# Patient Record
Sex: FEMALE | Race: WHITE | NOT HISPANIC OR LATINO | Employment: FULL TIME | URBAN - METROPOLITAN AREA
[De-identification: names, ages, dates, MRNs, and addresses within clinical notes are randomized per-mention and may not be internally consistent; named-entity substitution may affect disease eponyms.]

---

## 2017-01-02 ENCOUNTER — OFFICE VISIT (OUTPATIENT)
Dept: ORTHOPEDICS | Facility: CLINIC | Age: 57
End: 2017-01-02
Payer: OTHER MISCELLANEOUS

## 2017-01-02 VITALS — TEMPERATURE: 96.9 F | HEIGHT: 57 IN | BODY MASS INDEX: 33.22 KG/M2 | WEIGHT: 154 LBS

## 2017-01-02 DIAGNOSIS — G56.03 BILATERAL CARPAL TUNNEL SYNDROME: ICD-10-CM

## 2017-01-02 DIAGNOSIS — G56.21 ULNAR NERVE ENTRAPMENT AT THE WRIST, RIGHT: Primary | ICD-10-CM

## 2017-01-02 PROCEDURE — 99024 POSTOP FOLLOW-UP VISIT: CPT | Performed by: ORTHOPAEDIC SURGERY

## 2017-01-02 ASSESSMENT — PAIN SCALES - GENERAL: PAINLEVEL: NO PAIN (0)

## 2017-01-02 NOTE — NURSING NOTE
"Chief Complaint   Patient presents with     RECHECK     Release of Guyon's canal right wrist.  DOS: 11/23/16 (6 weeks postop)     Surgical Followup     PREOPERATIVE DIAGNOSIS:  Compression of the ulnar nerve at Guyon's canal, right wrist.  POSTOPERATIVE DIAGNOSIS:  Compression of the ulnar nerve at Guyon's canal, right wrist.  PROCEDURE:  Release of Guyon's canal, right wrist.       Initial Temp(Src) 96.9  F (36.1  C) (Temporal)  Ht 1.454 m (4' 9.25\")  Wt 69.854 kg (154 lb)  BMI 33.04 kg/m2 Estimated body mass index is 33.04 kg/(m^2) as calculated from the following:    Height as of this encounter: 1.454 m (4' 9.25\").    Weight as of this encounter: 69.854 kg (154 lb).  BP completed using cuff size: NA (Not Taken)  KASI Maldonado  "

## 2017-01-02 NOTE — Clinical Note
[address]   [address]  Tel. [phone] / Fax [phone]        2017   Regarding: Fern Gonzalez  : 1960          To Whom it May Concern:    Fern is under my care.  Fern may return to work on 1/3/17 with the following restrictions: 5 hour work days for 1 month.  On 17 she may resume normal work days.  If you have any further questions please contact me at [phone].        Sincerely,       Leif Fine M.D./bettina laguna

## 2017-01-02 NOTE — PROGRESS NOTES
"Office Visit-Follow up  HISTORY OF PRESENT ILLNESS:    Fern Gonzalez is a 56 year old female who is seen in follow up for   Chief Complaint   Patient presents with     RECHECK     Release of Guyon's canal right wrist.  DOS: 11/23/16 (6 weeks postop)     Surgical Followup     PREOPERATIVE DIAGNOSIS:  Compression of the ulnar nerve at Guyon's canal, right wrist.  POSTOPERATIVE DIAGNOSIS:  Compression of the ulnar nerve at Guyon's canal, right wrist.  PROCEDURE:  Release of Guyon's canal, right wrist.       Patient is motivated to get back to work this point in time  Present symptoms: Her only residual symptom is that of palmar sensitivity.  Treatments tried to this point: She has had bilateral carpal tunnel releases. She had concurrent left wrist Guyon canal release and then subsequently was taken back to the OR for a right Guyon's canal release.    REVIEW OF SYSTEMS  General: negative for, night sweats, dizziness, fatigue  Resp: No shortness of breath and no cough  CV: negative for chest pain, syncope or near-syncope  GI: negative for nausea, vomiting and diarrhea  : negative for dysuria and hematuria  Musculoskeletal: as above  Neurologic: negative for syncope   Hematologic: negative for bleeding disorder    Physical Exam:  Vitals: Temp(Src) 96.9  F (36.1  C) (Temporal)  Ht 1.454 m (4' 9.25\")  Wt 69.854 kg (154 lb)  BMI 33.04 kg/m2  BMI= Body mass index is 33.04 kg/(m^2).  Constitutional: healthy, alert and no acute distress   Psychiatric: mentation appears normal and affect normal/bright  NEURO: no focal deficits  SKIN: no excoriation or erythema. No signs of infection.    GAIT: non-antalgic    JOINT/EXTREMITIES:     Inspection of her risks all well-healed.  She has local soft tissue induration. This is appropriate.  She has full range of motion of the digits and wrist.  Sensation is intact.    IMAGING INTERPRETATION: No x-rays obtained.       Impression:      ICD-10-CM    1. 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 Bilateral carpal tunnel syndrome G56.03        Patient is recovering very nicely from her wrist entrapment neuropathies.    Plan:   The above was reviewed with Fern and she agrees.     We would like to return to work. She unfortunately uses vibrating tools for 10 hour days.  We will release her to 5 hour work days. This will continue for 1 month. She will then resume her normal duties.    She was not accompanied by the [facility] today.    My plan would be to complete final paperwork in 2 months. This was explained to her. She was encouraged to return if she has any recurring issues. Otherwise, the paperwork be completed.  She was very comfortable with this approach.      Return to clinic 2, months, or PRN    Leif Fine MD

## 2017-01-02 NOTE — MR AVS SNAPSHOT
After Visit Summary   1/2/2017    Fern Gonzalez    MRN: [id number]           Patient Information     Date Of Birth          1960        Visit Information        Provider Department      1/2/2017 4:20 PM Leif Fine MD [facility]         Follow-ups after your visit        Your next 10 appointments already scheduled     Jan 06, 2017  9:30 AM   Return Sleep Patient with Deon Singh PA-C   [facility] ([facility])    [address] [place] [address]  [place] [address]   [phone]            Jan 06, 2017 11:15 AM   Treatment 45 with Joanie Moreland, OT   [facility] ([facility])    [address] [place] [address] [place] [address]   [phone]            Jan 13, 2017 11:15 AM   Treatment 45 with Joanie Moreland OT   [facility] ([facility])    [address] [place] [address] [place] [address]   [phone]            Jan 20, 2017 11:15 AM   Treatment 45 with Joanie Moreland OT   [facility] ([facility])    [address] [place] [address] [place] [address]   [phone]            Jan 27, 2017 11:15 AM   Treatment 45 with Joanie Moreland OT   [facility] ([facility])    [address] [place] [address] [place] [address]   [phone]            Jan 27, 2017 12:00 PM   Consult HOD with Lenora Norris RD   [facility] ([facility])    [address] [place] [address]   [phone]            Feb 01, 2017 10:45 AM   Treatment 45 with Joanie Moreland OT   [facility] ([facility])    [place] [address]   [phone]            Feb 10, 2017 10:30 AM   Treatment 45 with Joanie Moreland, OT   [facility]  "([facility])    [address] [place] [address]   [phone]            2017 10:30 AM   Treatment 45 with Joanie Moreland, OT   [facility] ([facility])    [address] [place] [address]   [phone]            2017 10:30 AM   Treatment 45 with Joanie Moreland, OT   [facility] ([facility])    [address] [place] [address]   [phone]              Who to contact     If you have questions or need follow up information about today's clinic visit or your schedule please contact [facility] directly at [phone].  Normal or non-critical lab and imaging results will be communicated to you by MyChart, letter or phone within 4 business days after the clinic has received the results. If you do not hear from us within 7 days, please contact the clinic through MyChart or phone. If you have a critical or abnormal lab result, we will notify you by phone as soon as possible.  Submit refill requests through [email or web address] or call your pharmacy and they will forward the refill request to us. Please allow 3 business days for your refill to be completed.          Additional Information About Your Visit        [employer] Information     [email or web address] lets you send messages to your doctor, view your test results, renew your prescriptions, schedule appointments and more. To sign up, go to [email or web address] . Click on \"Log in\" on the left side of the screen, which will take you to the Welcome page. Then click on \"Sign up Now\" on the right side of the page.     You will be asked to enter the access code listed below, as well as some personal information. Please follow the directions to create your username and password.     Your access code is: [email or web address]  Expires: 2017  3:52 PM     Your access code will  in 90 days. If you need help or a new " "code, please call your [facility] or [phone].        Your Vitals Were     Temperature Height BMI (Body Mass Index)             96.9  F (36.1  C) (Temporal) 1.454 m (4' 9.25\") 33.04 kg/m2          Blood Pressure from Last 3 Encounters:   12/13/16 113/62   11/23/16 106/77   11/18/16 112/66    Weight from Last 3 Encounters:   01/02/17 69.854 kg (154 lb)   12/28/16 70.58 kg (155 lb 9.6 oz)   12/13/16 69.854 kg (154 lb)              Today, you had the following     No orders found for display       Primary Care Provider Office Phone # Fax #    Mirta Fan [phone]       [facility] [place] [address] [place] [address]        Thank you!     Thank you for choosing [facility]  for your care. Our goal is always to provide you with excellent care. Hearing back from our patients is one way we can continue to improve our services. Please take a few minutes to complete the written survey that you may receive in the mail after your visit with us. Thank you!             Your Updated Medication List - Protect others around you: Learn how to safely use, store and throw away your medicines at www.disposemymeds.org.          This list is accurate as of: 1/2/17  4:23 PM.  Always use your most recent med list.                   Brand Name Dispense Instructions for use    fexofenadine 180 MG tablet    ALLEGRA    30 tablet    Take 1 tablet (180 mg) by mouth daily       ibuprofen 200 MG tablet    ADVIL/MOTRIN    120 tablet    Take 3 tablets (600 mg) by mouth every 6 hours as needed       ipratropium - albuterol 0.5 mg/2.5 mg/3 mL 0.5-2.5 (3) MG/3ML neb solution    DUONEB    30 vial    Take 1 vial (3 mLs) by nebulization every 6 hours as needed for shortness of breath / dyspnea or wheezing       order for DME     1 each    Equipment being ordered: Other: nebulizer machine Treatment Diagnosis: acute bronchospasm, respiratory distress  Use as directed with albuterol       " Vitamin D3 3000 UNITS Tabs          ZOFRAN PO      Take 4 mg by mouth every 6 hours as needed for nausea or vomiting       zolpidem 10 MG tablet    AMBIEN    1 tablet    Take tablet by mouth 15 minutes prior to sleep, for Sleep Study

## 2017-01-13 ENCOUNTER — HOSPITAL ENCOUNTER (OUTPATIENT)
Dept: OCCUPATIONAL THERAPY | Facility: CLINIC | Age: 57
Setting detail: THERAPIES SERIES
End: 2017-01-13
Attending: PHYSICIAN ASSISTANT
Payer: OTHER MISCELLANEOUS

## 2017-01-13 ENCOUNTER — OFFICE VISIT (OUTPATIENT)
Dept: SLEEP MEDICINE | Facility: CLINIC | Age: 57
End: 2017-01-13
Payer: COMMERCIAL

## 2017-01-13 VITALS
HEIGHT: 57 IN | HEART RATE: 82 BPM | DIASTOLIC BLOOD PRESSURE: 73 MMHG | OXYGEN SATURATION: 95 % | BODY MASS INDEX: 33.44 KG/M2 | WEIGHT: 155 LBS | SYSTOLIC BLOOD PRESSURE: 107 MMHG

## 2017-01-13 DIAGNOSIS — G47.33 OSA (OBSTRUCTIVE SLEEP APNEA): Primary | ICD-10-CM

## 2017-01-13 PROCEDURE — 99213 OFFICE O/P EST LOW 20 MIN: CPT | Performed by: PHYSICIAN ASSISTANT

## 2017-01-13 PROCEDURE — 97110 THERAPEUTIC EXERCISES: CPT | Mod: GO | Performed by: OCCUPATIONAL THERAPIST

## 2017-01-13 PROCEDURE — 97035 APP MDLTY 1+ULTRASOUND EA 15: CPT | Mod: GO | Performed by: OCCUPATIONAL THERAPIST

## 2017-01-13 PROCEDURE — 97140 MANUAL THERAPY 1/> REGIONS: CPT | Mod: GO | Performed by: OCCUPATIONAL THERAPIST

## 2017-01-13 PROCEDURE — 40000839 ZZH STATISTIC HAND THERAPY VISIT: Performed by: OCCUPATIONAL THERAPIST

## 2017-01-13 NOTE — MR AVS SNAPSHOT
After Visit Summary   1/13/2017    Fern Gonzalez    MRN: [id number]           Patient Information     Date Of Birth          1960        Visit Information        Provider Department      1/13/2017 9:00 AM Deon Singh PA-C [facility]        Care Instructions      Your BMI is There is no weight on file to calculate BMI.  Weight management is a personal decision.  If you are interested in exploring weight loss strategies, the following discussion covers the approaches that may be successful. Body mass index (BMI) is one way to tell whether you are at a healthy weight, overweight, or obese. It measures your weight in relation to your height.  A BMI of 18.5 to 24.9 is in the healthy range. A person with a BMI of 25 to 29.9 is considered overweight, and someone with a BMI of 30 or greater is considered obese. More than two-thirds of American adults are considered overweight or obese.  Being overweight or obese increases the risk for further weight gain. Excess weight may lead to heart disease and diabetes.  Creating and following plans for healthy eating and physical activity may help you improve your health.  Weight control is part of healthy lifestyle and includes exercise, emotional health, and healthy eating habits. Careful eating habits lifelong are the mainstay of weight control. Though there are significant health benefits from weight loss, long-term weight loss with diet alone may be very difficult to achieve- studies show long-term success with dietary management in less than 10% of people. Attaining a healthy weight may be especially difficult to achieve in those with severe obesity. In some cases, medications, devices and surgical management might be considered.  What can you do?  If you are overweight or obese and are interested in methods for weight loss, you should discuss this with your provider.     Consider reducing daily calorie intake by 500  calories.     Keep a food journal.     Avoiding skipping meals, consider cutting portions instead.    Diet combined with exercise helps maintain muscle while optimizing fat loss. Strength training is particularly important for building and maintaining muscle mass. Exercise helps reduce stress, increase energy, and improves fitness. Increasing exercise without diet control, however, may not burn enough calories to loose weight.       Start walking three days a week 10-20 minutes at a time    Work towards walking thirty minutes five days a week     Eventually, increase the speed of your walking for 1-2 minutes at time    In addition, we recommend that you review healthy lifestyles and methods for weight loss available through the National Institutes of Health patient information sites:  http://win.niddk.nih.gov/publications/index.htm    And look into health and wellness programs that may be available through your health insurance provider, employer, local community center, or ghislaine club.    Weight management plan: Patient was referred to their PCP to discuss a diet and exercise plan.    The problem of recurrent insomnia is discussed. Avoidance of caffeine sources is strongly encouraged. Sleep hygiene issues are reviewed. The use of sedative hypnotics for temporary relief is appropriate; we discussed the addictive nature of these drugs, and a one-time only prescription for prn use of a hypnotic is given, to use no more than 3 times per week for 2-3 weeks.    Sometimes insomnia can be made worse by our own habits or situation.  You should consider making some of the following changes to help improve your sleep:     If there is excessive noise in your house / neighborhood use a fan or similar device to make a quiet background noise that can drown out other noises    If your room is too bright early in the morning, hang a blanket or extra curtain over the windows to keep the light out or use a sleeping mask to cover your  eyes    If your room is too warm during the night, set the temperature in the house down a few degrees for the night    Spend a little time before bedtime trying to relax.  Don t work right up until bedtime.  Take 30-60 minutes to relax and unwind before bedtime with a book or watching TV    Do not drink anything with alcohol or caffeine in them for at least 4-5 hours before bedtime    Do not smoke right before bedtime or during the night    No strenuous exercise for 2-3 hours before bedtime    The problem of recurrent insomnia is discussed. Avoidance of caffeine sources is strongly encouraged. Sleep hygiene issues are reviewed. The use of sedative hypnotics for temporary relief is appropriate; we discussed the addictive nature of these drugs, and a one-time only prescription for prn use of a hypnotic is given, to use no more than 3 times per week for 2-3 weeks.  Stress, tension, and anxiety can be big factors contributing to insomnia.  If you can t relax your mind and body, then you won t be able to sleep.  You would likely benefit from trying some of the relaxation exercises that we ve assembled below.  These are all internet based links.  If you don t have or use a computer, you may benefit from one of the stress management books listed below that you can get at your public library or at a local bookstore for a relatively low price.      Copy and paste into web browser address window   https://www.[employer].com/watch?v=Eown9dgAyZg    Progressive Muscle Relaxation (PMR):     http://www.[employer]Hawk/progressive-muscle-relaxation-exercise.html     http://studentsupport.[facility]/counseling/resources/self-help/relaxation-and-stress-management/   Deep Breathing Exercises:    http://www.[employer]Hawk/breathing-awareness.html     Meditation:     www.CityVoter    www.theOcera Therapeuticsguided-meditation-site.com You may have to pay for some of these resources.    Guided  Imagery:    http://www.[place].MODIZY.COM/guided-imagery-scripts.html     http://S[employer]aCamp/library/guqohyoqwy-kgakip-monrrym/    [place] site under construction : http://www.[place].[facility]/Services/SleepMedicine/Audio/index.htm        Follow-ups after your visit        Your next 10 appointments already scheduled     Jan 13, 2017 11:15 AM   Treatment 45 with Joanie Moreland OT   [facility] ([facility])    [address] [place] [address]   [phone]            Jan 20, 2017 11:15 AM   Treatment 45 with Joanie Moreland OT   [facility] ([facility])    [address] [place] [address]   [phone]            Jan 27, 2017 11:15 AM   Treatment 45 with Joanie Moreland OT   [facility] ([facility])    [address] [place] [address]   [phone]            Jan 27, 2017 12:00 PM   Consult HOD with Lenora Norris RD   [facility] ([facility])    [address] [place] [address] [place] [address]   [phone]            Feb 01, 2017 10:45 AM   Treatment 45 with Joanie Moreland OT   [facility] ([facility])    [address] [place] [address]   [phone]            Feb 10, 2017 10:30 AM   Treatment 45 with Joanie Moreland OT   [facility] ([facility])    [address] [place] [address]   [phone]            Feb 17, 2017 10:30 AM   Treatment 45 with Joanie Moreland OT   [facility] ([facility])    [address] [place] [address]   [phone]            Feb 24, 2017 10:30 AM   Treatment 45 with Joanie Moreland OT   [facility] ([facility])    [address] [place] [address] [phone]              Who to contact     If you have questions or need follow up information about today's clinic visit or your schedule please contact [facility] directly at [phone].  Normal or non-critical lab and imaging results will be communicated to you by MyChart, letter or phone within 4 business days after the clinic has received the results. If you do not hear from us within 7 days, please contact the clinic through MyChart or phone. If you have a critical or abnormal lab result, we will notify you by phone as soon as possible.  Submit refill requests through [employer] or call your pharmacy and they will forward the refill request to us. Please allow 3 business days for your refill to be completed.          Additional Information About Your Visit        MyChart Information     [employer]ulation lets you send messages to your doctor, view your test results, renew your prescriptions, schedule appointments and more. To sign up, go to [email or web address] . Click on \"Log in\" on the left side of the screen, which will take you to the Welcome page. Then click on \"Sign up Now\" on the right side of the page.     You will be asked to enter the access code listed below, as well as some personal information. Please follow the directions to create your username and password.     Your access code is: [email or web address]  Expires: 2017  3:52 PM     Your access code will  in 90 days. If you need help or a new code, please call your [facility] clinic or [phone].        Care EveryWhere ID     This is your Care EveryWhere ID. This could be used by other organizations to access your [facility] medical records  [id number]        Your Vitals Were     Pulse Height BMI (Body Mass Index) Pulse Oximetry          82 1.448 m (4' 9\") 33.53 kg/m2 95%         Blood Pressure from Last 3 Encounters:   17 107/73   16 113/62   16 106/77    Weight from " Last 3 Encounters:   01/13/17 70.308 kg (155 lb)   01/02/17 69.854 kg (154 lb)   12/28/16 70.58 kg (155 lb 9.6 oz)              Today, you had the following     No orders found for display         Today's Medication Changes          These changes are accurate as of: 1/13/17  9:31 AM.  If you have any questions, ask your nurse or doctor.               Stop taking these medicines if you haven't already. Please contact your care team if you have questions.     Vitamin D3 3000 UNITS Tabs   Stopped by:  Deon Singh PA-C           ZOFRAN PO   Stopped by:  Deon Singh PA-C           zolpidem 10 MG tablet   Commonly known as:  AMBIEN   Stopped by:  Deon Singh PA-C                    Primary Care Provider Office Phone # Fax #    Mirta Edilson Fan [phone]       [facility] [place] [address] [place] [address]        Thank you!     Thank you for choosing [facility]  for your care. Our goal is always to provide you with excellent care. Hearing back from our patients is one way we can continue to improve our services. Please take a few minutes to complete the written survey that you may receive in the mail after your visit with us. Thank you!             Your Updated Medication List - Protect others around you: Learn how to safely use, store and throw away your medicines at www.disposemymeds.org.          This list is accurate as of: 1/13/17  9:31 AM.  Always use your most recent med list.                   Brand Name Dispense Instructions for use    fexofenadine 180 MG tablet    ALLEGRA    30 tablet    Take 1 tablet (180 mg) by mouth daily       ibuprofen 200 MG tablet    ADVIL/MOTRIN    120 tablet    Take 3 tablets (600 mg) by mouth every 6 hours as needed       ipratropium - albuterol 0.5 mg/2.5 mg/3 mL 0.5-2.5 (3) MG/3ML neb solution    DUONEB    30 vial    Take 1 vial (3 mLs) by nebulization every 6 hours as needed for shortness of  breath / dyspnea or wheezing       order for DME     1 each    Equipment being ordered: Other: nebulizer machine Treatment Diagnosis: acute bronchospasm, respiratory distress  Use as directed with albuterol

## 2017-01-13 NOTE — NURSING NOTE
"Chief Complaint   Patient presents with     Sleep Problem     go over sleep study       Initial /73 mmHg  Pulse 82  Ht 1.448 m (4' 9\")  Wt 70.308 kg (155 lb)  BMI 33.53 kg/m2  SpO2 95% Estimated body mass index is 33.53 kg/(m^2) as calculated from the following:    Height as of this encounter: 1.448 m (4' 9\").    Weight as of this encounter: 70.308 kg (155 lb).  BP completed using cuff size: large  "

## 2017-01-13 NOTE — PATIENT INSTRUCTIONS
Your BMI is There is no weight on file to calculate BMI.  Weight management is a personal decision.  If you are interested in exploring weight loss strategies, the following discussion covers the approaches that may be successful. Body mass index (BMI) is one way to tell whether you are at a healthy weight, overweight, or obese. It measures your weight in relation to your height.  A BMI of 18.5 to 24.9 is in the healthy range. A person with a BMI of 25 to 29.9 is considered overweight, and someone with a BMI of 30 or greater is considered obese. More than two-thirds of American adults are considered overweight or obese.  Being overweight or obese increases the risk for further weight gain. Excess weight may lead to heart disease and diabetes.  Creating and following plans for healthy eating and physical activity may help you improve your health.  Weight control is part of healthy lifestyle and includes exercise, emotional health, and healthy eating habits. Careful eating habits lifelong are the mainstay of weight control. Though there are significant health benefits from weight loss, long-term weight loss with diet alone may be very difficult to achieve- studies show long-term success with dietary management in less than 10% of people. Attaining a healthy weight may be especially difficult to achieve in those with severe obesity. In some cases, medications, devices and surgical management might be considered.  What can you do?  If you are overweight or obese and are interested in methods for weight loss, you should discuss this with your provider.     Consider reducing daily calorie intake by 500 calories.     Keep a food journal.     Avoiding skipping meals, consider cutting portions instead.    Diet combined with exercise helps maintain muscle while optimizing fat loss. Strength training is particularly important for building and maintaining muscle mass. Exercise helps reduce stress, increase energy, and  improves fitness. Increasing exercise without diet control, however, may not burn enough calories to loose weight.       Start walking three days a week 10-20 minutes at a time    Work towards walking thirty minutes five days a week     Eventually, increase the speed of your walking for 1-2 minutes at time    In addition, we recommend that you review healthy lifestyles and methods for weight loss available through the National Institutes of Health patient information sites:  http://win.niddk.nih.gov/publications/index.htm    And look into health and wellness programs that may be available through your health insurance provider, employer, local community center, or ghislaine club.    Weight management plan: Patient was referred to their PCP to discuss a diet and exercise plan.    The problem of recurrent insomnia is discussed. Avoidance of caffeine sources is strongly encouraged. Sleep hygiene issues are reviewed. The use of sedative hypnotics for temporary relief is appropriate; we discussed the addictive nature of these drugs, and a one-time only prescription for prn use of a hypnotic is given, to use no more than 3 times per week for 2-3 weeks.    Sometimes insomnia can be made worse by our own habits or situation.  You should consider making some of the following changes to help improve your sleep:     If there is excessive noise in your house / neighborhood use a fan or similar device to make a quiet background noise that can drown out other noises    If your room is too bright early in the morning, hang a blanket or extra curtain over the windows to keep the light out or use a sleeping mask to cover your eyes    If your room is too warm during the night, set the temperature in the house down a few degrees for the night    Spend a little time before bedtime trying to relax.  Don t work right up until bedtime.  Take 30-60 minutes to relax and unwind before bedtime with a book or watching TV    Do not drink anything  with alcohol or caffeine in them for at least 4-5 hours before bedtime    Do not smoke right before bedtime or during the night    No strenuous exercise for 2-3 hours before bedtime    The problem of recurrent insomnia is discussed. Avoidance of caffeine sources is strongly encouraged. Sleep hygiene issues are reviewed. The use of sedative hypnotics for temporary relief is appropriate; we discussed the addictive nature of these drugs, and a one-time only prescription for prn use of a hypnotic is given, to use no more than 3 times per week for 2-3 weeks.  Stress, tension, and anxiety can be big factors contributing to insomnia.  If you can t relax your mind and body, then you won t be able to sleep.  You would likely benefit from trying some of the relaxation exercises that we ve assembled below.  These are all internet based links.  If you don t have or use a computer, you may benefit from one of the stress management books listed below that you can get at your public library or at a local bookstore for a relatively low price.      Copy and paste into web browser address window   https://www.[employer].com/watch?v=Yzab0eeRmLj    Progressive Muscle Relaxation (PMR):     http://www.[employer]/progressive-muscle-relaxation-exercise.html     http://studentsupport.[facility]/counseling/resources/self-help/relaxation-and-stress-management/   Deep Breathing Exercises:    http://www.[employer]/breathing-awareness.html     Meditation:     www.[employer]    www.[employer] IndustriesguidedExacastermeditation-site.com You may have to pay for some of these resources.    Guided Imagery:    http://www.[employer]/guided-imagery-scripts.html     http://[employer]anz[employer]/library/qgwvarscmb-rpumgv-gsdxfbz/    [employer]tis site under construction : http://www.fairview.org/Services/SleepMedicine/Audio/index.htm

## 2017-01-13 NOTE — PROGRESS NOTES
Sleep Study Follow-Up Visit:    Date on this visit: 1/13/2017    Fern Gonzalez comes in today for follow-up of her sleep study done on 12/15/2016 at the [facility] for excessive daytime sleepiness and possible sleep apnea.    Sleep latency 10.5 minutes with Ambien.  REM achieved.   REM latency 150 minutes.  Sleep efficiency 77.4%. Total sleep time 396.5 minutes.    Sleep architecture:  Stage 1, 8.4% (5%), stage 2, 72.4% (45-55%), stage 3, 2.3% (15-20%), stage REM, 16.9% (20-25%).  AHI was 1.8, without desaturations. RDI 4.5.  REM RDI 3.6, consistent with no REM NICOLE.  Supine RDI 3.1, consistent with no SUPINE NICOLE.  Periodic Limb Movement Index 8.9/hour.       These findings were reviewed with patient.     Past medical/surgical history, family history, social history, medications and allergies were reviewed.      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, right 11/08/2016     Priority: Medium     Lateral epicondylitis of right elbow 06/02/2016     Priority: Medium     Bilateral carpal tunnel syndrome 06/02/2016     Priority: Medium     Respiratory distress 09/01/2015     Priority: Medium     Wheezing-associated respiratory infection 09/01/2015     Priority: Medium     Tobacco use disorder 04/08/2015     Priority: Medium     Anxiety 08/15/2014     Priority: Medium     Major depressive disorder, recurrent episode, moderate (H) 05/14/2015     CARDIOVASCULAR SCREENING; LDL GOAL LESS THAN 160 10/07/2011     Meningitis due to viruses not elsewhere classified 10/03/2011     Herpes zoster with meningitis 10/03/2011     Chronic cholecystitis 01/22/2007        Impression/Plan:  No nicole, patient is given information regarding insomnia. Will follow up if she wishes to pursue further treatment.   She will follow up with me as needed.     Fifteen minutes spent with patient, all of which were spent face-to-face counseling, consulting,  coordinating plan of care.          CC: Mirta Fan

## 2017-02-03 ENCOUNTER — TELEPHONE (OUTPATIENT)
Dept: ORTHOPEDICS | Facility: CLINIC | Age: 57
End: 2017-02-03

## 2017-02-03 NOTE — TELEPHONE ENCOUNTER
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 please call to clarify.     Phone Number Patient can be reached at: Other phone number:  [employer] - [phone]      Best Time: today if possible     Can we leave a detailed message on this number? Not Applicable    Call taken on 2/3/2017 at 8:59 AM by Debora Perez

## 2017-02-03 NOTE — TELEPHONE ENCOUNTER
Left message for Lynda to return call.  Per last office notes:      Plan:   The above was reviewed with Fern and she agrees.      We would like to return to work. She unfortunately uses vibrating tools for 10 hour days.  We will release her to 5 hour work days. This will continue for 1 month. She will then resume her normal duties.    She was not accompanied by the [facility] today.    My plan would be to complete final paperwork in 2 months. This was explained to her. She was encouraged to return if she has any recurring issues. Otherwise, the paperwork be completed.  She was very comfortable with this approach.

## 2017-02-19 ENCOUNTER — HOSPITAL ENCOUNTER (EMERGENCY)
Facility: CLINIC | Age: 57
Discharge: HOME | End: 2017-02-19
Attending: PHYSICIAN ASSISTANT | Admitting: PHYSICIAN ASSISTANT
Payer: COMMERCIAL

## 2017-02-19 VITALS
BODY MASS INDEX: 31.28 KG/M2 | OXYGEN SATURATION: 94 % | HEART RATE: 78 BPM | RESPIRATION RATE: 16 BRPM | SYSTOLIC BLOOD PRESSURE: 114 MMHG | DIASTOLIC BLOOD PRESSURE: 86 MMHG | HEIGHT: 57 IN | WEIGHT: 145 LBS | TEMPERATURE: 99.3 F

## 2017-02-19 DIAGNOSIS — J10.1 INFLUENZA A: ICD-10-CM

## 2017-02-19 LAB
FLUAV+FLUBV AG SPEC QL: ABNORMAL
FLUAV+FLUBV AG SPEC QL: POSITIVE
SPECIMEN SOURCE: ABNORMAL

## 2017-02-19 PROCEDURE — 87804 INFLUENZA ASSAY W/OPTIC: CPT | Performed by: FAMILY MEDICINE

## 2017-02-19 PROCEDURE — 25000125 ZZHC RX 250: Performed by: PHYSICIAN ASSISTANT

## 2017-02-19 PROCEDURE — 25000128 H RX IP 250 OP 636: Performed by: PHYSICIAN ASSISTANT

## 2017-02-19 PROCEDURE — 99284 EMERGENCY DEPT VISIT MOD MDM: CPT | Mod: 25

## 2017-02-19 PROCEDURE — 94640 AIRWAY INHALATION TREATMENT: CPT

## 2017-02-19 PROCEDURE — 99284 EMERGENCY DEPT VISIT MOD MDM: CPT | Performed by: PHYSICIAN ASSISTANT

## 2017-02-19 PROCEDURE — 96372 THER/PROPH/DIAG INJ SC/IM: CPT

## 2017-02-19 RX ORDER — KETOROLAC TROMETHAMINE 30 MG/ML
30 INJECTION, SOLUTION INTRAMUSCULAR; INTRAVENOUS ONCE
Status: COMPLETED | OUTPATIENT
Start: 2017-02-19 | End: 2017-02-19

## 2017-02-19 RX ORDER — IPRATROPIUM BROMIDE AND ALBUTEROL SULFATE 2.5; .5 MG/3ML; MG/3ML
3 SOLUTION RESPIRATORY (INHALATION) ONCE
Status: COMPLETED | OUTPATIENT
Start: 2017-02-19 | End: 2017-02-19

## 2017-02-19 RX ADMIN — KETOROLAC TROMETHAMINE 30 MG: 30 INJECTION, SOLUTION INTRAMUSCULAR at 14:49

## 2017-02-19 RX ADMIN — IPRATROPIUM BROMIDE AND ALBUTEROL SULFATE 3 ML: .5; 3 SOLUTION RESPIRATORY (INHALATION) at 14:51

## 2017-02-19 ASSESSMENT — ENCOUNTER SYMPTOMS
CHILLS: 1
CHEST TIGHTNESS: 1
FEVER: 0
SHORTNESS OF BREATH: 0
SORE THROAT: 0
VOMITING: 0
NAUSEA: 0
SINUS PRESSURE: 1
DIARRHEA: 0
COUGH: 1
HEADACHES: 1
ABDOMINAL PAIN: 0

## 2017-02-19 NOTE — ED AVS SNAPSHOT
[facility] Emergency Department    [address] [place] [address]    Phone:  [phone]    Fax:  [phone]                                       Fern Gonzalez   MRN: [id number]    Department:  [facility] Emergency Department   Date of Visit:  2/19/2017           After Visit Summary Signature Page     I have received my discharge instructions, and my questions have been answered. I have discussed any challenges I see with this plan with the nurse or doctor.    ..........................................................................................................................................  Patient/Patient Representative Signature      ..........................................................................................................................................  Patient Representative Print Name and Relationship to Patient    ..................................................               ................................................  Date                                            Time    ..........................................................................................................................................  Reviewed by Signature/Title    ...................................................              ..............................................  Date                                                            Time

## 2017-02-19 NOTE — DISCHARGE INSTRUCTIONS
Continue using ibuprofen or Tylenol for fever/headache/bodyaches.  You can also continue using Mucinex, NyQuil, and other over-the-counter cold/flu remedies for symptoms. Use your duo-neb treatments as needed for wheezing/worsening cough.  Be sure to get plenty of rest and drink lots of fluids to stay hydrated. You should start feeling better in 5-8 days.  Do not go to work until you are feeling better as this is very contagious. Return to the emergency department as discussed if your symptoms worsen or you develop difficulty breathing.  Thank you for choosing [facility] Emergency Department. It was a pleasure taking care of you today. If you have any questions, please call [phone].    Kareen Perez PA-C      Influenza (Adult)    Influenza is also called the flu. It is a viral illness that affects the air passages of your lungs. It is different from the common cold. The flu can easily be passed from one to person to another. It may be spread through the air by coughing and sneezing. Or it can be spread by touching the sick person and then touching your own eyes, nose, or mouth.  The flu starts 1 to 3 days after you are exposed to the flu virus. It may last for 1 to 2 weeks. You usually don t need to take antibiotics unless you have a complication. This might be an ear or sinus infection or pneumonia.  Symptoms of the flu may be mild or severe. They can include extreme tiredness (wanting to stay in bed all day), chills, fevers, muscle aches, soreness with eye movement, headache, and a dry, hacking cough.  Home care  Follow these guidelines when caring for yourself at home:    Avoid being around cigarette smoke, whether yours or other people s.    Acetaminophen or ibuprofen will help ease your fever, muscle aches, and headache. Don t give aspirin to anyone younger than 18 who has the flu. Aspirin can harm the liver.    Nausea and loss of appetite are common with the flu. Eat light meals. Drink 6  to 8 glasses of liquids every day. Good choices are water, sport drinks, soft drinks without caffeine, juices, tea, and soup. Extra fluids will also help loosen secretions in your nose and lungs.    Over-the-counter cold medicines will not make the flu go away faster. But the medicines may help with coughing, sore throat, and congestion in your nose and sinuses. Don t use a decongestant if you have high blood pressure.    Stay home until your fever has been gone for at least 24 hours without using medicine to reduce fever.  Follow-up care  Follow up with your health care provider, or as advised, if you are not getting better over the next week.  If you are 65 or older, talk with your provider about getting a pneumococcal vaccine every 5 years. You should also get this vaccine if you have chronic asthma or COPD. All adults should get a flu vaccine every fall. Ask your provider about this.  When to seek medical advice  Call your health care provider right away if any of these occur:    Cough with lots of colored sputum (mucus) or blood in your sputum    Chest pain, shortness of breath, wheezing, or difficulty breathing    Severe headache, or face, neck, or ear pain    New rash with fever    Fever of 101 F (38 C) oral or higher that doesn t get better with fever medicine    Confusion, behavior change, or seizure    Severe weakness or dizziness    You get a fever or cough after getting better for a few days

## 2017-02-19 NOTE — ED AVS SNAPSHOT
[facility] Emergency Department    [address] [place] [address]    Phone:  [phone]    Fax:  [phone]                                       Fern Gonzalez   MRN: [id number]    Department:  [facility] Emergency Department   Date of Visit:  2/19/2017           Patient Information     Date Of Birth          1960        Your diagnoses for this visit were:     Influenza A        You were seen by Kareen Perez PA-C.      Follow-up Information     Follow up with Mirta Fan NP In 1 week.    Specialty:  Nurse Practitioner - Family    Why:  As needed for persistent symptoms    Contact information:    [facility]  [address] [place] [address] [phone]          Follow up with [facility].    Specialty:  EMERGENCY MEDICINE    Why:  If symptoms worsen    Contact information:    [address] [place] [address] [address] [place] [address] [phone]    Additional information:    From [address]: Exit at [address] on south side of [place]. Turn right on [address]. Turn left at stoplight on [address]. [facility] will be in view two blocks ahead        Discharge Instructions           Continue using ibuprofen or Tylenol for fever/headache/bodyaches.  You can also continue using Mucinex, NyQuil, and other over-the-counter cold/flu remedies for symptoms.  Be sure to get plenty of rest and drink lots of fluids to stay hydrated. You should start feeling better in 5-8 days.  Do not go to work until you are feeling better as this is very contagious.  Thank you for choosing [facility] Emergency Department. It was a pleasure taking care of you today. If you have any questions, please call [phone].    Kareen Perez PA-C      Influenza (Adult)    Influenza is also called the flu. It is a viral illness that affects the air passages of your lungs. It is different from the common cold. The flu  can easily be passed from one to person to another. It may be spread through the air by coughing and sneezing. Or it can be spread by touching the sick person and then touching your own eyes, nose, or mouth.  The flu starts 1 to 3 days after you are exposed to the flu virus. It may last for 1 to 2 weeks. You usually don t need to take antibiotics unless you have a complication. This might be an ear or sinus infection or pneumonia.  Symptoms of the flu may be mild or severe. They can include extreme tiredness (wanting to stay in bed all day), chills, fevers, muscle aches, soreness with eye movement, headache, and a dry, hacking cough.  Home care  Follow these guidelines when caring for yourself at home:    Avoid being around cigarette smoke, whether yours or other people s.    Acetaminophen or ibuprofen will help ease your fever, muscle aches, and headache. Don t give aspirin to anyone younger than 18 who has the flu. Aspirin can harm the liver.    Nausea and loss of appetite are common with the flu. Eat light meals. Drink 6 to 8 glasses of liquids every day. Good choices are water, sport drinks, soft drinks without caffeine, juices, tea, and soup. Extra fluids will also help loosen secretions in your nose and lungs.    Over-the-counter cold medicines will not make the flu go away faster. But the medicines may help with coughing, sore throat, and congestion in your nose and sinuses. Don t use a decongestant if you have high blood pressure.    Stay home until your fever has been gone for at least 24 hours without using medicine to reduce fever.  Follow-up care  Follow up with your health care provider, or as advised, if you are not getting better over the next week.  If you are 65 or older, talk with your provider about getting a pneumococcal vaccine every 5 years. You should also get this vaccine if you have chronic asthma or COPD. All adults should get a flu vaccine every fall. Ask your provider about this.  When to  seek medical advice  Call your health care provider right away if any of these occur:    Cough with lots of colored sputum (mucus) or blood in your sputum    Chest pain, shortness of breath, wheezing, or difficulty breathing    Severe headache, or face, neck, or ear pain    New rash with fever    Fever of 101 F (38 C) oral or higher that doesn t get better with fever medicine    Confusion, behavior change, or seizure    Severe weakness or dizziness    You get a fever or cough after getting better for a few days           Future Appointments        Provider Department Dept Phone Center    2/24/2017 10:30 AM Joanie Moreland OT [facility] Occupational Therapy [phone] [facility]      24 Hour Appointment Hotline       To make an appointment at any [facility], call [phone] ([phone]). If you don't have a family doctor or clinic, we will help you find one. [facility] are conveniently located to serve the needs of you and your family.             Review of your medicines      Our records show that you are taking the medicines listed below. If these are incorrect, please call your family doctor or clinic.        Dose / Directions Last dose taken    fexofenadine 180 MG tablet   Commonly known as:  ALLEGRA   Dose:  180 mg   Quantity:  30 tablet        Take 1 tablet (180 mg) by mouth daily   Refills:  0        ibuprofen 200 MG tablet   Commonly known as:  ADVIL/MOTRIN   Dose:  600 mg   Quantity:  120 tablet        Take 3 tablets (600 mg) by mouth every 6 hours as needed   Refills:  0        ipratropium - albuterol 0.5 mg/2.5 mg/3 mL 0.5-2.5 (3) MG/3ML neb solution   Commonly known as:  DUONEB   Dose:  1 vial   Quantity:  30 vial        Take 1 vial (3 mLs) by nebulization every 6 hours as needed for shortness of breath / dyspnea or wheezing   Refills:  1        order for DME   Quantity:  1 each        Equipment being ordered: Other: nebulizer machine Treatment Diagnosis: acute  "bronchospasm, respiratory distress  Use as directed with albuterol   Refills:  0                Procedures and tests performed during your visit     Influenza A/B antigen      Orders Needing Specimen Collection     None      Pending Results     No orders found from 2017 to 2017.            Pending Culture Results     No orders found from 2017 to 2017.            Thank you for choosing [facility]       Thank you for choosing [facility] for your care. Our goal is always to provide you with excellent care. Hearing back from our patients is one way we can continue to improve our services. Please take a few minutes to complete the written survey that you may receive in the mail after you visit with us. Thank you!        [employer] Information     [employer] lets you send messages to your doctor, view your test results, renew your prescriptions, schedule appointments and more. To sign up, go to [email or web address] . Click on \"Log in\" on the left side of the screen, which will take you to the Welcome page. Then click on \"Sign up Now\" on the right side of the page.     You will be asked to enter the access code listed below, as well as some personal information. Please follow the directions to create your username and password.     Your access code is: [email or web address]  Expires: 2017  3:52 PM     Your access code will  in 90 days. If you need help or a new code, please call your [facility] clinic or [phone].        Care EveryWhere ID     This is your Care EveryWhere ID. This could be used by other organizations to access your [facility] medical records  [id number]        After Visit Summary       This is your record. Keep this with you and show to your community pharmacist(s) and doctor(s) at your next visit.                  "

## 2017-02-19 NOTE — LETTER
[facility] EMERGENCY DEPARTMENT  [address] [place] [address]  [phone]  Dept: [phone]      2/19/2017    Re: Fern Gonzalez      TO WHOM IT MAY CONCERN:    Fern Gonzalez was seen on 2/19/17.  Please excuse her until 2/27/17 due to illness.    Cordially      Kareen Perez PA-C   [facility] EMERGENCY DEPARTMENT  
Patient

## 2017-02-19 NOTE — ED PROVIDER NOTES
"  History     Chief Complaint   Patient presents with     Flu Symptoms     Otalgia     Cough     HPI  Fern Gonzalez is a 56 year old female who presents to the emergency department with flu symptoms.  2 days ago she developed sinus pressure/congestion with runny nose, right ear pain, dry cough, chest tightness, body aches, chills, and sinus headache.  She has tried Mucinex and NyQuil without significant relief.  Right ear feels like there is a lot of pressure behind it.  Headache also described as a pressure behind her right eye.  She denies significant shortness of breath but reports breathing feeling \"tight\" and wheezy.  No chest pain, no vomiting or diarrhea.  She did not get her flu shot this year.  Many people at her work have had similar symptoms.  She is a smoker.     I have reviewed the Medications, Allergies, Past Medical and Surgical History, and Social History in the Epic system.    Review of Systems   Constitutional: Positive for chills. Negative for fever.   HENT: Positive for congestion, ear pain (right) and sinus pressure. Negative for sore throat.    Eyes: Negative for visual disturbance.   Respiratory: Positive for cough and chest tightness. Negative for shortness of breath.    Cardiovascular: Negative for chest pain.   Gastrointestinal: Negative for abdominal pain, diarrhea, nausea and vomiting.   Neurological: Positive for headaches.   All other systems reviewed and are negative.      Physical Exam   BP: 114/86  Pulse: 115  Temp: 99.3  F (37.4  C)  Resp: 16  Height: 144.8 cm (4' 9\")  Weight: 65.8 kg (145 lb)  SpO2: 94 %  Physical Exam   Constitutional: She is oriented to person, place, and time. She appears well-developed and well-nourished. She appears ill. No distress.   HENT:   Head: Normocephalic and atraumatic.   Right Ear: External ear normal. A middle ear effusion is present.   Left Ear: Tympanic membrane and external ear normal.   Nose: Right sinus exhibits maxillary sinus tenderness. "   Mouth/Throat: Uvula is midline, oropharynx is clear and moist and mucous membranes are normal. No oropharyngeal exudate.   Cardiovascular: Normal rate, regular rhythm and normal heart sounds.  Exam reveals no gallop and no friction rub.    No murmur heard.  Pulmonary/Chest: Effort normal. No respiratory distress. She has wheezes (faint, expiratory at bases). She has no rales.   Abdominal: Soft. There is no tenderness. There is no rebound.   Neurological: She is alert and oriented to person, place, and time.   Skin: Skin is warm and dry. She is not diaphoretic.   Psychiatric: She has a normal mood and affect.   Nursing note and vitals reviewed.      ED Course     ED Course     Procedures  None     Labs Ordered and Resulted from Time of ED Arrival Up to the Time of Departure from the ED   INFLUENZA A/B ANTIGEN - Abnormal; Notable for the following:        Result Value    Influenza A Positive (*)     All other components within normal limits       Assessments & Plan (with Medical Decision Making)  Fern Gonzalez is a 56 year old female who presented to the emergency department with flulike symptoms.  This began 2 days ago and seems to be worsening.  She has been taking Mucinex and NyQuil without much relief. On arrival she had a temp of 99.3F and was tachycardic at 115. She had O2 saturations at 94% on room air. Exam revealed right ear effusion with maxillary sinus tenderness and scant expiratory wheezing at lung bases. Patient was given IM toradol for headache and a duo-neb treatment which helped open airway and improved wheezing. Patient reported mild relief of sinus pressure with toradol. She was swabbed for influenza and this came back positive for influenza A.  She is over 48 hours into the illness and has no underlying pulmonary disease so I do not think Tamiflu is indicated.  Patient was advised to use Tylenol or ibuprofen for Continued headache/bodyaches/fever.  She can also continue using over-the-counter  cold/flu therapies.  I recommended plenty of rest and oral hydration. She has a neb machine with duo-nebs at home that I recommended she use as needed for wheezing/chest tightness or worsening cough. She was given a work note to excuse her for the rest of the week. I discussed warning signs and symptoms of when she needs to return to the emergency department. She can otherwise follow up as needed in the clinic. Patient expressed understanding and was agreeable to this plan and to discharge.     I have reviewed the nursing notes.    I have reviewed the findings, diagnosis, plan and need for follow up with the patient.    New Prescriptions    No medications on file       Final diagnoses:   Influenza A       2/19/2017   [facility] EMERGENCY DEPARTMENT     Kareen Perez PA-C  02/19/17 [id number]

## 2017-03-01 ENCOUNTER — TELEPHONE (OUTPATIENT)
Dept: ORTHOPEDICS | Facility: CLINIC | Age: 57
End: 2017-03-01

## 2017-03-30 ENCOUNTER — OFFICE VISIT (OUTPATIENT)
Dept: ORTHOPEDICS | Facility: CLINIC | Age: 57
End: 2017-03-30
Payer: OTHER MISCELLANEOUS

## 2017-03-30 ENCOUNTER — TELEPHONE (OUTPATIENT)
Dept: ORTHOPEDICS | Facility: CLINIC | Age: 57
End: 2017-03-30

## 2017-03-30 VITALS — HEIGHT: 57 IN | TEMPERATURE: 98.8 F

## 2017-03-30 DIAGNOSIS — G56.21 ULNAR NERVE ENTRAPMENT AT THE WRIST, RIGHT: ICD-10-CM

## 2017-03-30 DIAGNOSIS — G56.03 BILATERAL CARPAL TUNNEL SYNDROME: Primary | ICD-10-CM

## 2017-03-30 PROCEDURE — 99213 OFFICE O/P EST LOW 20 MIN: CPT | Performed by: ORTHOPAEDIC SURGERY

## 2017-03-30 ASSESSMENT — PAIN SCALES - GENERAL: PAINLEVEL: SEVERE PAIN (7)

## 2017-03-30 NOTE — PROGRESS NOTES
"Office Visit-Follow up  HISTORY OF PRESENT ILLNESS:    Fern Gonzalez is a 56 year old female who is seen in follow up for   Chief Complaint   Patient presents with     RECHECK     Release of Guyon's canal right wrist.  DOS: 11/23/16 (4 months postop)  New pain in right wrist     Surgical Followup     PREOPERATIVE DIAGNOSIS:  Compression of the ulnar nerve at Guyon's canal, right wrist.  POSTOPERATIVE DIAGNOSIS:  Compression of the ulnar nerve at Guyon's canal, right wrist.  PROCEDURE:  Release of Guyon's canal, right wrist.     RECHECK     Left wrist pain.       Patient returns today 3 months after she was last seen.  In February she had called us requesting return to her normal duties. This includes 12 are work days where she will be using vibrating tools.  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 This was my plan at the time of her January follow-up appointment.  Present symptoms: She states that since returning to the full-time duty she has had discomfort in her hands with some neurologic symptoms.  Treatments tried to this point: There has been no treatment of her recurrent symptoms.    REVIEW OF SYSTEMS  General: negative for, night sweats, dizziness, fatigue  Resp: No shortness of breath and no cough  CV: negative for chest pain, syncope or near-syncope  GI: negative for nausea, vomiting and diarrhea  : negative for dysuria and hematuria  Musculoskeletal: as above  Neurologic: negative for syncope   Hematologic: negative for bleeding disorder    Physical Exam:  Vitals: Temp 98.8  F (37.1  C) (Temporal)  Ht 1.448 m (4' 9\")  BMI= There is no height or weight on file to calculate BMI.  Constitutional: healthy, alert and no acute distress   Psychiatric: mentation appears normal and affect normal/bright  NEURO: no focal deficits  SKIN: no excoriation or erythema. No signs of infection.    GAIT: non-antalgic    JOINT/EXTREMITIES:     Surgical wounds are well-healed. She still has a reasonably " well-maintained range of motion of the digits and wrist.  There is some sensitivity over the palms.    IMAGING INTERPRETATION: No x-rays were taken.       Impression:      ICD-10-CM    1. Bilateral carpal tunnel syndrome G56.03    2. 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  She now describes recurrent problems since returning to her normal duties.    Plan:   The above was reviewed with Fern.     We will take her off work with the hopes that complete rest will get her symptoms to reverse. We will ask her to take a regular dose of ibuprofen for 2 weeks.        Return to clinic 2, weeks, even if her symptoms have improved in the 2 weeks time we will have to seriously consider whether or not returning her to her previous level of employment is going to be appropriate for the long run.    Leif Fine MD

## 2017-03-30 NOTE — NURSING NOTE
"Chief Complaint   Patient presents with     RECHECK     Release of Guyon's canal right wrist.  DOS: 11/23/16 (4 months postop)  New pain in right wrist     Surgical Followup     PREOPERATIVE DIAGNOSIS:  Compression of the ulnar nerve at Guyon's canal, right wrist.  POSTOPERATIVE DIAGNOSIS:  Compression of the ulnar nerve at Guyon's canal, right wrist.  PROCEDURE:  Release of Guyon's canal, right wrist.     RECHECK     Left wrist pain.       Initial Temp 98.8  F (37.1  C) (Temporal)  Ht 1.448 m (4' 9\") Estimated body mass index is 31.38 kg/(m^2) as calculated from the following:    Height as of 2/19/17: 1.448 m (4' 9\").    Weight as of 2/19/17: 65.8 kg (145 lb).  Medication Reconciliation: complete   KASI Maldonado  "

## 2017-03-30 NOTE — MR AVS SNAPSHOT
"              After Visit Summary   3/30/2017    Fern Gonzalez    MRN: [id number]           Patient Information     Date Of Birth          1960        Visit Information        Provider Department      3/30/2017 10:20 AM Leif Fine MD [facility]        Today's Diagnoses     Bilateral carpal tunnel syndrome    -  1    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, right           Follow-ups after your visit        Your next 10 appointments already scheduled     Apr 13, 2017 10:20 AM CDT   Return Visit with Leif Fine MD   [facility] ([facility]    [address] [place] [id number] [phone]              Who to contact     If you have questions or need follow up information about today's clinic visit or your schedule please contact [facility] directly at [phone].  Normal or non-critical lab and imaging results will be communicated to you by iFrat Warshart, letter or phone within 4 business days after the clinic has received the results. If you do not hear from us within 7 days, please contact the clinic through iFrat Warshart or phone. If you have a critical or abnormal lab result, we will notify you by phone as soon as possible.  Submit refill requests through Vendigi or call your pharmacy and they will forward the refill request to us. Please allow 3 business days for your refill to be completed.          Additional Information About Your Visit        MyChart Information     Vendigi lets you send messages to your doctor, view your test results, renew your prescriptions, schedule appointments and more. To sign up, go to [email or web address] . Click on \"Log in\" on the left side of the screen, which will take you to the Welcome page. Then click on \"Sign up Now\" on the right side of the page.     You will be asked to enter the access code listed below, as well as some personal information. Please follow the directions to create " "your username and password.     Your access code is: [email or web address]  Expires: 2017 10:22 AM     Your access code will  in 90 days. If you need help or a new code, please call your [facility] or [phone].        Care EveryWhere ID     This is your Care EveryWhere ID. This could be used by other organizations to access your [facility] medical records  [id number]        Your Vitals Were     Temperature Height                98.8  F (37.1  C) (Temporal) 1.448 m (4' 9\")           Blood Pressure from Last 3 Encounters:   17 114/86   17 107/73   16 113/62    Weight from Last 3 Encounters:   17 65.8 kg (145 lb)   17 70.3 kg (155 lb)   17 69.9 kg (154 lb)              Today, you had the following     No orders found for display       Primary Care Provider Office Phone # Fax #    Mirta Fan [phone]       [facility] [address] [place] [address]        Thank you!     Thank you for choosing [facility]  for your care. Our goal is always to provide you with excellent care. Hearing back from our patients is one way we can continue to improve our services. Please take a few minutes to complete the written survey that you may receive in the mail after your visit with us. Thank you!             Your Updated Medication List - Protect others around you: Learn how to safely use, store and throw away your medicines at www.disposemymeds.org.          This list is accurate as of: 3/30/17 11:59 PM.  Always use your most recent med list.                   Brand Name Dispense Instructions for use    ibuprofen 200 MG tablet    ADVIL/MOTRIN    120 tablet    Take 3 tablets (600 mg) by mouth every 6 hours as needed       order for DME     1 each    Equipment being ordered: Other: nebulizer machine Treatment Diagnosis: acute bronchospasm, respiratory distress  Use as directed with albuterol         "

## 2017-03-30 NOTE — LETTER
[address] [address]  [phone]          March 30, 2017    RE:  Fern Gonzalez                                                                                                                                                       [address]  [facility] [address]            To whom it may concern:    Fern Gonzalez is under my professional care for bilateral wrist pain s/p carpal tunnel and guyons canal release.  Patient had resurgence of pain.  We will need to take her off work over the next 2 weeks.  She will follow up in 2 weeks for evaluation of symptoms.      Sincerely,        Leif Fine MD

## 2017-03-30 NOTE — TELEPHONE ENCOUNTER
Pt need paperwork for todays visit sent to [employer]/ [employer] Claim # [id number] Fax# [phone]/ Notes and Aftersummery visit also when next apt is scheduled.

## 2017-03-30 NOTE — LETTER
"  3/30/2017       RE: Fern Gonzalez  [address] [facility] [address]           Dear Colleague,    Thank you for referring your patient, Fern Gonzalez, to the [facility]. Please see a copy of my visit note below.    Office Visit-Follow up  HISTORY OF PRESENT ILLNESS:    Fern Gonzalez is a 56 year old female who is seen in follow up for   Chief Complaint   Patient presents with     RECHECK     Release of Guyon's canal right wrist.  DOS: 11/23/16 (4 months postop)  New pain in right wrist     Surgical Followup     PREOPERATIVE DIAGNOSIS:  Compression of the ulnar nerve at Guyon's canal, right wrist.  POSTOPERATIVE DIAGNOSIS:  Compression of the ulnar nerve at Guyon's canal, right wrist.  PROCEDURE:  Release of Guyon's canal, right wrist.     RECHECK     Left wrist pain.       Patient returns today 3 months after she was last seen.  In February she had called us requesting return to her normal duties. This includes 12 are work days where she will be using vibrating tools.  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 This was my plan at the time of her January follow-up appointment.  Present symptoms: She states that since returning to the full-time duty she has had discomfort in her hands with some neurologic symptoms.  Treatments tried to this point: There has been no treatment of her recurrent symptoms.    REVIEW OF SYSTEMS  General: negative for, night sweats, dizziness, fatigue  Resp: No shortness of breath and no cough  CV: negative for chest pain, syncope or near-syncope  GI: negative for nausea, vomiting and diarrhea  : negative for dysuria and hematuria  Musculoskeletal: as above  Neurologic: negative for syncope   Hematologic: negative for bleeding disorder    Physical Exam:  Vitals: Temp 98.8  F (37.1  C) (Temporal)  Ht 1.448 m (4' 9\")  BMI= There is no height or weight on file to calculate BMI.  Constitutional: healthy, alert and no acute distress   Psychiatric: mentation " appears normal and affect normal/bright  NEURO: no focal deficits  SKIN: no excoriation or erythema. No signs of infection.    GAIT: non-antalgic    JOINT/EXTREMITIES:     Surgical wounds are well-healed. She still has a reasonably well-maintained range of motion of the digits and wrist.  There is some sensitivity over the palms.    IMAGING INTERPRETATION: No x-rays were taken.       Impression:      ICD-10-CM    1. Bilateral carpal tunnel syndrome G56.03    2. 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  She now describes recurrent problems since returning to her normal duties.    Plan:   The above was reviewed with Fern.     We will take her off work with the hopes that complete rest will get her symptoms to reverse. We will ask her to take a regular dose of ibuprofen for 2 weeks.        Return to clinic 2, weeks, even if her symptoms have improved in the 2 weeks time we will have to seriously consider whether or not returning her to her previous level of employment is going to be appropriate for the long run.    Leif Fine MD    Again, thank you for allowing me to participate in the care of your patient.        Sincerely,              Leif Fine MD

## 2017-04-13 ENCOUNTER — OFFICE VISIT (OUTPATIENT)
Dept: ORTHOPEDICS | Facility: CLINIC | Age: 57
End: 2017-04-13
Payer: OTHER MISCELLANEOUS

## 2017-04-13 VITALS — WEIGHT: 153 LBS | HEIGHT: 57 IN | TEMPERATURE: 99.4 F | BODY MASS INDEX: 33.01 KG/M2

## 2017-04-13 DIAGNOSIS — R20.0 NUMBNESS AND TINGLING OF BOTH UPPER EXTREMITIES WHILE SLEEPING: ICD-10-CM

## 2017-04-13 DIAGNOSIS — R20.2 NUMBNESS AND TINGLING OF BOTH UPPER EXTREMITIES WHILE SLEEPING: ICD-10-CM

## 2017-04-13 DIAGNOSIS — M54.12 CERVICAL RADICULOPATHY: Primary | ICD-10-CM

## 2017-04-13 PROCEDURE — 99214 OFFICE O/P EST MOD 30 MIN: CPT | Performed by: PHYSICIAN ASSISTANT

## 2017-04-13 ASSESSMENT — PAIN SCALES - GENERAL: PAINLEVEL: SEVERE PAIN (7)

## 2017-04-13 NOTE — LETTER
[address] [address]  [phone]          April 13, 2017    RE:  Fern MATT Lisa                                                                                                                                                       [address]  [facility] [address]            To whom it may concern:    Fern VERNELL Gonzalez is under my professional care for continued bilateral wrist/hand numbness and tingling s/p bilateral carpal tunnel release.  Please keep her off work at this time.      Sincerely,        Leif Fine MD

## 2017-04-13 NOTE — NURSING NOTE
"Chief Complaint   Patient presents with     RECHECK     Release of Guyon's canal right wrist.  DOS: 11/23/16 (5 months postop)      Surgical Followup     PREOPERATIVE DIAGNOSIS:  Compression of the ulnar nerve at Guyon's canal, right wrist.  POSTOPERATIVE DIAGNOSIS:  Compression of the ulnar nerve at Guyon's canal, right wrist.  PROCEDURE:  Release of Guyon's canal, right wrist.     RECHECK     Left wrist pain.     Work Comp       Initial Temp 99.4  F (37.4  C) (Temporal)  Ht 1.448 m (4' 9\")  Wt 69.4 kg (153 lb)  BMI 33.11 kg/m2 Estimated body mass index is 33.11 kg/(m^2) as calculated from the following:    Height as of this encounter: 1.448 m (4' 9\").    Weight as of this encounter: 69.4 kg (153 lb).  BP completed using cuff size: NA (Not Taken)  Medication Reconciliation: complete    Pilar Bolaños CMA, April 13, 2017  "

## 2017-04-13 NOTE — MR AVS SNAPSHOT
After Visit Summary   4/13/2017    Fern Gonzalez    MRN: [id number]           Patient Information     Date Of Birth          1960        Visit Information        Provider Department      4/13/2017 10:20 AM Leif Fine MD [facility]        Today's Diagnoses     Cervical radiculopathy    -  1    Numbness and tingling of both upper extremities while sleeping           Follow-ups after your visit        Additional Services     CHIROPRACTIC REFERRAL       Your provider has referred you to: [facility] [phone]   [email or web address]    Please be aware that coverage of these services is subject to the terms and limitations of your health insurance plan.  Call member services at your health plan with any benefit or coverage questions.      Please bring the following to your appointment:    >>   Any x-rays, CTs or MRIs which have been performed.  Contact the facility where they were done to arrange for  prior to your scheduled appointment.    >>   List of current medications   >>   This referral request   >>   Any documents/labs given to you for this referral                  Future tests that were ordered for you today     Open Future Orders        Priority Expected Expires Ordered    MR Cervical Spine w/o Contrast Routine  4/13/2018 4/13/2017            Who to contact     If you have questions or need follow up information about today's clinic visit or your schedule please contact [facility] directly at [phone].  Normal or non-critical lab and imaging results will be communicated to you by MyChart, letter or phone within 4 business days after the clinic has received the results. If you do not hear from us within 7 days, please contact the clinic through MyChart or phone. If you have a critical or abnormal lab result, we will notify you by phone as soon as possible.  Submit refill requests through  "MyChart or call your pharmacy and they will forward the refill request to us. Please allow 3 business days for your refill to be completed.          Additional Information About Your Visit        MyChart Information     Modern Armoryhart lets you send messages to your doctor, view your test results, renew your prescriptions, schedule appointments and more. To sign up, go to [email or web address] . Click on \"Log in\" on the left side of the screen, which will take you to the Welcome page. Then click on \"Sign up Now\" on the right side of the page.     You will be asked to enter the access code listed below, as well as some personal information. Please follow the directions to create your username and password.     Your access code is: [email or web address]  Expires: 2017 10:22 AM     Your access code will  in 90 days. If you need help or a new code, please call your [place] clinic or [phone].        Care EveryWhere ID     This is your Care EveryWhere ID. This could be used by other organizations to access your [place] medical records  [id number]        Your Vitals Were     Temperature Height BMI (Body Mass Index)             99.4  F (37.4  C) (Temporal) 1.448 m (4' 9\") 33.11 kg/m2          Blood Pressure from Last 3 Encounters:   17 114/86   17 107/73   16 113/62    Weight from Last 3 Encounters:   17 69.4 kg (153 lb)   17 65.8 kg (145 lb)   17 70.3 kg (155 lb)              We Performed the Following     CHIROPRACTIC REFERRAL        Primary Care Provider Office Phone # Fax #    Mirta Fan [phone]       [facility] [address] [place] [address]        Thank you!     Thank you for choosing [facility]  for your care. Our goal is always to provide you with excellent care. Hearing back from our patients is one way we can continue to improve our services. Please take a few minutes to complete the written survey that you " may receive in the mail after your visit with us. Thank you!             Your Updated Medication List - Protect others around you: Learn how to safely use, store and throw away your medicines at www.disposemymeds.org.          This list is accurate as of: 4/13/17 12:11 PM.  Always use your most recent med list.                   Brand Name Dispense Instructions for use    ibuprofen 200 MG tablet    ADVIL/MOTRIN    120 tablet    Take 3 tablets (600 mg) by mouth every 6 hours as needed       order for DME     1 each    Equipment being ordered: Other: nebulizer machine Treatment Diagnosis: acute bronchospasm, respiratory distress  Use as directed with albuterol

## 2017-04-13 NOTE — LETTER
"  4/13/2017       RE: Fern Gonzalez  [address] [facility] [address]           Dear Colleague,    Thank you for referring your patient, Fern Gonzalez, to the [facility]. Please see a copy of my visit note below.    HISTORY OF PRESENT ILLNESS:    Fern Gonzalez is a 56 year old female who is seen in follow up for   Chief Complaint   Patient presents with     RECHECK     Release of Guyon's canal right wrist.  DOS: 11/23/16 (5 months postop)      Surgical Followup     PREOPERATIVE DIAGNOSIS:  Compression of the ulnar nerve at Guyon's canal, right wrist.  POSTOPERATIVE DIAGNOSIS:  Compression of the ulnar nerve at Guyon's canal, right wrist.  PROCEDURE:  Release of Guyon's canal, right wrist.     RECHECK     Left wrist pain.     Work Comp   Interval: Patient reports she was doing well in both wrists postsurgically until she began working 12 hours per day. This work status irritated initially her right wrist and now she is experiencing pain in her left wrist. Patient just had 2 weeks off of work to try to determine if rest and a consistent anti-inflammatory could relieve her symptoms. She reports no change in symptoms. She describes her pain 6 out of 10. The pain distribution in her left hand which is greater than her right goes from the pinky across the volar portion of the wrist up to the thumb. The pain is greatest at night. Patient has been utilizing NyQuil at night just to allow for some sleep. She is using Advil 3 tablets 3 times daily. This is initially a work comp claim.  Patient evaluation done with Dr. Fine    Physical Exam:  Vitals: Temp 99.4  F (37.4  C) (Temporal)  Ht 1.448 m (4' 9\")  Wt 69.4 kg (153 lb)  BMI 33.11 kg/m2  BMI= Body mass index is 33.11 kg/(m^2).  Constitutional: healthy, alert and no acute distress   Psychiatric: mentation appears normal and affect normal/bright  NEURO: no focal deficits  Location of surgery:  Bilateral wrists  Tinel's at the wrist does not " repeat any pain.  Patient has good motion in both hands. There is no visible swelling or redness.    Cervical maneuvers are tested:  Chin to chest, looking over both shoulders, and tear to shoulder maneuvers do not cause repeatable symptoms. Patient looking up to the sealing reports some neck discomfort as well as bilateral hand tingling into all the tips of the fingers.     ASSESSMENT:    Chief Complaint   Patient presents with     RECHECK     Release of Guyon's canal right wrist.  DOS: 11/23/16 (5 months postop)      Surgical Followup     PREOPERATIVE DIAGNOSIS:  Compression of the ulnar nerve at Guyon's canal, right wrist.  POSTOPERATIVE DIAGNOSIS:  Compression of the ulnar nerve at Guyon's canal, right wrist.  PROCEDURE:  Release of Guyon's canal, right wrist.     RECHECK     Left wrist pain.     Work Comp       ICD-10-CM    1. Cervical radiculopathy M54.12 MR Cervical Spine w/o Contrast     CHIROPRACTIC REFERRAL   2. Numbness and tingling of both upper extremities while sleeping R20.0     R20.2      Patient is still with continued numbness and tingling of bilateral hands status post bilateral carpal tunnel release and release at Guyon's canal.  Cervical neck testing didn't cause some repeatable/familiar symptoms. Patient's pain symptoms began again after patient was working longer shifts in usual.    Plan:   Return to work: Patient would be unable to work with current symptomatology. Note was faxed to work comp claim person as well as a copy given to the patient.  We will obtain cervical spine MRI to further investigate bilateral numbness.  Chiropractic referral is obtained to assist patient with management of symptoms  Return to clinic after cervical spine MRI is obtained.    Ximena Cabral PA-C   4/13/2017  11:01 AM      I attest I have seen and evaluated the patient.  I agree with above impression and plan.    Leif Fine MD    Again, thank you for allowing me to participate in the care of your patient.         Sincerely,              Leif Fine MD

## 2017-04-13 NOTE — PROGRESS NOTES
"HISTORY OF PRESENT ILLNESS:    Fern Gonzalez is a 56 year old female who is seen in follow up for   Chief Complaint   Patient presents with     RECHECK     Release of Guyon's canal right wrist.  DOS: 11/23/16 (5 months postop)      Surgical Followup     PREOPERATIVE DIAGNOSIS:  Compression of the ulnar nerve at Guyon's canal, right wrist.  POSTOPERATIVE DIAGNOSIS:  Compression of the ulnar nerve at Guyon's canal, right wrist.  PROCEDURE:  Release of Guyon's canal, right wrist.     RECHECK     Left wrist pain.     Work Comp   Interval: Patient reports she was doing well in both wrists postsurgically until she began working 12 hours per day. This work status irritated initially her right wrist and now she is experiencing pain in her left wrist. Patient just had 2 weeks off of work to try to determine if rest and a consistent anti-inflammatory could relieve her symptoms. She reports no change in symptoms. She describes her pain 6 out of 10. The pain distribution in her left hand which is greater than her right goes from the pinky across the volar portion of the wrist up to the thumb. The pain is greatest at night. Patient has been utilizing NyQuil at night just to allow for some sleep. She is using Advil 3 tablets 3 times daily. This is initially a work comp claim.  Patient evaluation done with Dr. Fine    Physical Exam:  Vitals: Temp 99.4  F (37.4  C) (Temporal)  Ht 1.448 m (4' 9\")  Wt 69.4 kg (153 lb)  BMI 33.11 kg/m2  BMI= Body mass index is 33.11 kg/(m^2).  Constitutional: healthy, alert and no acute distress   Psychiatric: mentation appears normal and affect normal/bright  NEURO: no focal deficits  Location of surgery:  Bilateral wrists  Tinel's at the wrist does not repeat any pain.  Patient has good motion in both hands. There is no visible swelling or redness.    Cervical maneuvers are tested:  Chin to chest, looking over both shoulders, and chin to shoulder maneuvers do not cause repeatable symptoms. " Patient looking up to the ceiling reports some neck discomfort as well as bilateral hand tingling into all the tips of the fingers.     ASSESSMENT:    Chief Complaint   Patient presents with     RECHECK     Release of Guyon's canal right wrist.  DOS: 11/23/16 (5 months postop)      Surgical Followup     PREOPERATIVE DIAGNOSIS:  Compression of the ulnar nerve at Guyon's canal, right wrist.  POSTOPERATIVE DIAGNOSIS:  Compression of the ulnar nerve at Guyon's canal, right wrist.  PROCEDURE:  Release of Guyon's canal, right wrist.     RECHECK     Left wrist pain.     Work Comp       ICD-10-CM    1. Cervical radiculopathy M54.12 MR Cervical Spine w/o Contrast     CHIROPRACTIC REFERRAL   2. Numbness and tingling of both upper extremities while sleeping R20.0     R20.2      Patient is still with continued numbness and tingling of bilateral hands status post bilateral carpal tunnel release and release at Guyon's canal.  Cervical neck testing looking up did cause some repeatable/familiar symptoms. Patient's pain symptoms began again after patient was working longer shifts than usual.    Plan:   Return to work: Patient would be unable to work with current symptomatology. Note was faxed to work comp claim person as well as a copy given to the patient.  We will obtain cervical spine MRI to further investigate bilateral numbness.  Chiropractic referral is obtained to assist patient with management of symptoms  Return to clinic after cervical spine MRI is obtained.    Ximena Cabral PA-C   4/13/2017  11:01 AM      I attest I have seen and evaluated the patient.  I agree with above impression and plan.    Leif Fine MD

## 2017-04-28 ENCOUNTER — HOSPITAL ENCOUNTER (OUTPATIENT)
Dept: MRI IMAGING | Facility: CLINIC | Age: 57
Discharge: HOME | End: 2017-04-28
Attending: ORTHOPAEDIC SURGERY | Admitting: ORTHOPAEDIC SURGERY
Payer: OTHER MISCELLANEOUS

## 2017-04-28 DIAGNOSIS — M54.12 CERVICAL RADICULOPATHY: ICD-10-CM

## 2017-04-28 PROCEDURE — 72141 MRI NECK SPINE W/O DYE: CPT

## 2017-05-02 ENCOUNTER — OFFICE VISIT (OUTPATIENT)
Dept: ORTHOPEDICS | Facility: CLINIC | Age: 57
End: 2017-05-02
Payer: OTHER MISCELLANEOUS

## 2017-05-02 VITALS — HEIGHT: 57 IN | BODY MASS INDEX: 33.22 KG/M2 | WEIGHT: 154 LBS | TEMPERATURE: 98.4 F

## 2017-05-02 DIAGNOSIS — G56.03 BILATERAL CARPAL TUNNEL SYNDROME: Primary | ICD-10-CM

## 2017-05-02 DIAGNOSIS — M54.12 CERVICAL RADICULOPATHY: ICD-10-CM

## 2017-05-02 PROCEDURE — 99213 OFFICE O/P EST LOW 20 MIN: CPT | Performed by: ORTHOPAEDIC SURGERY

## 2017-05-02 ASSESSMENT — PAIN SCALES - GENERAL: PAINLEVEL: SEVERE PAIN (6)

## 2017-05-02 NOTE — LETTER
[address] [address]  [phone]          May 2nd, 2017    RE:  Fern Gonzalez                                                                                                                                                       [address]  [facility] [address]            To whom it may concern:    Fern Gonzalez is under my professional care for continued bilateral wrist/hand numbness and tingling s/p bilateral carpal tunnel release.  Please keep her off work at this time.During this interval she will start physical therapy. She will follow up in 3 weeks.       Sincerely,          Leif Fine MD

## 2017-05-02 NOTE — MR AVS SNAPSHOT
After Visit Summary   5/2/2017    Fern Gonzalez    MRN: [id number]           Patient Information     Date Of Birth          1960        Visit Information        Provider Department      5/2/2017 10:30 AM Leif Fine MD [facility]        Today's Diagnoses     Bilateral carpal tunnel syndrome    -  1    Cervical radiculopathy           Follow-ups after your visit        Additional Services     OCCUPATIONAL THERAPY REFERRAL       *This therapy referral will be filtered to a centralized scheduling office at [facility] and the patient will receive a call to schedule an appointment at a [facility] location most convenient for them. *     [facility] provides Occupational Therapy evaluation and treatment and many specialty services across the [facility] system.  If requesting a specialty program, please choose from the list below.    If you have not heard from the scheduling office within 2 business days, please call [phone] for all locations, with the exception of [place], please call [phone].     Treatment: Evaluation & Treatment  Special Instructions/Modalities: PRN  Special Programs: Eval and Treat    Please be aware that coverage of these services is subject to the terms and limitations of your health insurance plan.  Call member services at your health plan with any benefit or coverage questions.      **Note to Provider:  If you are referring outside of [facility] for the therapy appointment, please list the name of the location in the  special instructions  above, print the referral and give to the patient to schedule the appointment.                  Your next 10 appointments already scheduled     May 03, 2017 11:40 AM CDT   Office Visit with Marycarmen Flores MD   [facility]    [address] [facility] [address] [phone]           Bring a current list of  "meds and any records pertaining to this visit.  For Physicals, please bring immunization records and any forms needing to be filled out.  Please arrive 10 minutes early to complete paperwork.            May 15, 2017 11:00 AM CDT   Ortho Eval with Joanie Moreland OT   [facility] ([facility])    [address] [place] [address] [address] [place] [id number] [phone]            May 23, 2017 10:30 AM CDT   [facility] Visit with Leif Fine MD   [facility]    [address] [place] [id number] [phone]              Who to contact     If you have questions or need follow up information about today's clinic visit or your schedule please contact [facility] directly at [phone].  Normal or non-critical lab and imaging results will be communicated to you by MyChart, letter or phone within 4 business days after the clinic has received the results. If you do not hear from us within 7 days, please contact the clinic through [employer] or phone. If you have a critical or abnormal lab result, we will notify you by phone as soon as possible.  Submit refill requests through [employer] or call your pharmacy and they will forward the refill request to us. Please allow 3 business days for your refill to be completed.          Additional Information About Your Visit        MyChart Information     [employer] lets you send messages to your doctor, view your test results, renew your prescriptions, schedule appointments and more. To sign up, go to [email or web address] . Click on \"Log in\" on the left side of the screen, which will take you to the Welcome page. Then click on \"Sign up Now\" on the right side of the page.     You will be asked to enter the access code listed below, as well as some personal information. Please follow the directions to create your username and password.     Your access code is: [email or web address]  Expires: " "2017 10:22 AM     Your access code will  in 90 days. If you need help or a new code, please call your [facility] or [phone].        Care EveryWhere ID     This is your Care EveryWhere ID. This could be used by other organizations to access your [facility] medical records  [id number]        Your Vitals Were     Temperature Height BMI (Body Mass Index)             98.4  F (36.9  C) (Temporal) 1.448 m (4' 9\") 33.33 kg/m2          Blood Pressure from Last 3 Encounters:   17 114/86   17 107/73   16 113/62    Weight from Last 3 Encounters:   17 69.9 kg (154 lb)   17 69.4 kg (153 lb)   17 65.8 kg (145 lb)              We Performed the Following     OCCUPATIONAL THERAPY REFERRAL        Primary Care Provider Office Phone # Fax #    Mirta Fan [phone]       [facility] [address] [place] [address]        Thank you!     Thank you for choosing [facility]  for your care. Our goal is always to provide you with excellent care. Hearing back from our patients is one way we can continue to improve our services. Please take a few minutes to complete the written survey that you may receive in the mail after your visit with us. Thank you!             Your Updated Medication List - Protect others around you: Learn how to safely use, store and throw away your medicines at www.disposemymeds.org.          This list is accurate as of: 17 12:50 PM.  Always use your most recent med list.                   Brand Name Dispense Instructions for use    ibuprofen 200 MG tablet    ADVIL/MOTRIN    120 tablet    Take 3 tablets (600 mg) by mouth every 6 hours as needed       NYQUIL PO          order for DME     1 each    Equipment being ordered: Other: nebulizer machine Treatment Diagnosis: acute bronchospasm, respiratory distress  Use as directed with albuterol         "

## 2017-05-02 NOTE — NURSING NOTE
"Chief Complaint   Patient presents with     RECHECK     Review MRI     RECHECK     Release of Guyon's canal right wrist. DOS: 11/23/16 (5 months postop)        Initial Temp 98.4  F (36.9  C) (Temporal)  Ht 1.448 m (4' 9\")  Wt 69.9 kg (154 lb)  BMI 33.33 kg/m2 Estimated body mass index is 33.33 kg/(m^2) as calculated from the following:    Height as of this encounter: 1.448 m (4' 9\").    Weight as of this encounter: 69.9 kg (154 lb).  Medication Reconciliation: complete     Ida Campbell CMA (AAMA)      "

## 2017-05-02 NOTE — LETTER
"  5/2/2017       RE: Fern Gonzalez  [address]  [facility] [address]           Dear Colleague,    Thank you for referring your patient, Fern Gonzalez, to the [facility]. Please see a copy of my visit note below.    HISTORY OF PRESENT ILLNESS:    Fern Gonzalez is a 56 year old female who is seen in follow up for   Chief Complaint   Patient presents with     RECHECK     Review MRI     RECHECK     Release of Guyon's canal right wrist. DOS: 11/23/16 (5 months postop)    Present symptoms: Patient returned initially March 30 for repeat of numbness and tingling in bilateral hands. She has re: done a trial of 2 weeks of anti-inflammatory and taken off of work which offered limited relief in her symptoms. At last visit patient was expressing some neck discomfort and thought that this may be contributing to her numbness and tingling. Patient had reported that it was returning to work at 12 hour days that had exacerbated her symptoms. She is describing numbness and tingling in both the ulnar and carpal tunnel distributions of bilateral hands. Today she states the numbness that persisted in her left pinky is now gone.  Patient states she still has persistent pain into the palm of her hands.  Patient was asked about her smoking status. She states when she returned to work it was approximately 6 cigarettes per day. Since off work she has been up to about half a pack per day  Patient's work is repetitive.  Treatments tried to this point: Off work status, anti-inflammatory on a consistent basis.  Patient evaluation done with Dr. Fine    Physical Exam:  Vitals: Temp 98.4  F (36.9  C) (Temporal)  Ht 4' 9\" (1.448 m)  Wt 154 lb (69.9 kg)  BMI 33.33 kg/m2  BMI= Body mass index is 33.33 kg/(m^2).  Constitutional: healthy, alert and no acute distress   Psychiatric: mentation appears normal and affect normal/bright  NEURO: no focal deficits  SKIN: no excoriation or erythema. No signs of " infection.  JOINT/EXTREMITIES:  Affected extremity pulses are easily palpable.  Inspection of the hands:  There is no atrophy. There is no unusual swelling.    Cervical Spine Exam: Inspection: Patient holds her head in the normal position.  Patient reports no discomfort with any of the cervical maneuvers including looking up to the ceiling.  With pressure placed on her forehead while she is looking upwards she states some neck discomfort but no radiation of tingling into her fingers.    IMAGING INTERPRETATION: Cervical MRI reveals only mild disc protrusion C4 to C5 as well as C6 to C7. There is no significant stenosis. Findings on today's MRI would not explain persistent numbness and tingling she is experiencing.  Independent visualization of the images was performed.     ASSESSMENT:    Chief Complaint   Patient presents with     RECHECK     Review MRI     RECHECK     Release of Guyon's canal right wrist. DOS: 11/23/16 (5 months postop)        ICD-10-CM    1. Bilateral carpal tunnel syndrome G56.03 OCCUPATIONAL THERAPY REFERRAL   2. Cervical radiculopathy M54.12      Cervical radiculopathy is less likely a contributing factor to patient's persistent numbness and tingling as evidence by MRI. Patient does admit to some smoking which may have delayed the healing process as well as return to a very repetitive work situation. With the increased hours in the repetitive nature of her workplace she may have developed some unhealthy scarring for return of carpal tunnel symptoms.    Plan:   Our plan is patient to repeat occupational therapy to address persistent palmar pain. If physical therapy is ineffective the next step would be to inject bilateral carpal tunnel  A note is written for patient to continue off work status and she has had some relief Off of work.  A letter was provided to the patient and another letter sent to work comp .  Patient is also encouraged to aggressively decrease her smoking. She will  contact her primary care provider for this.  Return to clinic 3 weeks for reevaluation and effectiveness of occupational therapy    Scribed by  Ximena Cabral PA-C   5/2/2017  11:45 AM      I attest I have seen and evaluated the patient.  I agree with above impression and plan.    Leif Fine MD      Again, thank you for allowing me to participate in the care of your patient.        Sincerely,              Leif Fine MD

## 2017-05-02 NOTE — PROGRESS NOTES
"HISTORY OF PRESENT ILLNESS:    Fern Gonzalez is a 56 year old female who is seen in follow up for   Chief Complaint   Patient presents with     RECHECK     Review MRI     RECHECK     Release of Guyon's canal right wrist. DOS: 11/23/16 (5 months postop)    Present symptoms: Patient returned initially March 30 for repeat of numbness and tingling in bilateral hands. She has re: done a trial of 2 weeks of anti-inflammatory and taken off of work which offered limited relief in her symptoms. At last visit patient was expressing some neck discomfort and thought that this may be contributing to her numbness and tingling. Patient had reported that it was returning to work at 12 hour days that had exacerbated her symptoms. She is describing numbness and tingling in both the ulnar and carpal tunnel distributions of bilateral hands. Today she states the numbness that persisted in her left pinky is now gone.  Patient states she still has persistent pain into the palm of her hands.  Patient was asked about her smoking status. She states when she returned to work it was approximately 6 cigarettes per day. Since off work she has been up to about half a pack per day  Patient's work is repetitive.  Treatments tried to this point: Off work status, anti-inflammatory on a consistent basis.  Patient evaluation done with Dr. Fine    Physical Exam:  Vitals: Temp 98.4  F (36.9  C) (Temporal)  Ht 4' 9\" (1.448 m)  Wt 154 lb (69.9 kg)  BMI 33.33 kg/m2  BMI= Body mass index is 33.33 kg/(m^2).  Constitutional: healthy, alert and no acute distress   Psychiatric: mentation appears normal and affect normal/bright  NEURO: no focal deficits  SKIN: no excoriation or erythema. No signs of infection.  JOINT/EXTREMITIES:  Affected extremity pulses are easily palpable.  Inspection of the hands:  There is no atrophy. There is no unusual swelling.    Cervical Spine Exam: Inspection: Patient holds her head in the normal position.  Patient reports no " discomfort with any of the cervical maneuvers including looking up to the ceiling.  With pressure placed on her forehead while she is looking upwards she states some neck discomfort but no radiation of tingling into her fingers.    IMAGING INTERPRETATION: Cervical MRI reveals only mild disc protrusion C4 to C5 as well as C6 to C7. There is no significant stenosis. Findings on today's MRI would not explain persistent numbness and tingling she is experiencing.  Independent visualization of the images was performed.     ASSESSMENT:    Chief Complaint   Patient presents with     RECHECK     Review MRI     RECHECK     Release of Guyon's canal right wrist. DOS: 11/23/16 (5 months postop)        ICD-10-CM    1. Bilateral carpal tunnel syndrome G56.03 OCCUPATIONAL THERAPY REFERRAL   2. Cervical radiculopathy M54.12      Cervical radiculopathy is less likely a contributing factor to patient's persistent numbness and tingling as evidence by MRI. Patient does admit to some smoking which may have delayed the healing process as well as return to a very repetitive work situation. With the increased hours in the repetitive nature of her workplace she may have developed some unhealthy scarring for return of carpal tunnel symptoms.    Plan:   Our plan is patient to repeat occupational therapy to address persistent palmar pain. If physical therapy is ineffective the next step would be to inject bilateral carpal tunnel  A note is written for patient to continue off work status and she has had some relief Off of work.  A letter was provided to the patient and another letter sent to work comp .  Patient is also encouraged to aggressively decrease her smoking. She will contact her primary care provider for this.  Return to clinic 3 weeks for reevaluation and effectiveness of occupational therapy    Scribed by  Ximena Cabral PA-C   5/2/2017  11:45 AM      I attest I have seen and evaluated the patient.  I agree with above  impression and plan.    Leif Fine MD

## 2017-05-15 ENCOUNTER — HOSPITAL ENCOUNTER (OUTPATIENT)
Dept: OCCUPATIONAL THERAPY | Facility: CLINIC | Age: 57
Setting detail: THERAPIES SERIES
End: 2017-05-15
Attending: ORTHOPAEDIC SURGERY
Payer: OTHER MISCELLANEOUS

## 2017-05-15 DIAGNOSIS — G56.03 BILATERAL CARPAL TUNNEL SYNDROME: Primary | ICD-10-CM

## 2017-05-15 PROCEDURE — 97167 OT EVAL HIGH COMPLEX 60 MIN: CPT | Mod: GO | Performed by: OCCUPATIONAL THERAPIST

## 2017-05-15 PROCEDURE — 40000839 ZZH STATISTIC HAND THERAPY VISIT: Performed by: OCCUPATIONAL THERAPIST

## 2017-05-15 NOTE — PROGRESS NOTES
Occupational Therapy Evaluation     05/15/17 1100   Quick Adds   Quick Adds Fall Risk Screen   General Information/History   Start Of Care Date 05/15/17   Referring Physician Dr. Leif Fine    Orders Evaluate And Treat As Indicated;Other   Other Orders modalities PRN   Orders Date 05/02/17   Medical Diagnosis bilateral CTS G56.03   Precautions/Limitations currently not working due to symptoms   Additional Occupational Profile Info/Pertinent history of current problem The patient is a 55 y/o female who had previous OT services for bilateral CTS and guyon canal release 11/23/16.  Patient attempted to return to work and per EPIC chart review had been working 12 hour shifts.  Increased hand numbing, tingling and pain.  Loos of functional use of the hands.  Hand pain and numbing keeps her from sleeping, dressing, driving , house mgt,  yardwork, typing/writing, meal prep/clean up.    Previous treatment or current condition bilateral edema gloves and scar pads, (has wrist braces but not using)   Onset date of current episode/exacerbation 03/15/17   Date of surgery 11/23/16   Surgical procedure Bilateral carpal tunnel and guyons canal release    Chronicity Chronic   Hand Dominance Right   Affected side Bilateral   Functional limitations perform activities of daily living;perform required work activities;perform desired leisure / sports activities   Reported Symptoms Pain;Loss of Motion/Stiffness;Tingling;Numbness   QuickDASH [Functional Disability Questionnaire; 0-100 (0=no dysfunction; 100=dysfunction)] (UEFI: 24/80  (decreased hand function since last OT))   Important Activities work, home tasks, yardwork   Patient role/Employment history Employed   Occupation Diver finisher and training   Employment Status Currently not working due to present problem   Primary Job Tasks Gripping/pinching;Pushing/pulling;Repetitive tasks;Operating a machine;Lifting;Carrying;Reaching   Patient/Family goals  "statement \"to be pain free and return to work\"   Fall Risk Screen   Fall screen completed by OT   Have you fallen 2 or more times in the last year? No   Pain   Pain Other Pain Reported   Primary Pain Report   Location right   Radiation Hand   Pain Quality Stabbing;Shooting;Sharp   Frequency Constant   Scale 7/10  (10/10 during grasp)   Pain Is Same All Of The Time   Pain Is Exacerbated By Pinching;Gripping;Activity/movement;Carrying;Pushing   Pain Is Relieved By Heat;Ice   Progression Since Onset Unchanged   Other Pain Report   Location left    Radiation Hand   Pain Quality Sharp;Shooting;Stabbing   Frequency Constant   Scale 8/10  (10/10 with grasping)   Pain Is Same All Of The Time   Pain Is Exacerbated By Gripping;Pinching;Carrying;Activity/movement   Pain Is Relieved By Ice;Heat   Progression Since Onset Unchanged   ROM   ROM AROM;PROM   AROM   AROM Elbow;Wrist   Wrist   Wrist Extension - Left 42   Wrist Extension - Right 38   Wrist Flexion- Left 40   Wrist Flexion - Right 58   Wrist Supination- Left WFL   Wrist Supination - Right WFL   Wrist Pronation- Left WFL   Wrist Pronation - Right WFL   Radial Deviation- Left limitied due to pain   Radial Deviation - Right limitied due to pain   Ulnar Deviation- Left limited due to pain   Ulnar Deviation - Right limited due to pain   Elbow   Elbow Extension- Left -10   Elbow Extension - Right -8   Elbow Flexion- Left WFL   Elbow Flexion - Right WNL   PROM   PROM Wrist;Elbow   Wrist   Wrist Extension- Left 45   Wrist Extension - Right 45   Wrist Flexion- Left 45   Wrist Flexion - Right 60   Elbow   Elbow Extension- Left -2   Elbow Extension - Right -3   Strength   Strength Strength  (not tested due to pain)   Education Assessment   Preferred Learning Style Listening;Demonstration;Pictures/video   Barriers to Learning No barriers   Therapy Interventions   Planned Therapy Interventions Ultrasound;Iontophoresis (list drug);Paraffin;Strengthening;ROM;Stretching;Manual " Therapy;Splinting;Self Care/Home Management;Home Program   Therapy plan comments Ionophoresis patch with 4% dexamethasone   Clinical Impression   Criteria for Skilled Therapeutic Interventions Met yes;treatment indicated   OT Diagnosis Decreased functional use of the hands due to pain and loss of motion.  patient has been using protective posturing with arms due to pain; has decreased elbow ROM and tightness which is also contributing to hand pain in the common flexor muscle groups.   Influenced by the following impairments Pain;Decreased range of motion;Decreased strength;Impaired sensation   Assessment of Occupational Performance 5 or more Performance Deficits   Identified Performance Deficits dressing, bathing, hygiene/grooming, home mgt, yard work, meal prep/clean up, driving, work tasks/activities   Clinical Decision Making (Complexity) High complexity   Therapy Frequency 2x week   Predicted Duration of Therapy Intervention (days/wks) 12 weeks   Risks and Benefits of Treatment have been explained. Yes   Patient, Family & other staff in agreement with plan of care Yes   Clinical Impression Comments The patient has significant pain, muscle tightness and loss of functional use of her hands.  She is in constant movement with hands , to ease her pain.  She will benefit from OT services to address decreasing pain, increase smooth tissue response and improve function of the hands for BADL/IADls.   Hand Goals   Hand Goals Hygiene/Toileting;Household Chores;Driving;Sleeping   Hygiene/Toileting   Current Functional Task Brushing teeth;Brushing/combing hair;Washing back;Wiping   Previous Performance Level Independent   Current Performance Level Severe difficulty   Goal Target Task Style hair;Wash face;Hold toilet paper and wipe;Reach around to wipe;Hold brush/comb and brush/comb hair;Hold toothbrush and brush teeth   Goal Target Performance Level Mild difficulty   Due Date 08/15/17   Household Chores   Current Functional  Task Washing and drying dishes;Vacuuming;Sweeping;Cooking;Gripping   Previous Performance Level Independent   Current Performance Level Severe difficulty   Goal Target Task Hold dish rag and wash dishes;Reach shelves to put dishes away;Weight bear through hand to wash floors;Sweep floors;Hold spoon for stirring;Open a tight or new jar;Hold and lift plate;Carry a shopping bag;Push a shopping cart   Goal Target Performance Level Mild difficulty   Due Date 08/15/17   Driving   Current Functional Task Holding steering wheel;Shifting;Turning car key;Opening car door   Previous Performance Level Independent   Current Performance Level Severe difficulty   Goal Target Task  door handle;Pull car door open;Turn car key to start car; steering wheel   Goal Target Performance Level Mild difficulty   Due Date 08/15/17   Sleeping   Current Functional Task Sleeping through the night   Previous Performance Level Mild difficulty   Current Performance Level Severe difficulty   Goal Target Task Staying asleep   Goal Target Performance Level Mild difficulty   Due Date 08/15/17   Total Evaluation Time   Total Evaluation Time (Minutes) 45       Thank you for referring Fern To OT services to assist with decrease pain and improve function and return to work,    If you have any questions or concerns, please contact me at [phone].    Joanie Moreland MA, OTR/L  [facility]

## 2017-05-18 ENCOUNTER — HOSPITAL ENCOUNTER (OUTPATIENT)
Dept: OCCUPATIONAL THERAPY | Facility: CLINIC | Age: 57
Setting detail: THERAPIES SERIES
End: 2017-05-18
Attending: ORTHOPAEDIC SURGERY
Payer: OTHER MISCELLANEOUS

## 2017-05-18 PROCEDURE — 97033 APP MDLTY 1+IONTPHRSIS EA 15: CPT | Mod: GO | Performed by: OCCUPATIONAL THERAPIST

## 2017-05-18 PROCEDURE — 40000839 ZZH STATISTIC HAND THERAPY VISIT: Performed by: OCCUPATIONAL THERAPIST

## 2017-05-18 PROCEDURE — 97110 THERAPEUTIC EXERCISES: CPT | Mod: GO | Performed by: OCCUPATIONAL THERAPIST

## 2017-05-18 PROCEDURE — 97140 MANUAL THERAPY 1/> REGIONS: CPT | Mod: GO | Performed by: OCCUPATIONAL THERAPIST

## 2017-05-18 PROCEDURE — 97018 PARAFFIN BATH THERAPY: CPT | Mod: GO | Performed by: OCCUPATIONAL THERAPIST

## 2017-05-23 ENCOUNTER — OFFICE VISIT (OUTPATIENT)
Dept: ORTHOPEDICS | Facility: CLINIC | Age: 57
End: 2017-05-23
Payer: OTHER MISCELLANEOUS

## 2017-05-23 VITALS — HEIGHT: 57 IN | WEIGHT: 154 LBS | TEMPERATURE: 98.2 F | BODY MASS INDEX: 33.22 KG/M2

## 2017-05-23 DIAGNOSIS — Z98.890 S/P CARPAL TUNNEL RELEASE: Primary | ICD-10-CM

## 2017-05-23 DIAGNOSIS — M72.0 PALMAR FASCIITIS: ICD-10-CM

## 2017-05-23 PROCEDURE — 99213 OFFICE O/P EST LOW 20 MIN: CPT | Performed by: ORTHOPAEDIC SURGERY

## 2017-05-23 RX ORDER — MELOXICAM 7.5 MG/1
7.5 TABLET ORAL DAILY
Qty: 30 TABLET | Refills: 1 | Status: SHIPPED | OUTPATIENT
Start: 2017-05-23 | End: 2017-07-20

## 2017-05-23 RX ORDER — GABAPENTIN 300 MG/1
CAPSULE ORAL
Qty: 60 CAPSULE | Refills: 0 | Status: SHIPPED | OUTPATIENT
Start: 2017-05-23 | End: 2017-07-20

## 2017-05-23 ASSESSMENT — PAIN SCALES - GENERAL: PAINLEVEL: SEVERE PAIN (6)

## 2017-05-23 NOTE — LETTER
[address] [address]  [phone]          May 23, 2017    RE:  Fern MATT Lisa                                                                                                                                                       [address]  [facility] [address]            To whom it may concern:    Fern Gonzalez is under my professional care for continued bilateral hand and wrist pain postop carpal tunnel release. Please keep patient off work for another 4 weeks at which time she will be reevaluated      Sincerely,        Leif Fine MD

## 2017-05-23 NOTE — MR AVS SNAPSHOT
After Visit Summary   5/23/2017    Fern Gonzalez    MRN: [id number]           Patient Information     Date Of Birth          1960        Visit Information        Provider Department      5/23/2017 10:30 AM Leif Fine MD [facility]         Follow-ups after your visit        Your next 10 appointments already scheduled     May 25, 2017 11:15 AM CDT   Hand Treatment with Joanie Moreland, OT   [facility]ational [facility] ([facility])    [address] [place] [address] [place] [address]   [phone]            May 30, 2017  1:15 PM CDT   Hand Treatment with Joanie Moreland, OT   [facility] ([facility])    [address] [place] [address] [address] [place] [address]   [phone]            Jun 01, 2017 11:30 AM CDT   Hand Treatment with Joanie Moreland, OT   [facility] ([facility])    [address] [place] [address] [address] [place] [address]   [phone]            Jun 06, 2017 11:15 AM CDT   Hand Treatment with Joanie Moreland, OT   [facility] ([facility])    [address] [place] [address] [address] [place] [address]   [phone]            Jun 08, 2017 11:15 AM CDT   Hand Treatment with Joanie Moreland, OT   [facility] ([facility])    [address] [place] [address] [address] [place] [address]   [phone]            Jun 13, 2017 11:15 AM CDT   Hand Treatment with Joanie Moreland, OT   [facility] ([facility])    [address] [place] [address] [address] [place] [address]   [phone]            Terry 15, 2017 11:15 AM CDT   Hand Treatment with Joanie Moreland, OT   [facility] ([facility])    [facility] [address] [place] [address]   [phone]            Jun 20, 2017 11:00 AM CDT   Hand Treatment with Joanie MCKEON  "KIRBY Moreland   [facility] Occupational Therapy ([facility])    [address] [place] [address]   [phone]            Jun 22, 2017 11:00 AM CDT   Hand Treatment with Joanie Moreland, OT   [facility] Occupational Therapy ([facility])    [address] [place] [address]   [phone]            Jun 27, 2017 10:30 AM CDT   Hand Treatment with Joanie Moreland, OT   [facility] Occupational Therapy ([facility])    [address] [place] [address]   [phone]              Who to contact     If you have questions or need follow up information about today's clinic visit or your schedule please contact [facility] directly at [phone].  Normal or non-critical lab and imaging results will be communicated to you by MyChart, letter or phone within 4 business days after the clinic has received the results. If you do not hear from us within 7 days, please contact the clinic through [employer] or phone. If you have a critical or abnormal lab result, we will notify you by phone as soon as possible.  Submit refill requests through [employer] or call your pharmacy and they will forward the refill request to us. Please allow 3 business days for your refill to be completed.          Additional Information About Your Visit        MyChart Information     [employer] lets you send messages to your doctor, view your test results, renew your prescriptions, schedule appointments and more. To sign up, go to [email or web address] . Click on \"Log in\" on the left side of the screen, which will take you to the Welcome page. Then click on \"Sign up Now\" on the right side of the page.     You will be asked to enter the access code listed below, as well as some personal information. Please follow the directions to create your username and password.     Your access code is: [email or web address]  Expires: 6/28/2017 10:22 AM   " "  Your access code will  in 90 days. If you need help or a new code, please call your [place] clinic or [phone].        Care EveryWhere ID     This is your Care EveryWhere ID. This could be used by other organizations to access your [place] medical records  [id number]        Your Vitals Were     Temperature Height BMI (Body Mass Index)             98.2  F (36.8  C) (Temporal) 1.448 m (4' 9\") 33.33 kg/m2          Blood Pressure from Last 3 Encounters:   17 114/86   17 107/73   16 113/62    Weight from Last 3 Encounters:   17 69.9 kg (154 lb)   17 69.9 kg (154 lb)   17 69.4 kg (153 lb)              Today, you had the following     No orders found for display       Primary Care Provider Office Phone # Fax #    Mirta Fan [phone]       [facility] [place] [address] [place] [address]        Thank you!     Thank you for choosing [facility]  for your care. Our goal is always to provide you with excellent care. Hearing back from our patients is one way we can continue to improve our services. Please take a few minutes to complete the written survey that you may receive in the mail after your visit with us. Thank you!             Your Updated Medication List - Protect others around you: Learn how to safely use, store and throw away your medicines at www.disposemymeds.org.          This list is accurate as of: 17 10:32 AM.  Always use your most recent med list.                   Brand Name Dispense Instructions for use    ibuprofen 200 MG tablet    ADVIL/MOTRIN    120 tablet    Take 3 tablets (600 mg) by mouth every 6 hours as needed       NYQUIL PO      Reported on 2017       order for DME     1 each    Equipment being ordered: Other: nebulizer machine Treatment Diagnosis: acute bronchospasm, respiratory distress  Use as directed with albuterol         "

## 2017-05-23 NOTE — NURSING NOTE
"Chief Complaint   Patient presents with     Surgical Followup     Release of Guyon's canal right wrist. DOS: 11/23/16 (6 months postop)      Surgical Followup     Left carpal tunnel release and release of Guyon's canal.  DOS: 10/5/16 (7 months postop)     Surgical Followup     Right carpal tunnel release.  DOS: 9/7/16 (8 months postop)     Work Comp       Initial Temp 98.2  F (36.8  C) (Temporal)  Ht 1.448 m (4' 9\")  Wt 69.9 kg (154 lb)  BMI 33.33 kg/m2 Estimated body mass index is 33.33 kg/(m^2) as calculated from the following:    Height as of this encounter: 1.448 m (4' 9\").    Weight as of this encounter: 69.9 kg (154 lb).  Medication Reconciliation: complete   KASI Maldonado  "

## 2017-05-23 NOTE — PROGRESS NOTES
"HISTORY OF PRESENT ILLNESS:    Fern Gonzalez is a 56 year old female who is seen in follow up for   Chief Complaint   Patient presents with     Surgical Followup     Release of Guyon's canal right wrist. DOS: 11/23/16 (6 months postop)      Surgical Followup     Left carpal tunnel release and release of Guyon's canal.  DOS: 10/5/16 (7 months postop)     Surgical Followup     Right carpal tunnel release.  DOS: 9/7/16 (8 months postop)     Work Comp   Present symptoms: Patient reports her pain is about the same bilateral palmar surfaces of the hands and wrist. She reports no numbness and tingling into the fingers themselves. Patient has been to only 2 occupational therapy appointments. Although it was only 2 appointments she does feel like this could be beneficial as her palm surfaces feel much better during the treatment process. She does state the pain returns however after treatment. Patient has another 6 weeks of scheduled twice a week appointments for her bilateral hands and wrists. She has one more week available after the next 6 weeks for a total of 8 weeks if she needs further treatments. Patient has also decreased her cigarette smoking and she is now down to 6 or 8 cigarettes per day.  Patient has been off work for the past 2 months due to her recurrence of symptoms.  Treatments tried to this point: Patient just began occupational therapy treatments  Patient evaluation done with Dr. Fine    Physical Exam:  Vitals: Temp 98.2  F (36.8  C) (Temporal)  Ht 1.448 m (4' 9\")  Wt 69.9 kg (154 lb)  BMI 33.33 kg/m2  BMI= Body mass index is 33.33 kg/(m^2).  Constitutional: healthy, alert and no acute distress   Psychiatric: mentation appears normal and affect normal/bright  NEURO: no focal deficits  SKIN: no excoriation or erythema. No signs of infection.  JOINT/EXTREMITIES:  Affected extremity pulses are easily palpable.  Bilateral Hand/Finger Exam: Inspection: No visible redness or swelling  Range of Motion " patient's range of motion is not affected  Patient with tenderness at the palmar surface even with palpation     ASSESSMENT:    Chief Complaint   Patient presents with     Surgical Followup     Release of Guyon's canal right wrist. DOS: 11/23/16 (6 months postop)      Surgical Followup     Left carpal tunnel release and release of Guyon's canal.  DOS: 10/5/16 (7 months postop)     Surgical Followup     Right carpal tunnel release.  DOS: 9/7/16 (8 months postop)     Work Comp       ICD-10-CM    1. S/P carpal tunnel release Z98.890 gabapentin (NEURONTIN) 300 MG capsule     meloxicam (MOBIC) 7.5 MG tablet   2. Palmar fasciitis/pain bilateral M72.0     Patient had bilateral carpal tunnel releases done     Patient with continued palmar pain status post carpal tunnel release and Guyon's canal release. She has no numbness or tingling into the fingers.    Plan:   Patient to continue with occupational therapy as scheduled.  Patient will continue her smoking cessation  We will continue to keep patient off work over the next 4 weeks while she attends therapy. A note was written and given to her. We will make sure this information also gets to her work comp representative.  We have also prescribed patient gabapentin to address nerve pain and meloxicam to address inflammation. These were prescribed into [facility] pharmacy electronically.  Patient will return to clinic in 4 weeks for reevaluation      Scribed by  Ximena Cabral PA-C   5/23/2017  10:51 AM      I attest I have seen and evaluated the patient.  I agree with above impression and plan.    Leif Fine MD

## 2017-05-23 NOTE — LETTER
"  5/23/2017       RE: Fern Gonzalez  [address] [facility] [address]           Dear Colleague,    Thank you for referring your patient, Fern Gonzalez, to the [facility]. Please see a copy of my visit note below.    HISTORY OF PRESENT ILLNESS:    Fern Gonzalez is a 56 year old female who is seen in follow up for   Chief Complaint   Patient presents with     Surgical Followup     Release of Guyon's canal right wrist. DOS: 11/23/16 (6 months postop)      Surgical Followup     Left carpal tunnel release and release of Guyon's canal.  DOS: 10/5/16 (7 months postop)     Surgical Followup     Right carpal tunnel release.  DOS: 9/7/16 (8 months postop)     Work Comp   Present symptoms: Patient reports her pain is about the same bilateral palmar surfaces of the hands and wrist. She reports no numbness and tingling into the fingers themselves. Patient has been to only 2 occupational therapy appointments. Although it was only 2 appointments she does feel like this could be beneficial as her palm surfaces feel much better during the treatment process. She does state the pain returns however after treatment. Patient has another 6 weeks of scheduled twice a week appointments for her bilateral hands and wrists. She has one more week available after the next 6 weeks for a total of 8 weeks if she needs further treatments. Patient has also decreased her cigarette smoking and she is now down to 6 or 8 cigarettes per day.  Patient has been off work for the past 2 months due to her recurrence of symptoms.  Treatments tried to this point: Patient just began occupational therapy treatments  Patient evaluation done with Dr. Fine    Physical Exam:  Vitals: Temp 98.2  F (36.8  C) (Temporal)  Ht 1.448 m (4' 9\")  Wt 69.9 kg (154 lb)  BMI 33.33 kg/m2  BMI= Body mass index is 33.33 kg/(m^2).  Constitutional: healthy, alert and no acute distress   Psychiatric: mentation appears normal and affect " normal/bright  NEURO: no focal deficits  SKIN: no excoriation or erythema. No signs of infection.  JOINT/EXTREMITIES:  Affected extremity pulses are easily palpable.  Bilateral Hand/Finger Exam: Inspection: No visible redness or swelling  Range of Motion patient's range of motion is not affected  Patient with tenderness at the palmar surface even with palpation     ASSESSMENT:    Chief Complaint   Patient presents with     Surgical Followup     Release of Guyon's canal right wrist. DOS: 11/23/16 (6 months postop)      Surgical Followup     Left carpal tunnel release and release of Guyon's canal.  DOS: 10/5/16 (7 months postop)     Surgical Followup     Right carpal tunnel release.  DOS: 9/7/16 (8 months postop)     Work Comp       ICD-10-CM    1. S/P carpal tunnel release Z98.890 gabapentin (NEURONTIN) 300 MG capsule     meloxicam (MOBIC) 7.5 MG tablet   2. Palmar fasciitis/pain bilateral M72.0     Patient had bilateral carpal tunnel releases done     Patient with continued palmar pain status post carpal tunnel release and Guyon's canal release. She has no numbness or tingling into the fingers.    Plan:   Patient to continue with occupational therapy as scheduled.  Patient will continue her smoking cessation  We will continue to keep patient off work over the next 4 weeks while she attends therapy. A note was written and given to her. We will make sure this information also gets to her work comp representative.  We have also prescribed patient gabapentin to address nerve pain and meloxicam to address inflammation. These were prescribed into [facility] pharmacy electronically.  Patient will return to clinic in 4 weeks for reevaluation      Scribed by  Ximena Cabral PA-C   5/23/2017  10:51 AM      I attest I have seen and evaluated the patient.  I agree with above impression and plan.    Leif Fine MD      Again, thank you for allowing me to participate in the care of your patient.         Sincerely,              Leif Fine MD

## 2017-05-25 ENCOUNTER — HOSPITAL ENCOUNTER (OUTPATIENT)
Dept: OCCUPATIONAL THERAPY | Facility: CLINIC | Age: 57
Setting detail: THERAPIES SERIES
End: 2017-05-25
Attending: ORTHOPAEDIC SURGERY
Payer: OTHER MISCELLANEOUS

## 2017-05-25 PROCEDURE — 97033 APP MDLTY 1+IONTPHRSIS EA 15: CPT | Mod: GO | Performed by: OCCUPATIONAL THERAPIST

## 2017-05-25 PROCEDURE — 97140 MANUAL THERAPY 1/> REGIONS: CPT | Mod: GO | Performed by: OCCUPATIONAL THERAPIST

## 2017-05-25 PROCEDURE — 97110 THERAPEUTIC EXERCISES: CPT | Mod: GO | Performed by: OCCUPATIONAL THERAPIST

## 2017-05-25 PROCEDURE — 40000839 ZZH STATISTIC HAND THERAPY VISIT: Performed by: OCCUPATIONAL THERAPIST

## 2017-05-25 PROCEDURE — 97035 APP MDLTY 1+ULTRASOUND EA 15: CPT | Mod: GO | Performed by: OCCUPATIONAL THERAPIST

## 2017-05-30 ENCOUNTER — HOSPITAL ENCOUNTER (OUTPATIENT)
Dept: OCCUPATIONAL THERAPY | Facility: CLINIC | Age: 57
Setting detail: THERAPIES SERIES
End: 2017-05-30
Attending: ORTHOPAEDIC SURGERY
Payer: OTHER MISCELLANEOUS

## 2017-05-30 PROCEDURE — 40000839 ZZH STATISTIC HAND THERAPY VISIT: Performed by: OCCUPATIONAL THERAPIST

## 2017-05-30 PROCEDURE — 97035 APP MDLTY 1+ULTRASOUND EA 15: CPT | Mod: GO | Performed by: OCCUPATIONAL THERAPIST

## 2017-05-30 PROCEDURE — 97140 MANUAL THERAPY 1/> REGIONS: CPT | Mod: GO | Performed by: OCCUPATIONAL THERAPIST

## 2017-05-30 PROCEDURE — 97110 THERAPEUTIC EXERCISES: CPT | Mod: GO | Performed by: OCCUPATIONAL THERAPIST

## 2017-06-06 ENCOUNTER — HOSPITAL ENCOUNTER (OUTPATIENT)
Dept: OCCUPATIONAL THERAPY | Facility: CLINIC | Age: 57
Setting detail: THERAPIES SERIES
End: 2017-06-06
Attending: ORTHOPAEDIC SURGERY
Payer: OTHER MISCELLANEOUS

## 2017-06-06 PROCEDURE — 97033 APP MDLTY 1+IONTPHRSIS EA 15: CPT | Mod: GO | Performed by: OCCUPATIONAL THERAPIST

## 2017-06-06 PROCEDURE — 97140 MANUAL THERAPY 1/> REGIONS: CPT | Mod: GO | Performed by: OCCUPATIONAL THERAPIST

## 2017-06-06 PROCEDURE — 40000839 ZZH STATISTIC HAND THERAPY VISIT: Performed by: OCCUPATIONAL THERAPIST

## 2017-06-06 PROCEDURE — 97035 APP MDLTY 1+ULTRASOUND EA 15: CPT | Mod: GO | Performed by: OCCUPATIONAL THERAPIST

## 2017-06-08 ENCOUNTER — HOSPITAL ENCOUNTER (OUTPATIENT)
Dept: OCCUPATIONAL THERAPY | Facility: CLINIC | Age: 57
Setting detail: THERAPIES SERIES
End: 2017-06-08
Attending: ORTHOPAEDIC SURGERY
Payer: OTHER MISCELLANEOUS

## 2017-06-08 PROCEDURE — 97033 APP MDLTY 1+IONTPHRSIS EA 15: CPT | Mod: GO | Performed by: OCCUPATIONAL THERAPIST

## 2017-06-08 PROCEDURE — 40000839 ZZH STATISTIC HAND THERAPY VISIT: Performed by: OCCUPATIONAL THERAPIST

## 2017-06-08 PROCEDURE — 97035 APP MDLTY 1+ULTRASOUND EA 15: CPT | Mod: GO | Performed by: OCCUPATIONAL THERAPIST

## 2017-06-08 PROCEDURE — 97140 MANUAL THERAPY 1/> REGIONS: CPT | Mod: GO | Performed by: OCCUPATIONAL THERAPIST

## 2017-06-13 ENCOUNTER — HOSPITAL ENCOUNTER (OUTPATIENT)
Dept: OCCUPATIONAL THERAPY | Facility: CLINIC | Age: 57
Setting detail: THERAPIES SERIES
End: 2017-06-13
Attending: ORTHOPAEDIC SURGERY
Payer: OTHER MISCELLANEOUS

## 2017-06-13 PROCEDURE — 97033 APP MDLTY 1+IONTPHRSIS EA 15: CPT | Mod: GO | Performed by: OCCUPATIONAL THERAPIST

## 2017-06-13 PROCEDURE — 97035 APP MDLTY 1+ULTRASOUND EA 15: CPT | Mod: GO | Performed by: OCCUPATIONAL THERAPIST

## 2017-06-13 PROCEDURE — 97140 MANUAL THERAPY 1/> REGIONS: CPT | Mod: GO | Performed by: OCCUPATIONAL THERAPIST

## 2017-06-13 PROCEDURE — 40000839 ZZH STATISTIC HAND THERAPY VISIT: Performed by: OCCUPATIONAL THERAPIST

## 2017-06-15 ENCOUNTER — HOSPITAL ENCOUNTER (OUTPATIENT)
Dept: OCCUPATIONAL THERAPY | Facility: CLINIC | Age: 57
Setting detail: THERAPIES SERIES
End: 2017-06-15
Attending: ORTHOPAEDIC SURGERY
Payer: OTHER MISCELLANEOUS

## 2017-06-15 PROCEDURE — 40000839 ZZH STATISTIC HAND THERAPY VISIT: Performed by: OCCUPATIONAL THERAPIST

## 2017-06-15 PROCEDURE — 97035 APP MDLTY 1+ULTRASOUND EA 15: CPT | Mod: GO | Performed by: OCCUPATIONAL THERAPIST

## 2017-06-15 PROCEDURE — 97140 MANUAL THERAPY 1/> REGIONS: CPT | Mod: GO | Performed by: OCCUPATIONAL THERAPIST

## 2017-06-15 PROCEDURE — 97110 THERAPEUTIC EXERCISES: CPT | Mod: GO | Performed by: OCCUPATIONAL THERAPIST

## 2017-06-20 ENCOUNTER — OFFICE VISIT (OUTPATIENT)
Dept: ORTHOPEDICS | Facility: CLINIC | Age: 57
End: 2017-06-20
Payer: OTHER MISCELLANEOUS

## 2017-06-20 ENCOUNTER — HOSPITAL ENCOUNTER (OUTPATIENT)
Dept: OCCUPATIONAL THERAPY | Facility: CLINIC | Age: 57
Setting detail: THERAPIES SERIES
End: 2017-06-20
Attending: ORTHOPAEDIC SURGERY
Payer: OTHER MISCELLANEOUS

## 2017-06-20 VITALS — TEMPERATURE: 98.3 F | HEIGHT: 57 IN

## 2017-06-20 DIAGNOSIS — G56.03 BILATERAL CARPAL TUNNEL SYNDROME: ICD-10-CM

## 2017-06-20 DIAGNOSIS — G56.21 ULNAR NERVE ENTRAPMENT AT THE WRIST, RIGHT: Primary | ICD-10-CM

## 2017-06-20 PROCEDURE — 97110 THERAPEUTIC EXERCISES: CPT | Mod: GO | Performed by: OCCUPATIONAL THERAPIST

## 2017-06-20 PROCEDURE — 97035 APP MDLTY 1+ULTRASOUND EA 15: CPT | Mod: GO | Performed by: OCCUPATIONAL THERAPIST

## 2017-06-20 PROCEDURE — 40000839 ZZH STATISTIC HAND THERAPY VISIT: Performed by: OCCUPATIONAL THERAPIST

## 2017-06-20 PROCEDURE — 99213 OFFICE O/P EST LOW 20 MIN: CPT | Performed by: ORTHOPAEDIC SURGERY

## 2017-06-20 PROCEDURE — 97140 MANUAL THERAPY 1/> REGIONS: CPT | Mod: GO | Performed by: OCCUPATIONAL THERAPIST

## 2017-06-20 ASSESSMENT — PAIN SCALES - GENERAL: PAINLEVEL: MILD PAIN (3)

## 2017-06-20 NOTE — LETTER
"  6/20/2017       RE: Fern Gonzalez  [address]    [facility] [address]           Dear Colleague,    Thank you for referring your patient, Fern Gonzalez, to the [facility]. Please see a copy of my visit note below.    Office Visit-Follow up  HISTORY OF PRESENT ILLNESS:    Fern Gonzalez is a 56 year old female who is seen in follow up for   Chief Complaint   Patient presents with     Surgical Followup     Release of Guyon's canal right wrist. DOS: 11/23/16 (7 months postop)      Surgical Followup     Left carpal tunnel release and release of Guyon's canal.  DOS: 10/5/16 (8 months postop)     Surgical Followup     Right carpal tunnel release.  DOS: 9/7/16 (9 months postop)     Work Comp       Patient reports that she did not tolerate the gabapentin.  Present symptoms: She reports that the wrists are both improving. The left toe far greater degree than the right. Her complaint continues to be palmar sensitivity.  Treatments tried to this point: She recently has been working with physical therapy. We tried to add gabapentin which she did not tolerate. She has been able to take the meloxicam.  She assures me that she is continuing to decrease her use of tobacco products.    REVIEW OF SYSTEMS  General: negative for, night sweats, dizziness, fatigue  Resp: No shortness of breath and no cough  CV: negative for chest pain, syncope or near-syncope  GI: negative for nausea, vomiting and diarrhea  : negative for dysuria and hematuria  Musculoskeletal: as above  Neurologic: negative for syncope   Hematologic: negative for bleeding disorder    Physical Exam:  Vitals: Temp 98.3  F (36.8  C) (Temporal)  Ht 1.448 m (4' 9\")  BMI= There is no height or weight on file to calculate BMI.  Constitutional: healthy, alert and no acute distress   Psychiatric: mentation appears normal and affect normal/bright  NEURO: no focal deficits  SKIN: no excoriation or erythema. No signs of infection.    GAIT: " non-antalgic    JOINT/EXTREMITIES:     The only findings today are sensitivity to palpation of the palmar wound. This is far more problematic on the right than on the left.  Her right wrist range of motion passively is much more limited than the left.    IMAGING INTERPRETATION: No new x-rays obtained.       Impression:      ICD-10-CM    1. 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 Bilateral carpal tunnel syndrome G56.03        She is status post wrist surgery. She appears to be struggling with postoperative palmar sensitivity. I would have to sign this to postoperative scarring. She also returned to a high demand job but for a short period of time.    I have made it clear to her how smoking may very well be related to her scarring.    Plan:   The above was reviewed with Fern and none of the above is new to her..     We will continue with physical therapy. She will continue the use of meloxicam.    We identified that there are some limited jobs for her. Therefore a return to work slip is created stating that she must avoid power gripping, repetitive gripping, and use of vibrating tools.      Return to clinic 4, weeks    Leif Fine MD    Again, thank you for allowing me to participate in the care of your patient.        Sincerely,              Leif Fine MD

## 2017-06-20 NOTE — LETTER
[address] [address] [phone]          June 20, 2017    RE:  Fern Gonzalez                                                                                                                                                       [address]    [facility] [address]            To whom it may concern:    Fern Gonzalez is under my professional care for bilateral carpal tunnel release with residual symptoms in right hand. Patient may return to work full time beginning June 19, 2017 with what is to be permanent restrictions of no repetitive gripping, no power gripping, and no vibrating tools.  Patient will still need opportunity to attend therapy over the next several weeks. Patient's next evaluation will be done in 4 weeks.      Sincerely,        Leif Fine MD

## 2017-06-20 NOTE — PROGRESS NOTES
"Office Visit-Follow up  HISTORY OF PRESENT ILLNESS:    Fern Gonzalez is a 56 year old female who is seen in follow up for   Chief Complaint   Patient presents with     Surgical Followup     Release of Guyon's canal right wrist. DOS: 11/23/16 (7 months postop)      Surgical Followup     Left carpal tunnel release and release of Guyon's canal.  DOS: 10/5/16 (8 months postop)     Surgical Followup     Right carpal tunnel release.  DOS: 9/7/16 (9 months postop)     Work Comp       Patient reports that she did not tolerate the gabapentin.  Present symptoms: She reports that the wrists are both improving. The left toe far greater degree than the right. Her complaint continues to be palmar sensitivity.  Treatments tried to this point: She recently has been working with physical therapy. We tried to add gabapentin which she did not tolerate. She has been able to take the meloxicam.  She assures me that she is continuing to decrease her use of tobacco products.    REVIEW OF SYSTEMS  General: negative for, night sweats, dizziness, fatigue  Resp: No shortness of breath and no cough  CV: negative for chest pain, syncope or near-syncope  GI: negative for nausea, vomiting and diarrhea  : negative for dysuria and hematuria  Musculoskeletal: as above  Neurologic: negative for syncope   Hematologic: negative for bleeding disorder    Physical Exam:  Vitals: Temp 98.3  F (36.8  C) (Temporal)  Ht 1.448 m (4' 9\")  BMI= There is no height or weight on file to calculate BMI.  Constitutional: healthy, alert and no acute distress   Psychiatric: mentation appears normal and affect normal/bright  NEURO: no focal deficits  SKIN: no excoriation or erythema. No signs of infection.    GAIT: non-antalgic    JOINT/EXTREMITIES:     The only findings today are sensitivity to palpation of the palmar wound. This is far more problematic on the right than on the left.  Her right wrist range of motion passively is much more limited than the " left.    IMAGING INTERPRETATION: No new x-rays obtained.       Impression:      ICD-10-CM    1. 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 Bilateral carpal tunnel syndrome G56.03        She is status post wrist surgery. She appears to be struggling with postoperative palmar sensitivity. I would have to sign this to postoperative scarring. She also returned to a high demand job but for a short period of time.    I have made it clear to her how smoking may very well be related to her scarring.    Plan:   The above was reviewed with Fern and none of the above is new to her..     We will continue with physical therapy. She will continue the use of meloxicam.    We identified that there are some limited jobs for her. Therefore a return to work slip is created stating that she must avoid power gripping, repetitive gripping, and use of vibrating tools.      Return to clinic 4, weeks    Leif Fine MD

## 2017-06-20 NOTE — NURSING NOTE
"Chief Complaint   Patient presents with     Surgical Followup     Release of Guyon's canal right wrist. DOS: 11/23/16 (7 months postop)      Surgical Followup     Left carpal tunnel release and release of Guyon's canal.  DOS: 10/5/16 (8 months postop)     Surgical Followup     Right carpal tunnel release.  DOS: 9/7/16 (9 months postop)     Work Comp       Initial Temp 98.3  F (36.8  C) (Temporal)  Ht 1.448 m (4' 9\") Estimated body mass index is 33.33 kg/(m^2) as calculated from the following:    Height as of 5/23/17: 1.448 m (4' 9\").    Weight as of 5/23/17: 69.9 kg (154 lb).  Medication Reconciliation: complete   KASI Maldonado  "

## 2017-06-20 NOTE — MR AVS SNAPSHOT
After Visit Summary   6/20/2017    Fern Gonzalez    MRN: [id number]           Patient Information     Date Of Birth          1960        Visit Information        Provider Department      6/20/2017 10:30 AM Leif Fine MD [facility]         Follow-ups after your visit        Your next 10 appointments already scheduled     Jun 20, 2017 10:30 AM CDT   Return Visit with Leif Fine MD   [facility] ([facility]    [address] [place], [place] [address] [address]   [phone]            Jun 20, 2017 11:00 AM CDT   Hand Treatment with Joanie Moreland, OT   [facility] ([facility])    [address] [place] [address] [place] [address]   [phone]            Jun 22, 2017 11:00 AM CDT   Hand Treatment with Joanie Moreland, OT   [facility] ([facility])    [address] [place] [address] [address] [place] [address]   [phone]            Jun 27, 2017 10:30 AM CDT   Hand Treatment with Joanie Moreland, OT   [facility] ([facility])    [address] [place] [address] [address] [place] [address]   [phone]            Jun 29, 2017 11:15 AM CDT   Hand Treatment with Joanie Moreland, OT   [facility] ([facility])    [address] [place] [address] [address] [place] [address]   [phone]            Jul 06, 2017 11:15 AM CDT   Hand Treatment with Joanie Moreland OT   [facility] ([facility])    [address] [place] [address] [address] [place] [address]   [phone]              Who to contact     If you have questions or need follow up information about today's clinic visit or your schedule please contact [facility] directly at [phone].  Normal or non-critical lab and imaging results will be communicated to you by MyChart, letter or phone within 4  "business days after the clinic has received the results. If you do not hear from us within 7 days, please contact the clinic through [employer] or phone. If you have a critical or abnormal lab result, we will notify you by phone as soon as possible.  Submit refill requests through [employer] or call your pharmacy and they will forward the refill request to us. Please allow 3 business days for your refill to be completed.          Additional Information About Your Visit        [employer] Information     [employer] lets you send messages to your doctor, view your test results, renew your prescriptions, schedule appointments and more. To sign up, go to [email or web address] . Click on \"Log in\" on the left side of the screen, which will take you to the Welcome page. Then click on \"Sign up Now\" on the right side of the page.     You will be asked to enter the access code listed below, as well as some personal information. Please follow the directions to create your username and password.     Your access code is: [email or web address]  Expires: 2017 10:22 AM     Your access code will  in 90 days. If you need help or a new code, please call your [place] clinic or [phone].        Care EveryWhere ID     This is your Care EveryWhere ID. This could be used by other organizations to access your [place] medical records  [id number]        Your Vitals Were     Temperature Height                98.3  F (36.8  C) (Temporal) 1.448 m (4' 9\")           Blood Pressure from Last 3 Encounters:   17 114/86   17 107/73   16 113/62    Weight from Last 3 Encounters:   17 69.9 kg (154 lb)   17 69.9 kg (154 lb)   17 69.4 kg (153 lb)              Today, you had the following     No orders found for display       Primary Care Provider Office Phone # Fax #    Mirta Fan [phone]       [facility] [address] [place] [address]        Thank you!     Thank you for " choosing [facility]  for your care. Our goal is always to provide you with excellent care. Hearing back from our patients is one way we can continue to improve our services. Please take a few minutes to complete the written survey that you may receive in the mail after your visit with us. Thank you!             Your Updated Medication List - Protect others around you: Learn how to safely use, store and throw away your medicines at www.disposemymeds.org.          This list is accurate as of: 6/20/17 10:26 AM.  Always use your most recent med list.                   Brand Name Dispense Instructions for use    gabapentin 300 MG capsule    NEURONTIN    60 capsule    Take 1 tablet nightly.       ibuprofen 200 MG tablet    ADVIL/MOTRIN    120 tablet    Take 3 tablets (600 mg) by mouth every 6 hours as needed       meloxicam 7.5 MG tablet    MOBIC    30 tablet    Take 1 tablet (7.5 mg) by mouth daily       NYQUIL PO      Reported on 5/23/2017       order for DME     1 each    Equipment being ordered: Other: nebulizer machine Treatment Diagnosis: acute bronchospasm, respiratory distress  Use as directed with albuterol

## 2017-06-20 NOTE — LETTER
[address]   [address]  Tel. [phone] / Fax [phone]        2017   Regarding: Fern Gonzalez  : 1960          To Whom it May Concern:    Fern Gonzalez is under my professional care for bilateral carpal tunnel release with residual symptoms in right hand. Patient may return to work full time beginning 2017 with what is to be permanent restrictions of no repetitive gripping, no power gripping, and no vibrating tools.  Patient will still need opportunity to attend therapy over the next several weeks. Patient's next evaluation will be done in 4 weeks.      Sincerely,       Leif Fine M.D./[place] [place]

## 2017-06-22 ENCOUNTER — HOSPITAL ENCOUNTER (OUTPATIENT)
Dept: OCCUPATIONAL THERAPY | Facility: CLINIC | Age: 57
Setting detail: THERAPIES SERIES
End: 2017-06-22
Attending: ORTHOPAEDIC SURGERY
Payer: OTHER MISCELLANEOUS

## 2017-06-22 PROCEDURE — 97035 APP MDLTY 1+ULTRASOUND EA 15: CPT | Mod: GO | Performed by: OCCUPATIONAL THERAPIST

## 2017-06-22 PROCEDURE — 40000839 ZZH STATISTIC HAND THERAPY VISIT: Performed by: OCCUPATIONAL THERAPIST

## 2017-06-22 PROCEDURE — 97140 MANUAL THERAPY 1/> REGIONS: CPT | Mod: GO | Performed by: OCCUPATIONAL THERAPIST

## 2017-06-22 PROCEDURE — 97110 THERAPEUTIC EXERCISES: CPT | Mod: GO | Performed by: OCCUPATIONAL THERAPIST

## 2017-06-22 NOTE — PROGRESS NOTES
Occupational Therapy Progress Note     06/22/17 1058   Notes   Note Type Progress Note   Signing Clinician's Name / Credentials   Signing clinician's name / credentials Joanie Moreland OTR/L   Providers   Referring Physician Dr. Leif Fine    General Information   Rxs Used 10/24   Medical Diagnosis bilateral CTS G56.03   Orders Evaluate And Treat As Indicated;Other   Other Orders modalities PRN   Insurance WC   Start Of Care Date 05/15/17   Onset date of current episode/exacerbation 03/15/17   Other pertinent information Physician provided a workability letter this date.  No vibrational or power tools, permanemetly.  Patient to retunr to work on Monday, with new work activitiy/task.   Subjective Measures   Subjective The patient would like to continue with OT services to reduce pain and improve function.   Current Pain level 2/10  (right and 1/10 left)   Patient Reported % Functional Improvement 75%   Functional Improvement Reported Leisure Activities;Self care activities   QuickDASH [Functional Disability Questionnaire; 0-100 (0=no dysfunction; 100=dysfunction)] (UEFI: 38/80 Improved)   Objective Measures   Objective Measures Strength;ROM   ROM   Location (anatomical) wrist    Location Bilat   Motion Ext/Flex   ROM Comments WNL supination/pronation, rad/uln dev.    AROM right extension 72 right flexion 70 degrees.  AROM left extension 70 left flexion 75 degrees   Strength   Location Bilat    15# right and 20# left  (improved, but below norm)   Lateral Pinch right 10# and left 10#  (improved, but below norm)   Other 9 hole peg test: right 20 and left 26 with 1 drop   Modalities   Modalities Ultrasound   Ultrasound -Type Continuous;2 cm sound head   Skilled Interventions To Increase Blood Flow For Improved Healing;To Enhance Tendon Gliding;To Decrease Pain   Intensity 1.0 cm2   Duration (does not include the 3-5 min set up/clean up time) 8 min   Frequency 3 MHz   Location bilateral  palms, thenars, hypothenars and center palm, carpal tunnel   Positioning sitting   Therapeutic Exercise   Therapeutic Exercise Other Exercises/Activities   Skilled Interventions To Increase Blood Flow For Improved Healing;To Increase Tissue Extensibility;To Increase Tissue Excursion;To Enhance Joint Rom;To Decrease Pain   Minutes of Treatment 30   Other Exer/Activities/Educ   Exercise 1 Reassessed, reviewed HEP and pain mgt, reviewed goals and outcomes   Description 1 refer to objectives, HEP and goal update   Exercise 2 Scar tissue self massage education   Description 2 with fingers or smooth tool assist , daily to break down scar tissue and increase extensibility   Manual   Manual STM   Skilled Interventions To Increase Blood Flow For Improved Healing;To Enhance Tendon Gliding;To Enhance Tissue Repair;To Decrease Pain   Minutes of Treatment 10   STM Tool Assisted;Hypothenars;Thenars   Position Sitting   Description/ Technique Gau Sha tool, small applied to thenar eminence and crease mild pressure as tolerated by pt. to break down scar tissue and decrease pain.  STM flexow forearms, distal wrist and palm ossilation, transverse movement.   Hand Goals   Hand Goals Hygiene/Toileting;Household Chores;Driving;Sleeping   Hygiene/Toileting   Current Functional Task Brushing teeth;Brushing/combing hair;Washing back;Wiping   Previous Performance Level Independent   Current Performance Level Moderate difficulty   Goal Target Task Style hair;Wash face;Hold toilet paper and wipe;Reach around to wipe;Hold brush/comb and brush/comb hair;Hold toothbrush and brush teeth   Goal Target Performance Level Mild difficulty   Due Date 08/15/17   Household Chores   Current Functional Task Washing and drying dishes;Vacuuming;Sweeping;Cooking;Gripping   Previous Performance Level Independent   Current Performance Level Severe difficulty   Goal Target Task Hold dish rag and wash dishes;Reach shelves to put dishes away;Weight bear through  hand to wash floors;Sweep floors;Hold spoon for stirring;Open a tight or new jar;Hold and lift plate;Carry a shopping bag;Push a shopping cart   Goal Target Performance Level Mild difficulty   Due Date 08/15/17   Driving   Current Functional Task Holding steering wheel;Shifting;Turning car key;Opening car door   Previous Performance Level Independent   Current Performance Level Moderate difficulty  (wrist braces donned)   Goal Target Task  door handle;Pull car door open;Turn car key to start car; steering wheel   Goal Target Performance Level Mild difficulty   Due Date 08/15/17   Sleeping   Current Functional Task Sleeping through the night   Previous Performance Level Mild difficulty   Current Performance Level Mild difficulty  (wrist braces donned)   Goal Target Task Staying asleep   Goal Target Performance Level Mild difficulty   Due Date 08/15/17   Date Goal Met 06/13/17   Assessment   Clinical Impression(s) Comments Improved tolerance kulwinder tactile stimulation and grasp/pinch activities, however continues to be limited with functional use of the hands.  Patient is returning to work, and light non-repetitive activities recommended for her.   Response to Therapy: Improvements ROM;Flexibility;Sensitivity;Coordination;Strength;Self Care Skills;Pain   Education   Learner Patient   Readiness Eager   Method Explanation;Demonstration   Response Verbalizes understanding;Demonstrates understanding   Education Notes self scar massage, flexor stretch   Plan   Home program as instucted   Plan US, STM,TE/TA, densitization and HEP   Total Session Time   Total Session Time (In Minutes) 48       Thank you for referring Fern  To OT services to assist with decrease pain and improve functional use for daily tasks/activities.    If you have any questions or concerns, please contact me at [phone].    Joanie Moreland MA, OTR/L  [facility]

## 2017-06-27 ENCOUNTER — HOSPITAL ENCOUNTER (OUTPATIENT)
Dept: OCCUPATIONAL THERAPY | Facility: CLINIC | Age: 57
Setting detail: THERAPIES SERIES
End: 2017-06-27
Attending: ORTHOPAEDIC SURGERY
Payer: OTHER MISCELLANEOUS

## 2017-06-27 PROCEDURE — 97140 MANUAL THERAPY 1/> REGIONS: CPT | Mod: GO | Performed by: OCCUPATIONAL THERAPIST

## 2017-06-27 PROCEDURE — 40000839 ZZH STATISTIC HAND THERAPY VISIT: Performed by: OCCUPATIONAL THERAPIST

## 2017-06-27 PROCEDURE — 97035 APP MDLTY 1+ULTRASOUND EA 15: CPT | Mod: GO | Performed by: OCCUPATIONAL THERAPIST

## 2017-06-29 ENCOUNTER — HOSPITAL ENCOUNTER (OUTPATIENT)
Dept: OCCUPATIONAL THERAPY | Facility: CLINIC | Age: 57
Setting detail: THERAPIES SERIES
End: 2017-06-29
Attending: ORTHOPAEDIC SURGERY
Payer: OTHER MISCELLANEOUS

## 2017-06-29 PROCEDURE — 97110 THERAPEUTIC EXERCISES: CPT | Mod: GO | Performed by: OCCUPATIONAL THERAPIST

## 2017-06-29 PROCEDURE — 97035 APP MDLTY 1+ULTRASOUND EA 15: CPT | Mod: GO | Performed by: OCCUPATIONAL THERAPIST

## 2017-06-29 PROCEDURE — 97140 MANUAL THERAPY 1/> REGIONS: CPT | Mod: GO | Performed by: OCCUPATIONAL THERAPIST

## 2017-06-29 PROCEDURE — 40000839 ZZH STATISTIC HAND THERAPY VISIT: Performed by: OCCUPATIONAL THERAPIST

## 2017-07-06 ENCOUNTER — TELEPHONE (OUTPATIENT)
Dept: ORTHOPEDICS | Facility: CLINIC | Age: 57
End: 2017-07-06

## 2017-07-06 ENCOUNTER — HOSPITAL ENCOUNTER (OUTPATIENT)
Dept: OCCUPATIONAL THERAPY | Facility: CLINIC | Age: 57
Setting detail: THERAPIES SERIES
End: 2017-07-06
Attending: ORTHOPAEDIC SURGERY
Payer: OTHER MISCELLANEOUS

## 2017-07-06 PROCEDURE — 97035 APP MDLTY 1+ULTRASOUND EA 15: CPT | Mod: GO | Performed by: OCCUPATIONAL THERAPIST

## 2017-07-06 PROCEDURE — 97110 THERAPEUTIC EXERCISES: CPT | Mod: GO | Performed by: OCCUPATIONAL THERAPIST

## 2017-07-06 PROCEDURE — 40000839 ZZH STATISTIC HAND THERAPY VISIT: Performed by: OCCUPATIONAL THERAPIST

## 2017-07-06 PROCEDURE — 97140 MANUAL THERAPY 1/> REGIONS: CPT | Mod: GO | Performed by: OCCUPATIONAL THERAPIST

## 2017-07-06 NOTE — TELEPHONE ENCOUNTER
Our goal is to have forms completed with 72 hours, however some forms may require a visit or additional information.    Who is the form from?: Insurance comp  Where the form came from: form was faxed in  What clinic location was the form placed at?: [place]  Where the form was placed: 'rob Ramirez  What number is listed as a contact on the form?: [phone] fax # [phone]    Phone call message- patient request for a letter, form or note:    Date needed: Filled out at next office visit - July 20, 2017    Has the patient signed a consent form for release of information?     Call taken on 7/6/2017 at 4:43 PM by Pilar Bolaños    Type of letter, form or note: disability

## 2017-07-11 ENCOUNTER — HOSPITAL ENCOUNTER (OUTPATIENT)
Dept: OCCUPATIONAL THERAPY | Facility: CLINIC | Age: 57
Setting detail: THERAPIES SERIES
End: 2017-07-11
Attending: ORTHOPAEDIC SURGERY
Payer: OTHER MISCELLANEOUS

## 2017-07-11 PROCEDURE — 97035 APP MDLTY 1+ULTRASOUND EA 15: CPT | Mod: GO | Performed by: OCCUPATIONAL THERAPIST

## 2017-07-11 PROCEDURE — 97110 THERAPEUTIC EXERCISES: CPT | Mod: GO | Performed by: OCCUPATIONAL THERAPIST

## 2017-07-11 PROCEDURE — 97140 MANUAL THERAPY 1/> REGIONS: CPT | Mod: GO | Performed by: OCCUPATIONAL THERAPIST

## 2017-07-11 PROCEDURE — 40000839 ZZH STATISTIC HAND THERAPY VISIT: Performed by: OCCUPATIONAL THERAPIST

## 2017-07-13 ENCOUNTER — HOSPITAL ENCOUNTER (OUTPATIENT)
Dept: OCCUPATIONAL THERAPY | Facility: CLINIC | Age: 57
Setting detail: THERAPIES SERIES
End: 2017-07-13
Attending: ORTHOPAEDIC SURGERY
Payer: OTHER MISCELLANEOUS

## 2017-07-13 PROCEDURE — 97110 THERAPEUTIC EXERCISES: CPT | Mod: GO | Performed by: OCCUPATIONAL THERAPIST

## 2017-07-13 PROCEDURE — 97035 APP MDLTY 1+ULTRASOUND EA 15: CPT | Mod: GO | Performed by: OCCUPATIONAL THERAPIST

## 2017-07-13 PROCEDURE — 97140 MANUAL THERAPY 1/> REGIONS: CPT | Mod: GO | Performed by: OCCUPATIONAL THERAPIST

## 2017-07-13 PROCEDURE — 40000839 ZZH STATISTIC HAND THERAPY VISIT: Performed by: OCCUPATIONAL THERAPIST

## 2017-07-18 ENCOUNTER — HOSPITAL ENCOUNTER (OUTPATIENT)
Dept: OCCUPATIONAL THERAPY | Facility: CLINIC | Age: 57
Setting detail: THERAPIES SERIES
End: 2017-07-18
Attending: ORTHOPAEDIC SURGERY
Payer: OTHER MISCELLANEOUS

## 2017-07-18 PROCEDURE — 97035 APP MDLTY 1+ULTRASOUND EA 15: CPT | Mod: GO | Performed by: OCCUPATIONAL THERAPIST

## 2017-07-18 PROCEDURE — 97110 THERAPEUTIC EXERCISES: CPT | Mod: GO | Performed by: OCCUPATIONAL THERAPIST

## 2017-07-18 PROCEDURE — 97140 MANUAL THERAPY 1/> REGIONS: CPT | Mod: GO | Performed by: OCCUPATIONAL THERAPIST

## 2017-07-18 PROCEDURE — 40000839 ZZH STATISTIC HAND THERAPY VISIT: Performed by: OCCUPATIONAL THERAPIST

## 2017-07-20 ENCOUNTER — HOSPITAL ENCOUNTER (OUTPATIENT)
Dept: OCCUPATIONAL THERAPY | Facility: CLINIC | Age: 57
Setting detail: THERAPIES SERIES
End: 2017-07-20
Attending: ORTHOPAEDIC SURGERY
Payer: OTHER MISCELLANEOUS

## 2017-07-20 ENCOUNTER — OFFICE VISIT (OUTPATIENT)
Dept: ORTHOPEDICS | Facility: CLINIC | Age: 57
End: 2017-07-20
Payer: OTHER MISCELLANEOUS

## 2017-07-20 VITALS — WEIGHT: 151 LBS | BODY MASS INDEX: 32.58 KG/M2 | HEIGHT: 57 IN | TEMPERATURE: 97.9 F

## 2017-07-20 DIAGNOSIS — G56.21 ULNAR NERVE ENTRAPMENT AT THE WRIST, RIGHT: Primary | ICD-10-CM

## 2017-07-20 DIAGNOSIS — Z98.890 S/P CARPAL TUNNEL RELEASE: ICD-10-CM

## 2017-07-20 DIAGNOSIS — G56.21 GUYON SYNDROME, RIGHT: ICD-10-CM

## 2017-07-20 DIAGNOSIS — G56.03 BILATERAL CARPAL TUNNEL SYNDROME: ICD-10-CM

## 2017-07-20 PROCEDURE — 99213 OFFICE O/P EST LOW 20 MIN: CPT | Performed by: ORTHOPAEDIC SURGERY

## 2017-07-20 PROCEDURE — 40000839 ZZH STATISTIC HAND THERAPY VISIT: Performed by: OCCUPATIONAL THERAPIST

## 2017-07-20 PROCEDURE — 97140 MANUAL THERAPY 1/> REGIONS: CPT | Mod: GO | Performed by: OCCUPATIONAL THERAPIST

## 2017-07-20 PROCEDURE — 97110 THERAPEUTIC EXERCISES: CPT | Mod: GO | Performed by: OCCUPATIONAL THERAPIST

## 2017-07-20 PROCEDURE — 97035 APP MDLTY 1+ULTRASOUND EA 15: CPT | Mod: GO | Performed by: OCCUPATIONAL THERAPIST

## 2017-07-20 ASSESSMENT — PAIN SCALES - GENERAL: PAINLEVEL: MILD PAIN (3)

## 2017-07-20 NOTE — LETTER
"      7/20/2017         RE: Fern Gonzalez  [address]  [facility] [address]        Dear Colleague,    Thank you for referring your patient, Fern Gonzalez, to [facility]. Please see a copy of my visit note below.    Office Visit-Follow up  HISTORY OF PRESENT ILLNESS:    Fern Gonzalez is a 56 year old female who is seen in follow up for   Chief Complaint   Patient presents with     Surgical Followup     Release of Guyon's canal right wrist. DOS: 11/23/16 (8 months postop)      Surgical Followup     Left carpal tunnel release and release of Guyon's canal.  DOS: 10/5/16 (9 months postop)     Surgical Followup     Right carpal tunnel release.  DOS: 9/7/16 (10 months postop)     Work Comp       Patient presents with:  Surgical Followup: Release of Guyon's canal right wrist. DOS: 11/23/16 (8 months postop)   Surgical Followup: Left carpal tunnel release and release of Guyon's canal.  DOS: 10/5/16 (9 months postop)  Surgical Followup: Right carpal tunnel release.  DOS: 9/7/16 (10 months postop)  Work Comp      Patient is accompanied by the [facility] today.  She has not been able to return to work.  Present symptoms: She admits that her left hand is actually been feeling pretty good. The right hand as quite a bit behind in progress. Her main complaint is that of palmar sensitivity.  Treatments tried to this point: She has been working with therapy. She has not stopped smoking.    REVIEW OF SYSTEMS  General: negative for, night sweats, dizziness, fatigue  Resp: No shortness of breath and no cough  CV: negative for chest pain, syncope or near-syncope  GI: negative for nausea, vomiting and diarrhea  : negative for dysuria and hematuria  Musculoskeletal: as above  Neurologic: negative for syncope   Hematologic: negative for bleeding disorder    Physical Exam:  Vitals: Temp 97.9  F (36.6  C) (Temporal)  Ht 1.448 m (4' 9\")  Wt 68.5 kg (151 lb)  BMI 32.68 kg/m2  BMI= Body mass index is 32.68 " kg/(m^2).  Constitutional: healthy, alert and no acute distress   Psychiatric: mentation appears normal and affect normal/bright  NEURO: no focal deficits  SKIN: no excoriation or erythema. No signs of infection.    GAIT: non-antalgic    JOINT/EXTREMITIES:     Inspection of both hands shows his surgical wounds are well-healed.  She has no limitations to range of motion of the digits or risks as we compare the right to left and on an empirical basis.  The palmar wound is mildly to moderately sensitive on the right. She has very little sensitivity on the left.    She can develop a very good resistance with power . No new x-rays were obtained.      IMAGING INTERPRETATION: No new x-rays were obtained.     Impression:      ICD-10-CM    1. 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 Bilateral carpal tunnel syndrome G56.03    3. S/P carpal tunnel release Z98.890    4. 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   She continues to deny any symptoms of numbness or tingling in either hand.  With being taken off work and attending physical therapy her palmar sensitivity symptoms continue to improve. Her left hand is better than the right. The right hand required 2 surgical approaches. And the second right hand surgery was the most recent one performed at approximately 8 months ago.       Plan:   The above was reviewed with Fern and the Q RC, Karen Nissen.     She will continue with the occupational therapist.  We will modify her work restrictions to allow her repetitive activities that do not involve power gripping or use of vibrating tools.    It was agreed in the office today that we did not believe that she will ever be able to return to work that requires use of vibrating tools or power gripping. We are hoping to identify the other activities that she will be able to tolerate.    Return to clinic 1, months, we are hoping to find what  level of employment she may tolerate.    Leif Fine MD    Again, thank you for allowing me to participate in the care of your patient.        Sincerely,        Leif Fine MD

## 2017-07-20 NOTE — PROGRESS NOTES
"Office Visit-Follow up  HISTORY OF PRESENT ILLNESS:    Fern Gonzalez is a 56 year old female who is seen in follow up for   Chief Complaint   Patient presents with     Surgical Followup     Release of Guyon's canal right wrist. DOS: 11/23/16 (8 months postop)      Surgical Followup     Left carpal tunnel release and release of Guyon's canal.  DOS: 10/5/16 (9 months postop)     Surgical Followup     Right carpal tunnel release.  DOS: 9/7/16 (10 months postop)     Work Comp       Patient presents with:  Surgical Followup: Release of Guyon's canal right wrist. DOS: 11/23/16 (8 months postop)   Surgical Followup: Left carpal tunnel release and release of Guyon's canal.  DOS: 10/5/16 (9 months postop)  Surgical Followup: Right carpal tunnel release.  DOS: 9/7/16 (10 months postop)  Work Comp      Patient is accompanied by the [facility] today.  She has not been able to return to work.  Present symptoms: She admits that her left hand is actually been feeling pretty good. The right hand as quite a bit behind in progress. Her main complaint is that of palmar sensitivity.  Treatments tried to this point: She has been working with therapy. She has not stopped smoking.    REVIEW OF SYSTEMS  General: negative for, night sweats, dizziness, fatigue  Resp: No shortness of breath and no cough  CV: negative for chest pain, syncope or near-syncope  GI: negative for nausea, vomiting and diarrhea  : negative for dysuria and hematuria  Musculoskeletal: as above  Neurologic: negative for syncope   Hematologic: negative for bleeding disorder    Physical Exam:  Vitals: Temp 97.9  F (36.6  C) (Temporal)  Ht 1.448 m (4' 9\")  Wt 68.5 kg (151 lb)  BMI 32.68 kg/m2  BMI= Body mass index is 32.68 kg/(m^2).  Constitutional: healthy, alert and no acute distress   Psychiatric: mentation appears normal and affect normal/bright  NEURO: no focal deficits  SKIN: no excoriation or erythema. No signs of infection.    GAIT: " non-antalgic    JOINT/EXTREMITIES:     Inspection of both hands shows his surgical wounds are well-healed.  She has no limitations to range of motion of the digits or risks as we compare the right to left and on an empirical basis.  The palmar wound is mildly to moderately sensitive on the right. She has very little sensitivity on the left.    She can develop a very good resistance with power . No new x-rays were obtained.      IMAGING INTERPRETATION: No new x-rays were obtained.     Impression:      ICD-10-CM    1. 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 Bilateral carpal tunnel syndrome G56.03    3. S/P carpal tunnel release Z98.890    4. 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   She continues to deny any symptoms of numbness or tingling in either hand.  With being taken off work and attending physical therapy her palmar sensitivity symptoms continue to improve. Her left hand is better than the right. The right hand required 2 surgical approaches. And the second right hand surgery was the most recent one performed at approximately 8 months ago.       Plan:   The above was reviewed with Fern and the  RC, Karen Nissen.     She will continue with the occupational therapist.  We will modify her work restrictions to allow her repetitive activities that do not involve power gripping or use of vibrating tools.    It was agreed in the office today that we did not believe that she will ever be able to return to work that requires use of vibrating tools or power gripping. We are hoping to identify the other activities that she will be able to tolerate.    Return to clinic 1, months, we are hoping to find what level of employment she may tolerate.    Leif Fine MD

## 2017-07-20 NOTE — LETTER
[address] [address]  [phone]          July 20, 2017    RE:  Fern Gonzalez                                                                                                                                                       [address]  [facility] [address]            To whom it may concern:    Fern Gonzalez is under my professional care for    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, right  Bilateral carpal tunnel syndrome  S/P carpal tunnel release  Guyon syndrome, right     Patient will continue occupational therapy.  Modify restriction to no power gripping, no vibratory tools,  Next follow up planned for 1 month.       Sincerely,        Leif Fine MD

## 2017-07-20 NOTE — MR AVS SNAPSHOT
After Visit Summary   7/20/2017    Fern Gonzalez    MRN: [id number]           Patient Information     Date Of Birth          1960        Visit Information        Provider Department      7/20/2017 10:30 AM Leif Fine MD [facility]        Today's Diagnoses     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, right    -  1    Bilateral carpal tunnel syndrome        S/P carpal tunnel release        Guyon syndrome, right           Follow-ups after your visit        Your next 10 appointments already scheduled     Jul 25, 2017 10:30 AM CDT   Hand Treatment with Sydney Ricks, OT   [facility] ([facility])    [address] [place] [address] [place] [address]   [phone]            Jul 27, 2017 10:30 AM CDT   Hand Treatment with Sydney Ricks OT   [facility] ([facility])    [address] [place] [address] [place] [address]   [phone]            Aug 01, 2017 10:30 AM CDT   Hand Treatment with Joanie Moreland OT   [facility] ([facility])    [address] [place] [address] [place] [address]   [phone]            Aug 03, 2017 10:30 AM CDT   Hand Treatment with Joanie Moreland OT   [facility] ([facility])    [address] [facility] [address] [place] [address]   [phone]            Aug 08, 2017 10:30 AM CDT   Hand Treatment with Joanie Moreland OT   [facility] ([facility])    [address] [place] [address] [place] [address]   [phone]            Aug 10, 2017 10:30 AM CDT   Hand Treatment with Joanie Moreland OT   [facility] ([facility])    [address] [facility] [address] [place] [address]   [phone]            Aug 15, 2017 10:30 AM CDT   Hand Treatment with Joanie Moreland, OT   [facility]  "([facility])    [address] [place] [id number] [phone]              Who to contact     If you have questions or need follow up information about today's clinic visit or your schedule please contact [facility] directly at [phone].  Normal or non-critical lab and imaging results will be communicated to you by MyChart, letter or phone within 4 business days after the clinic has received the results. If you do not hear from us within 7 days, please contact the clinic through MyChart or phone. If you have a critical or abnormal lab result, we will notify you by phone as soon as possible.  Submit refill requests through [employer] or call your pharmacy and they will forward the refill request to us. Please allow 3 business days for your refill to be completed.          Additional Information About Your Visit        MyChart Information     [email or web address] lets you send messages to your doctor, view your test results, renew your prescriptions, schedule appointments and more. To sign up, go to [email or web address] . Click on \"Log in\" on the left side of the screen, which will take you to the Welcome page. Then click on \"Sign up Now\" on the right side of the page.     You will be asked to enter the access code listed below, as well as some personal information. Please follow the directions to create your username and password.     Your access code is: [email or web address]  Expires: 10/18/2017 10:36 AM     Your access code will  in 90 days. If you need help or a new code, please call your [facility] or [phone].        Care EveryWhere ID     This is your Care EveryWhere ID. This could be used by other organizations to access your [facility] medical records  [id number]        Your Vitals Were     Temperature Height BMI (Body Mass Index)             97.9  F (36.6  C) (Temporal) 1.448 m (4' 9\") 32.68 kg/m2          Blood Pressure from Last 3 Encounters:   17 114/86 "   01/13/17 107/73   12/13/16 113/62    Weight from Last 3 Encounters:   07/20/17 68.5 kg (151 lb)   05/23/17 69.9 kg (154 lb)   05/02/17 69.9 kg (154 lb)              Today, you had the following     No orders found for display       Primary Care Provider Office Phone # Fax #    Mirta Waggoner Meng, MANI [phone]       [facility] [address] [place] [address]        Equal Access to Services     [facility]: Hadii aad ku hadasho Soomaali, waaxda luqadaha, qaybta kaalmada adeegyada, waxay idiin hayaan adeeg kharasheri artis . So [facility] [phone].    ATENCIÓN: Si habla español, tiene a pedroza disposición servicios gratuitos de asistencia lingüística. Llame al [phone].    We comply with applicable federal civil rights laws and [place] laws. We do not discriminate on the basis of race, color, national origin, age, disability sex, sexual orientation or gender identity.            Thank you!     Thank you for choosing [facility]  for your care. Our goal is always to provide you with excellent care. Hearing back from our patients is one way we can continue to improve our services. Please take a few minutes to complete the written survey that you may receive in the mail after your visit with us. Thank you!             Your Updated Medication List - Protect others around you: Learn how to safely use, store and throw away your medicines at www.disposemymeds.org.          This list is accurate as of: 7/20/17 10:36 AM.  Always use your most recent med list.                   Brand Name Dispense Instructions for use Diagnosis    ibuprofen 200 MG tablet    ADVIL/MOTRIN    120 tablet    Take 3 tablets (600 mg) by mouth every 6 hours as needed    Headache(784.0)       NYQUIL PO      Reported on 5/23/2017        order for DME     1 each    Equipment being ordered: Other: nebulizer machine Treatment Diagnosis: acute bronchospasm, respiratory distress  Use as directed with  albuterol    Respiratory distress, Acute bronchospasm

## 2017-07-20 NOTE — NURSING NOTE
"Chief Complaint   Patient presents with     Surgical Followup     Release of Guyon's canal right wrist. DOS: 11/23/16 (8 months postop)      Surgical Followup     Left carpal tunnel release and release of Guyon's canal.  DOS: 10/5/16 (9 months postop)     Surgical Followup     Right carpal tunnel release.  DOS: 9/7/16 (10 months postop)     Work Comp       Initial Temp 97.9  F (36.6  C) (Temporal)  Ht 1.448 m (4' 9\")  Wt 68.5 kg (151 lb)  BMI 32.68 kg/m2 Estimated body mass index is 32.68 kg/(m^2) as calculated from the following:    Height as of this encounter: 1.448 m (4' 9\").    Weight as of this encounter: 68.5 kg (151 lb).  Medication Reconciliation: complete   KASI Maldonado  "

## 2017-07-26 ENCOUNTER — HOSPITAL ENCOUNTER (OUTPATIENT)
Dept: OCCUPATIONAL THERAPY | Facility: CLINIC | Age: 57
Setting detail: THERAPIES SERIES
End: 2017-07-26
Attending: ORTHOPAEDIC SURGERY
Payer: OTHER MISCELLANEOUS

## 2017-07-26 PROCEDURE — 40000839 ZZH STATISTIC HAND THERAPY VISIT: Performed by: OCCUPATIONAL THERAPIST

## 2017-07-26 PROCEDURE — 97140 MANUAL THERAPY 1/> REGIONS: CPT | Mod: GO | Performed by: OCCUPATIONAL THERAPIST

## 2017-07-26 PROCEDURE — 97110 THERAPEUTIC EXERCISES: CPT | Mod: GO | Performed by: OCCUPATIONAL THERAPIST

## 2017-07-26 PROCEDURE — 97035 APP MDLTY 1+ULTRASOUND EA 15: CPT | Mod: GO | Performed by: OCCUPATIONAL THERAPIST

## 2017-07-27 ENCOUNTER — HOSPITAL ENCOUNTER (OUTPATIENT)
Dept: OCCUPATIONAL THERAPY | Facility: CLINIC | Age: 57
Setting detail: THERAPIES SERIES
End: 2017-07-27
Attending: ORTHOPAEDIC SURGERY
Payer: OTHER MISCELLANEOUS

## 2017-07-27 PROCEDURE — 97140 MANUAL THERAPY 1/> REGIONS: CPT | Mod: GO | Performed by: OCCUPATIONAL THERAPIST

## 2017-07-27 PROCEDURE — 97035 APP MDLTY 1+ULTRASOUND EA 15: CPT | Mod: GO | Performed by: OCCUPATIONAL THERAPIST

## 2017-07-27 PROCEDURE — 40000839 ZZH STATISTIC HAND THERAPY VISIT: Performed by: OCCUPATIONAL THERAPIST

## 2017-07-27 PROCEDURE — 97110 THERAPEUTIC EXERCISES: CPT | Mod: GO | Performed by: OCCUPATIONAL THERAPIST

## 2017-08-01 ENCOUNTER — HOSPITAL ENCOUNTER (OUTPATIENT)
Dept: OCCUPATIONAL THERAPY | Facility: CLINIC | Age: 57
Setting detail: THERAPIES SERIES
End: 2017-08-01
Attending: ORTHOPAEDIC SURGERY
Payer: OTHER MISCELLANEOUS

## 2017-08-01 PROCEDURE — 40000839 ZZH STATISTIC HAND THERAPY VISIT: Performed by: OCCUPATIONAL THERAPIST

## 2017-08-01 PROCEDURE — 97140 MANUAL THERAPY 1/> REGIONS: CPT | Mod: GO | Performed by: OCCUPATIONAL THERAPIST

## 2017-08-01 PROCEDURE — 97035 APP MDLTY 1+ULTRASOUND EA 15: CPT | Mod: GO | Performed by: OCCUPATIONAL THERAPIST

## 2017-08-01 PROCEDURE — 97110 THERAPEUTIC EXERCISES: CPT | Mod: GO | Performed by: OCCUPATIONAL THERAPIST

## 2017-08-01 NOTE — ADDENDUM NOTE
Encounter addended by: Sydney Ricks OT on: 7/31/2017  9:34 PM<BR>     Actions taken: Flowsheet data copied forward, Flowsheet accepted

## 2017-08-01 NOTE — ADDENDUM NOTE
Encounter addended by: Sydney Ricks OT on: 7/31/2017  9:45 PM<BR>     Actions taken: Flowsheet data copied forward, Flowsheet accepted

## 2017-08-03 ENCOUNTER — TRANSFERRED RECORDS (OUTPATIENT)
Dept: HEALTH INFORMATION MANAGEMENT | Facility: CLINIC | Age: 57
End: 2017-08-03

## 2017-08-08 ENCOUNTER — HOSPITAL ENCOUNTER (OUTPATIENT)
Dept: OCCUPATIONAL THERAPY | Facility: CLINIC | Age: 57
Setting detail: THERAPIES SERIES
End: 2017-08-08
Attending: ORTHOPAEDIC SURGERY
Payer: OTHER MISCELLANEOUS

## 2017-08-08 PROCEDURE — 97110 THERAPEUTIC EXERCISES: CPT | Mod: GO | Performed by: OCCUPATIONAL THERAPIST

## 2017-08-08 PROCEDURE — 97140 MANUAL THERAPY 1/> REGIONS: CPT | Mod: GO | Performed by: OCCUPATIONAL THERAPIST

## 2017-08-08 PROCEDURE — 40000839 ZZH STATISTIC HAND THERAPY VISIT: Performed by: OCCUPATIONAL THERAPIST

## 2017-08-08 NOTE — PROGRESS NOTES
Occupational Therapy Progress Note   08/08/17 1029   Notes   Note Type Progress Note   Signing Clinician's Name / Credentials   Signing clinician's name / credentials Joanie MOY/L   Providers   Referring Physician Dr. Leif Fine    General Information   Rxs Used 20/24   Medical Diagnosis bilateral CTS G56.03   Orders Evaluate And Treat As Indicated;Other   Other Orders modalities PRN   Insurance WC   Start Of Care Date 05/15/17   Onset date of current episode/exacerbation 03/15/17   Surgical procedure Bilateral carpal tunnel and guyons canal release    Date of surgery 11/23/16   Subjective Measures   Subjective Patient agreed to initate discharge OT in 2 sessions.  Will continue to build strength in hands for return to work.   QuickDASH [Functional Disability Questionnaire; 0-100 (0=no dysfunction; 100=dysfunction)] (UEFI: 45/80  Improved)   ROM   Location (anatomical) wrist, forearm   Location Bilat   Motion Ext/Flex;Supination   ROM Comments AROM; wr ext right 70 and left 70, wr flex right WNLand left 78  (improved, WNL)   Strength   Location Bilat    right 35# and left 25#  (improved)   Therapeutic Exercise   Therapeutic Exercise ROM/AROM;Other Exercises/Activities   Skilled Interventions To Increase Blood Flow For Improved Healing;To Increase Tissue Extensibility;To Increase Strength   Minutes of Treatment 40   ROM/AROM   AROM Thumb All Planes   Sets/Reps 1 set;8 reps   AROM Wrist All Planes   Sets/Reps 1 set;10 reps   AROM Forearm All Planes   Sets/Reps 1 set;10 reps   Other Exer/Activities/Educ   Exercise 1 Reassessed, reviewed/reassesed goals and UEFI   Description 1 improved   Exercise 2 eccentirc TE with theraband ciarra   Description 2 10x each hand   Exercise 3 Pro/sup wheel/tool   Description 3 3 minutes each hand   Exercise 4 AAROM wrist flexion/extension   Description 4 10x each direction and hand   Manual   Manual STM   Skilled Interventions To Increase Blood  Flow For Improved Healing;To Enhance Tendon Gliding;To Enhance Tissue Repair;To Decrease Pain   Minutes of Treatment 10   STM Tool Assisted;Thenars;Hypothenars   Position Sitting   Description/ Technique STM mild pressure applied to bilateral palm and along ulnar aspect of the hand.    Hand Goals   Hand Goals Hygiene/Toileting;Household Chores;Driving;Sleeping   Hygiene/Toileting   Current Functional Task Brushing teeth;Brushing/combing hair;Washing back;Wiping   Previous Performance Level Independent   Current Performance Level Mild difficulty   Goal Target Task Style hair;Wash face;Hold toilet paper and wipe;Reach around to wipe;Hold brush/comb and brush/comb hair;Hold toothbrush and brush teeth   Goal Target Performance Level Mild difficulty   Due Date 08/15/17   Date Goal Met 08/08/17   Household Chores   Current Functional Task Washing and drying dishes;Vacuuming;Sweeping;Cooking;Gripping   Previous Performance Level Independent   Current Performance Level Moderate difficulty  (improved)   Goal Target Task Hold dish rag and wash dishes;Reach shelves to put dishes away;Weight bear through hand to wash floors;Sweep floors;Hold spoon for stirring;Open a tight or new jar;Hold and lift plate;Carry a shopping bag;Push a shopping cart   Goal Target Performance Level Mild difficulty   Due Date 08/15/17   Driving   Current Functional Task Holding steering wheel;Shifting;Turning car key;Opening car door   Previous Performance Level Independent   Current Performance Level Moderate difficulty  (wrist braces donned)   Goal Target Task  door handle;Pull car door open;Turn car key to start car; steering wheel   Goal Target Performance Level Mild difficulty   Due Date 08/15/17   Sleeping   Current Functional Task Sleeping through the night   Previous Performance Level Mild difficulty   Current Performance Level Mild difficulty  (wrist braces donned)   Goal Target Task Staying asleep   Goal Target Performance Level Mild  difficulty   Due Date 08/15/17   Date Goal Met 06/13/17   Assessment   Clinical Impression(s) Comments Increased functional use of the hands for daily tasks.  However, continues to limit due to hand protection posturing, discussed with patient to limited posturing and increase seated posture with tactile input to the hands.   Response to Therapy: Improvements ROM;Flexibility;Strength;Pain;Self Care Skills;Sensitivity   Plan   Home program continue OT recommendations and HEP   Plan Strengthening for work prep.    Total Session Time   Timed Code Treatment Minutes 50   Total Treatment Time (sum of timed and untimed services) 50       Thank you for referring Fern  To OT services to assist with decrease pain and improve function of the hands to return to work.    If you have any questions or concerns, please contact me at [phone].    Joanie Moreland MA, OTR/L  [facility]

## 2017-08-10 ENCOUNTER — HOSPITAL ENCOUNTER (OUTPATIENT)
Dept: OCCUPATIONAL THERAPY | Facility: CLINIC | Age: 57
Setting detail: THERAPIES SERIES
End: 2017-08-10
Attending: ORTHOPAEDIC SURGERY
Payer: OTHER MISCELLANEOUS

## 2017-08-10 PROCEDURE — 97110 THERAPEUTIC EXERCISES: CPT | Mod: GO | Performed by: OCCUPATIONAL THERAPIST

## 2017-08-10 PROCEDURE — 40000839 ZZH STATISTIC HAND THERAPY VISIT: Performed by: OCCUPATIONAL THERAPIST

## 2017-08-10 PROCEDURE — 97140 MANUAL THERAPY 1/> REGIONS: CPT | Mod: GO | Performed by: OCCUPATIONAL THERAPIST

## 2017-08-15 ENCOUNTER — HOSPITAL ENCOUNTER (OUTPATIENT)
Dept: OCCUPATIONAL THERAPY | Facility: CLINIC | Age: 57
Setting detail: THERAPIES SERIES
End: 2017-08-15
Attending: ORTHOPAEDIC SURGERY
Payer: OTHER MISCELLANEOUS

## 2017-08-15 PROCEDURE — 40000839 ZZH STATISTIC HAND THERAPY VISIT: Performed by: OCCUPATIONAL THERAPIST

## 2017-08-15 PROCEDURE — 97110 THERAPEUTIC EXERCISES: CPT | Mod: GO | Performed by: OCCUPATIONAL THERAPIST

## 2017-08-15 PROCEDURE — 97140 MANUAL THERAPY 1/> REGIONS: CPT | Mod: GO | Performed by: OCCUPATIONAL THERAPIST

## 2017-08-15 NOTE — PROGRESS NOTES
Occupational Therapy Discharge Progress Note       08/15/17 1039   Notes   Note Type Discharge Summary   Signing Clinician's Name / Credentials   Signing clinician's name / credentials Joanie MOY/L   Providers   Referring Physician Dr. Leif Fine    General Information   Rxs Used 22/24   Medical Diagnosis bilateral CTS G56.03   Orders Evaluate And Treat As Indicated;Other   Other Orders modalities PRN   Insurance WC   Start Of Care Date 05/15/17   Onset date of current episode/exacerbation 03/15/17   Surgical procedure Bilateral carpal tunnel and guyons canal release    Date of surgery 11/23/16   Subjective Measures   Subjective The patient agrees to discharge OT this date.   Current Pain level 1/10   Functional Improvement Reported Leisure Activities;Self care activities;Prehension;Carrying;Pushing;Upper extremity weight bearing   QuickDASH [Functional Disability Questionnaire; 0-100 (0=no dysfunction; 100=dysfunction)] (UEFI: 45/80)   ROM   Location (anatomical) wrist, forearm   Location Bilat   Motion Ext/Flex;Supination   ROM Comments AROM; wr ext right 70 and left 70, wr flex right WNLand left 78  (improved, WNL)   Strength   Location Bilat    right 35# and left 25#  (improved)   Therapeutic Exercise   Therapeutic Exercise Other Exercises/Activities   Minutes of Treatment 8   Other Exer/Activities/Educ   Exercise 1 Reviewed goals, UEFI, objectives and home programming for discharge   Manual   Manual STM   Skilled Interventions To Increase Blood Flow For Improved Healing;To Enhance Tendon Gliding;To Enhance Tissue Repair;To Decrease Pain   Minutes of Treatment 8   STM Tool Assisted;Thenars;Hypothenars   Position Sitting   Description/ Technique STM mild pressure applied to bilateral palm and along ulnar aspect of the hand.    Hand Goals   Hand Goals Hygiene/Toileting;Household Chores;Driving;Sleeping   Hygiene/Toileting   Current Functional Task Brushing  teeth;Brushing/combing hair;Washing back;Wiping   Previous Performance Level Independent   Current Performance Level Mild difficulty   Goal Target Task Style hair;Wash face;Hold toilet paper and wipe;Reach around to wipe;Hold brush/comb and brush/comb hair;Hold toothbrush and brush teeth   Goal Target Performance Level Mild difficulty   Due Date 08/15/17   Date Goal Met 08/08/17   Household Chores   Current Functional Task Washing and drying dishes;Vacuuming;Sweeping;Cooking;Gripping   Previous Performance Level Independent   Current Performance Level Moderate difficulty  (improved)   Goal Target Task Hold dish rag and wash dishes;Reach shelves to put dishes away;Weight bear through hand to wash floors;Sweep floors;Hold spoon for stirring;Open a tight or new jar;Hold and lift plate;Carry a shopping bag;Push a shopping cart   Goal Target Performance Level Mild difficulty   Due Date 08/15/17   If goal not met, Why? hypoersenitivy in right palm limits grasp for household tasks.   Driving   Current Functional Task Holding steering wheel;Shifting;Turning car key;Opening car door   Previous Performance Level Independent   Current Performance Level Mild difficulty  (wrist braces donned)   Goal Target Task  door handle;Pull car door open;Turn car key to start car; steering wheel   Goal Target Performance Level Mild difficulty   Due Date 08/15/17   Date Goal Met 08/15/17   Sleeping   Current Functional Task Sleeping through the night   Previous Performance Level Mild difficulty   Current Performance Level Mild difficulty  (wrist braces donned)   Goal Target Task Staying asleep   Goal Target Performance Level Mild difficulty   Due Date 08/15/17   Date Goal Met 06/13/17   Assessment   Clinical Impression(s) Comments Patient has plateaued with progress.  Continues to be hypersensitive in right palm, distal to incision site, some scar tissue build up and inability to smooth at radial aspect of the incision of the right  hand.  Patient to continue with bilateral hand strengthening home exercise program independently.   Response to Therapy: Improvements ROM;Flexibility;Strength;Pain;Self Care Skills   Plan   Home program Continue HEP independently and will discuss work tasks/activities    Updates to plan of care discharge   Total Session Time   Timed Code Treatment Minutes 16   Total Treatment Time (sum of timed and untimed services) 16     Thank you for referring Fern  To OT services to assist with improve hand function to return to work and other daily tasks/activities.    If you have any questions or concerns, please contact me at [phone].    Joanie Moreland MA, OTR/L  [facility]

## 2017-08-17 ENCOUNTER — OFFICE VISIT (OUTPATIENT)
Dept: ORTHOPEDICS | Facility: CLINIC | Age: 57
End: 2017-08-17
Payer: OTHER MISCELLANEOUS

## 2017-08-17 VITALS — BODY MASS INDEX: 33.66 KG/M2 | HEIGHT: 57 IN | TEMPERATURE: 98.4 F | WEIGHT: 156 LBS

## 2017-08-17 DIAGNOSIS — G56.03 BILATERAL CARPAL TUNNEL SYNDROME: Primary | ICD-10-CM

## 2017-08-17 PROCEDURE — 99213 OFFICE O/P EST LOW 20 MIN: CPT | Performed by: ORTHOPAEDIC SURGERY

## 2017-08-17 RX ORDER — NIFEDIPINE 10 MG/1
10 CAPSULE ORAL 3 TIMES DAILY
Qty: 20 CAPSULE | Refills: 1 | Status: SHIPPED | OUTPATIENT
Start: 2017-08-17 | End: 2017-08-28

## 2017-08-17 ASSESSMENT — PAIN SCALES - GENERAL: PAINLEVEL: MILD PAIN (3)

## 2017-08-17 NOTE — LETTER
[address] [address]  [phone]          August 17, 2017    RE:  Fern MATT Lisa                                                                                                                                                       [address]  [facility] [address]            To whom it may concern:    Fern Gonzalez is under my professional care for    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, right  Bilateral carpal tunnel syndrome  S/P carpal tunnel release  Guyon syndrome, right     Patient will continue occupational therapy. Continue restriction of no power gripping, no vibratory tools,  Follow up planned for 1 month.       Sincerely,        Leif Fine MD

## 2017-08-17 NOTE — LETTER
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       Sincerely,        Leif Fine MD

## 2017-08-17 NOTE — NURSING NOTE
"Chief Complaint   Patient presents with     Surgical Followup     Release of Guyon's canal right wrist. DOS: 11/23/16 (9 months postop)      Surgical Followup     Left carpal tunnel release and release of Guyon's canal.  DOS: 10/5/16 (10 months postop)     Surgical Followup     Right carpal tunnel release.  DOS: 9/7/16 (11 months postop)     Work Comp     Intake     Would like to discss injections.  Would like more information on what they are and if she would be able to go back to her normal job.       Initial Temp 98.4  F (36.9  C) (Temporal)  Ht 1.448 m (4' 9\")  Wt 70.8 kg (156 lb)  BMI 33.76 kg/m2 Estimated body mass index is 33.76 kg/(m^2) as calculated from the following:    Height as of this encounter: 1.448 m (4' 9\").    Weight as of this encounter: 70.8 kg (156 lb).  Medication Reconciliation: complete   KASI Maldonado  "

## 2017-08-17 NOTE — LETTER
8/17/2017         RE: Fern Gonzalez  [address]  [facility] [address]        Dear Colleague,    Thank you for referring your patient, Fern Gonzalez, to the [facility]. Please see a copy of my visit note below.    Office Visit-Follow up  HISTORY OF PRESENT ILLNESS:    Fern Gonzalez is a 56 year old female who is seen in follow up for   Chief Complaint   Patient presents with     Surgical Followup     Release of Guyon's canal right wrist. DOS: 11/23/16 (9 months postop)      Surgical Followup     Left carpal tunnel release and release of Guyon's canal.  DOS: 10/5/16 (10 months postop)     Surgical Followup     Right carpal tunnel release.  DOS: 9/7/16 (11 months postop)     Work Comp     Intake     Would like to discss injections.  Would like more information on what they are and if she would be able to go back to her normal job.       Patient presents with:  Surgical Followup: Release of Guyon's canal right wrist. DOS: 11/23/16 (9 months postop)   Surgical Followup: Left carpal tunnel release and release of Guyon's canal.  DOS: 10/5/16 (10 months postop)  Surgical Followup: Right carpal tunnel release.  DOS: 9/7/16 (11 months postop)  Work Comp  Intake: Would like to discss injections.  Would like more information on what they are and if she would be able to go back to her normal job.      The above intake note was reviewed.  She is accompanied by GALLO VIDES today.  She was unable to return to work given the previous restriction.  We also reviewed that her symptoms were well controlled and all aspects until she returned to her normal job status. This occurred at about 5 months after her second right hand surgery.  However,  the return to work brought symptoms back in both hands that were originally consistent with recurrent carpal tunnel.  Present symptoms: Her main and sole complaint today is hypersensitivity and numbness in the palm region of her right hand only.  Treatments tried to  "this point: She has been on a relative rest program and physical therapy.  We reviewed that we did try her on gabapentin previously but she did not tolerate this.    REVIEW OF SYSTEMS  General: negative for, night sweats, dizziness, fatigue  Resp: No shortness of breath and no cough  CV: negative for chest pain, syncope or near-syncope  GI: negative for nausea, vomiting and diarrhea  : negative for dysuria and hematuria  Musculoskeletal: as above  Neurologic: negative for syncope   Hematologic: negative for bleeding disorder    Physical Exam:  Vitals: Temp 98.4  F (36.9  C) (Temporal)  Ht 1.448 m (4' 9\")  Wt 70.8 kg (156 lb)  BMI 33.76 kg/m2  BMI= Body mass index is 33.76 kg/(m^2).  Constitutional: healthy, alert and no acute distress   Psychiatric: mentation appears normal and affect normal/bright  NEURO: no focal deficits  SKIN: no excoriation or erythema. No signs of infection.    GAIT: non-antalgic    JOINT/EXTREMITIES:     All of her surgical wounds are well-healed.  I cannot identify any significant loss of range of motion to digits and/or wrist on both sides.  The most pressing finding today is palmar sensitivity on the right hand only.  Also her subjective statement that there is altered sensation in the palm of her hand.    IMAGING INTERPRETATION: No x-rays were obtained       Impression:      ICD-10-CM    1. Bilateral carpal tunnel syndrome G56.03 NIFEdipine (PROCARDIA) 10 MG capsule       It would appear today that her carpal tunnel symptoms and ulnar nerve symptoms at the wrist have been improved if not resolved.  The residual palmar sensitivity with numbness may be related to a small sensory branch of nerve that can run along the palmar region. Given the timing post surgery I am hopeful that this nerve is simply being entrapped in scar tissue.    Plan:   The above was reviewed with Fern and the GALLO VIDES.     I would like to attempt an alternative medication to see if we can block pain pathways. We " will try nifedipine. She was given an explanation how works and she was cautioned about side effects. She is very willing to accept this. That prescription was written.    We will continue her work restriction.    If the above is unsuccessful then we may attempt injection of her palmar wound as opposed to the carpal canal. This will require infiltrating subcutaneously in the region of her surgical incision.      Return to clinic 10, days    Leif Fine MD    Again, thank you for allowing me to participate in the care of your patient.        Sincerely,        Leif Fine MD

## 2017-08-17 NOTE — PROGRESS NOTES
Office Visit-Follow up  HISTORY OF PRESENT ILLNESS:    Fern Gonzalez is a 56 year old female who is seen in follow up for   Chief Complaint   Patient presents with     Surgical Followup     Release of Guyon's canal right wrist. DOS: 11/23/16 (9 months postop)      Surgical Followup     Left carpal tunnel release and release of Guyon's canal.  DOS: 10/5/16 (10 months postop)     Surgical Followup     Right carpal tunnel release.  DOS: 9/7/16 (11 months postop)     Work Comp     Intake     Would like to discss injections.  Would like more information on what they are and if she would be able to go back to her normal job.       Patient presents with:  Surgical Followup: Release of Guyon's canal right wrist. DOS: 11/23/16 (9 months postop)   Surgical Followup: Left carpal tunnel release and release of Guyon's canal.  DOS: 10/5/16 (10 months postop)  Surgical Followup: Right carpal tunnel release.  DOS: 9/7/16 (11 months postop)  Work Comp  Intake: Would like to discss injections.  Would like more information on what they are and if she would be able to go back to her normal job.      The above intake note was reviewed.  She is accompanied by GALLO VIDES today.  She was unable to return to work given the previous restriction.  We also reviewed that her symptoms were well controlled and all aspects until she returned to her normal job status. This occurred at about 5 months after her second right hand surgery.  However,  the return to work brought symptoms back in both hands that were originally consistent with recurrent carpal tunnel.  Present symptoms: Her main and sole complaint today is hypersensitivity and numbness in the palm region of her right hand only.  Treatments tried to this point: She has been on a relative rest program and physical therapy.  We reviewed that we did try her on gabapentin previously but she did not tolerate this.    REVIEW OF SYSTEMS  General: negative for, night sweats, dizziness, fatigue  Resp:  "No shortness of breath and no cough  CV: negative for chest pain, syncope or near-syncope  GI: negative for nausea, vomiting and diarrhea  : negative for dysuria and hematuria  Musculoskeletal: as above  Neurologic: negative for syncope   Hematologic: negative for bleeding disorder    Physical Exam:  Vitals: Temp 98.4  F (36.9  C) (Temporal)  Ht 1.448 m (4' 9\")  Wt 70.8 kg (156 lb)  BMI 33.76 kg/m2  BMI= Body mass index is 33.76 kg/(m^2).  Constitutional: healthy, alert and no acute distress   Psychiatric: mentation appears normal and affect normal/bright  NEURO: no focal deficits  SKIN: no excoriation or erythema. No signs of infection.    GAIT: non-antalgic    JOINT/EXTREMITIES:     All of her surgical wounds are well-healed.  I cannot identify any significant loss of range of motion to digits and/or wrist on both sides.  The most pressing finding today is palmar sensitivity on the right hand only.  Also her subjective statement that there is altered sensation in the palm of her hand.    IMAGING INTERPRETATION: No x-rays were obtained       Impression:      ICD-10-CM    1. Bilateral carpal tunnel syndrome G56.03 NIFEdipine (PROCARDIA) 10 MG capsule       It would appear today that her carpal tunnel symptoms and ulnar nerve symptoms at the wrist have been improved if not resolved.  The residual palmar sensitivity with numbness may be related to a small sensory branch of nerve that can run along the palmar region. Given the timing post surgery I am hopeful that this nerve is simply being entrapped in scar tissue.    Plan:   The above was reviewed with Fern and the GALLO VIDES.     I would like to attempt an alternative medication to see if we can block pain pathways. We will try nifedipine. She was given an explanation how works and she was cautioned about side effects. She is very willing to accept this. That prescription was written.    We will continue her work restriction.    If the above is unsuccessful then " we may attempt injection of her palmar wound as opposed to the carpal canal. This will require infiltrating subcutaneously in the region of her surgical incision.      Return to clinic 10, days    Leif Fine MD

## 2017-08-17 NOTE — MR AVS SNAPSHOT
"              After Visit Summary   2017    Fern Gonzalez    MRN: [id number]           Patient Information     Date Of Birth          1960        Visit Information        Provider Department      2017 10:30 AM Leif Fine MD [facility]         Follow-ups after your visit        Who to contact     If you have questions or need follow up information about today's clinic visit or your schedule please contact [facility] directly at [phone].  Normal or non-critical lab and imaging results will be communicated to you by MyChart, letter or phone within 4 business days after the clinic has received the results. If you do not hear from us within 7 days, please contact the clinic through 3TIERhart or phone. If you have a critical or abnormal lab result, we will notify you by phone as soon as possible.  Submit refill requests through EverythingMe or call your pharmacy and they will forward the refill request to us. Please allow 3 business days for your refill to be completed.          Additional Information About Your Visit        [email or web address] Information     EverythingMe lets you send messages to your doctor, view your test results, renew your prescriptions, schedule appointments and more. To sign up, go to [email or web address] . Click on \"Log in\" on the left side of the screen, which will take you to the Welcome page. Then click on \"Sign up Now\" on the right side of the page.     You will be asked to enter the access code listed below, as well as some personal information. Please follow the directions to create your username and password.     Your access code is: [email or web address]  Expires: 10/18/2017 10:36 AM     Your access code will  in 90 days. If you need help or a new code, please call your [facility] or [phone].        Care EveryWhere ID     This is your Care EveryWhere ID. This could be used by other organizations to access your [place] [facility] medical " "records  [id number]        Your Vitals Were     Temperature Height BMI (Body Mass Index)             98.4  F (36.9  C) (Temporal) 1.448 m (4' 9\") 33.76 kg/m2          Blood Pressure from Last 3 Encounters:   02/19/17 114/86   01/13/17 107/73   12/13/16 113/62    Weight from Last 3 Encounters:   08/17/17 70.8 kg (156 lb)   07/20/17 68.5 kg (151 lb)   05/23/17 69.9 kg (154 lb)              Today, you had the following     No orders found for display       Primary Care Provider Office Phone # Fax #    Mirta Edilson Fan, MANI [phone] [address] [place] [address]        Equal Access to Services     AVA LOZANO : Stas english Sobrandon, waaxda luqadaha, qaybta kaalmada adeegyada, michael artis . So [facility] [phone].    ATENCIÓN: Si habla español, tiene a pedroza disposición servicios gratuitos de asistencia lingüística. Llame al [phone].    We comply with applicable federal civil rights laws and [place] laws. We do not discriminate on the basis of race, color, national origin, age, disability sex, sexual orientation or gender identity.            Thank you!     Thank you for choosing [facility]  for your care. Our goal is always to provide you with excellent care. Hearing back from our patients is one way we can continue to improve our services. Please take a few minutes to complete the written survey that you may receive in the mail after your visit with us. Thank you!             Your Updated Medication List - Protect others around you: Learn how to safely use, store and throw away your medicines at www.disposemymeds.org.          This list is accurate as of: 8/17/17 10:32 AM.  Always use your most recent med list.                   Brand Name Dispense Instructions for use Diagnosis    ibuprofen 200 MG tablet    ADVIL/MOTRIN    120 tablet    Take 3 tablets (600 mg) by mouth every 6 hours as needed    Headache(784.0)       NYQUIL PO    "   Reported on 5/23/2017        order for DME     1 each    Equipment being ordered: Other: nebulizer machine Treatment Diagnosis: acute bronchospasm, respiratory distress  Use as directed with albuterol    Respiratory distress, Acute bronchospasm

## 2017-08-28 ENCOUNTER — OFFICE VISIT (OUTPATIENT)
Dept: ORTHOPEDICS | Facility: CLINIC | Age: 57
End: 2017-08-28
Payer: OTHER MISCELLANEOUS

## 2017-08-28 VITALS — TEMPERATURE: 97.7 F | BODY MASS INDEX: 33.22 KG/M2 | HEIGHT: 57 IN | WEIGHT: 154 LBS

## 2017-08-28 DIAGNOSIS — G56.03 BILATERAL CARPAL TUNNEL SYNDROME: ICD-10-CM

## 2017-08-28 PROCEDURE — 99213 OFFICE O/P EST LOW 20 MIN: CPT | Performed by: ORTHOPAEDIC SURGERY

## 2017-08-28 RX ORDER — NIFEDIPINE 10 MG/1
10 CAPSULE ORAL 2 TIMES DAILY
Qty: 40 CAPSULE | Refills: 1 | Status: SHIPPED | OUTPATIENT
Start: 2017-08-28 | End: 2017-09-25

## 2017-08-28 ASSESSMENT — PAIN SCALES - GENERAL: PAINLEVEL: MILD PAIN (2)

## 2017-08-28 NOTE — LETTER
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      Sincerely,        Leif Fine MD

## 2017-08-28 NOTE — MR AVS SNAPSHOT
"              After Visit Summary   2017    Fern Gonzalez    MRN: [id number]           Patient Information     Date Of Birth          1960        Visit Information        Provider Department      2017 2:10 PM Leif Fine MD [facility]         Follow-ups after your visit        Who to contact     If you have questions or need follow up information about today's clinic visit or your schedule please contact [facility] directly at [phone].  Normal or non-critical lab and imaging results will be communicated to you by MyChart, letter or phone within 4 business days after the clinic has received the results. If you do not hear from us within 7 days, please contact the clinic through PlanetTranhart or phone. If you have a critical or abnormal lab result, we will notify you by phone as soon as possible.  Submit refill requests through Appointedd or call your pharmacy and they will forward the refill request to us. Please allow 3 business days for your refill to be completed.          Additional Information About Your Visit        PlanetTranharRolocule Games Information     Appointedd lets you send messages to your doctor, view your test results, renew your prescriptions, schedule appointments and more. To sign up, go to [email or web address] . Click on \"Log in\" on the left side of the screen, which will take you to the Welcome page. Then click on \"Sign up Now\" on the right side of the page.     You will be asked to enter the access code listed below, as well as some personal information. Please follow the directions to create your username and password.     Your access code is: [email or web address]  Expires: 10/18/2017 10:36 AM     Your access code will  in 90 days. If you need help or a new code, please call your [facility] or [phone].        Care EveryWhere ID     This is your Care EveryWhere ID. This could be used by other organizations to access your [place] medical " "records  [id number]        Your Vitals Were     Temperature Height BMI (Body Mass Index)             97.7  F (36.5  C) (Temporal) 1.448 m (4' 9\") 33.33 kg/m2          Blood Pressure from Last 3 Encounters:   02/19/17 114/86   01/13/17 107/73   12/13/16 113/62    Weight from Last 3 Encounters:   08/28/17 69.9 kg (154 lb)   08/17/17 70.8 kg (156 lb)   07/20/17 68.5 kg (151 lb)              Today, you had the following     No orders found for display       Primary Care Provider Office Phone # Fax #    Mirta Edilson Fan, MANI [phone] [address] [place] [address]        Equal Access to Services     AVA LOZANO : Stas english Sobrandon, waaxda luqadaha, qaybta kaalmada adeegyada, michael artis . So [facility] [phone].    ATENCIÓN: Si habla español, tiene a pedroza disposición servicios gratuitos de asistencia lingüística. Llame al [phone].    We comply with applicable federal civil rights laws and [place] laws. We do not discriminate on the basis of race, color, national origin, age, disability sex, sexual orientation or gender identity.            Thank you!     Thank you for choosing [facility]  for your care. Our goal is always to provide you with excellent care. Hearing back from our patients is one way we can continue to improve our services. Please take a few minutes to complete the written survey that you may receive in the mail after your visit with us. Thank you!             Your Updated Medication List - Protect others around you: Learn how to safely use, store and throw away your medicines at www.disposemymeds.org.          This list is accurate as of: 8/28/17  2:15 PM.  Always use your most recent med list.                   Brand Name Dispense Instructions for use Diagnosis    ibuprofen 200 MG tablet    ADVIL/MOTRIN    120 tablet    Take 3 tablets (600 mg) by mouth every 6 hours as needed    Headache(784.0)       NIFEdipine " 10 MG capsule    PROCARDIA    20 capsule    Take 1 capsule (10 mg) by mouth 3 times daily    Bilateral carpal tunnel syndrome       NYQUIL PO      Reported on 5/23/2017        order for DME     1 each    Equipment being ordered: Other: nebulizer machine Treatment Diagnosis: acute bronchospasm, respiratory distress  Use as directed with albuterol    Respiratory distress, Acute bronchospasm

## 2017-08-28 NOTE — LETTER
"    8/28/2017         RE: Fern Gonzalez  [address] [facility] [address]        Dear Colleague,    Thank you for referring your patient, Fern Gonzalez, to the [facility]. Please see a copy of my visit note below.    HISTORY OF PRESENT ILLNESS:    Fern Gonzalez is a 56 year old female who is seen in follow up for   Chief Complaint   Patient presents with     Surgical Followup     Release of Guyon's canal right wrist. DOS: 11/23/16 (9 months postop)      Surgical Followup     Left carpal tunnel release and release of Guyon's canal.  DOS: 10/5/16 (10 months postop)     Surgical Followup     Right carpal tunnel release.  DOS: 9/7/16 (11 months postop)     Work Comp   Present symptoms: Patient reports she has noticed a considerable difference in her pain levels for her right palm. Patient states her left hand is doing quite well. Patient states the right palm however even with medication still has some residual sensitivity. Patient also states she is having \"weird\" side effects from the medication of nifedipine especially upon wakening in the morning  Treatments tried to this point: Patient with bilateral carpal tunnel release, occupational therapy, desensitization techniques, nifedipine.  Patient has currently been off work. Patient's workplace has not been able to find work within her restrictions.  Patient evaluation done with Dr. Fine    Physical Exam:  Vitals: Temp 97.7  F (36.5  C) (Temporal)  Ht 1.448 m (4' 9\")  Wt 69.9 kg (154 lb)  BMI 33.33 kg/m2  BMI= Body mass index is 33.33 kg/(m^2).  Constitutional: healthy, alert and no acute distress   Psychiatric: mentation appears normal and affect normal/bright  NEURO: no focal deficits  SKIN: no excoriation or erythema. No signs of infection.  JOINT/EXTREMITIES:  Affected extremity pulses are easily palpable.  Bilateral Hand/Finger Exam: Inspection: There is no obvious redness or swelling to either palm left or right.  Tender: " Patient still with some mild tenderness upon palpation of her incision of the right hand but it is not as excruciating as before. The left hand is doing well with palpation  Range of Motion patient is able to mobilize all her fingers readily    ASSESSMENT:  Chief Complaint   Patient presents with     Surgical Followup     Release of Guyon's canal right wrist. DOS: 11/23/16 (9 months postop)      Surgical Followup     Left carpal tunnel release and release of Guyon's canal.  DOS: 10/5/16 (10 months postop)     Surgical Followup     Right carpal tunnel release.  DOS: 9/7/16 (11 months postop)     Work Comp       ICD-10-CM    1. Bilateral carpal tunnel syndrome G56.03 NIFEdipine (PROCARDIA) 10 MG capsule     patient with bilateral carpal tunnel and Guyon's canal release.  Patient is showing decreased sensitivity since beginning nifedipine 10 days ago.    Plan:   Injection of the incision versus continuance of nifedipine was discussed. It is advisable to patient that since the nifedipine showing a positive response that we should continue on this. We will increase patient's nifedipine dose to 1 tablet in the evening and 1 tablet in the morning or 10 mg twice daily. Patient is advised that she will eventually get used to or acclimate to the side effects of the medication.  A letter was written for patient's workplace to continue previous restrictions of no power gripping, no vibratory tools.  Return to clinic 4 weeks for reevaluation    Scribed by  Ximena Cabral PA-C   8/28/2017  3:00 PM      I attest I have seen and evaluated the patient.  I agree with above impression and plan.    Leif Fine MD    Again, thank you for allowing me to participate in the care of your patient.        Sincerely,        Leif Fine MD

## 2017-08-28 NOTE — NURSING NOTE
"Chief Complaint   Patient presents with     Surgical Followup     Release of Guyon's canal right wrist. DOS: 11/23/16 (9 months postop)      Surgical Followup     Left carpal tunnel release and release of Guyon's canal.  DOS: 10/5/16 (10 months postop)     Surgical Followup     Right carpal tunnel release.  DOS: 9/7/16 (11 months postop)     Work Comp       Initial Temp 97.7  F (36.5  C) (Temporal)  Ht 1.448 m (4' 9\")  Wt 69.9 kg (154 lb)  BMI 33.33 kg/m2 Estimated body mass index is 33.33 kg/(m^2) as calculated from the following:    Height as of this encounter: 1.448 m (4' 9\").    Weight as of this encounter: 69.9 kg (154 lb).  Medication Reconciliation: complete   KASI Maldonado  "

## 2017-08-28 NOTE — PROGRESS NOTES
"HISTORY OF PRESENT ILLNESS:    Fern Gonzalez is a 56 year old female who is seen in follow up for   Chief Complaint   Patient presents with     Surgical Followup     Release of Guyon's canal right wrist. DOS: 11/23/16 (9 months postop)      Surgical Followup     Left carpal tunnel release and release of Guyon's canal.  DOS: 10/5/16 (10 months postop)     Surgical Followup     Right carpal tunnel release.  DOS: 9/7/16 (11 months postop)     Work Comp   Present symptoms: Patient reports she has noticed a considerable difference in her pain levels for her right palm. Patient states her left hand is doing quite well. Patient states the right palm however even with medication still has some residual sensitivity. Patient also states she is having \"weird\" side effects from the medication of nifedipine especially upon wakening in the morning  Treatments tried to this point: Patient with bilateral carpal tunnel release, occupational therapy, desensitization techniques, nifedipine.  Patient has currently been off work. Patient's workplace has not been able to find work within her restrictions.  Patient evaluation done with Dr. Fine    Physical Exam:  Vitals: Temp 97.7  F (36.5  C) (Temporal)  Ht 1.448 m (4' 9\")  Wt 69.9 kg (154 lb)  BMI 33.33 kg/m2  BMI= Body mass index is 33.33 kg/(m^2).  Constitutional: healthy, alert and no acute distress   Psychiatric: mentation appears normal and affect normal/bright  NEURO: no focal deficits  SKIN: no excoriation or erythema. No signs of infection.  JOINT/EXTREMITIES:  Affected extremity pulses are easily palpable.  Bilateral Hand/Finger Exam: Inspection: There is no obvious redness or swelling to either palm left or right.  Tender: Patient still with some mild tenderness upon palpation of her incision of the right hand but it is not as excruciating as before. The left hand is doing well with palpation  Range of Motion patient is able to mobilize all her fingers " readily    ASSESSMENT:  Chief Complaint   Patient presents with     Surgical Followup     Release of Guyon's canal right wrist. DOS: 11/23/16 (9 months postop)      Surgical Followup     Left carpal tunnel release and release of Guyon's canal.  DOS: 10/5/16 (10 months postop)     Surgical Followup     Right carpal tunnel release.  DOS: 9/7/16 (11 months postop)     Work Comp       ICD-10-CM    1. Bilateral carpal tunnel syndrome G56.03 NIFEdipine (PROCARDIA) 10 MG capsule     patient with bilateral carpal tunnel and Guyon's canal release.  Patient is showing decreased sensitivity since beginning nifedipine 10 days ago.    Plan:   Injection of the incision versus continuance of nifedipine was discussed. It is advisable to patient that since the nifedipine showing a positive response that we should continue on this. We will increase patient's nifedipine dose to 1 tablet in the evening and 1 tablet in the morning or 10 mg twice daily. Patient is advised that she will eventually get used to or acclimate to the side effects of the medication.  A letter was written for patient's workplace to continue previous restrictions of no power gripping, no vibratory tools.  Return to clinic 4 weeks for reevaluation    Scribed by  Ximena Cabral PA-C   8/28/2017  3:00 PM      I attest I have seen and evaluated the patient.  I agree with above impression and plan.    Leif Fine MD

## 2017-09-25 ENCOUNTER — OFFICE VISIT (OUTPATIENT)
Dept: ORTHOPEDICS | Facility: CLINIC | Age: 57
End: 2017-09-25
Payer: OTHER MISCELLANEOUS

## 2017-09-25 VITALS — TEMPERATURE: 98.4 F | WEIGHT: 158 LBS | HEIGHT: 57 IN | BODY MASS INDEX: 34.09 KG/M2

## 2017-09-25 DIAGNOSIS — G56.21 GUYON SYNDROME, RIGHT: ICD-10-CM

## 2017-09-25 DIAGNOSIS — Z98.890 POSTOPERATIVE STATE: ICD-10-CM

## 2017-09-25 DIAGNOSIS — G56.03 BILATERAL CARPAL TUNNEL SYNDROME: Primary | ICD-10-CM

## 2017-09-25 PROCEDURE — 99213 OFFICE O/P EST LOW 20 MIN: CPT | Performed by: ORTHOPAEDIC SURGERY

## 2017-09-25 ASSESSMENT — PAIN SCALES - GENERAL: PAINLEVEL: MILD PAIN (2)

## 2017-09-25 NOTE — MR AVS SNAPSHOT
"              After Visit Summary   2017    Fern Gonzalez    MRN: [id number]           Patient Information     Date Of Birth          1960        Visit Information        Provider Department      2017 2:00 PM Leif Fine MD [facility]         Follow-ups after your visit        Who to contact     If you have questions or need follow up information about today's clinic visit or your schedule please contact [facility] directly at [phone].  Normal or non-critical lab and imaging results will be communicated to you by MyChart, letter or phone within 4 business days after the clinic has received the results. If you do not hear from us within 7 days, please contact the clinic through Pipelinefxhart or phone. If you have a critical or abnormal lab result, we will notify you by phone as soon as possible.  Submit refill requests through Persado or call your pharmacy and they will forward the refill request to us. Please allow 3 business days for your refill to be completed.          Additional Information About Your Visit        PipelinefxharSelltag Information     Persado lets you send messages to your doctor, view your test results, renew your prescriptions, schedule appointments and more. To sign up, go to [email or web address] . Click on \"Log in\" on the left side of the screen, which will take you to the Welcome page. Then click on \"Sign up Now\" on the right side of the page.     You will be asked to enter the access code listed below, as well as some personal information. Please follow the directions to create your username and password.     Your access code is: [email or web address]  Expires: 10/18/2017 10:36 AM     Your access code will  in 90 days. If you need help or a new code, please call your [facility] or [phone].        Care EveryWhere ID     This is your Care EveryWhere ID. This could be used by other organizations to access your [place] medical " "records  [id number]        Your Vitals Were     Temperature Height BMI (Body Mass Index)             98.4  F (36.9  C) (Temporal) 1.448 m (4' 9\") 34.19 kg/m2          Blood Pressure from Last 3 Encounters:   02/19/17 114/86   01/13/17 107/73   12/13/16 113/62    Weight from Last 3 Encounters:   09/25/17 71.7 kg (158 lb)   08/28/17 69.9 kg (154 lb)   08/17/17 70.8 kg (156 lb)              Today, you had the following     No orders found for display       Primary Care Provider Office Phone # Fax #    Mirta Edilson Fan, MANI [phone] [address] [place] [address]        Equal Access to Services     AVA LOZANO : Stas english Sobrandon, waaxda luqadaha, qaybta kaalmada adeegyada, michael artis . So [facility] [phone].    ATENCIÓN: Si habla español, tiene a pedroza disposición servicios gratuitos de asistencia lingüística. Llame al [phone].    We comply with applicable federal civil rights laws and [place] laws. We do not discriminate on the basis of race, color, national origin, age, disability sex, sexual orientation or gender identity.            Thank you!     Thank you for choosing [facility]  for your care. Our goal is always to provide you with excellent care. Hearing back from our patients is one way we can continue to improve our services. Please take a few minutes to complete the written survey that you may receive in the mail after your visit with us. Thank you!             Your Updated Medication List - Protect others around you: Learn how to safely use, store and throw away your medicines at www.disposemymeds.org.          This list is accurate as of: 9/25/17  2:01 PM.  Always use your most recent med list.                   Brand Name Dispense Instructions for use Diagnosis    ibuprofen 200 MG tablet    ADVIL/MOTRIN    120 tablet    Take 3 tablets (600 mg) by mouth every 6 hours as needed    Headache(784.0)       NYQUIL PO    "   Reported on 5/23/2017        order for DME     1 each    Equipment being ordered: Other: nebulizer machine Treatment Diagnosis: acute bronchospasm, respiratory distress  Use as directed with albuterol    Respiratory distress, Acute bronchospasm

## 2017-09-25 NOTE — LETTER
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      Sincerely,        Leif LAST

## 2017-09-25 NOTE — LETTER
9/25/2017         RE: Fern Gonzalez  [address]  [facility] [address]        Dear Colleague,    Thank you for referring your patient, Fern Gonzalez, to the [facility]. Please see a copy of my visit note below.    Office Visit-Follow up  HISTORY OF PRESENT ILLNESS:    Fern Gonzalez is a 57 year old female who is seen in follow up for   Chief Complaint   Patient presents with     Surgical Followup     Release of Guyon's canal right wrist. DOS: 11/23/16 (10 months postop)      Surgical Followup     Left carpal tunnel release and release of Guyon's canal.  DOS: 10/5/16 (11 months postop)     Surgical Followup     Right carpal tunnel release.  DOS: 9/7/16 (12 months postop)     Work Comp       Patient presents with:  Surgical Followup: Release of Guyon's canal right wrist. DOS: 11/23/16 (10 months postop)   Surgical Followup: Left carpal tunnel release and release of Guyon's canal.  DOS: 10/5/16 (11 months postop)  Surgical Followup: Right carpal tunnel release.  DOS: 9/7/16 (12 months postop)  Work Comp      Patient is returning to discuss the results of her treatment with nifedipine.  She does state that she stopped taking it about 10 days ago because she stopped seeing a progressive improvement. There has been no backsliding since then.  We did release her to unrestricted work on last office visit. They did not have any restricted work for her however she is going to be returning to work within a day or so as there is now work available with that striction.    She is not accompanied by [facility] today.  Present symptoms: Her only complaint is the palmar sensitivity. However, she admits that it is substantially better.  Treatments tried to this point: Most recent surgery was on her right wrist for the release of Guyon's canal. That was 10 months ago. We have been trying to desensitize her right hand since her recurrence in March.    REVIEW OF SYSTEMS    General: negative for, night sweats,  "dizziness, fatigue  Resp: No shortness of breath and no cough  CV: negative for chest pain, syncope or near-syncope  GI: negative for nausea, vomiting and diarrhea  : negative for dysuria and hematuria  Musculoskeletal: as above  Neurologic: negative for syncope   Hematologic: negative for bleeding disorder    Physical Exam:    Vitals: Temp 98.4  F (36.9  C) (Temporal)  Ht 1.448 m (4' 9\")  Wt 71.7 kg (158 lb)  BMI 34.19 kg/m2  BMI= Body mass index is 34.19 kg/(m^2).  Constitutional: healthy, alert and no acute distress   Psychiatric: mentation appears normal and affect normal/bright  NEURO: no focal deficits  SKIN: no excoriation or erythema. No signs of infection.    GAIT: non-antalgic    JOINT/EXTREMITIES:     The only pertinent finding of her right hand is that as we massaged the palmar wound there is some degree of sensitivity.  She does not have the degree of sensitivity that we recognized before.  She does not show any gross limitations to range of motion of her digits and/or her wrist. No new x-rays were obtained.    IMAGING INTERPRETATION: No new x-rays were obtained.       Impression:      ICD-10-CM    1. Bilateral carpal tunnel syndrome G56.03    2. 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 Postoperative state Z98.890        It appears as though the palmar sensitivity has been aided by the use of the calcium channel blocking medication nifedipine.    Plan:   The above was reviewed with Fern and she agrees.     We will keep the same restriction. We will see how she does on this restriction.  I did discuss with her the use of skin stimulants. She will try that at home as an adjunct treatment.      Return to clinic 4, weeks    Leif Fine MD    Again, thank you for allowing me to participate in the care of your patient.        Sincerely,        Leif Fine MD    "

## 2017-09-25 NOTE — NURSING NOTE
"Chief Complaint   Patient presents with     Surgical Followup     Release of Guyon's canal right wrist. DOS: 11/23/16 (10 months postop)      Surgical Followup     Left carpal tunnel release and release of Guyon's canal.  DOS: 10/5/16 (11 months postop)     Surgical Followup     Right carpal tunnel release.  DOS: 9/7/16 (12 months postop)     Work Comp       Initial Temp 98.4  F (36.9  C) (Temporal)  Ht 1.448 m (4' 9\")  Wt 71.7 kg (158 lb)  BMI 34.19 kg/m2 Estimated body mass index is 34.19 kg/(m^2) as calculated from the following:    Height as of this encounter: 1.448 m (4' 9\").    Weight as of this encounter: 71.7 kg (158 lb).  Medication Reconciliation: complete   KASI Maldonado  "

## 2017-09-25 NOTE — PROGRESS NOTES
Office Visit-Follow up  HISTORY OF PRESENT ILLNESS:    Fern Gonzalez is a 57 year old female who is seen in follow up for   Chief Complaint   Patient presents with     Surgical Followup     Release of Guyon's canal right wrist. DOS: 11/23/16 (10 months postop)      Surgical Followup     Left carpal tunnel release and release of Guyon's canal.  DOS: 10/5/16 (11 months postop)     Surgical Followup     Right carpal tunnel release.  DOS: 9/7/16 (12 months postop)     Work Comp       Patient presents with:  Surgical Followup: Release of Guyon's canal right wrist. DOS: 11/23/16 (10 months postop)   Surgical Followup: Left carpal tunnel release and release of Guyon's canal.  DOS: 10/5/16 (11 months postop)  Surgical Followup: Right carpal tunnel release.  DOS: 9/7/16 (12 months postop)  Work Comp      Patient is returning to discuss the results of her treatment with nifedipine.  She does state that she stopped taking it about 10 days ago because she stopped seeing a progressive improvement. There has been no backsliding since then.  We did release her to unrestricted work on last office visit. They did not have any restricted work for her however she is going to be returning to work within a day or so as there is now work available with that striction.    She is not accompanied by [facility] today.  Present symptoms: Her only complaint is the palmar sensitivity. However, she admits that it is substantially better.  Treatments tried to this point: Most recent surgery was on her right wrist for the release of Guyon's canal. That was 10 months ago. We have been trying to desensitize her right hand since her recurrence in March.    REVIEW OF SYSTEMS    General: negative for, night sweats, dizziness, fatigue  Resp: No shortness of breath and no cough  CV: negative for chest pain, syncope or near-syncope  GI: negative for nausea, vomiting and diarrhea  : negative for dysuria and hematuria  Musculoskeletal: as above  Neurologic:  "negative for syncope   Hematologic: negative for bleeding disorder    Physical Exam:    Vitals: Temp 98.4  F (36.9  C) (Temporal)  Ht 1.448 m (4' 9\")  Wt 71.7 kg (158 lb)  BMI 34.19 kg/m2  BMI= Body mass index is 34.19 kg/(m^2).  Constitutional: healthy, alert and no acute distress   Psychiatric: mentation appears normal and affect normal/bright  NEURO: no focal deficits  SKIN: no excoriation or erythema. No signs of infection.    GAIT: non-antalgic    JOINT/EXTREMITIES:     The only pertinent finding of her right hand is that as we massaged the palmar wound there is some degree of sensitivity.  She does not have the degree of sensitivity that we recognized before.  She does not show any gross limitations to range of motion of her digits and/or her wrist. No new x-rays were obtained.    IMAGING INTERPRETATION: No new x-rays were obtained.       Impression:      ICD-10-CM    1. Bilateral carpal tunnel syndrome G56.03    2. 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 Postoperative state Z98.890        It appears as though the palmar sensitivity has been aided by the use of the calcium channel blocking medication nifedipine.    Plan:   The above was reviewed with Fern and she agrees.     We will keep the same restriction. We will see how she does on this restriction.  I did discuss with her the use of skin stimulants. She will try that at home as an adjunct treatment.      Return to clinic 4, weeks    Leif Fine MD  "

## 2017-10-23 ENCOUNTER — OFFICE VISIT (OUTPATIENT)
Dept: ORTHOPEDICS | Facility: CLINIC | Age: 57
End: 2017-10-23
Payer: OTHER MISCELLANEOUS

## 2017-10-23 VITALS — WEIGHT: 154 LBS | HEIGHT: 57 IN | BODY MASS INDEX: 33.22 KG/M2 | TEMPERATURE: 97.9 F

## 2017-10-23 DIAGNOSIS — G56.03 BILATERAL CARPAL TUNNEL SYNDROME: ICD-10-CM

## 2017-10-23 DIAGNOSIS — G56.21 ULNAR NERVE ENTRAPMENT AT THE WRIST, RIGHT: ICD-10-CM

## 2017-10-23 DIAGNOSIS — M77.11 LATERAL EPICONDYLITIS OF RIGHT ELBOW: Primary | ICD-10-CM

## 2017-10-23 PROCEDURE — 99213 OFFICE O/P EST LOW 20 MIN: CPT | Performed by: ORTHOPAEDIC SURGERY

## 2017-10-23 ASSESSMENT — PAIN SCALES - GENERAL: PAINLEVEL: MILD PAIN (2)

## 2017-10-23 NOTE — LETTER
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      Sincerely,        Leif LAST

## 2017-10-23 NOTE — LETTER
10/23/2017         RE: Fern Gonzalez  [address]  [facility] [address]        Dear Colleague,    Thank you for referring your patient, Fern Gonzalez, to the [facility]. Please see a copy of my visit note below.    Office Visit-Follow up  HISTORY OF PRESENT ILLNESS:    Fern Gonzalez is a 57 year old female who is seen in follow up for   Chief Complaint   Patient presents with     Surgical Followup     Release of Guyon's canal right wrist. DOS: 11/23/16 (11 months postop)      Surgical Followup     Left carpal tunnel release and release of Guyon's canal.  DOS: 10/5/16 (12 months postop)     Surgical Followup     Right carpal tunnel release.  DOS: 9/7/16 (13 months postop)     Work Comp       Patient presents with:  Surgical Followup: Release of Guyon's canal right wrist. DOS: 11/23/16 (11 months postop)   Surgical Followup: Left carpal tunnel release and release of Guyon's canal.  DOS: 10/5/16 (12 months postop)  Surgical Followup: Right carpal tunnel release.  DOS: 9/7/16 (13 months postop)  Work Comp      Patient is returning primarily to discuss her right wrist.  She is accompanied by the [facility]  Present symptoms: She still has palmar sensitivity on the right hand but describes this as much improved. She again denies having any numbness and tingling consistent with either her carpal tunnel or Guyon's canal release. She does describe altered sensation in the palm of her right hand only.  Treatments tried to this point: She continues to be on the nifedipine. She has returned to restricted work. She does tend to wear a wrist splint on the right hand to protect the palmar sensitivity.    REVIEW OF SYSTEMS    General: negative for, night sweats, dizziness, fatigue  Resp: No shortness of breath and no cough  CV: negative for chest pain, syncope or near-syncope  GI: negative for nausea, vomiting and diarrhea  : negative for dysuria and hematuria  Musculoskeletal: as above  Neurologic:  "negative for syncope   Hematologic: negative for bleeding disorder    Physical Exam:    Vitals: Temp 97.9  F (36.6  C) (Temporal)  Ht 1.448 m (4' 9\")  Wt 69.9 kg (154 lb)  BMI 33.33 kg/m2  BMI= Body mass index is 33.33 kg/(m^2).  Constitutional: healthy, alert and no acute distress   Psychiatric: mentation appears normal and affect normal/bright  NEURO: no focal deficits  SKIN: no excoriation or erythema. No signs of infection.      JOINT/EXTREMITIES:     The only real physical finding today is that of sensitivity over the right palmar wound.  She otherwise has good range of motion of all digits in both wrists.  The light touch sensation in her hand over the digits is intact.    IMAGING INTERPRETATION: No new x-rays were taken.       Impression:      ICD-10-CM    1. Lateral epicondylitis of right elbow M77.11    2. Bilateral carpal tunnel syndrome G56.03    3. 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  The use of nifedipine has given her enough relief that she has returned to restricted work.    Plan:   The above was reviewed with Fern and the  [facility].     We will continue her restriction.  We will stop the nifedipine. This is so that we can assess what her baseline is at this time.    If stopping the nifedipine leads to rapid return of symptoms that are not tolerable then we have options of either continuing the nifedipine long-term or trying to explore the palmar sensory nerve to either ablate the nerve or release it.  I explained to her that surgical treatments of the nerve very unpredictable..      Return to clinic 2, weeks    Leif Fine MD    Again, thank you for allowing me to participate in the care of your patient.        Sincerely,        Leif Fine MD    "

## 2017-10-23 NOTE — LETTER
October 23, 2017      Fern VERNELL Gonzalez  [address] [facility] [address]        To Whom It May Concern:    Fern Gonzalez is under my professional care for    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, Bilateral  Bilateral carpal tunnel syndrome  S/P carpal tunnel release, Bilateral  Guyon syndrome, Bilateral     Continue restriction of no power gripping, no vibratory tools,  Follow up planned for 2 weeks.    Sincerely,        Leif Fine MD

## 2017-10-23 NOTE — PROGRESS NOTES
"Office Visit-Follow up  HISTORY OF PRESENT ILLNESS:    Fern Gonzalez is a 57 year old female who is seen in follow up for   Chief Complaint   Patient presents with     Surgical Followup     Release of Guyon's canal right wrist. DOS: 11/23/16 (11 months postop)      Surgical Followup     Left carpal tunnel release and release of Guyon's canal.  DOS: 10/5/16 (12 months postop)     Surgical Followup     Right carpal tunnel release.  DOS: 9/7/16 (13 months postop)     Work Comp       Patient presents with:  Surgical Followup: Release of Guyon's canal right wrist. DOS: 11/23/16 (11 months postop)   Surgical Followup: Left carpal tunnel release and release of Guyon's canal.  DOS: 10/5/16 (12 months postop)  Surgical Followup: Right carpal tunnel release.  DOS: 9/7/16 (13 months postop)  Work Comp      Patient is returning primarily to discuss her right wrist.  She is accompanied by the [facility]  Present symptoms: She still has palmar sensitivity on the right hand but describes this as much improved. She again denies having any numbness and tingling consistent with either her carpal tunnel or Guyon's canal release. She does describe altered sensation in the palm of her right hand only.  Treatments tried to this point: She continues to be on the nifedipine. She has returned to restricted work. She does tend to wear a wrist splint on the right hand to protect the palmar sensitivity.    REVIEW OF SYSTEMS    General: negative for, night sweats, dizziness, fatigue  Resp: No shortness of breath and no cough  CV: negative for chest pain, syncope or near-syncope  GI: negative for nausea, vomiting and diarrhea  : negative for dysuria and hematuria  Musculoskeletal: as above  Neurologic: negative for syncope   Hematologic: negative for bleeding disorder    Physical Exam:    Vitals: Temp 97.9  F (36.6  C) (Temporal)  Ht 1.448 m (4' 9\")  Wt 69.9 kg (154 lb)  BMI 33.33 kg/m2  BMI= Body mass index is 33.33 kg/(m^2).  Constitutional: " healthy, alert and no acute distress   Psychiatric: mentation appears normal and affect normal/bright  NEURO: no focal deficits  SKIN: no excoriation or erythema. No signs of infection.      JOINT/EXTREMITIES:     The only real physical finding today is that of sensitivity over the right palmar wound.  She otherwise has good range of motion of all digits in both wrists.  The light touch sensation in her hand over the digits is intact.    IMAGING INTERPRETATION: No new x-rays were taken.       Impression:      ICD-10-CM    1. Lateral epicondylitis of right elbow M77.11    2. Bilateral carpal tunnel syndrome G56.03    3. 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  The use of nifedipine has given her enough relief that she has returned to restricted work.    Plan:   The above was reviewed with Fern and the [employer].     We will continue her restriction.  We will stop the nifedipine. This is so that we can assess what her baseline is at this time.    If stopping the nifedipine leads to rapid return of symptoms that are not tolerable then we have options of either continuing the nifedipine long-term or trying to explore the palmar sensory nerve to either ablate the nerve or release it.  I explained to her that surgical treatments of the nerve very unpredictable..      Return to clinic 2, weeks    Leif Fine MD

## 2017-10-23 NOTE — MR AVS SNAPSHOT
"              After Visit Summary   10/23/2017    Fern Gonzalez    MRN: [id number]           Patient Information     Date Of Birth          1960        Visit Information        Provider Department      10/23/2017 2:00 PM Leif Fine MD [facility]         Follow-ups after your visit        Who to contact     If you have questions or need follow up information about today's clinic visit or your schedule please contact [facility] directly at [phone].  Normal or non-critical lab and imaging results will be communicated to you by MyChart, letter or phone within 4 business days after the clinic has received the results. If you do not hear from us within 7 days, please contact the clinic through [facility] or phone. If you have a critical or abnormal lab result, we will notify you by phone as soon as possible.  Submit refill requests through [employer] or call your pharmacy and they will forward the refill request to us. Please allow 3 business days for your refill to be completed.          Additional Information About Your Visit        [facility] Information     [employer] lets you send messages to your doctor, view your test results, renew your prescriptions, schedule appointments and more. To sign up, go to [email or web address] . Click on \"Log in\" on the left side of the screen, which will take you to the Welcome page. Then click on \"Sign up Now\" on the right side of the page.     You will be asked to enter the access code listed below, as well as some personal information. Please follow the directions to create your username and password.     Your access code is: [email or web address]  Expires: 2018  2:04 PM     Your access code will  in 90 days. If you need help or a new code, please call your [facility] or [phone].        Care EveryWhere ID     This is your Care EveryWhere ID. This could be used by other organizations to access your [place] medical " "records  [id number]        Your Vitals Were     Temperature Height BMI (Body Mass Index)             97.9  F (36.6  C) (Temporal) 1.448 m (4' 9\") 33.33 kg/m2          Blood Pressure from Last 3 Encounters:   02/19/17 114/86   01/13/17 107/73   12/13/16 113/62    Weight from Last 3 Encounters:   10/23/17 69.9 kg (154 lb)   09/25/17 71.7 kg (158 lb)   08/28/17 69.9 kg (154 lb)              Today, you had the following     No orders found for display       Primary Care Provider Office Phone # Fax #    Mirta Edilson Fan, MANI [phone] [address] [place] [address]        Equal Access to Services     AVA LOZANO : Stas english Sobrandon, waaxda luqadaha, qaybta kaalmada adeegyada, michael artis . So [facility] [phone].    ATENCIÓN: Si habla español, tiene a pedroza disposición servicios gratuitos de asistencia lingüística. Llame al [phone].    We comply with applicable federal civil rights laws and [place] laws. We do not discriminate on the basis of race, color, national origin, age, disability, sex, sexual orientation, or gender identity.            Thank you!     Thank you for choosing [facility]  for your care. Our goal is always to provide you with excellent care. Hearing back from our patients is one way we can continue to improve our services. Please take a few minutes to complete the written survey that you may receive in the mail after your visit with us. Thank you!             Your Updated Medication List - Protect others around you: Learn how to safely use, store and throw away your medicines at www.disposemymeds.org.          This list is accurate as of: 10/23/17  2:04 PM.  Always use your most recent med list.                   Brand Name Dispense Instructions for use Diagnosis    ibuprofen 200 MG tablet    ADVIL/MOTRIN    120 tablet    Take 3 tablets (600 mg) by mouth every 6 hours as needed    Headache(784.0)       NYQUIL PO "      Reported on 5/23/2017        order for DME     1 each    Equipment being ordered: Other: nebulizer machine Treatment Diagnosis: acute bronchospasm, respiratory distress  Use as directed with albuterol    Respiratory distress, Acute bronchospasm

## 2017-11-06 ENCOUNTER — OFFICE VISIT (OUTPATIENT)
Dept: ORTHOPEDICS | Facility: CLINIC | Age: 57
End: 2017-11-06
Payer: OTHER MISCELLANEOUS

## 2017-11-06 VITALS — HEIGHT: 57 IN | TEMPERATURE: 98 F

## 2017-11-06 DIAGNOSIS — M72.0 PALMAR FASCIITIS: ICD-10-CM

## 2017-11-06 DIAGNOSIS — Z98.890 POSTOPERATIVE STATE: ICD-10-CM

## 2017-11-06 DIAGNOSIS — G56.21 GUYON SYNDROME, RIGHT: ICD-10-CM

## 2017-11-06 DIAGNOSIS — G56.01 CARPAL TUNNEL SYNDROME OF RIGHT WRIST: Primary | ICD-10-CM

## 2017-11-06 PROCEDURE — 99213 OFFICE O/P EST LOW 20 MIN: CPT | Performed by: ORTHOPAEDIC SURGERY

## 2017-11-06 ASSESSMENT — PAIN SCALES - GENERAL: PAINLEVEL: MILD PAIN (3)

## 2017-11-06 NOTE — LETTER
"    11/6/2017         RE: Fern Gonzalez  [address]  [facility] [address]        Dear Colleague,    Thank you for referring your patient, Fern Gonzalez, to the [facility]. Please see a copy of my visit note below.    Office Visit-Follow up  HISTORY OF PRESENT ILLNESS:    Fern Gonzalez is a 57 year old female who is seen in follow up for   Chief Complaint   Patient presents with     Surgical Followup     Release of Guyon's canal right wrist. DOS: 11/23/16 (11.5 months postop)      Surgical Followup     Left carpal tunnel release and release of Guyon's canal.  DOS: 10/5/16 (12.5 months postop)     Surgical Followup     Right carpal tunnel release.  DOS: 9/7/16 (13.5 months postop)     Work Comp       Patient presents with:  Surgical Followup: Release of Guyon's canal right wrist. DOS: 11/23/16 (11.5 months postop)   Surgical Followup: Left carpal tunnel release and release of Guyon's canal.  DOS: 10/5/16 (12.5 months postop)  Surgical Followup: Right carpal tunnel release.  DOS: 9/7/16 (13.5 months postop)  Work Comp      Patient is accompanied by her [facility].  Present symptoms: Since re-manipulating her medicines her palmar pain has worsened. She has numbness in her middle finger. She has discomfort if she tries to fully extend her little finger.  Treatments tried to this point: Please refer to previous notes as multiple treatments have been accomplished. Most recently we stop one of her medications as she seemed to be doing well.    REVIEW OF SYSTEMS    General: negative for, night sweats, dizziness, fatigue  Resp: No shortness of breath and no cough  CV: negative for chest pain, syncope or near-syncope  GI: negative for nausea, vomiting and diarrhea  : negative for dysuria and hematuria  Musculoskeletal: as above  Neurologic: negative for syncope   Hematologic: negative for bleeding disorder    Physical Exam:    Vitals: Temp 98  F (36.7  C) (Temporal)  Ht 1.448 m (4' 9\")  BMI= There " is no height or weight on file to calculate BMI.  Constitutional: healthy, alert and no acute distress   Psychiatric: mentation appears normal and affect normal/bright  NEURO: no focal deficits  SKIN: no excoriation or erythema. No signs of infection.        JOINT/EXTREMITIES:     There is no visible swelling about her hand and specifically around the incision.  I do not detect any significant loss of range of motion with respect to flexion and extension of her digits.  Empirically it is hard to extend her digits and her wrists simultaneously. The right hand extension is more restricted than the left. The right hand extension of digits and wrist causes palmar discomfort.    She does allow me to manipulate the palm of her hand but this is uncomfortable.  If I extend her digits and tighten the palmar aponeurosis and there is a palpable cord at the base of the middle finger which is longitudinal and which seems to be the most uncomfortable structure. If I massage as she describes numbness in her middle finger.    A standard Tinel's at the wrist does not exacerbate symptoms.    IMAGING INTERPRETATION: No x-rays were obtained.       Impression:      ICD-10-CM    1. Carpal tunnel syndrome of right wrist G56.01    2. 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 Postoperative state Z98.890    4. Palmar fasciitis/pain bilateral M72.0        The patient appears to recover very well with respect to her left hand surgery.    She continues to have difficulty with her right hand following the surgical procedures.  We have hypothesized that she had a sensory nerve injury at the time of her carpal tunnel or at the time of her Guyon's release.  Today's exam is a little bit harder for me to interpret as she is getting numbness in her middle finger and has tightness in her little finger.    Perhaps we're dealing with some sort of recurrence due to scarring.    Plan:   The above was reviewed with Fern and the [employer].     We will repeat her  EMG.  We will continue her work restriction.    We talked about getting a second opinion which I personally feel is appropriate. That opinion probably should not be gotten until her EMG is repeated.    If there is evidence that she still has median nerve entrapment or evidence of entrapment of the ulnar nerve at Guyon's canal then perhaps a repeat surgical approach would be necessary. A second opinion concerning this would also be very helpful.      Return to clinic I suggested 6 weeks primarily because I believe this will be enough time to get an EMG and for her to get a second opinion with the EMG accomplished.    Leif Fine MD    Again, thank you for allowing me to participate in the care of your patient.        Sincerely,        Leif Fine MD

## 2017-11-06 NOTE — NURSING NOTE
"Chief Complaint   Patient presents with     Surgical Followup     Release of Guyon's canal right wrist. DOS: 11/23/16 (11.5 months postop)      Surgical Followup     Left carpal tunnel release and release of Guyon's canal.  DOS: 10/5/16 (12.5 months postop)     Surgical Followup     Right carpal tunnel release.  DOS: 9/7/16 (13.5 months postop)     Work Comp       Initial Temp 98  F (36.7  C) (Temporal)  Ht 1.448 m (4' 9\") Estimated body mass index is 33.33 kg/(m^2) as calculated from the following:    Height as of 10/23/17: 1.448 m (4' 9\").    Weight as of 10/23/17: 69.9 kg (154 lb).  Medication Reconciliation: complete   KASI Maldonado  "

## 2017-11-06 NOTE — PROGRESS NOTES
"Office Visit-Follow up  HISTORY OF PRESENT ILLNESS:    Fern Gonzalez is a 57 year old female who is seen in follow up for   Chief Complaint   Patient presents with     Surgical Followup     Release of Guyon's canal right wrist. DOS: 11/23/16 (11.5 months postop)      Surgical Followup     Left carpal tunnel release and release of Guyon's canal.  DOS: 10/5/16 (12.5 months postop)     Surgical Followup     Right carpal tunnel release.  DOS: 9/7/16 (13.5 months postop)     Work Comp       Patient presents with:  Surgical Followup: Release of Guyon's canal right wrist. DOS: 11/23/16 (11.5 months postop)   Surgical Followup: Left carpal tunnel release and release of Guyon's canal.  DOS: 10/5/16 (12.5 months postop)  Surgical Followup: Right carpal tunnel release.  DOS: 9/7/16 (13.5 months postop)  Work Comp      Patient is accompanied by her Q RC.  Present symptoms: Since re-manipulating her medicines her palmar pain has worsened. She has numbness in her middle finger. She has discomfort if she tries to fully extend her little finger.  Treatments tried to this point: Please refer to previous notes as multiple treatments have been accomplished. Most recently we stop one of her medications as she seemed to be doing well.    REVIEW OF SYSTEMS    General: negative for, night sweats, dizziness, fatigue  Resp: No shortness of breath and no cough  CV: negative for chest pain, syncope or near-syncope  GI: negative for nausea, vomiting and diarrhea  : negative for dysuria and hematuria  Musculoskeletal: as above  Neurologic: negative for syncope   Hematologic: negative for bleeding disorder    Physical Exam:    Vitals: Temp 98  F (36.7  C) (Temporal)  Ht 1.448 m (4' 9\")  BMI= There is no height or weight on file to calculate BMI.  Constitutional: healthy, alert and no acute distress   Psychiatric: mentation appears normal and affect normal/bright  NEURO: no focal deficits  SKIN: no excoriation or erythema. No signs of " infection.        JOINT/EXTREMITIES:     There is no visible swelling about her hand and specifically around the incision.  I do not detect any significant loss of range of motion with respect to flexion and extension of her digits.  Empirically it is hard to extend her digits and her wrists simultaneously. The right hand extension is more restricted than the left. The right hand extension of digits and wrist causes palmar discomfort.    She does allow me to manipulate the palm of her hand but this is uncomfortable.  If I extend her digits and tighten the palmar aponeurosis and there is a palpable cord at the base of the middle finger which is longitudinal and which seems to be the most uncomfortable structure. If I massage as she describes numbness in her middle finger.    A standard Tinel's at the wrist does not exacerbate symptoms.    IMAGING INTERPRETATION: No x-rays were obtained.       Impression:      ICD-10-CM    1. Carpal tunnel syndrome of right wrist G56.01    2. 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 Postoperative state Z98.890    4. Palmar fasciitis/pain bilateral M72.0        The patient appears to recover very well with respect to her left hand surgery.    She continues to have difficulty with her right hand following the surgical procedures.  We have hypothesized that she had a sensory nerve injury at the time of her carpal tunnel or at the time of her Guyon's release.  Today's exam is a little bit harder for me to interpret as she is getting numbness in her middle finger and has tightness in her little finger.    Perhaps we're dealing with some sort of recurrence due to scarring.    Plan:   The above was reviewed with Fern and the GALLO VIDES.     We will repeat her EMG.  We will continue her work restriction.    We talked about getting a second opinion which I personally feel is appropriate. That opinion probably should not be gotten until her EMG is repeated.    If there is evidence that she still has  median nerve entrapment or evidence of entrapment of the ulnar nerve at Guyon's canal then perhaps a repeat surgical approach would be necessary. A second opinion concerning this would also be very helpful.      Return to clinic I suggested 6 weeks primarily because I believe this will be enough time to get an EMG and for her to get a second opinion with the EMG accomplished.    Leif Fine MD

## 2017-11-06 NOTE — MR AVS SNAPSHOT
"              After Visit Summary   2017    Fern Gonzalez    MRN: [id number]           Patient Information     Date Of Birth          1960        Visit Information        Provider Department      2017 2:30 PM Leif Fine MD [facility]         Follow-ups after your visit        Who to contact     If you have questions or need follow up information about today's clinic visit or your schedule please contact [facility] directly at [phone].  Normal or non-critical lab and imaging results will be communicated to you by MyChart, letter or phone within 4 business days after the clinic has received the results. If you do not hear from us within 7 days, please contact the clinic through GloNavhart or phone. If you have a critical or abnormal lab result, we will notify you by phone as soon as possible.  Submit refill requests through ArthroCAD or call your pharmacy and they will forward the refill request to us. Please allow 3 business days for your refill to be completed.          Additional Information About Your Visit        GloNavharSkySpecs Information     ArthroCAD lets you send messages to your doctor, view your test results, renew your prescriptions, schedule appointments and more. To sign up, go to [email or web address] . Click on \"Log in\" on the left side of the screen, which will take you to the Welcome page. Then click on \"Sign up Now\" on the right side of the page.     You will be asked to enter the access code listed below, as well as some personal information. Please follow the directions to create your username and password.     Your access code is: [email or web address]  Expires: 2018  1:04 PM     Your access code will  in 90 days. If you need help or a new code, please call your [facility] or [phone].        Care EveryWhere ID     This is your Care EveryWhere ID. This could be used by other organizations to access your [place] medical " "records  [id number]        Your Vitals Were     Temperature Height                98  F (36.7  C) (Temporal) 1.448 m (4' 9\")           Blood Pressure from Last 3 Encounters:   02/19/17 114/86   01/13/17 107/73   12/13/16 113/62    Weight from Last 3 Encounters:   10/23/17 69.9 kg (154 lb)   09/25/17 71.7 kg (158 lb)   08/28/17 69.9 kg (154 lb)              Today, you had the following     No orders found for display       Primary Care Provider Office Phone # Fax #    Mirta Hancockbernadine Fan, MANI [phone] [address] [place] [address]        Equal Access to Services     [facility] : Hadii mahendra briceñoo Sotorstenali, waaxda luqadaha, qaybta kaalmada adeegyada, michael artis . So [facility] [phone].    ATENCIÓN: Si habla español, tiene a pedroza disposición servicios gratuitos de asistencia lingüística. Llame al [phone].    We comply with applicable federal civil rights laws and [place] laws. We do not discriminate on the basis of race, color, national origin, age, disability, sex, sexual orientation, or gender identity.            Thank you!     Thank you for choosing [facility]  for your care. Our goal is always to provide you with excellent care. Hearing back from our patients is one way we can continue to improve our services. Please take a few minutes to complete the written survey that you may receive in the mail after your visit with us. Thank you!             Your Updated Medication List - Protect others around you: Learn how to safely use, store and throw away your medicines at www.disposemymeds.org.          This list is accurate as of: 11/6/17  2:38 PM.  Always use your most recent med list.                   Brand Name Dispense Instructions for use Diagnosis    ibuprofen 200 MG tablet    ADVIL/MOTRIN    120 tablet    Take 3 tablets (600 mg) by mouth every 6 hours as needed    Headache(784.0)       NYQUIL PO      Reported on 5/23/2017     "    order for DME     1 each    Equipment being ordered: Other: nebulizer machine Treatment Diagnosis: acute bronchospasm, respiratory distress  Use as directed with albuterol    Respiratory distress, Acute bronchospasm

## 2017-11-06 NOTE — LETTER
[address] [address]  [phone]          2017    RE:  Fern Gonzalez                                                                                                                                                       [address]  [facility] [address]            To Whom It May Concern:     Fern Gonzalez is under my professional care for    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, Bilateral  Bilateral carpal tunnel syndrome  S/P carpal tunnel release, Bilateral  Guyon syndrome, Bilateral      Continue restriction of no power gripping, no vibratory tools,  Follow up planned for 6 weeks.     Sincerely,           Leif Fine MD              Patient Name: Fern Gonzalez                                          : 1960                                      PAGE:

## 2017-11-20 ENCOUNTER — OFFICE VISIT (OUTPATIENT)
Dept: URGENT CARE | Facility: RETAIL CLINIC | Age: 57
End: 2017-11-20
Payer: COMMERCIAL

## 2017-11-20 VITALS
SYSTOLIC BLOOD PRESSURE: 112 MMHG | TEMPERATURE: 98.5 F | OXYGEN SATURATION: 98 % | DIASTOLIC BLOOD PRESSURE: 82 MMHG | HEART RATE: 81 BPM

## 2017-11-20 DIAGNOSIS — H92.01 EAR DISCOMFORT, RIGHT: ICD-10-CM

## 2017-11-20 DIAGNOSIS — F17.200 TOBACCO USE DISORDER: ICD-10-CM

## 2017-11-20 DIAGNOSIS — J01.90 ACUTE SINUSITIS WITH SYMPTOMS > 10 DAYS: Primary | ICD-10-CM

## 2017-11-20 DIAGNOSIS — H60.91 OTITIS EXTERNA OF RIGHT EAR, UNSPECIFIED CHRONICITY, UNSPECIFIED TYPE: ICD-10-CM

## 2017-11-20 PROCEDURE — 99213 OFFICE O/P EST LOW 20 MIN: CPT | Performed by: PHYSICIAN ASSISTANT

## 2017-11-20 NOTE — NURSING NOTE
"Chief Complaint   Patient presents with     Ear Problem     right ear pain started last night      Headache     x 3 days       Initial /82  Pulse 81  Temp 98.5  F (36.9  C) (Oral)  SpO2 98% Estimated body mass index is 33.33 kg/(m^2) as calculated from the following:    Height as of 10/23/17: 4' 9\" (1.448 m).    Weight as of 10/23/17: 154 lb (69.9 kg).  Medication Reconciliation: complete   Veronica Gan      "

## 2017-11-20 NOTE — LETTER
[address] [address]        11/20/2017    Fern Shirley was seen 11/20/2017 at [facility]. Please excuse Fern from work today and tomorrow  due to illness. Fern  may return to work 11/22/2017 if no fever x 1 day and feeling better.      Cordially,        Alexsandra Hardin, PAC

## 2017-11-20 NOTE — PATIENT INSTRUCTIONS
Keep ear dry  Warm packs    Saline nose spray      Please FOLLOW UP at primary care clinic if not improving, new symptoms, worse or this does not resolve.  [facility]  [phone]

## 2017-11-20 NOTE — MR AVS SNAPSHOT
"              After Visit Summary   2017    Fern Gonzalez    MRN: [id number]           Patient Information     Date Of Birth          1960        Visit Information        Provider Department      2017 11:00 AM Alexsandra Hardin PA-C [facility]        Today's Diagnoses     Ear discomfort, right    -  1    Tobacco use disorder        Acute sinusitis with symptoms > 10 days        Otitis externa of right ear, unspecified chronicity, unspecified type          Care Instructions    Keep ear dry  Warm packs    Saline nose spray      Please FOLLOW UP at primary care clinic if not improving, new symptoms, worse or this does not resolve.  [facility]  [phone]              Follow-ups after your visit        Your next 10 appointments already scheduled     Dec 18, 2017  2:00 PM CST   [facility] Leif Fine MD   [facility]    [address] [place] [address] [phone]              Who to contact     You can reach your care team any time of the day by calling [phone].  Notification of test results:  If you have an abnormal lab result, we will notify you by phone as soon as possible.         Additional Information About Your Visit        MyChart Information     Umii Productshart lets you send messages to your doctor, view your test results, renew your prescriptions, schedule appointments and more. To sign up, go to [email or web address] . Click on \"Log in\" on the left side of the screen, which will take you to the Welcome page. Then click on \"Sign up Now\" on the right side of the page.     You will be asked to enter the access code listed below, as well as some personal information. Please follow the directions to create your username and password.     Your access code is: [email or web address]  Expires: 2018  1:04 PM     Your access code will  in 90 days. If you need help or a new code, please " call your [place] clinic or [phone].        Care EveryWhere ID     This is your Care EveryWhere ID. This could be used by other organizations to access your [place] medical records  [id number]        Your Vitals Were     Pulse Temperature Pulse Oximetry             81 98.5  F (36.9  C) (Oral) 98%          Blood Pressure from Last 3 Encounters:   11/20/17 112/82   02/19/17 114/86   01/13/17 107/73    Weight from Last 3 Encounters:   10/23/17 154 lb (69.9 kg)   09/25/17 158 lb (71.7 kg)   08/28/17 154 lb (69.9 kg)              Today, you had the following     No orders found for display         Today's Medication Changes          These changes are accurate as of: 11/20/17 11:42 AM.  If you have any questions, ask your nurse or doctor.               Start taking these medicines.        Dose/Directions    amoxicillin-clavulanate 875-125 MG per tablet   Commonly known as:  AUGMENTIN   Used for:  Acute sinusitis with symptoms > 10 days, Otitis externa of right ear, unspecified chronicity, unspecified type        Dose:  1 tablet   Take 1 tablet by mouth 2 times daily   Quantity:  20 tablet   Refills:  0            Where to get your medicines      These medications were sent [address]  [address] [facility] [address]     Phone:  [phone]     amoxicillin-clavulanate 875-125 MG per tablet                Primary Care Provider Office Phone # Fax #    Mirta Fan [phone] [address] [place] [address]        Equal Access to Services     [facility]: Hadii aad ku hadasho Soomaali, waaxda luqadaha, qaybta kaalmada michael conroy [facility] hayaan adeeg kharash la'aan . So [facility] [phone].    ATENCIÓN: Si habla español, tiene a pedroza disposición servicios gratuitos de asistencia lingüística. Llame al [phone].    We comply with applicable federal civil rights laws and [place] laws. We do not discriminate on the basis of race, color,  national origin, age, disability, sex, sexual orientation, or gender identity.            Thank you!     Thank you for choosing [facility]  for your care. Our goal is always to provide you with excellent care. Hearing back from our patients is one way we can continue to improve our services. Please take a few minutes to complete the written survey that you may receive in the mail after your visit with us. Thank you!             Your Updated Medication List - Protect others around you: Learn how to safely use, store and throw away your medicines at www.disposemymeds.org.          This list is accurate as of: 11/20/17 11:42 AM.  Always use your most recent med list.                   Brand Name Dispense Instructions for use Diagnosis    amoxicillin-clavulanate 875-125 MG per tablet    AUGMENTIN    20 tablet    Take 1 tablet by mouth 2 times daily    Acute sinusitis with symptoms > 10 days, Otitis externa of right ear, unspecified chronicity, unspecified type       order for DME     1 each    Equipment being ordered: Other: nebulizer machine Treatment Diagnosis: acute bronchospasm, respiratory distress  Use as directed with albuterol    Respiratory distress, Acute bronchospasm

## 2017-11-20 NOTE — PROGRESS NOTES
Chief Complaint   Patient presents with     Ear Problem     right ear pain started last night      Headache     x 3 days         SUBJECTIVE:   Pt. presenting to [facility] -  with a chief complaint of HA and earache. May have sinus infection. Purulent nasal discharge last week -less now but > sinus pressure and HA./.   See CC.  Onset of symptoms > week - less nasal discharge now but > HA  Course of illness is worsening.    Severity moderate  Current and Associated symptoms: stuffy nose, ear pain right, facial pain/pressure and headache  Treatment measures tried include Tylenol/Ibuprofen.  Predisposing factors include tobacco use.  Last antibiotic doxy 2016   Smoker yes    ROS:  Afebrile   Energy level is a little <  ENT - denies throat pain.  CP - no cough,SOB or chest pain   GI- - appetite normal. No nausea, vomiting or diarrhea.   No bowel or bladder changes   MSK - no joint pain or swelling   Skin: No rashes    Past Medical History:   Diagnosis Date     Diagnostic skin and sensitization tests (aka ALLERGENS) 10/29/15 skin tests pos. for: T >> dog/DM only.      Esophageal reflux      Headache(784.0) 10/11/11    Admitted, suspected Viral cephalitis, meningitis     Migraine, unspecified, without mention of intractable migraine without mention of status migrainosus      Recurrent sinusitis      Varicella meningitis      Past Surgical History:   Procedure Laterality Date     C  DELIVERY ONLY      3 times     CHOLECYSTECTOMY, LAPOROSCOPIC  2007    Lysis of intestinal adhesions.     [facility] AFF SURGICAL PATHOLOGY       COLONOSCOPY N/A 10/31/2014    Procedure: COMBINED COLONOSCOPY, SINGLE OR MULTIPLE BIOPSY/POLYPECTOMY BY BIOPSY;  Surgeon: Leonard Dumont MD;  Location: [facility]     HC LAP,FULGURATE/EXCISE LESIONS  10/04    mesh present as had abdominal wall surgery     HC UGI ENDOSCOPY, SIMPLE EXAM  10/26/2006     HYSTERECTOMY TOTAL ABDOMINAL  2007     RELEASE CARPAL TUNNEL Right 2016     Procedure: RELEASE CARPAL TUNNEL;  Surgeon: Leif Fine MD;  Location: [facility]     RELEASE CARPAL TUNNEL Left 10/5/2016    Procedure: RELEASE CARPAL TUNNEL;  Surgeon: Leif Fine MD;  Location: [facility]     RELEASE CARPAL TUNNEL Right 11/23/2016    Procedure: RELEASE CARPAL TUNNEL;  Surgeon: Leif Fine MD;  Location: [facility] OR     Patient Active Problem List   Diagnosis     Chronic cholecystitis     Meningitis due to viruses not elsewhere classified     Herpes zoster with meningitis     CARDIOVASCULAR SCREENING; LDL GOAL LESS THAN 160     Anxiety     Tobacco use disorder     Major depressive disorder, recurrent episode, moderate (H)     Respiratory distress     Wheezing-associated respiratory infection     Lateral epicondylitis of right elbow     Bilateral carpal tunnel syndrome     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          OBJECTIVE:  /82  Pulse 81  Temp 98.5  F (36.9  C) (Oral)  SpO2 98%    GENERAL APPEARANCE: cooperative, alert and no distress. Appears well hydrated.  EYES: conjunctiva clear  HENT: Rt ear canal  There is some redness and swelling near meatus (small pimple?) but canal not inflamed and TM normal   Lt ear canal clear and TM normal   Nose mod congestion. thick discharge  Mouth without ulcers or lesions. no erythema. no exudate. Some PND noted  NECK: supple, few small shoddy NT ant nodes. No  posterior nodes.  RESP: lungs clear to auscultation - no rales, rhonchi or wheezes. Breathing easily.  CV: regular rates and rhythm  ABDOMEN:  soft, nontender, no HSM or masses and bowel sounds normal   SKIN: no suspicious lesions or rashes  mod tenderness to palpate over yin max sinus areas and some rt frontal  tenderness   ASSESSMENT:     Ear discomfort, right  Tobacco use disorder  Acute sinusitis with symptoms > 10 days  Otitis externa of right ear, unspecified chronicity, unspecified type      PLAN:  Symptomatic measures   Prescriptions as below. Discussed indications, dosing, side affects and adverse reactions of medications with  Patient -Augmentin  Eat yogurt daily or take a probiotic supplement when on antibiotics.  saline nasal spray for  nasal congestion   (states she can not tolerate Flonase -gets nose bleeds)  Cool mist vaporizer.  Smoking discouraged - Discussed > CV,CP and cancer risks with tobacco use.    Stay in clean air environment.  > rest.  > fluids.  Contagiousness and hygiene discussed.  Fever and pain  control measures discussed.   If unable to swallow or any breathing difficulty to go to ED         AVS given and discussed:  Patient Instructions   Keep ear dry  Warm packs    Saline nose spray      Please FOLLOW UP at primary care clinic if not improving, new symptoms, worse or this does not resolve.  [facility]  [phone]        See letter for work   Patient is comfortable with this plan.  Electronically signed,  HAYDEE Hardin, PAC

## 2017-11-21 ENCOUNTER — TRANSFERRED RECORDS (OUTPATIENT)
Dept: HEALTH INFORMATION MANAGEMENT | Facility: CLINIC | Age: 57
End: 2017-11-21

## 2017-11-30 ENCOUNTER — TELEPHONE (OUTPATIENT)
Dept: ORTHOPEDICS | Facility: CLINIC | Age: 57
End: 2017-11-30

## 2017-11-30 NOTE — TELEPHONE ENCOUNTER
Instructed Vanessa that the scan is not sent to us yet. If she needs it quickly, she should call the [facility].  Shilpa Ramachandran RN  [facility]  11/30/2017 9:54 AM

## 2017-11-30 NOTE — TELEPHONE ENCOUNTER
Reason for Call:  Other call back    Detailed comments: Vanessa/MOON called stating Fern had the EMG on 11/21 and is getting a second opinion as Dr Fine requested (per Vanessa).  This is at [facility] next week and Vanessa is requesting the EMG results.  Her fax is [phone]    Phone Number Patient can be reached at: Other phone number:  [phone] / Vanessa MUSTAFA*    Best Time: any    Can we leave a detailed message on this number? YES    Call taken on 11/30/2017 at 8:49 AM by Jesi Torres

## 2017-12-13 ENCOUNTER — TRANSFERRED RECORDS (OUTPATIENT)
Dept: HEALTH INFORMATION MANAGEMENT | Facility: CLINIC | Age: 57
End: 2017-12-13

## 2018-01-08 ENCOUNTER — OFFICE VISIT (OUTPATIENT)
Dept: ORTHOPEDICS | Facility: CLINIC | Age: 58
End: 2018-01-08
Payer: OTHER MISCELLANEOUS

## 2018-01-08 VITALS — HEIGHT: 59 IN | TEMPERATURE: 98.1 F | BODY MASS INDEX: 30.44 KG/M2 | WEIGHT: 151 LBS

## 2018-01-08 DIAGNOSIS — G56.21 ULNAR NERVE ENTRAPMENT AT THE WRIST, RIGHT: ICD-10-CM

## 2018-01-08 DIAGNOSIS — G56.03 BILATERAL CARPAL TUNNEL SYNDROME: Primary | ICD-10-CM

## 2018-01-08 PROCEDURE — 99213 OFFICE O/P EST LOW 20 MIN: CPT | Performed by: ORTHOPAEDIC SURGERY

## 2018-01-08 ASSESSMENT — PAIN SCALES - GENERAL: PAINLEVEL: MILD PAIN (3)

## 2018-01-08 NOTE — MR AVS SNAPSHOT
"              After Visit Summary   2018    Fern Gonzalez    MRN: [id number]           Patient Information     Date Of Birth          1960        Visit Information        Provider Department      2018 2:20 PM eLif Fine MD [facility]         Follow-ups after your visit        Who to contact     If you have questions or need follow up information about today's clinic visit or your schedule please contact [facility] directly at [phone].  Normal or non-critical lab and imaging results will be communicated to you by MyChart, letter or phone within 4 business days after the clinic has received the results. If you do not hear from us within 7 days, please contact the clinic through [employer] or phone. If you have a critical or abnormal lab result, we will notify you by phone as soon as possible.  Submit refill requests through [employer] or call your pharmacy and they will forward the refill request to us. Please allow 3 business days for your refill to be completed.          Additional Information About Your Visit        [employer] [facility] Hospital[facility] Information     [employer] lets you send messages to your doctor, view your test results, renew your prescriptions, schedule appointments and more. To sign up, go to [email or web address] . Click on \"Log in\" on the left side of the screen, which will take you to the Welcome page. Then click on \"Sign up Now\" on the right side of the page.     You will be asked to enter the access code listed below, as well as some personal information. Please follow the directions to create your username and password.     Your access code is: [email or web address]  Expires: 2018  1:04 PM     Your access code will  in 90 days. If you need help or a new code, please call your [facility] or [phone].        Care EveryWhere ID     This is your Care EveryWhere ID. This could be used by other organizations to access your [place] medical " "records  [id number]        Your Vitals Were     Temperature Height BMI (Body Mass Index)             98.1  F (36.7  C) (Temporal) 1.486 m (4' 10.5\") 31.02 kg/m2          Blood Pressure from Last 3 Encounters:   11/20/17 112/82   02/19/17 114/86   01/13/17 107/73    Weight from Last 3 Encounters:   01/08/18 68.5 kg (151 lb)   10/23/17 69.9 kg (154 lb)   09/25/17 71.7 kg (158 lb)              Today, you had the following     No orders found for display       Primary Care Provider Office Phone # Fax #    Mirta Edilson Fan, MANI [phone] [address] [place] [address]        Equal Access to Services     [facility]: Hadii aad ku hadasho Soomaali, waaxda luqadaha, qaybta kaalmada adeegyada, michael artis . So [facility] [phone].    ATENCIÓN: Si habla español, tiene a pedroza disposición servicios gratuitos de asistencia lingüística. Llame al [phone].    We comply with applicable federal civil rights laws and [place] laws. We do not discriminate on the basis of race, color, national origin, age, disability, sex, sexual orientation, or gender identity.            Thank you!     Thank you for choosing [facility]  for your care. Our goal is always to provide you with excellent care. Hearing back from our patients is one way we can continue to improve our services. Please take a few minutes to complete the written survey that you may receive in the mail after your visit with us. Thank you!             Your Updated Medication List - Protect others around you: Learn how to safely use, store and throw away your medicines at www.disposemymeds.org.          This list is accurate as of: 1/8/18  2:29 PM.  Always use your most recent med list.                   Brand Name Dispense Instructions for use Diagnosis    order for DME     1 each    Equipment being ordered: Other: nebulizer machine Treatment Diagnosis: acute bronchospasm, respiratory distress  Use as " directed with albuterol    Respiratory distress, Acute bronchospasm

## 2018-01-08 NOTE — LETTER
1/8/2018         RE: Fern Gonzalez  [address]  [facility] [address]        Dear Colleague,    Thank you for referring your patient, Fern Gonzalez, to the [facility]. Please see a copy of my visit note below.    Office Visit-Follow up  HISTORY OF PRESENT ILLNESS:    Fern Gonzalez is a 57 year old female who is seen in follow up for   Chief Complaint   Patient presents with     Surgical Followup     Release of Guyon's canal right wrist. DOS: 11/23/16 (13.5 months postop)      Surgical Followup     Left carpal tunnel release and release of Guyon's canal.  DOS: 10/5/16 (15 months postop)     Surgical Followup     Right carpal tunnel release.  DOS: 9/7/16 (16 months postop)     Work Comp       Patient presents with:  Surgical Followup: Release of Guyon's canal right wrist. DOS: 11/23/16 (13.5 months postop)   Surgical Followup: Left carpal tunnel release and release of Guyon's canal.  DOS: 10/5/16 (15 months postop)  Surgical Followup: Right carpal tunnel release.  DOS: 9/7/16 (16 months postop)  Work Comp      The patient is accompanied by her QR C today.  She did obtain a second opinion by Dr. Ken at [facility].  He was able to evaluate her after her repeat EMG.  The EMG showed only evidence of residual mild right carpal tunnel syndrome.  The recommendations made by Dr. Ken were very similar to our recommendations.    He did identify that she had some trigger finger phenomena over the middle and little fingers of the right hand only.  His treatment recommendations also paralleled what we would normally offer to somebody with trigger finger.  Present symptoms: Her main ongoing symptoms at present are only the hypersensitivity in the palm of right hand and her description of mild triggering of the little and middle fingers to the right hand.  Treatments tried to this point: Please refer to previous notes.    The patient makes it clear today that she does not want to try any injections  "into her hands either into the region of trigger finger or into the region of the palm.  She also makes reference to  Her desire to not have repeat surgery on her right carpal tunnel.    She does continue to work.  She is in .  This is due to the restriction we have put her on.  She has not missed any work.    REVIEW OF SYSTEMS    General: negative for, night sweats, dizziness, fatigue  Resp: No shortness of breath and no cough  CV: negative for chest pain, syncope or near-syncope  GI: negative for nausea, vomiting and diarrhea  : negative for dysuria and hematuria  Musculoskeletal: as above  Neurologic: negative for syncope   Hematologic: negative for bleeding disorder    Physical Exam:    Vitals: Temp 98.1  F (36.7  C) (Temporal)  Ht 1.486 m (4' 10.5\")  Wt 68.5 kg (151 lb)  BMI 31.02 kg/m2  BMI= Body mass index is 31.02 kg/(m^2).  Constitutional: healthy, alert and no acute distress   Psychiatric: mentation appears normal and affect normal/bright  NEURO: no focal deficits  SKIN: no excoriation or erythema. No signs of infection.      JOINT/EXTREMITIES:     The only pertinent finding today is that her right palm is still very sensitive to light touch.      The secondary findings today is some fullness identified over the volar surface at the base of the middle and little fingers to the right hand.  I cannot appreciate any locking phenomena.  The patient is a little anxious as I palpate anywhere in the palm of her hand so it is a little difficult to verify sensitivity over these flexor tendon nodules.      IMAGING INTERPRETATION: No new x-rays were taken today.    The EMG report was reviewed.  Testing was done at the [facility] of neurology.  Copies of her second opinion were placed into the chart today.       impression:      ICD-10-CM    1. Bilateral carpal tunnel syndrome G56.03    2. 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, right G56.21        The patient continues with fairly " significant palmar sensitivity following her right carpal tunnel release.  Her symptoms of carpal tunnel in general with respect to her left and right hand as well as symptoms that have been affiliated with her compression of the ulnar nerve a Guyon's also appear to be resolved.    She does test positive for residual right carpal tunnel syndrome based on her recent EMG.  Clinically to my exam she shows mild findings of trigger fingers.    Plan:   The above was reviewed with Fern and the [facility]..     Options today would parallel those that were recommended by myself previously as well as those that Dr. Ken had suggested.  Injection in the palm of the right hand could be offered.  Trigger finger injection could be offered today.  One could consider repeat surgery on her right carpal tunnel.  One also could consider releasing the trigger fingers that she is experiencing.    With the patient's desire to avoid injections and surgery the decision today is to do nothing different.      Given all the information today in my opinion it makes most sense to define her present restriction as per minute.  We did discuss the implications of this recommendation with respect to her present employment and future employment.  However, it is very clear that the present restriction would need to be honored no matter what future treatment is considered.    With her understanding of this she agrees to us for establishing a permanent restriction.  No further treatment will be offered at this time.  I would suggest that this is also most likely the appropriate time for end of healing.    Disability rating would have to be defined based on [facility] statutes.      Return to clinic MITZI Fine MD    Again, thank you for allowing me to participate in the care of your patient.        Sincerely,        Leif Fine MD

## 2018-01-08 NOTE — PROGRESS NOTES
Office Visit-Follow up  HISTORY OF PRESENT ILLNESS:    Fern Gonzalez is a 57 year old female who is seen in follow up for   Chief Complaint   Patient presents with     Surgical Followup     Release of Guyon's canal right wrist. DOS: 11/23/16 (13.5 months postop)      Surgical Followup     Left carpal tunnel release and release of Guyon's canal.  DOS: 10/5/16 (15 months postop)     Surgical Followup     Right carpal tunnel release.  DOS: 9/7/16 (16 months postop)     Work Comp       Patient presents with:  Surgical Followup: Release of Guyon's canal right wrist. DOS: 11/23/16 (13.5 months postop)   Surgical Followup: Left carpal tunnel release and release of Guyon's canal.  DOS: 10/5/16 (15 months postop)  Surgical Followup: Right carpal tunnel release.  DOS: 9/7/16 (16 months postop)  Work Comp      The patient is accompanied by her QR C today.  She did obtain a second opinion by Dr. Ken at [facility]  He was able to evaluate her after her repeat EMG.  The EMG showed only evidence of residual mild right carpal tunnel syndrome.  The recommendations made by Dr. Ken were very similar to our recommendations.    He did identify that she had some trigger finger phenomena over the middle and little fingers of the right hand only.  His treatment recommendations also paralleled what we would normally offer to somebody with trigger finger.  Present symptoms: Her main ongoing symptoms at present are only the hypersensitivity in the palm of right hand and her description of mild triggering of the little and middle fingers to the right hand.  Treatments tried to this point: Please refer to previous notes.    The patient makes it clear today that she does not want to try any injections into her hands either into the region of trigger finger or into the region of the palm.  She also makes reference to  Her desire to not have repeat surgery on her right carpal tunnel.    She does continue to work.  She is in shipping and  "receiving.  This is due to the restriction we have put her on.  She has not missed any work.    REVIEW OF SYSTEMS    General: negative for, night sweats, dizziness, fatigue  Resp: No shortness of breath and no cough  CV: negative for chest pain, syncope or near-syncope  GI: negative for nausea, vomiting and diarrhea  : negative for dysuria and hematuria  Musculoskeletal: as above  Neurologic: negative for syncope   Hematologic: negative for bleeding disorder    Physical Exam:    Vitals: Temp 98.1  F (36.7  C) (Temporal)  Ht 1.486 m (4' 10.5\")  Wt 68.5 kg (151 lb)  BMI 31.02 kg/m2  BMI= Body mass index is 31.02 kg/(m^2).  Constitutional: healthy, alert and no acute distress   Psychiatric: mentation appears normal and affect normal/bright  NEURO: no focal deficits  SKIN: no excoriation or erythema. No signs of infection.      JOINT/EXTREMITIES:     The only pertinent finding today is that her right palm is still very sensitive to light touch.      The secondary findings today is some fullness identified over the volar surface at the base of the middle and little fingers to the right hand.  I cannot appreciate any locking phenomena.  The patient is a little anxious as I palpate anywhere in the palm of her hand so it is a little difficult to verify sensitivity over these flexor tendon nodules.      IMAGING INTERPRETATION: No new x-rays were taken today.    The EMG report was reviewed.  Testing was done at the [facility].  Copies of her second opinion were placed into the chart today.       impression:      ICD-10-CM    1. Bilateral carpal tunnel syndrome G56.03    2. 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  Her symptoms of carpal tunnel in general with respect to her left and right hand as well as symptoms that have been affiliated with her compression of the ulnar nerve " a Guyon's also appear to be resolved.    She does test positive for residual right carpal tunnel syndrome based on her recent EMG.  Clinically to my exam she shows mild findings of trigger fingers.    Plan:   The above was reviewed with Fern and the [facility]..     Options today would parallel those that were recommended by myself previously as well as those that Dr. Ken had suggested.  Injection in the palm of the right hand could be offered.  Trigger finger injection could be offered today.  One could consider repeat surgery on her right carpal tunnel.  One also could consider releasing the trigger fingers that she is experiencing.    With the patient's desire to avoid injections and surgery the decision today is to do nothing different.      Given all the information today in my opinion it makes most sense to define her present restriction as per minute.  We did discuss the implications of this recommendation with respect to her present employment and future employment.  However, it is very clear that the present restriction would need to be honored no matter what future treatment is considered.    With her understanding of this she agrees to us for establishing a permanent restriction.  No further treatment will be offered at this time.  I would suggest that this is also most likely the appropriate time for end of healing.    Disability rating would have to be defined based on [facility] statutes.      Return to clinic MITZI Fine MD

## 2018-01-08 NOTE — NURSING NOTE
"Chief Complaint   Patient presents with     Surgical Followup     Release of Guyon's canal right wrist. DOS: 11/23/16 (13.5 months postop)      Surgical Followup     Left carpal tunnel release and release of Guyon's canal.  DOS: 10/5/16 (15 months postop)     Surgical Followup     Right carpal tunnel release.  DOS: 9/7/16 (16 months postop)     Work Comp       Initial Temp 98.1  F (36.7  C) (Temporal)  Ht 1.486 m (4' 10.5\")  Wt 68.5 kg (151 lb)  BMI 31.02 kg/m2 Estimated body mass index is 31.02 kg/(m^2) as calculated from the following:    Height as of this encounter: 1.486 m (4' 10.5\").    Weight as of this encounter: 68.5 kg (151 lb).  Medication Reconciliation: complete   KASI Maldonado  "

## 2018-01-08 NOTE — LETTER
January 8, 2018      Fern Gonzalez  [address] [facility] [address]        To Whom It May Concern:    Fern Gonzalez was seen in our clinic.Fern is under my professional care for:      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, Bilateral  Carpal tunnel syndrome, Bilateral  S/P carpal tunnel release, Bilateral  Guyon syndrome, Bilateral      Her permanent work restrictions are: No power gripping and No use of vibratory tools.    Sincerely,        Leif Fine MD

## 2018-01-10 ENCOUNTER — HOSPITAL ENCOUNTER (EMERGENCY)
Facility: CLINIC | Age: 58
Discharge: HOME | End: 2018-01-10
Attending: FAMILY MEDICINE | Admitting: FAMILY MEDICINE
Payer: COMMERCIAL

## 2018-01-10 VITALS
RESPIRATION RATE: 16 BRPM | BODY MASS INDEX: 30.44 KG/M2 | HEIGHT: 59 IN | TEMPERATURE: 96.8 F | SYSTOLIC BLOOD PRESSURE: 107 MMHG | DIASTOLIC BLOOD PRESSURE: 84 MMHG | OXYGEN SATURATION: 98 % | WEIGHT: 151 LBS

## 2018-01-10 DIAGNOSIS — J01.90 ACUTE SINUSITIS WITH SYMPTOMS > 10 DAYS: ICD-10-CM

## 2018-01-10 PROCEDURE — 99283 EMERGENCY DEPT VISIT LOW MDM: CPT | Mod: Z6 | Performed by: FAMILY MEDICINE

## 2018-01-10 PROCEDURE — 99282 EMERGENCY DEPT VISIT SF MDM: CPT | Performed by: FAMILY MEDICINE

## 2018-01-10 RX ORDER — LEVOFLOXACIN 500 MG/1
500 TABLET, FILM COATED ORAL DAILY
Qty: 10 TABLET | Refills: 0 | Status: SHIPPED | OUTPATIENT
Start: 2018-01-10 | End: 2018-01-20

## 2018-01-10 NOTE — DISCHARGE INSTRUCTIONS
Thank you for giving us the opportunity to see you. The impression is that you have maxillary sinusitis.  Please see the handout below.    You were on Augmentin last month, and I am going to have you take Levaquin 500 mg daily for 10 days.    Please stop smoking, and used a [place] pot.    Drink plenty of fluids.  Use your inhaler/nebulizer as needed.    If you are not seeing an improvement within 5-7 days, please follow up with your primary care provider or clinic.     After discharge, please closely monitor for any new or worsening symptoms. Return to the Emergency Department at any time if your symptoms worsen.        Acute Bacterial Rhinosinusitis (ABRS)    Acute bacterial rhinosinusitis (ABRS) is an infection of your nasal cavity and sinuses. It s caused by bacteria. Acute means that you ve had symptoms for less than 4 weeks, but possibly up to 12 weeks.  Understanding your sinuses  The nasal cavity is the large air-filled space behind your nose. The sinuses are a group of spaces formed by the bones of your face. They connect with your nasal cavity. ABRS causes the tissue lining these spaces to become inflamed. Mucus may not drain normally. This leads to facial pain and other symptoms.  What causes ABRS?  ABRS most often follows an upper respiratory infection caused by a virus. Bacteria then infect the lining of your nasal cavity and sinuses. But you can also get ABRS if you have:    Nasal allergies    Long-term nasal swelling and congestion not caused by allergies    Blockage in the nose  Symptoms of ABRS  The symptoms of ABRS may be different for each person and include:    Nasal congestion or blockage    Pain or pressure in the face    Thick, colored drainage from the nose  Other symptoms may include:    Runny nose    Fluid draining from the nose down the throat (postnasal drip)    Headache    Cough    Pain    Fever  Diagnosing ABRS  ABRS may be diagnosed if you ve had an upper respiratory infection like a  cold and cough for 10 or more days without improvement or with worsening symptoms. Your healthcare provider will ask about your symptoms and your medical history. The provider will check your vital signs, including your temperature. You ll have a physical exam. The healthcare provider will check your ears, nose, and throat. You likely won t need any tests. If ABRS comes back, you may have a culture or other tests.  Treatment for ABRS  Treatment may include:    Antibiotic medicine. This is for symptoms that last for at least 10 to 14 days.    Nasal corticosteroid medicine. Drops or spray used in the nose can lessen swelling and congestion.    Over-the-counter pain medicine. This is to lessen sinus pain and pressure.    Nasal decongestant medicine. Spray or drops may help to lessen congestion. Do not use them for more than a few days.    Salt wash (saline irrigation). This can help to loosen mucus.  Possible complications of ABRS  ABRS may come back or become long-term (chronic). In rare cases, ABRS may cause complications such as:     Inflamed tissue around the brain and spinal cord (meningitis)    Inflamed tissue around the eyes (orbital cellulitis)    Inflamed bones around the sinuses (osteitis)  These problems may need to be treated in a hospital with intravenous (IV) antibiotic medicine or surgery.  When to call the healthcare provider  Call your healthcare provider if you have any of the following:    Symptoms that don t get better, or get worse    Symptoms that don t get better after 3 to 5 days on antibiotics    Trouble seeing    Swelling around your eyes    Confusion or trouble staying awake   Date Last Reviewed: 5/1/2017 2000-2017 [employer]. [address] [place] [address], [place] [address]. All rights reserved. This information is not intended as a substitute for professional medical care. Always follow your healthcare professional's instructions.

## 2018-01-10 NOTE — LETTER
[facility]  Emergency Room  [address]   [address]  Tel: [phone]   Fax: [phone]  January 10, 2018    Fern Gonzalez  [address]  [facility] [address]  [phone] (home) [phone] (work)    : 1960          To Whom it May Concern:    Fern Gonzalez was seen in our ER today, January 10, 2018. I expect her condition to improve over the next 5-7 days.  She missed work this week due to illness and may return to work, without restriction, when improved.      Please contact me for questions or concerns.    Sincerely,      Josiah Jose MD

## 2018-01-10 NOTE — ED AVS SNAPSHOT
[facility] Emergency Department    [address] [place] [address]    Phone:  [phone]    Fax:  [phone]                                       Fern Gonzalez   MRN: [id number]    Department:  [facility] Emergency Department   Date of Visit:  1/10/2018           Patient Information     Date Of Birth          1960        Your diagnoses for this visit were:     Acute sinusitis        You were seen by Teri Jose MD.      Follow-up Information     Follow up with Mirta Fan NP In 5 days.    Specialty:  Nurse Practitioner - Family    Why:  if not improving    Contact information:    [address] [place] [id number] [phone]          Discharge Instructions       Thank you for giving us the opportunity to see you. The impression is that you have maxillary sinusitis.  Please see the handout below.    You were on Augmentin last month, and I am going to have you take Levaquin 500 mg daily for 10 days.    Please stop smoking, and used a [place] pot.    Drink plenty of fluids.  Use your inhaler/nebulizer as needed.    If you are not seeing an improvement within 5-7 days, please follow up with your primary care provider or clinic.     After discharge, please closely monitor for any new or worsening symptoms. Return to the Emergency Department at any time if your symptoms worsen.        Acute Bacterial Rhinosinusitis (ABRS)    Acute bacterial rhinosinusitis (ABRS) is an infection of your nasal cavity and sinuses. It s caused by bacteria. Acute means that you ve had symptoms for less than 4 weeks, but possibly up to 12 weeks.  Understanding your sinuses  The nasal cavity is the large air-filled space behind your nose. The sinuses are a group of spaces formed by the bones of your face. They connect with your nasal cavity. ABRS causes the tissue lining these spaces to become inflamed. Mucus may not drain normally. This leads to facial pain and other symptoms.  What  causes ABRS?  ABRS most often follows an upper respiratory infection caused by a virus. Bacteria then infect the lining of your nasal cavity and sinuses. But you can also get ABRS if you have:    Nasal allergies    Long-term nasal swelling and congestion not caused by allergies    Blockage in the nose  Symptoms of ABRS  The symptoms of ABRS may be different for each person and include:    Nasal congestion or blockage    Pain or pressure in the face    Thick, colored drainage from the nose  Other symptoms may include:    Runny nose    Fluid draining from the nose down the throat (postnasal drip)    Headache    Cough    Pain    Fever  Diagnosing ABRS  ABRS may be diagnosed if you ve had an upper respiratory infection like a cold and cough for 10 or more days without improvement or with worsening symptoms. Your healthcare provider will ask about your symptoms and your medical history. The provider will check your vital signs, including your temperature. You ll have a physical exam. The healthcare provider will check your ears, nose, and throat. You likely won t need any tests. If ABRS comes back, you may have a culture or other tests.  Treatment for ABRS  Treatment may include:    Antibiotic medicine. This is for symptoms that last for at least 10 to 14 days.    Nasal corticosteroid medicine. Drops or spray used in the nose can lessen swelling and congestion.    Over-the-counter pain medicine. This is to lessen sinus pain and pressure.    Nasal decongestant medicine. Spray or drops may help to lessen congestion. Do not use them for more than a few days.    Salt wash (saline irrigation). This can help to loosen mucus.  Possible complications of ABRS  ABRS may come back or become long-term (chronic). In rare cases, ABRS may cause complications such as:     Inflamed tissue around the brain and spinal cord (meningitis)    Inflamed tissue around the eyes (orbital cellulitis)    Inflamed bones around the sinuses  (osteitis)  These problems may need to be treated in a hospital with intravenous (IV) antibiotic medicine or surgery.  When to call the healthcare provider  Call your healthcare provider if you have any of the following:    Symptoms that don t get better, or get worse    Symptoms that don t get better after 3 to 5 days on antibiotics    Trouble seeing    Swelling around your eyes    Confusion or trouble staying awake   Date Last Reviewed: 5/1/2017 2000-2017 [employer]. [address] [place] [address]. All rights reserved. This information is not intended as a substitute for professional medical care. Always follow your healthcare professional's instructions.          24 Hour Appointment Hotline       To make an appointment at any [facility], call [phone] ([phone]). If you don't have a family doctor or clinic, we will help you find one. [facility] are conveniently located to serve the needs of you and your family.             Review of your medicines      START taking        Dose / Directions Last dose taken    levofloxacin 500 MG tablet   Commonly known as:  LEVAQUIN   Dose:  500 mg   Quantity:  10 tablet        Take 1 tablet (500 mg) by mouth daily for 10 days   Refills:  0          Our records show that you are taking the medicines listed below. If these are incorrect, please call your family doctor or clinic.        Dose / Directions Last dose taken    ADVIL PO   Dose:  600 mg        Take 600 mg by mouth   Refills:  0        order for DME   Quantity:  1 each        Equipment being ordered: Other: nebulizer machine Treatment Diagnosis: acute bronchospasm, respiratory distress  Use as directed with albuterol   Refills:  0                Prescriptions were sent or printed at these locations (1 Prescription)                   [facility] [place] - [address]   [address], [facility] [address]    Telephone:  [phone]   Fax:   "[phone]   Hours:                  E-Prescribed (1 of 1)         levofloxacin (LEVAQUIN) 500 MG tablet                Orders Needing Specimen Collection     None      Pending Results     No orders found from 2018 to 2018.            Pending Culture Results     No orders found from 2018 to 2018.            Pending Results Instructions     If you had any lab results that were not finalized at the time of your Discharge, you can call the ED Lab Result RN at [phone]. You will be contacted by this team for any positive Lab results or changes in treatment. The nurses are available 7 days a week from 10A to 6:30P.  You can leave a message 24 hours per day and they will return your call.        Thank you for choosing [facility]       Thank you for choosing [facility] for your care. Our goal is always to provide you with excellent care. Hearing back from our patients is one way we can continue to improve our services. Please take a few minutes to complete the written survey that you may receive in the mail after you visit with us. Thank you!        [employer] Information     [employer] lets you send messages to your doctor, view your test results, renew your prescriptions, schedule appointments and more. To sign up, go to [email or web address] . Click on \"Log in\" on the left side of the screen, which will take you to the Welcome page. Then click on \"Sign up Now\" on the right side of the page.     You will be asked to enter the access code listed below, as well as some personal information. Please follow the directions to create your username and password.     Your access code is: [email or web address]  Expires: 2018  1:04 PM     Your access code will  in 90 days. If you need help or a new code, please call your [facility] clinic or [phone].        Care EveryWhere ID     This is your Care EveryWhere ID. This could be used by other organizations to access your [facility] medical records  [id number]      "   Equal Access to Services     AVA LOZANO : Stas Welch, ann garcia, michael delgado. So [facility] [phone].    ATENCIÓN: Si habla español, tiene a pedroza disposición servicios gratuitos de asistencia lingüística. Llame al [phone].    We comply with applicable federal civil rights laws and [place] laws. We do not discriminate on the basis of race, color, national origin, age, disability, sex, sexual orientation, or gender identity.            After Visit Summary       This is your record. Keep this with you and show to your community pharmacist(s) and doctor(s) at your next visit.

## 2018-01-10 NOTE — ED AVS SNAPSHOT
[facility] Emergency Department    [address] [place] [address]    Phone:  [phone]    Fax:  [phone]                                       Fern Gonzalez   MRN: [id number]    Department:  [facility] Emergency Department   Date of Visit:  1/10/2018           After Visit Summary Signature Page     I have received my discharge instructions, and my questions have been answered. I have discussed any challenges I see with this plan with the nurse or doctor.    ..........................................................................................................................................  Patient/Patient Representative Signature      ..........................................................................................................................................  Patient Representative Print Name and Relationship to Patient    ..................................................               ................................................  Date                                            Time    ..........................................................................................................................................  Reviewed by Signature/Title    ...................................................              ..............................................  Date                                                            Time

## 2018-01-10 NOTE — ED PROVIDER NOTES
ED Provider Note   CC:     Chief Complaint   Patient presents with     Cough        HPI: Fern Gonzalez is a 57 year old female with 7 day history of facial pain, headache, sinus congestion, and nonproductive cough.  She reports onset of a severe headache with a lot of frontal and maxillary sinus pain.  She has some numbness in her teeth.  She has been blowing her nose and coughing but it is not been very effective.  She is felt fatigued, and now has some wheezing.  She is a smoker of 6-8 cigarettes a day.  She was treated for sinusitis a month ago with Augmentin for 10 days.  Patient denies any significant fever, chills, vomiting, diarrhea, abdominal pain.  Patient feels a lot of pressure in her ears as well.  She feels miserable and has missed work this week.    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   Current Outpatient Prescriptions on File Prior to Encounter:  order for DME Equipment being ordered: Other: nebulizer machineTreatment Diagnosis: acute bronchospasm, respiratory distressUse as directed with albuterol (Patient not taking: Reported on 3/30/2017)       ALLERGIES:    Allergies   Allergen Reactions     Cephalexin Hcl Hives     Codeine Nausea and  "Vomiting     significant     Gabapentin Nausea and Vomiting     Morphine Hcl Nausea and Vomiting     Percocet [Oxycodone-Acetaminophen] Nausea and Vomiting     Significant; pt reports that plain Oxycodone she did fine with     Valium [Diazepam] Other (See Comments)     \"terrible nightmare\"     Vicodin [Hydrocodone-Acetaminophen] Nausea and Vomiting     significant     Cefazolin Rash       Past medical, surgical and social history, along with triage and nursing notes were reviewed.    Review of Systems   Negative except noted in HPI    Physical Exam     Vitals:    01/10/18 0813 01/10/18 0853   BP: (!) 131/107 107/84   Resp: 20 16   Temp: 96.8  F (36  C)    TempSrc: Oral    SpO2: 98%    Weight: 68.5 kg (151 lb)    Height: 1.499 m (4' 11\")      GENERAL APPEARANCE: alert, in moderate distress  FACE: normal facies  EYES: PER;   HENT: Nasal mucosa; right TM cannot be visualized due to cerumen  NECK: no adenopathy or asymmetry, thyroid normal to palpation  RESP: lungs are clear to auscultation -slight wheeze  CV: regular rates and rhythm  ABD: soft, no tenderness; no rebound or guarding  EXT: warm, good capillary refill  SKIN: no worrisome rash    Available Lab/Imaging Results   No results found for this or any previous visit (from the past 24 hour(s)).    Impression     Final diagnoses:   Acute sinusitis       ED Course & Medical Decision Making   Fern Gonzalez presented with with over 1 week history of sinus symptoms with facial pain, sinus headache and congestion with a nonproductive cough.  Patient was treated with Augmentin a month ago for 10 days for sinusitis.  She is not sure that her symptoms are completely better after that course.  Her symptoms have gradually worsened, and she presents to the ED with ear pain, facial pain especially over the maxillary and frontal sinuses.  She has not noticed a high fever, but has felt slightly chilled and sweaty.  Patient missed work every day this week.  Patient was seen " shortly after arrival.  She had axillary sinus tenderness with nasal mucosal edema and erythema.  The oropharynx is slightly injected.  Lungs reveal slight wheeze.  Patient likely has sinusitis/bronchitis.  She was urged to quit smoking.  I would like her to use a [place] pot or saline irrigation, moist hot compresses to the face, gentle steam inhalation, and begin antibiotics with Levaquin 500 mg daily for 10 days.  Follow-up in the clinic if not improving in 5-7 days.          Discharge Medication List as of 1/10/2018  8:47 AM      START taking these medications    Details   levofloxacin (LEVAQUIN) 500 MG tablet Take 1 tablet (500 mg) by mouth daily for 10 days, Disp-10 tablet, R-0, E-Prescribe                Teri Jose MD  01/10/18 3826

## 2018-02-01 ENCOUNTER — TELEPHONE (OUTPATIENT)
Dept: ORTHOPEDICS | Facility: CLINIC | Age: 58
End: 2018-02-01

## 2018-02-01 NOTE — TELEPHONE ENCOUNTER
Reason for Call:  Form, our goal is to have forms completed with 72 hours, however, some forms may require a visit or additional information.    Type of letter, form or note:  disability    Who is the form from?: Insurance comp    Where did the form come from: form was faxed in    What clinic location was the form placed at?: [place]    Where the form was placed: 's Box    What number is listed as a contact on the form?: [employer] fax # [phone]         Additional comments: none    Call taken on 2/1/2018 at 1:07 PM by Shilpa Baum

## 2018-02-14 NOTE — TELEPHONE ENCOUNTER
Forms completed, faxed to [facility] at [phone], sent for scanning, and a copy was placed in my fax folder.

## 2018-03-13 NOTE — PROGRESS NOTES
SUBJECTIVE:   Fern Gonzalez is a 57 year old female who presents to clinic today for the following health issues:    The 10-year ASCVD risk score (Tomás JUNG Jr, et al., 2013) is: 3.2%    Values used to calculate the score:      Age: 57 years      Sex: Female      Is Non- : No      Diabetic: No      Tobacco smoker: Yes      Systolic Blood Pressure: 107 mmHg      Is BP treated: No      HDL Cholesterol: 60 mg/dL      Total Cholesterol: 202 mg/dL  Patient is eligible for use of low-dose aspirin for primary prevention of heart attack and stroke.  Provider has discussed aspirin with patient and our decision was:     Prescribe:  Daily low-dose aspirin recommended for primary prevention, patient agrees with plan.        HPI  Joint Pain    Onset: 03/06/18    Description:   Location: left knee  Character: Sharp and Stabbing below patella and around the knee    Intensity: moderate    Progression of Symptoms: intermittent    Accompanying Signs & Symptoms:  Other symptoms: radiation of pain to dez to shin up to hip    History:   Previous similar pain: no       Precipitating factors:   Trauma or overuse: YES- Patient fell both knees buckling.    Alleviating factors:  Improved by: NSAID - Alleve    Therapies Tried and outcome: Alleve, elevation      Problem list and histories reviewed & adjusted, as indicated.  Additional history:   Fell on ice and struck the medial left knee just below the knee joint line    Advance Care Planning 3/13/2018: ACP Review of Chart / Resources Provided:  Reviewed chart for advance care plan.  Fern Gonzalez has been provided information and resources to begin or update their advance care plan.  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     [facility] AFF SURGICAL PATHOLOGY       COLONOSCOPY N/A 10/31/2014    Procedure: COMBINED COLONOSCOPY, SINGLE OR MULTIPLE BIOPSY/POLYPECTOMY BY BIOPSY;  Surgeon: Leonard Dumont MD;  Location: [facility]     HC LAP,FULGURATE/EXCISE LESIONS  10/04    mesh present as had abdominal wall surgery     HC UGI ENDOSCOPY, SIMPLE EXAM  10/26/2006     HYSTERECTOMY TOTAL ABDOMINAL  2007     RELEASE CARPAL TUNNEL Right 2016    Procedure: RELEASE CARPAL TUNNEL;  Surgeon: Leif Fine MD;  Location: [facility]     RELEASE CARPAL TUNNEL Left 10/5/2016    Procedure: RELEASE CARPAL TUNNEL;  Surgeon: Leif Fine MD;  Location: [facility]     RELEASE CARPAL TUNNEL Right 2016    Procedure: RELEASE CARPAL TUNNEL;  Surgeon: Leif Fine MD;  Location: [facility]       Social History   Substance Use Topics     Smoking status: Current Every Day Smoker     Packs/day: 0.25     Years: 0.50     Types: Cigarettes     Smokeless tobacco: Never Used     Alcohol use 0.0 oz/week     0 Standard drinks or equivalent per week      Comment: occ     Family History   Problem Relation Age of Onset     Thyroid Disease Maternal Grandmother      Thyroid Disease Sister      Coronary Artery Disease No family hx of      Hypertension No family hx of      Hyperlipidemia No family hx of      Cancer - colorectal No family hx of      Ovarian Cancer No family hx of      Other Cancer No family hx of      Mental  "Illness No family hx of      CEREBROVASCULAR DISEASE No family hx of      Asthma No family hx of      Known Genetic Syndrome No family hx of          Current Outpatient Prescriptions   Medication Sig Dispense Refill     Ibuprofen (ADVIL PO) Take 600 mg by mouth       Allergies   Allergen Reactions     Cephalexin Hcl Hives     Codeine Nausea and Vomiting     significant     Gabapentin Nausea and Vomiting     Morphine Hcl Nausea and Vomiting     Percocet [Oxycodone-Acetaminophen] Nausea and Vomiting     Significant; pt reports that plain Oxycodone she did fine with     Valium [Diazepam] Other (See Comments)     \"terrible nightmare\"     Vicodin [Hydrocodone-Acetaminophen] Nausea and Vomiting     significant     Cefazolin Rash     Recent Labs   Lab Test  11/18/16   1140  11/13/15   0840  09/01/15   1048  03/13/15   1031   10/11/11   1655   09/27/11   0745   LDL   --   122   --   91   --    --    --    --    HDL   --   60   --   50*   --    --    --    --    TRIG   --   99   --   84   --    --    --    --    ALT   --    --    --    --    --   31   --   7   CR  0.63   --   0.56   --    --   0.54   < >  0.62   GFRESTIMATED  >90  Non  GFR Calc     --   >90  Non  GFR Calc     --    --   >90   < >  >90   GFRESTBLACK  >90   GFR Calc     --   >90   GFR Calc     --    --   >90   < >  >90   POTASSIUM  3.9   --   3.2*   --    --   3.8   < >  3.7   TSH   --   1.04   --   1.00   < >   --    --    --     < > = values in this interval not displayed.      BP Readings from Last 3 Encounters:   03/15/18 110/70   01/10/18 107/84   11/20/17 112/82    Wt Readings from Last 3 Encounters:   03/15/18 153 lb 12.8 oz (69.8 kg)   01/10/18 151 lb (68.5 kg)   01/08/18 151 lb (68.5 kg)                  Labs reviewed in EPIC    ROS:  Constitutional, HEENT, cardiovascular, pulmonary, gi and gu systems are negative, except as otherwise noted.    OBJECTIVE:     /70 (Cuff Size: Adult " Regular)  Pulse 92  Temp 96.5  F (35.8  C) (Temporal)  Resp 16  Wt 153 lb 12.8 oz (69.8 kg)  BMI 31.06 kg/m2  Body mass index is 31.06 kg/(m^2).  GENERAL: healthy, alert and no distress  MS: LLE exam shows no deformities, no erythema, induration, or nodules, no evidence of joint effusion, ROM of all joints is normal and mildly positive [place] noted.  SKIN: no suspicious lesions or rashes  NEURO: Normal strength and tone, mentation intact and speech normal  PSYCH: mentation appears normal, affect normal/bright    Diagnostic Test Results:  No results found for this or any previous visit (from the past 24 hour(s)).     X-ray: negative for pathology today to my eye.  It will be overread by radiology.      ASSESSMENT/PLAN:     1. Tobacco use disorder  Advised cessation  - TOBACCO CESSATION ORDER FOR [facility]    2. Acute pain of left knee  3. Contusion of left knee and lower leg, initial encounter  - XR Knee Standing Left 2 Views; Future - PT and observe with medications.    4. Major depressive disorder, recurrent episode, moderate (H)  Doing well until she fell on the ice.  No new concerns.  - TOBACCO CESSATION ORDER FOR [facility]  - HONORING CHOICES REFERRAL    Rodolfo Esteban PA-C  [facility] for HPI/ROS submitted by the patient on 3/15/2018   If you checked off any problems, how difficult have these problems made it for you to do your work, take care of things at home, or get along with other people?: Somewhat difficult  PHQ9 TOTAL SCORE: 4  YUAN 7 TOTAL SCORE: 3

## 2018-03-15 ENCOUNTER — RADIANT APPOINTMENT (OUTPATIENT)
Dept: GENERAL RADIOLOGY | Facility: OTHER | Age: 58
End: 2018-03-15
Attending: PHYSICIAN ASSISTANT
Payer: COMMERCIAL

## 2018-03-15 ENCOUNTER — OFFICE VISIT (OUTPATIENT)
Dept: FAMILY MEDICINE | Facility: OTHER | Age: 58
End: 2018-03-15
Payer: COMMERCIAL

## 2018-03-15 VITALS
TEMPERATURE: 96.5 F | BODY MASS INDEX: 31.06 KG/M2 | SYSTOLIC BLOOD PRESSURE: 110 MMHG | DIASTOLIC BLOOD PRESSURE: 70 MMHG | WEIGHT: 153.8 LBS | RESPIRATION RATE: 16 BRPM | HEART RATE: 92 BPM

## 2018-03-15 DIAGNOSIS — F17.200 TOBACCO USE DISORDER: Primary | ICD-10-CM

## 2018-03-15 DIAGNOSIS — M25.562 ACUTE PAIN OF LEFT KNEE: ICD-10-CM

## 2018-03-15 DIAGNOSIS — S80.12XA CONTUSION OF LEFT KNEE AND LOWER LEG, INITIAL ENCOUNTER: ICD-10-CM

## 2018-03-15 DIAGNOSIS — F33.1 MAJOR DEPRESSIVE DISORDER, RECURRENT EPISODE, MODERATE (H): ICD-10-CM

## 2018-03-15 DIAGNOSIS — S80.02XA CONTUSION OF LEFT KNEE AND LOWER LEG, INITIAL ENCOUNTER: ICD-10-CM

## 2018-03-15 PROCEDURE — 73560 X-RAY EXAM OF KNEE 1 OR 2: CPT | Mod: LT

## 2018-03-15 PROCEDURE — 99214 OFFICE O/P EST MOD 30 MIN: CPT | Performed by: PHYSICIAN ASSISTANT

## 2018-03-15 RX ORDER — CYCLOBENZAPRINE HCL 10 MG
10 TABLET ORAL 3 TIMES DAILY PRN
Qty: 30 TABLET | Refills: 0 | Status: SHIPPED | OUTPATIENT
Start: 2018-03-15 | End: 2018-03-15

## 2018-03-15 RX ORDER — CYCLOBENZAPRINE HCL 10 MG
10 TABLET ORAL 3 TIMES DAILY PRN
Qty: 30 TABLET | Refills: 0 | Status: SHIPPED | OUTPATIENT
Start: 2018-03-15 | End: 2019-04-04

## 2018-03-15 RX ORDER — MELOXICAM 15 MG/1
15 TABLET ORAL DAILY
Qty: 90 TABLET | Refills: 1 | Status: SHIPPED | OUTPATIENT
Start: 2018-03-15 | End: 2018-03-15

## 2018-03-15 RX ORDER — MELOXICAM 15 MG/1
15 TABLET ORAL DAILY
Qty: 90 TABLET | Refills: 1 | Status: SHIPPED | OUTPATIENT
Start: 2018-03-15 | End: 2019-04-04

## 2018-03-15 ASSESSMENT — ANXIETY QUESTIONNAIRES
1. FEELING NERVOUS, ANXIOUS, OR ON EDGE: NOT AT ALL
7. FEELING AFRAID AS IF SOMETHING AWFUL MIGHT HAPPEN: NOT AT ALL
6. BECOMING EASILY ANNOYED OR IRRITABLE: SEVERAL DAYS
7. FEELING AFRAID AS IF SOMETHING AWFUL MIGHT HAPPEN: NOT AT ALL
5. BEING SO RESTLESS THAT IT IS HARD TO SIT STILL: SEVERAL DAYS
2. NOT BEING ABLE TO STOP OR CONTROL WORRYING: NOT AT ALL
4. TROUBLE RELAXING: SEVERAL DAYS
GAD7 TOTAL SCORE: 3
GAD7 TOTAL SCORE: 3
3. WORRYING TOO MUCH ABOUT DIFFERENT THINGS: NOT AT ALL
GAD7 TOTAL SCORE: 3

## 2018-03-15 ASSESSMENT — PAIN SCALES - GENERAL: PAINLEVEL: SEVERE PAIN (6)

## 2018-03-15 ASSESSMENT — PATIENT HEALTH QUESTIONNAIRE - PHQ9
10. IF YOU CHECKED OFF ANY PROBLEMS, HOW DIFFICULT HAVE THESE PROBLEMS MADE IT FOR YOU TO DO YOUR WORK, TAKE CARE OF THINGS AT HOME, OR GET ALONG WITH OTHER PEOPLE: SOMEWHAT DIFFICULT
SUM OF ALL RESPONSES TO PHQ QUESTIONS 1-9: 4
SUM OF ALL RESPONSES TO PHQ QUESTIONS 1-9: 4

## 2018-03-15 NOTE — NURSING NOTE
"Chief Complaint   Patient presents with     Musculoskeletal Problem     Panel Management     asa, advanced directives, tobacco cessation, dap, phq       Initial /70 (Cuff Size: Adult Regular)  Pulse 92  Temp 96.5  F (35.8  C) (Temporal)  Resp 16  Wt 153 lb 12.8 oz (69.8 kg)  BMI 31.06 kg/m2 Estimated body mass index is 31.06 kg/(m^2) as calculated from the following:    Height as of 1/10/18: 4' 11\" (1.499 m).    Weight as of this encounter: 153 lb 12.8 oz (69.8 kg).  Medication Reconciliation: complete  "

## 2018-03-15 NOTE — MR AVS SNAPSHOT
After Visit Summary   3/15/2018    Fern Gonzalez    MRN: [id number]           Patient Information     Date Of Birth          1960        Visit Information        Provider Department      3/15/2018 2:20 PM Rodolfo Patricio PA-C [facility]        Today's Diagnoses     Tobacco use disorder    -  1    Acute pain of left knee        Contusion of left knee and lower leg, initial encounter        Major depressive disorder, recurrent episode, moderate (H)          Care Instructions                   Meniscal (Cartilage) Tear        What is a meniscal (cartilage) tear?   The meniscus is a piece of cartilage in the middle of your knee. Cartilage is tough, smooth, rubbery tissue that lines and cushions the surface of the joints. You have a meniscus on the inner side of your knee (the medial meniscus) and a meniscus on the outer side of the knee (the lateral meniscus). Each meniscus attaches to the top of the shinbone (tibia), makes contact with the thighbone (femur), and acts as a shock absorber during weight-bearing activities. If a meniscus tears, it can cause knee pain and can limit motion.   How does it occur?   A meniscal tear can occur when the knee is forcefully twisted or sometimes with minimal or no trauma, such as when you are squatting.   What are the symptoms?   Symptoms may include the following:   You have pain in your knee joint.   You have immediate swelling with fluid in the joint, called an effusion.   You can't fully bend or straighten your leg.   Your knee locks or gets stuck in one place.   You hear a snap or pop at the time of the injury.   A chronic (old) meniscal tear may give you pain on and off during activities, with or without swelling. Your knee may sometimes lock, and you may have stiffness in the knee.   How is it diagnosed?   Your healthcare provider will review your symptoms and how the injury occurred. He or she will ask about your medical history and  examine your knee. Your provider will move your knee in several ways that may cause pain along the injured meniscal surface. You may have X-rays to see if the bones in your knee are injured, but a meniscal tear will not show on an X-ray. An MRI scan can help diagnose a meniscal tear.   How is it treated?   To treat this condition:   Put an ice pack, gel pack, or package of frozen vegetables, wrapped in a cloth on the area every 3 to 4 hours, for up to 20 minutes at a time.   Raise the knee on a pillow when you sit or lie down.   Wrap an elastic bandage around your knee to keep the swelling from getting worse.   Wear a knee immobilizer or other brace as directed by your provider.   Use crutches to prevent further injury while the meniscus heals.   Take an anti-inflammatory medicine such as ibuprofen, or other medicine as directed by your provider. Nonsteroidal anti-inflammatory medicines (NSAIDs) may cause stomach bleeding and other problems. These risks increase with age. Read the label and take as directed. Unless recommended by your healthcare provider, do not take for more than 10 days.   While you are recovering from your injury, you will need to change your sport or activity to one that does not make your condition worse. For example, you may need to swim instead of run.   Arthroscopic surgery is needed to repair or remove large torn pieces of cartilage. The surgery usually takes about an hour. An arthroscope is a tube with a light on the end that projects an image of the inside of your knee onto a TV screen. By putting tools through the end of the arthroscope, the healthcare provider can usually repair or remove the damaged meniscus. Because the meniscus is a valuable shock absorber, the provider will leave as much of the healthy portion of the meniscus as possible during surgery.   You will go home the day of the surgery. You should keep your leg elevated. Take it easy for at least the next 2 to 3 days.   Do  not take part in strenuous activities until your healthcare provider advises that you are ready.   How long will the effects last?   If you have a small tear that has not been repaired or removed, you may still be able to function well and be active. However, your knee may sometimes swell, lock, be stiff, or hurt during activities.   If you have surgery, you will need to spend time rehabilitating your knee. Everyone recovers at a different rate, depending on the severity of the injury and their general health. Many people return to their previous level of activity within a month or so after surgery.   When can I return to my normal activities?   Everyone recovers from an injury at a different rate. Return to your activities depends on how soon your knee recovers, not by how many days or weeks it has been since your injury has occurred. The goal is to return you to your normal activities as soon as is safely possible. If you return too soon you may worsen your injury.   You may safely return to your normal activities when, starting from the top of the list and progressing to the end, each of the following is true:   your injured knee can be fully straightened and bent without pain   your knee and leg have regained normal strength compared to the uninjured knee and leg   your knee is not swollen   you are able to bend, squat, or walk without pain   How can a meniscal tear be prevented?   To help prevent knee cartilage injuries, it helps to have strong thigh and hamstring muscles, and to stretch your legs before and after exercising. In activities such as skiing, be sure your ski bindings are set correctly by a trained professional so that your skis will release when you fall.     Published by [employer].  This content is reviewed periodically and is subject to change as new health information becomes available. The information is intended to inform and educate and is not a replacement for medical evaluation, advice,  diagnosis or treatment by a healthcare professional.   Written by Artis Wiley MD, for [facility].   ? 2010 [facility] and/or its affiliates. All Rights Reserved.   Copyright   [employer] Reference Systems 2011                   Meniscal (Cartilage) Tear Rehabilitation Exercises             You may do the first 5 exercises right away. You may do the rest of the exercises when the pain in your knee has decreased.   Passive knee extension: Do this exercise if you are unable to fully extend your knee. While lying on your back, place a rolled-up towel underneath the heel of your injured leg so the heel is about 6 inches off the ground. Relax your leg muscles and let gravity slowly straighten your knee. You may feel some discomfort while doing this exercise. Try to hold this position for 2 minutes. Repeat 3 times. Do this exercise several times per day. This exercise can also be done while sitting in a chair with your heel on another chair or stool.   Heel slide: Sit on a firm surface with your legs straight in front of you. Slowly slide the heel of your injured leg toward your buttock by pulling your knee toward your chest as you slide the heel. Return to the starting position. Do 3 sets of 10.   Standing calf stretch: Stand facing a wall with your hands on the wall at about eye level. Keep your injured leg back with your heel on the floor. Keep the other leg forward with the knee bent. Turn your back foot slightly inward (as if you were pigeon-toed). Slowly lean into the wall until you feel a stretch in the back of your calf. Hold the stretch for 15 to 30 seconds. Return to the starting position. Repeat 3 times. Do this exercise several times each day.   Hamstring stretch on wall: Lie on your back with your buttocks close to a doorway. Stretch your uninjured leg straight out in front of you on the floor through the doorway. Raise your injured leg and rest it against the wall next to the door frame. Keep your leg as  straight as possible. You should feel a stretch in the back of your thigh. Hold this position for 15 to 30 seconds. Repeat 3 times.   Straight leg raise: Lie on your back with your legs straight out in front of you. Bend the knee on your uninjured side and place the foot flat on the floor. Tighten the thigh muscle on your injured side and lift your leg about 8 inches off the floor. Keep your leg straight and your thigh muscle tight. Slowly lower your leg back down to the floor. Do 3 sets of 10.   Wall squat with a ball: Stand with your back, shoulders, and head against a wall. Look straight ahead. Keep your shoulders relaxed and your feet 3 feet from the wall and shoulder's width apart. Place a soccer or basketball-sized ball behind your back. Keeping your back against the wall, slowly squat down to a 45-degree angle. Your thighs will not yet be parallel to the floor. Hold this position for 10 seconds and then slowly slide back up the wall. Repeat 10 times. Build up to 3 sets of 10.   Step-up: Stand with the foot of your injured leg on a support 3 to 5 inches high (like a small step or block of wood). Keep your other foot flat on the floor. Shift your weight onto the injured leg on the support. Straighten your injured leg as the other leg comes off the floor. Return to the starting position by bending your injured leg and slowly lowering your uninjured leg back to the floor. Do 3 sets of 10.   Knee stabilization: Wrap a piece of elastic tubing around the ankle of the uninjured leg. Tie a knot in the other end of the tubing and close it in a door.   0. Stand facing the door on the leg without tubing and bend your knee slightly, keeping your thigh muscles tight. While maintaining this position, move the leg with the tubing straight back behind you. Do 3 sets of 10.   0. Turn 90 degrees so the leg without tubing is closest to the door. Move the leg with tubing away from your body. Do 3 sets of 10.   0. Turn 90 degrees  again so your back is to the door. Move the leg with tubing straight out in front of you. Do 3 sets of 10.   0. Turn your body 90 degrees again so the leg with tubing is closest to the door. Move the leg with tubing across your body. Do 3 sets of 10.   Hold onto a chair if you need help balancing. This exercise can be made even more challenging by standing on a pillow while you move the leg with tubing.   Resisted terminal knee extension: Make a loop from a piece of elastic tubing by tying a knot in both ends. Close both knots in a door. Step into the loop so the tubing is around the back of your injured leg. Lift the other foot off the ground. Hold onto a chair for balance, if needed. Bend the knee on the leg with tubing about 45 degrees. Slowly straighten your leg, keeping your thigh muscle tight as you do this. Do this 10 times. Do 3 sets. An easier way to do this is to stand on both legs for better support while you do the exercise.   Wobble board exercises:   0. Stand on a wobble board with your feet shoulder width apart. Rock the board forwards and backwards 30 times, then side to side 30 times. Hold on to a chair if you need support.   0. Rotate the wobble board around so that the edge of the board is in contact with the floor at all times. Do this 30 times in a clockwise and then a counterclockwise direction.   0. Balance on the wobble board for as long as you can without letting the edges touch the floor. Try to do this for 2 minutes without touching the floor.   0. Rotate the wobble board in clockwise and counterclockwise circles, but do not allow the edge of the board to touch the floor.   0. When you have mastered exercises A through D, try repeating them while standing on just your injured leg. Once you can do these exercises on one leg, try to do them with your eyes closed. Make sure you have something nearby to support you in case you lose your balance.   Published by [facility].  This content is  reviewed periodically and is subject to change as new health information becomes available. The information is intended to inform and educate and is not a replacement for medical evaluation, advice, diagnosis or treatment by a healthcare professional.   Written by Suly Fan, MS, PT, and Carol Delgado PT, [facility], [facility], for [facility].   ? 2010 [facility] and/or its affiliates. All Rights Reserved.   Copyright   Clinical Reference Systems 2011                Follow-ups after your visit        Additional Services     HONORING CHOICES REFERRAL       Your provider [facility] Advance Care Planning Facilitator or Serious illness clinic support staff. The facilitator or support staff will contact you to schedule the appointment or for the follow up call    Reason for Referral: Basic Advance Care Planning - 1:1 need            PHYSICAL THERAPY REFERRAL       *This therapy referral will be filtered to a centralized scheduling office at [facility] and the patient will receive a call to schedule an appointment at a [place] location most convenient for them. *     [facility] provides Physical Therapy evaluation and treatment and many specialty services across the [place] system.  If requesting a specialty program, please choose from the list below.    If you have not heard from the scheduling office within 2 business days, please call [phone] for all locations, with the exception of [place], please call [phone] and [facility], please call [phone]  Treatment: Evaluation & Treatment  Special Instructions/Modalities: left medial knee pain with possible medial meniscus injury  Special Programs: as needed based on assessment.    Please be aware that coverage of these services is subject to the terms and limitations of your health insurance plan.  Call member services at your health plan with any benefit or coverage questions.   "    **Note to Provider:  If you are referring outside of [facility] for the therapy appointment, please list the name of the location in the \"special instructions\" above, print the referral and give to the patient to schedule the appointment.                  Who to contact     If you have questions or need follow up information about today's clinic visit or your schedule please contact [facility] directly at [phone].  Normal or non-critical lab and imaging results will be communicated to you by [employer], letter or phone within 4 business days after the clinic has received the results. If you do not hear from us within 7 days, please contact the clinic through [employer] or phone. If you have a critical or abnormal lab result, we will notify you by phone as soon as possible.  Submit refill requests through [employer] or call your pharmacy and they will forward the refill request to us. Please allow 3 business days for your refill to be completed.          Additional Information About Your Visit        [employer] Information     [employer] lets you send messages to your doctor, view your test results, renew your prescriptions, schedule appointments and more. To sign up, go to [email or web address] . Click on \"Log in\" on the left side of the screen, which will take you to the Welcome page. Then click on \"Sign up Now\" on the right side of the page.     You will be asked to enter the access code listed below, as well as some personal information. Please follow the directions to create your username and password.     Your access code is: [email or web address]  Expires: 2018  2:54 PM     Your access code will  in 90 days. If you need help or a new code, please call your [facility] clinic or [phone].        Care EveryWhere ID     This is your Care EveryWhere ID. This could be used by other organizations to access your [facility] medical records  [id number]        Your Vitals Were     Pulse Temperature " Respirations BMI (Body Mass Index)          92 96.5  F (35.8  C) (Temporal) 16 31.06 kg/m2         Blood Pressure from Last 3 Encounters:   03/15/18 110/70   01/10/18 107/84   11/20/17 112/82    Weight from Last 3 Encounters:   03/15/18 153 lb 12.8 oz (69.8 kg)   01/10/18 151 lb (68.5 kg)   01/08/18 151 lb (68.5 kg)              We Performed the Following     DEPRESSION ACTION PLAN (DAP)     HONORING CHOICES REFERRAL     PHYSICAL THERAPY REFERRAL     TOBACCO CESSATION ORDER FOR [facility]          Today's Medication Changes          These changes are accurate as of 3/15/18  2:54 PM.  If you have any questions, ask your nurse or doctor.               Start taking these medicines.        Dose/Directions    cyclobenzaprine 10 MG tablet   Commonly known as:  FLEXERIL   Used for:  Acute pain of left knee, Contusion of left knee and lower leg, initial encounter   Started by:  Rodolfo Patricio PA-C        Dose:  10 mg   Take 1 tablet (10 mg) by mouth 3 times daily as needed for muscle spasms ( careful use as this can cause fatigue)   Quantity:  30 tablet   Refills:  0       meloxicam 15 MG tablet   Commonly known as:  MOBIC   Used for:  Contusion of left knee and lower leg, initial encounter, Acute pain of left knee   Started by:  Rodolfo Patricio PA-C        Dose:  15 mg   Take 1 tablet (15 mg) by mouth daily   Quantity:  90 tablet   Refills:  1         Stop taking these medicines if you haven't already. Please contact your care team if you have questions.     order for DME   Stopped by:  Rodolfo Patricio PA-C                Where to get your medicines      These medications were sent to [facility] - [address]   [address] [place] [address]     Phone:  [phone]     cyclobenzaprine 10 MG tablet    meloxicam 15 MG tablet                Primary Care Provider Fax #    Physician No Ref-Primary [phone]       No address on file        Equal Access to Services     AVA LOZANO AH:  Hadii aad ku hadnetoo Sotorstenali, waaxda luqadaha, qaybta kaalmada rafiq, michael dorieratna vergara lagermanimelda tino. So [facility] [phone].    ATENCIÓN: Si reyesla devin, tiene a pedroza disposición servicios gratuitos de asistencia lingüística. Llame al [phone].    We comply with applicable federal civil rights laws and [place] laws. We do not discriminate on the basis of race, color, national origin, age, disability, sex, sexual orientation, or gender identity.            Thank you!     Thank you for choosing [facility]  for your care. Our goal is always to provide you with excellent care. Hearing back from our patients is one way we can continue to improve our services. Please take a few minutes to complete the written survey that you may receive in the mail after your visit with us. Thank you!             Your Updated Medication List - Protect others around you: Learn how to safely use, store and throw away your medicines at www.disposemymeds.org.          This list is accurate as of 3/15/18  2:54 PM.  Always use your most recent med list.                   Brand Name Dispense Instructions for use Diagnosis    ADVIL PO      Take 600 mg by mouth        cyclobenzaprine 10 MG tablet    FLEXERIL    30 tablet    Take 1 tablet (10 mg) by mouth 3 times daily as needed for muscle spasms ( careful use as this can cause fatigue)    Acute pain of left knee, Contusion of left knee and lower leg, initial encounter       meloxicam 15 MG tablet    MOBIC    90 tablet    Take 1 tablet (15 mg) by mouth daily    Contusion of left knee and lower leg, initial encounter, Acute pain of left knee

## 2018-03-15 NOTE — PATIENT INSTRUCTIONS
Meniscal (Cartilage) Tear        What is a meniscal (cartilage) tear?   The meniscus is a piece of cartilage in the middle of your knee. Cartilage is tough, smooth, rubbery tissue that lines and cushions the surface of the joints. You have a meniscus on the inner side of your knee (the medial meniscus) and a meniscus on the outer side of the knee (the lateral meniscus). Each meniscus attaches to the top of the shinbone (tibia), makes contact with the thighbone (femur), and acts as a shock absorber during weight-bearing activities. If a meniscus tears, it can cause knee pain and can limit motion.   How does it occur?   A meniscal tear can occur when the knee is forcefully twisted or sometimes with minimal or no trauma, such as when you are squatting.   What are the symptoms?   Symptoms may include the following:   You have pain in your knee joint.   You have immediate swelling with fluid in the joint, called an effusion.   You can't fully bend or straighten your leg.   Your knee locks or gets stuck in one place.   You hear a snap or pop at the time of the injury.   A chronic (old) meniscal tear may give you pain on and off during activities, with or without swelling. Your knee may sometimes lock, and you may have stiffness in the knee.   How is it diagnosed?   Your healthcare provider will review your symptoms and how the injury occurred. He or she will ask about your medical history and examine your knee. Your provider will move your knee in several ways that may cause pain along the injured meniscal surface. You may have X-rays to see if the bones in your knee are injured, but a meniscal tear will not show on an X-ray. An MRI scan can help diagnose a meniscal tear.   How is it treated?   To treat this condition:   Put an ice pack, gel pack, or package of frozen vegetables, wrapped in a cloth on the area every 3 to 4 hours, for up to 20 minutes at a time.   Raise the knee on a pillow when you sit or  lie down.   Wrap an elastic bandage around your knee to keep the swelling from getting worse.   Wear a knee immobilizer or other brace as directed by your provider.   Use crutches to prevent further injury while the meniscus heals.   Take an anti-inflammatory medicine such as ibuprofen, or other medicine as directed by your provider. Nonsteroidal anti-inflammatory medicines (NSAIDs) may cause stomach bleeding and other problems. These risks increase with age. Read the label and take as directed. Unless recommended by your healthcare provider, do not take for more than 10 days.   While you are recovering from your injury, you will need to change your sport or activity to one that does not make your condition worse. For example, you may need to swim instead of run.   Arthroscopic surgery is needed to repair or remove large torn pieces of cartilage. The surgery usually takes about an hour. An arthroscope is a tube with a light on the end that projects an image of the inside of your knee onto a TV screen. By putting tools through the end of the arthroscope, the healthcare provider can usually repair or remove the damaged meniscus. Because the meniscus is a valuable shock absorber, the provider will leave as much of the healthy portion of the meniscus as possible during surgery.   You will go home the day of the surgery. You should keep your leg elevated. Take it easy for at least the next 2 to 3 days.   Do not take part in strenuous activities until your healthcare provider advises that you are ready.   How long will the effects last?   If you have a small tear that has not been repaired or removed, you may still be able to function well and be active. However, your knee may sometimes swell, lock, be stiff, or hurt during activities.   If you have surgery, you will need to spend time rehabilitating your knee. Everyone recovers at a different rate, depending on the severity of the injury and their general health. Many  people return to their previous level of activity within a month or so after surgery.   When can I return to my normal activities?   Everyone recovers from an injury at a different rate. Return to your activities depends on how soon your knee recovers, not by how many days or weeks it has been since your injury has occurred. The goal is to return you to your normal activities as soon as is safely possible. If you return too soon you may worsen your injury.   You may safely return to your normal activities when, starting from the top of the list and progressing to the end, each of the following is true:   your injured knee can be fully straightened and bent without pain   your knee and leg have regained normal strength compared to the uninjured knee and leg   your knee is not swollen   you are able to bend, squat, or walk without pain   How can a meniscal tear be prevented?   To help prevent knee cartilage injuries, it helps to have strong thigh and hamstring muscles, and to stretch your legs before and after exercising. In activities such as skiing, be sure your ski bindings are set correctly by a trained professional so that your skis will release when you fall.     Published by [employer].  This content is reviewed periodically and is subject to change as new health information becomes available. The information is intended to inform and educate and is not a replacement for medical evaluation, advice, diagnosis or treatment by a healthcare professional.   Written by Artis Wiley MD, for [employer].   ? 2010 [employer] and/or its affiliates. All Rights Reserved.   Copyright   [employer] 2011                   Meniscal (Cartilage) Tear Rehabilitation Exercises             You may do the first 5 exercises right away. You may do the rest of the exercises when the pain in your knee has decreased.   Passive knee extension: Do this exercise if you are unable to fully extend your knee. While lying on  your back, place a rolled-up towel underneath the heel of your injured leg so the heel is about 6 inches off the ground. Relax your leg muscles and let gravity slowly straighten your knee. You may feel some discomfort while doing this exercise. Try to hold this position for 2 minutes. Repeat 3 times. Do this exercise several times per day. This exercise can also be done while sitting in a chair with your heel on another chair or stool.   Heel slide: Sit on a firm surface with your legs straight in front of you. Slowly slide the heel of your injured leg toward your buttock by pulling your knee toward your chest as you slide the heel. Return to the starting position. Do 3 sets of 10.   Standing calf stretch: Stand facing a wall with your hands on the wall at about eye level. Keep your injured leg back with your heel on the floor. Keep the other leg forward with the knee bent. Turn your back foot slightly inward (as if you were pigeon-toed). Slowly lean into the wall until you feel a stretch in the back of your calf. Hold the stretch for 15 to 30 seconds. Return to the starting position. Repeat 3 times. Do this exercise several times each day.   Hamstring stretch on wall: Lie on your back with your buttocks close to a doorway. Stretch your uninjured leg straight out in front of you on the floor through the doorway. Raise your injured leg and rest it against the wall next to the door frame. Keep your leg as straight as possible. You should feel a stretch in the back of your thigh. Hold this position for 15 to 30 seconds. Repeat 3 times.   Straight leg raise: Lie on your back with your legs straight out in front of you. Bend the knee on your uninjured side and place the foot flat on the floor. Tighten the thigh muscle on your injured side and lift your leg about 8 inches off the floor. Keep your leg straight and your thigh muscle tight. Slowly lower your leg back down to the floor. Do 3 sets of 10.   Wall squat with a  ball: Stand with your back, shoulders, and head against a wall. Look straight ahead. Keep your shoulders relaxed and your feet 3 feet from the wall and shoulder's width apart. Place a soccer or basketball-sized ball behind your back. Keeping your back against the wall, slowly squat down to a 45-degree angle. Your thighs will not yet be parallel to the floor. Hold this position for 10 seconds and then slowly slide back up the wall. Repeat 10 times. Build up to 3 sets of 10.   Step-up: Stand with the foot of your injured leg on a support 3 to 5 inches high (like a small step or block of wood). Keep your other foot flat on the floor. Shift your weight onto the injured leg on the support. Straighten your injured leg as the other leg comes off the floor. Return to the starting position by bending your injured leg and slowly lowering your uninjured leg back to the floor. Do 3 sets of 10.   Knee stabilization: Wrap a piece of elastic tubing around the ankle of the uninjured leg. Tie a knot in the other end of the tubing and close it in a door.   0. Stand facing the door on the leg without tubing and bend your knee slightly, keeping your thigh muscles tight. While maintaining this position, move the leg with the tubing straight back behind you. Do 3 sets of 10.   0. Turn 90 degrees so the leg without tubing is closest to the door. Move the leg with tubing away from your body. Do 3 sets of 10.   0. Turn 90 degrees again so your back is to the door. Move the leg with tubing straight out in front of you. Do 3 sets of 10.   0. Turn your body 90 degrees again so the leg with tubing is closest to the door. Move the leg with tubing across your body. Do 3 sets of 10.   Hold onto a chair if you need help balancing. This exercise can be made even more challenging by standing on a pillow while you move the leg with tubing.   Resisted terminal knee extension: Make a loop from a piece of elastic tubing by tying a knot in both ends.  Close both knots in a door. Step into the loop so the tubing is around the back of your injured leg. Lift the other foot off the ground. Hold onto a chair for balance, if needed. Bend the knee on the leg with tubing about 45 degrees. Slowly straighten your leg, keeping your thigh muscle tight as you do this. Do this 10 times. Do 3 sets. An easier way to do this is to stand on both legs for better support while you do the exercise.   Wobble board exercises:   0. Stand on a wobble board with your feet shoulder width apart. Rock the board forwards and backwards 30 times, then side to side 30 times. Hold on to a chair if you need support.   0. Rotate the wobble board around so that the edge of the board is in contact with the floor at all times. Do this 30 times in a clockwise and then a counterclockwise direction.   0. Balance on the wobble board for as long as you can without letting the edges touch the floor. Try to do this for 2 minutes without touching the floor.   0. Rotate the wobble board in clockwise and counterclockwise circles, but do not allow the edge of the board to touch the floor.   0. When you have mastered exercises A through D, try repeating them while standing on just your injured leg. Once you can do these exercises on one leg, try to do them with your eyes closed. Make sure you have something nearby to support you in case you lose your balance.   Published by [employer].  This content is reviewed periodically and is subject to change as new health information becomes available. The information is intended to inform and educate and is not a replacement for medical evaluation, advice, diagnosis or treatment by a healthcare professional.   Written by Suly Fan, MS, PT, and Carol Delgado, PT, [facility], [facility], for [employer].   ? 2010 [facility] and/or its affiliates. All Rights Reserved.   Copyright   [employer] 2011

## 2018-03-15 NOTE — LETTER
My Depression Action Plan  Name: Fern Gonzalez   Date of Birth 1960  Date: 3/13/2018    My doctor: Mirta Fan   My clinic: [facility]  [address] [place] [id number] [phone]          GREEN    ZONE   Good Control    What it looks like:     Things are going generally well. You have normal up s and down s. You may even feel depressed from time to time, but bad moods usually last less than a day.   What you need to do:  1. Continue to care for yourself (see self care plan)  2. Check your depression survival kit and update it as needed  3. Follow your physician s recommendations including any medication.  4. Do not stop taking medication unless you consult with your physician first.           YELLOW         ZONE Getting Worse    What it looks like:     Depression is starting to interfere with your life.     It may be hard to get out of bed; you may be starting to isolate yourself from others.    Symptoms of depression are starting to last most all day and this has happened for several days.     You may have suicidal thoughts but they are not constant.   What you need to do:     1. Call your care team, your response to treatment will improve if you keep your care team informed of your progress. Yellow periods are signs an adjustment may need to be made.     2. Continue your self-care, even if you have to fake it!    3. Talk to someone in your support network    4. Open up your depression survival kit           RED    ZONE Medical Alert - Get Help    What it looks like:     Depression is seriously interfering with your life.     You may experience these or other symptoms: You can t get out of bed most days, can t work or engage in other necessary activities, you have trouble taking care of basic hygiene, or basic responsibilities, thoughts of suicide or death that will not go away, self-injurious behavior.     What you need to do:  1. Call your care team and  request a same-day appointment. If they are not available (weekends or after hours) call your local crisis line, emergency room or 911.      Electronically signed by: Odette Pulliam, March 13, 2018    Depression Self Care Plan / Survival Kit    Self-Care for Depression  Here s the deal. Your body and mind are really not as separate as most people think.  What you do and think affects how you feel and how you feel influences what you do and think. This means if you do things that people who feel good do, it will help you feel better.  Sometimes this is all it takes.  There is also a place for medication and therapy depending on how severe your depression is, so be sure to consult with your medical provider and/ or Behavioral Health Consultant if your symptoms are worsening or not improving.     In order to better manage my stress, I will:    Exercise  Get some form of exercise, every day. This will help reduce pain and release endorphins, the  feel good  chemicals in your brain. This is almost as good as taking antidepressants!  This is not the same as joining a gym and then never going! (they count on that by the way ) It can be as simple as just going for a walk or doing some gardening, anything that will get you moving.      Hygiene   Maintain good hygiene (Get out of bed in the morning, Make your bed, Brush your teeth, Take a shower, and Get dressed like you were going to work, even if you are unemployed).  If your clothes don't fit try to get ones that do.    Diet  I will strive to eat foods that are good for me, drink plenty of water, and avoid excessive sugar, caffeine, alcohol, and other mood-altering substances.  Some foods that are helpful in depression are: complex carbohydrates, B vitamins, flaxseed, fish or fish oil, fresh fruits and vegetables.    Psychotherapy  I agree to participate in Individual Therapy (if recommended).    Medication  If prescribed medications, I agree to take them.  Missing doses  can result in serious side effects.  I understand that drinking alcohol, or other illicit drug use, may cause potential side effects.  I will not stop my medication abruptly without first discussing it with my provider.    Staying Connected With Others  I will stay in touch with my friends, family members, and my primary care provider/team.    Use your imagination  Be creative.  We all have a creative side; it doesn t matter if it s oil painting, sand castles, or mud pies! This will also kick up the endorphins.    Witness Beauty  (AKA stop and smell the roses) Take a look outside, even in mid-winter. Notice colors, textures. Watch the squirrels and birds.     Service to others  Be of service to others.  There is always someone else in need.  By helping others we can  get out of ourselves  and remember the really important things.  This also provides opportunities for practicing all the other parts of the program.    Humor  Laugh and be silly!  Adjust your TV habits for less news and crime-drama and more comedy.    Control your stress  Try breathing deep, massage therapy, biofeedback, and meditation. Find time to relax each day.     My support system    Clinic Contact:  Phone number:    Contact 1:  Phone number:    Contact 2:  Phone number:    [place]/:  Phone number:    Therapist:  Phone number:    Local crisis center:    Phone number:    Other community support:  Phone number:

## 2018-03-16 ASSESSMENT — ANXIETY QUESTIONNAIRES: GAD7 TOTAL SCORE: 3

## 2018-03-16 ASSESSMENT — PATIENT HEALTH QUESTIONNAIRE - PHQ9: SUM OF ALL RESPONSES TO PHQ QUESTIONS 1-9: 4

## 2018-07-10 ENCOUNTER — HOSPITAL ENCOUNTER (EMERGENCY)
Facility: CLINIC | Age: 58
Discharge: HOME | End: 2018-07-10
Attending: FAMILY MEDICINE | Admitting: FAMILY MEDICINE

## 2018-07-10 ENCOUNTER — APPOINTMENT (OUTPATIENT)
Dept: CT IMAGING | Facility: CLINIC | Age: 58
End: 2018-07-10
Attending: FAMILY MEDICINE

## 2018-07-10 ENCOUNTER — OFFICE VISIT (OUTPATIENT)
Dept: URGENT CARE | Facility: RETAIL CLINIC | Age: 58
End: 2018-07-10

## 2018-07-10 VITALS
SYSTOLIC BLOOD PRESSURE: 96 MMHG | HEART RATE: 76 BPM | OXYGEN SATURATION: 97 % | DIASTOLIC BLOOD PRESSURE: 67 MMHG | TEMPERATURE: 98.1 F

## 2018-07-10 VITALS
RESPIRATION RATE: 16 BRPM | HEIGHT: 59 IN | HEART RATE: 75 BPM | OXYGEN SATURATION: 96 % | DIASTOLIC BLOOD PRESSURE: 49 MMHG | BODY MASS INDEX: 31.04 KG/M2 | WEIGHT: 154 LBS | TEMPERATURE: 98 F | SYSTOLIC BLOOD PRESSURE: 98 MMHG

## 2018-07-10 DIAGNOSIS — R51.9 ACUTE INTRACTABLE HEADACHE, UNSPECIFIED HEADACHE TYPE: Primary | ICD-10-CM

## 2018-07-10 DIAGNOSIS — G43.009 MIGRAINE WITHOUT AURA AND WITHOUT STATUS MIGRAINOSUS, NOT INTRACTABLE: ICD-10-CM

## 2018-07-10 PROCEDURE — 96375 TX/PRO/DX INJ NEW DRUG ADDON: CPT | Performed by: FAMILY MEDICINE

## 2018-07-10 PROCEDURE — 70450 CT HEAD/BRAIN W/O DYE: CPT

## 2018-07-10 PROCEDURE — 99284 EMERGENCY DEPT VISIT MOD MDM: CPT | Mod: Z6 | Performed by: FAMILY MEDICINE

## 2018-07-10 PROCEDURE — 25000128 H RX IP 250 OP 636: Performed by: FAMILY MEDICINE

## 2018-07-10 PROCEDURE — 96365 THER/PROPH/DIAG IV INF INIT: CPT | Performed by: FAMILY MEDICINE

## 2018-07-10 PROCEDURE — 99284 EMERGENCY DEPT VISIT MOD MDM: CPT | Mod: 25 | Performed by: FAMILY MEDICINE

## 2018-07-10 RX ORDER — METOCLOPRAMIDE HYDROCHLORIDE 5 MG/ML
10 INJECTION INTRAMUSCULAR; INTRAVENOUS ONCE
Status: COMPLETED | OUTPATIENT
Start: 2018-07-10 | End: 2018-07-10

## 2018-07-10 RX ORDER — DIPHENHYDRAMINE HYDROCHLORIDE 50 MG/ML
25 INJECTION INTRAMUSCULAR; INTRAVENOUS ONCE
Status: COMPLETED | OUTPATIENT
Start: 2018-07-10 | End: 2018-07-10

## 2018-07-10 RX ORDER — DEXAMETHASONE SODIUM PHOSPHATE 10 MG/ML
6 INJECTION, SOLUTION INTRAMUSCULAR; INTRAVENOUS ONCE
Status: COMPLETED | OUTPATIENT
Start: 2018-07-10 | End: 2018-07-10

## 2018-07-10 RX ORDER — KETOROLAC TROMETHAMINE 30 MG/ML
30 INJECTION, SOLUTION INTRAMUSCULAR; INTRAVENOUS ONCE
Status: COMPLETED | OUTPATIENT
Start: 2018-07-10 | End: 2018-07-10

## 2018-07-10 RX ORDER — MAGNESIUM SULFATE 1 G/100ML
1 INJECTION INTRAVENOUS ONCE
Status: COMPLETED | OUTPATIENT
Start: 2018-07-10 | End: 2018-07-10

## 2018-07-10 RX ADMIN — METOCLOPRAMIDE 10 MG: 5 INJECTION, SOLUTION INTRAMUSCULAR; INTRAVENOUS at 15:12

## 2018-07-10 RX ADMIN — DIPHENHYDRAMINE HYDROCHLORIDE 25 MG: 50 INJECTION, SOLUTION INTRAMUSCULAR; INTRAVENOUS at 15:10

## 2018-07-10 RX ADMIN — DEXAMETHASONE SODIUM PHOSPHATE 6 MG: 10 INJECTION, SOLUTION INTRAMUSCULAR; INTRAVENOUS at 15:15

## 2018-07-10 RX ADMIN — MAGNESIUM SULFATE IN DEXTROSE 1 G: 10 INJECTION, SOLUTION INTRAVENOUS at 15:17

## 2018-07-10 RX ADMIN — KETOROLAC TROMETHAMINE 30 MG: 30 INJECTION, SOLUTION INTRAMUSCULAR at 15:07

## 2018-07-10 ASSESSMENT — ENCOUNTER SYMPTOMS
HEADACHES: 1
NAUSEA: 1
DIZZINESS: 1

## 2018-07-10 NOTE — ED AVS SNAPSHOT
[facility] Emergency Department    [address] [place] [address]    Phone:  [phone]    Fax:  [phone]                                       Fern Gonzalez   MRN: [id number]    Department:  [facility] Emergency Department   Date of Visit:  7/10/2018           Patient Information     Date Of Birth          1960        Your diagnoses for this visit were:     Migraine without aura and without status migrainosus, not intractable        You were seen by Daniel Lacy MD.      Follow-up Information     Follow up with Clinic, [facility]. Schedule an appointment as soon as possible for a visit in 2 days.    Why:  If not improving.    Contact information:    [address]  [place] [address] [phone]          Discharge Instructions         * Migraine Headache  Migraine headaches are related to changes in blood flow to the brain. This causes throbbing or constant pain on one or both sides of the head. The pain may last from a few hours to several days. There is usually nausea, vomiting, sensitivity to light and sound, and blurred vision. A migraine attack may be triggered by emotional stress, hormone changes during the menstrual cycle, oral contraceptives, alcohol use, certain foods containing tyramine, eye strain, weather changes, missing meals, or too little or too much sleep.  Home Care For This Headache:  1) If you were given pain medicine for this headache, do not drive yourself home . Arrange for a ride, instead. When you get home, try to sleep. You should feel much better when you wake up.  2) Migraine headaches may improve with an ice pack on the forehead or at the base of the skull. Heat to the back of your neck may relieve any neck spasm.  3) Drink only clear liquids or eat a very light diet to avoid nausea/vomiting until symptoms improve.  Preventing Future Headaches:  1) Pay attention to those factors that seem to trigger your headache. Try  to avoid them when you can. If you have frequent headaches, it is useful to keep a diary of what you were doing, feeling or eating in the hours before each attack. Show this to your doctor to help find the cause of your headaches.  a) If you feel that stress is a factor in your headaches, look at the sources of stress in your life. Find ways to release the build-up of those stresses by using regular exercise, relaxation methods (yoga, meditation), bio-feedback or simply taking time-out for yourself. For more information about this, consult your doctor or go to a local bookstore and review books and tapes on this subject.  b) Tyramine is a substance present in the following foods : chocolate, yogurt, all cheeses except cottage cheese and cream cheese. smoked or pickled fish and meat (including herring, caviar, bologna, pepperoni, salami), liver, avocados, bananas, figs, raisins, and red wine. Be aware that these foods may trigger a migraine in some persons. Try taking these foods out of your diet for 1-2 months to see if this reduces headache frequency.  Treating Future Attacks:  1) At the first sign of a migraine headache, take a medicine to stop it if one has been prescribed for you. If not, take acetaminophen (Tylenol) or ibuprofen (Motrin, Advil) if you are able to take these. The sooner you take medicine, the better it will work.  2) You may also want to find a quiet, dark, comfortable place to sit or lie down. Let yourself relax or sleep.  3) An ice pack on the forehead or area of greatest pain may also help.   Follow Up  with your doctor if the headache is not better within the next 24 hours. If you have frequent headaches you should discuss a treatment plan with your primary care doctor. Ask if you can have medicine to take at home the next time you get a bad headache. Poorly controlled chronic headaches may require a referral to a neurologist (headache specialist).  Get Prompt Medical Attention  if any of  "the following occur:    Your head pain gets worse, or does not improve within 24 hours    Repeated vomiting (can t keep liquids down)    Sinus or ear or throat pain (not already reported)    Fever of 101  F (38.3  C) or higher, or as directed by your healthcare provider    Stiff neck    Extreme drowsiness, confusion or fainting    Weakness of an arm or leg or one side of the face    Difficulty with speech or vision    6845-2229 [employer]. [address] [place] [address], [place] [address]. All rights reserved. This information is not intended as a substitute for professional medical care. Always follow your healthcare professional's instructions.  This information has been modified by your health care provider with permission from the publisher.          Migraine Headache: Stages and Treatment    A migraine headache tends to progress in stages. Learning these stages can help you better understand what is happening. Then you can learn ways to reduce pain and relieve other symptoms. Methods for relieving your symptoms include self-care and medicines.  Migraine stages  Migraines tend to progress through 4 stages. Many people don't have all stages, and stages may differ with each headache:    Prodrome. A few hours to a day or so before the headache, you may feel tired, (yawning many times), uneasy, or armendariz. You may also feel bloated or crave certain foods.    Aura. Up to an hour before the headache starts, some migraine sufferers experience aura--flashing lights, blind spots, other vision problems, confusion, difficulty speaking, or other neurologic symptoms.    Headache. Moderate to severe pain affects one side of the head and then can spread to both sides, often along with nausea. You may be highly sensitive to light, sound, and odors. Vomiting or diarrhea may also happen. This stage lasts 4 to 72 hours.    Postdrome. After your headache ends, you may feel tired, achy, and \"washed out.\" This may last for a day " or so.  Self-care during a migraine  Here is what you can do:    Use a cold compress. Wrap a thin cloth around a cold pack, a cold can of soda, or a bag of frozen vegetables. Apply this to your temple or other pain site.    Drink fluids. If nausea makes it hard to drink, try sucking on ice.    Rest. If possible, lie down. Try not to bend over, as this may increase your pain. Sometimes laying in a dark quiet room can help the migraine from being aggravated.      Try caffeine. Some people find that drinking fluids with caffeine, such as coffee or tea, helps to lessen migraine pain.  Using medicines  Work with your healthcare provider to find the right medicines for you. Medicines for migraine may relieve pain (analgesics), relieve nausea, or attack the migraine's root causes (migraine-specific medicines).  Rebound headache  Taking analgesics each day, or even several times a week, may lead to more frequent and severe headaches. These are called rebound headaches. If you think you're having rebound headaches, tell your healthcare provider. He or she can help you safely decrease your medicine. Rebound caffeine withdrawal headaches can also happen.    Date Last Reviewed: 10/9/2015    6180-5358 [employer]. [address] [place] [address]. All rights reserved. This information is not intended as a substitute for professional medical care. Always follow your healthcare professional's instructions.          24 Hour Appointment Hotline       To make an appointment at any [facility], call [phone] ([phone]). If you don't have a family doctor or clinic, we will help you find one. [facility] clinics are conveniently located to serve the needs of you and your family.             Review of your medicines      Our records show that you are taking the medicines listed below. If these are incorrect, please call your family doctor or clinic.        Dose / Directions Last dose taken    ADVIL PO  "  Dose:  600 mg        Take 600 mg by mouth   Refills:  0        ALEVE PO        Refills:  0        ALLEGRA PO        Refills:  0        cyclobenzaprine 10 MG tablet   Commonly known as:  FLEXERIL   Dose:  10 mg   Quantity:  30 tablet        Take 1 tablet (10 mg) by mouth 3 times daily as needed for muscle spasms ( careful use as this can cause fatigue)   Refills:  0        meloxicam 15 MG tablet   Commonly known as:  MOBIC   Dose:  15 mg   Quantity:  90 tablet        Take 1 tablet (15 mg) by mouth daily   Refills:  1                Procedures and tests performed during your visit     CT Head w/o Contrast    Peripheral IV catheter      Orders Needing Specimen Collection     None      Pending Results     Date and Time Order Name Status Description    7/10/2018 1422 CT Head w/o Contrast Preliminary             Pending Culture Results     No orders found from 7/8/2018 to 7/11/2018.            Pending Results Instructions     If you had any lab results that were not finalized at the time of your Discharge, you can call the ED Lab Result RN at [phone]. You will be contacted by this team for any positive Lab results or changes in treatment. The nurses are available 7 days a week from 10A to 6:30P.  You can leave a message 24 hours per day and they will return your call.        Thank you for choosing [facility]       Thank you for choosing [facility] for your care. Our goal is always to provide you with excellent care. Hearing back from our patients is one way we can continue to improve our services. Please take a few minutes to complete the written survey that you may receive in the mail after you visit with us. Thank you!        [employer] Information     [employer] lets you send messages to your doctor, view your test results, renew your prescriptions, schedule appointments and more. To sign up, go to [email or web address] . Click on \"Log in\" on the left side of the screen, which will take you to the Welcome page. Then " "click on \"Sign up Now\" on the right side of the page.     You will be asked to enter the access code listed below, as well as some personal information. Please follow the directions to create your username and password.     Your access code is: [email or web address]  Expires: 10/8/2018  1:59 PM     Your access code will  in 90 days. If you need help or a new code, please call your [facility] clinic or [phone].        Care EveryWhere ID     This is your Care EveryWhere ID. This could be used by other organizations to access your [facility] medical records  [id number]        Equal Access to Services     [facility] : Stas Welch, ann garcia, brady conroy, michael jiménez. So [facility] [phone].    ATENCIÓN: Si habla español, tiene a pedroza disposición servicios gratuitos de asistencia lingüística. Llame al [phone].    We comply with applicable federal civil rights laws and [place] laws. We do not discriminate on the basis of race, color, national origin, age, disability, sex, sexual orientation, or gender identity.            After Visit Summary       This is your record. Keep this with you and show to your community pharmacist(s) and doctor(s) at your next visit.                  "

## 2018-07-10 NOTE — ED AVS SNAPSHOT
[facility] Emergency Department    [address] [place] [address]    Phone:  [phone]    Fax:  [phone]                                       Fern Gonzalez   MRN: [id number]    Department:  [facility] Emergency Department   Date of Visit:  7/10/2018           After Visit Summary Signature Page     I have received my discharge instructions, and my questions have been answered. I have discussed any challenges I see with this plan with the nurse or doctor.    ..........................................................................................................................................  Patient/Patient Representative Signature      ..........................................................................................................................................  Patient Representative Print Name and Relationship to Patient    ..................................................               ................................................  Date                                            Time    ..........................................................................................................................................  Reviewed by Signature/Title    ...................................................              ..............................................  Date                                                            Time

## 2018-07-10 NOTE — PROGRESS NOTES
[facility]  Headache:  Chief Complaint   Patient presents with     Ear Problem     right ear x 3 days     Dizziness     Headache     Headache  Behind right eye and around rt side of face to nape of neck. Started 36 hours ago - sudden onset - not improving. Lightheaded at times, tired, nauseated. Wonders  if some vision change rt. No rashes.    States she has had migraines in past and cluster HA in past but this is a little different.    No recent illnesses.  No URI symptoms.   Afebrile.    O: BP 96/67  Pulse 76  Temp 98.1  F (36.7  C) (Oral)  SpO2 97%    Patient is alert and cooperative but appears mildly ill. Rubs right temple.    A: Acute intractable headache, unspecified headache type      P: She needs more evaluation than [facility] can offer [facility],.  To [facility]. Spoke with MANDY Díaz to inform.  Patient is comfortbale with this plan  Electronically signed,  HAYDEE Hardin, PAC

## 2018-07-10 NOTE — MR AVS SNAPSHOT
"              After Visit Summary   7/10/2018    Fern Gonzalez    MRN: [id number]           Patient Information     Date Of Birth          1960        Visit Information        Provider Department      7/10/2018 1:40 PM Alexsandra Hardin PA-C [facility]        Today's Diagnoses     Acute intractable headache, unspecified headache type    -  1       Follow-ups after your visit        Who to contact     You can reach your care team any time of the day by calling [phone].  Notification of test results:  If you have an abnormal lab result, we will notify you by phone as soon as possible.         Additional Information About Your Visit        MyChart Information     [employer] lets you send messages to your doctor, view your test results, renew your prescriptions, schedule appointments and more. To sign up, go to [email or web address] . Click on \"Log in\" on the left side of the screen, which will take you to the Welcome page. Then click on \"Sign up Now\" on the right side of the page.     You will be asked to enter the access code listed below, as well as some personal information. Please follow the directions to create your username and password.     Your access code is: [email or web address]  Expires: 10/8/2018  1:59 PM     Your access code will  in 90 days. If you need help or a new code, please call your [facility] clinic or [phone].        Care EveryWhere ID     This is your [facility] EveryWhere ID. This could be used by other organizations to access your [facility] medical records  [id number]        Your Vitals Were     Pulse Temperature Pulse Oximetry             76 98.1  F (36.7  C) (Oral) 97%          Blood Pressure from Last 3 Encounters:   07/10/18 96/67   03/15/18 110/70   01/10/18 107/84    Weight from Last 3 Encounters:   03/15/18 153 lb 12.8 oz (69.8 kg)   01/10/18 151 lb (68.5 kg)   18 151 lb (68.5 kg)              Today, you had the following     No orders found for " display       Primary Care Provider Fax #    Physician No Ref-Primary [phone]       No address on file        Equal Access to Services     AVA LOZANO : Haddorcas mahendra razo amador Welch, walanida luqadaha, qaybta kaalmada rafiq, michael dorieratna vergara lagermanimelda jiménez. So [facility] [phone].    ATENCIÓN: Si habla español, tiene a pedroza disposición servicios gratuitos de asistencia lingüística. Llame al [phone].    We comply with applicable federal civil rights laws and [place] laws. We do not discriminate on the basis of race, color, national origin, age, disability, sex, sexual orientation, or gender identity.            Thank you!     Thank you for choosing [facility]  for your care. Our goal is always to provide you with excellent care. Hearing back from our patients is one way we can continue to improve our services. Please take a few minutes to complete the written survey that you may receive in the mail after your visit with us. Thank you!             Your Updated Medication List - Protect others around you: Learn how to safely use, store and throw away your medicines at www.disposemymeds.org.          This list is accurate as of 7/10/18  1:59 PM.  Always use your most recent med list.                   Brand Name Dispense Instructions for use Diagnosis    ADVIL PO      Take 600 mg by mouth        ALLEGRA PO           cyclobenzaprine 10 MG tablet    FLEXERIL    30 tablet    Take 1 tablet (10 mg) by mouth 3 times daily as needed for muscle spasms ( careful use as this can cause fatigue)    Acute pain of left knee, Contusion of left knee and lower leg, initial encounter       meloxicam 15 MG tablet    MOBIC    90 tablet    Take 1 tablet (15 mg) by mouth daily    Contusion of left knee and lower leg, initial encounter, Acute pain of left knee

## 2018-07-10 NOTE — ED PROVIDER NOTES
History     Chief Complaint   Patient presents with     Headache     The history is provided by the patient.     Fern Gonzalez is a 57 year old female who presents to the emergency department for a headache. Patient reports a headache behind her right eye, right ear and behind her head. She states she has a history of migraines, however, this headache is different. Patient states she was dizzy 2 days ago in the middle of the day. Patient reports, that night in the middle of the night is when the headache started. She states she has dizziness, nausea and photophobia with this headache. She denies any vomiting, numbness or tingling in her hands or feet. She tried Advil and Aleve with no relief. Patient report she did not sleep last night due to her headache.    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, right 11/08/2016     Priority: Medium     Lateral epicondylitis of right elbow 06/02/2016     Priority: Medium     Bilateral carpal tunnel syndrome 06/02/2016     Priority: Medium     Respiratory distress 09/01/2015     Priority: Medium     Wheezing-associated respiratory infection 09/01/2015     Priority: Medium     Major depressive disorder, recurrent episode, moderate (H) 05/14/2015     Priority: Medium     Tobacco use disorder 04/08/2015     Priority: Medium     Anxiety 08/15/2014     Priority: Medium     CARDIOVASCULAR SCREENING; LDL GOAL LESS THAN 160 10/07/2011     Priority: Medium     Meningitis due to viruses not elsewhere classified 10/03/2011     Priority: Medium     Herpes zoster with meningitis 10/03/2011     Priority: Medium     Chronic cholecystitis 01/22/2007      Priority: Medium        Past Medical History:    Past Medical History:   Diagnosis Date     Diagnostic skin and sensitization tests (aka ALLERGENS) 10/29/15 skin tests pos. for: T >> dog/DM only.      Esophageal reflux      Headache(784.0) 10/11/11     Migraine, unspecified, without mention of intractable migraine without mention of status migrainosus      Recurrent sinusitis      Varicella meningitis        Past Surgical History:    Past Surgical History:   Procedure Laterality Date     C  DELIVERY ONLY      3 times     CHOLECYSTECTOMY, LAPOROSCOPIC  2007    Lysis of intestinal adhesions.     [facility] AFF SURGICAL PATHOLOGY       COLONOSCOPY N/A 10/31/2014    Procedure: COMBINED COLONOSCOPY, SINGLE OR MULTIPLE BIOPSY/POLYPECTOMY BY BIOPSY;  Surgeon: Leonard Dumont MD;  Location: [facility]     HC LAP,FULGURATE/EXCISE LESIONS  10/04    mesh present as had abdominal wall surgery     HC UGI ENDOSCOPY, SIMPLE EXAM  10/26/2006     HYSTERECTOMY TOTAL ABDOMINAL  2007     RELEASE CARPAL TUNNEL Right 2016    Procedure: RELEASE CARPAL TUNNEL;  Surgeon: Leif Fine MD;  Location: [facility]     RELEASE CARPAL TUNNEL Left 10/5/2016    Procedure: RELEASE CARPAL TUNNEL;  Surgeon: Leif Fine MD;  Location: [facility]     RELEASE CARPAL TUNNEL Right 2016    Procedure: RELEASE CARPAL TUNNEL;  Surgeon: Leif Fine MD;  Location: [facility]       Family History:    Family History   Problem Relation Age of Onset     Thyroid Disease Maternal Grandmother      Thyroid Disease Sister      Coronary Artery Disease No family hx of      Hypertension No family hx of      Hyperlipidemia No family hx of      Cancer - colorectal No family hx of      Ovarian Cancer No family hx of      Other Cancer No family hx of      Mental Illness No family hx of      Cerebrovascular Disease No family hx of      Asthma No family hx of      Known Genetic Syndrome No family hx of        Social History:  Marital Status:  Single  "[1]  Social History   Substance Use Topics     Smoking status: Former Smoker     Packs/day: 0.25     Years: 0.50     Types: Cigarettes     Smokeless tobacco: Never Used      Comment: uses ecig now     Alcohol use 0.0 oz/week     0 Standard drinks or equivalent per week      Comment: \"very rarely\"        Medications:      Ibuprofen (ADVIL PO)   Naproxen Sodium (ALEVE PO)   cyclobenzaprine (FLEXERIL) 10 MG tablet   Fexofenadine HCl (ALLEGRA PO)   meloxicam (MOBIC) 15 MG tablet         Review of Systems   HENT:        Pt has photophobia   Gastrointestinal: Positive for nausea.   Neurological: Positive for dizziness and headaches.   All other systems reviewed and are negative.      Physical Exam   BP: 108/79  Heart Rate: 76  Temp: 98  F (36.7  C)  Resp: 16  Height: 149.9 cm (4' 11\")  Weight: 69.9 kg (154 lb)  SpO2: 99 %      Physical Exam   Constitutional: She is oriented to person, place, and time. She appears well-developed and well-nourished.   HENT:   Head: Normocephalic and atraumatic.   Eyes: Conjunctivae and EOM are normal.   Neck: Normal range of motion. Neck supple.   Cardiovascular: Normal rate, regular rhythm and normal heart sounds.    Pulmonary/Chest: Effort normal and breath sounds normal.   Abdominal: Soft. Bowel sounds are normal.   Musculoskeletal: Normal range of motion. She exhibits no deformity.   Neurological: She is alert and oriented to person, place, and time. She displays normal reflexes. No cranial nerve deficit. She exhibits normal muscle tone. Coordination normal.   Skin: Skin is warm and dry.   Psychiatric: She has a normal mood and affect. Her behavior is normal. Judgment normal.   Nursing note and vitals reviewed.      ED Course     ED Course     Procedures           Results for orders placed or performed during the hospital encounter of 07/10/18   CT Head w/o Contrast    Narrative    CT HEAD WITHOUT CONTRAST   7/10/2018 2:55 PM     HISTORY: Headache.     COMPARISON: " 10/11/2011.    TECHNIQUE: Axial images through the brain obtained without intravenous  contrast, reviewed in bone, brain, and subdural windows. Radiation  dose for this scan was reduced using automated exposure control,  adjustment of the mA and/or kV according to patient size, or iterative  reconstruction technique.    FINDINGS: Attenuation of the brain parenchyma is grossly within normal  limits without mass or acute hemorrhage. There is no mass-effect or  midline shift. Gray/white matter differentiation is well maintained.  No abnormal intra- or extra-axial fluid collections. The ventricles do  not appear enlarged out of proportion to the cerebral sulci.    The visualized portions of the paranasal sinuses, mastoid air cells,  calvarium, and orbits are unremarkable.       Impression    IMPRESSION: No acute intracranial pathology is identified.     Medications   metoclopramide (REGLAN) injection 10 mg (10 mg Intravenous Given 7/10/18 1512)   ketorolac (TORADOL) injection 30 mg (30 mg Intravenous Given 7/10/18 1507)   diphenhydrAMINE (BENADRYL) injection 25 mg (25 mg Intravenous Given 7/10/18 1510)   magnesium sulfate 1 gm in 100mL D5W intermittent infusion (0 g Intravenous Stopped 7/10/18 1542)   dexamethasone PF (DECADRON) injection 6 mg (6 mg Intravenous Given 7/10/18 1515)     CT of the head was negative.  Patient feels much better after the above medications were given.  The certainly does feel consistent with a migraine type headache.  We will have the patient follow-up with her doctor if things are not improving over the next few days.  Patient is stable to be discharged home at this point.    Assessments & Plan (with Medical Decision Making)  Migraine headache     I have reviewed the nursing notes.    I have reviewed the findings, diagnosis, plan and need for follow up with the patient.            This document serves as a record of services personally performed by Daniel Lacy MD. It was created on their  behalf by Karin Keys, a trained medical scribe. The creation of this record is based on the provider's personal observations and the statements of the patient. This document has been checked and approved by the attending provider.    Note: Chart documentation done in part with Dragon Voice Recognition software. Although reviewed after completion, some word and grammatical errors may remain.    7/10/2018   [facility] EMERGENCY DEPARTMENT     Daniel Lacy MD  07/10/18 9717

## 2018-07-10 NOTE — DISCHARGE INSTRUCTIONS
* Migraine Headache  Migraine headaches are related to changes in blood flow to the brain. This causes throbbing or constant pain on one or both sides of the head. The pain may last from a few hours to several days. There is usually nausea, vomiting, sensitivity to light and sound, and blurred vision. A migraine attack may be triggered by emotional stress, hormone changes during the menstrual cycle, oral contraceptives, alcohol use, certain foods containing tyramine, eye strain, weather changes, missing meals, or too little or too much sleep.  Home Care For This Headache:  1) If you were given pain medicine for this headache, do not drive yourself home . Arrange for a ride, instead. When you get home, try to sleep. You should feel much better when you wake up.  2) Migraine headaches may improve with an ice pack on the forehead or at the base of the skull. Heat to the back of your neck may relieve any neck spasm.  3) Drink only clear liquids or eat a very light diet to avoid nausea/vomiting until symptoms improve.  Preventing Future Headaches:  1) Pay attention to those factors that seem to trigger your headache. Try to avoid them when you can. If you have frequent headaches, it is useful to keep a diary of what you were doing, feeling or eating in the hours before each attack. Show this to your doctor to help find the cause of your headaches.  a) If you feel that stress is a factor in your headaches, look at the sources of stress in your life. Find ways to release the build-up of those stresses by using regular exercise, relaxation methods (yoga, meditation), bio-feedback or simply taking time-out for yourself. For more information about this, consult your doctor or go to a local bookstore and review books and tapes on this subject.  b) Tyramine is a substance present in the following foods : chocolate, yogurt, all cheeses except cottage cheese and cream cheese. smoked or pickled fish and meat (including herring,  caviar, bologna, pepperoni, salami), liver, avocados, bananas, figs, raisins, and red wine. Be aware that these foods may trigger a migraine in some persons. Try taking these foods out of your diet for 1-2 months to see if this reduces headache frequency.  Treating Future Attacks:  1) At the first sign of a migraine headache, take a medicine to stop it if one has been prescribed for you. If not, take acetaminophen (Tylenol) or ibuprofen (Motrin, Advil) if you are able to take these. The sooner you take medicine, the better it will work.  2) You may also want to find a quiet, dark, comfortable place to sit or lie down. Let yourself relax or sleep.  3) An ice pack on the forehead or area of greatest pain may also help.   Follow Up  with your doctor if the headache is not better within the next 24 hours. If you have frequent headaches you should discuss a treatment plan with your primary care doctor. Ask if you can have medicine to take at home the next time you get a bad headache. Poorly controlled chronic headaches may require a referral to a neurologist (headache specialist).  Get Prompt Medical Attention  if any of the following occur:    Your head pain gets worse, or does not improve within 24 hours    Repeated vomiting (can t keep liquids down)    Sinus or ear or throat pain (not already reported)    Fever of 101  F (38.3  C) or higher, or as directed by your healthcare provider    Stiff neck    Extreme drowsiness, confusion or fainting    Weakness of an arm or leg or one side of the face    Difficulty with speech or vision    6877-4489 [employer]. [address] [place] [address], [place] [address]. All rights reserved. This information is not intended as a substitute for professional medical care. Always follow your healthcare professional's instructions.  This information has been modified by your health care provider with permission from the publisher.          Migraine Headache: Stages and  "Treatment    A migraine headache tends to progress in stages. Learning these stages can help you better understand what is happening. Then you can learn ways to reduce pain and relieve other symptoms. Methods for relieving your symptoms include self-care and medicines.  Migraine stages  Migraines tend to progress through 4 stages. Many people don't have all stages, and stages may differ with each headache:    Prodrome. A few hours to a day or so before the headache, you may feel tired, (yawning many times), uneasy, or armendariz. You may also feel bloated or crave certain foods.    Aura. Up to an hour before the headache starts, some migraine sufferers experience aura--flashing lights, blind spots, other vision problems, confusion, difficulty speaking, or other neurologic symptoms.    Headache. Moderate to severe pain affects one side of the head and then can spread to both sides, often along with nausea. You may be highly sensitive to light, sound, and odors. Vomiting or diarrhea may also happen. This stage lasts 4 to 72 hours.    Postdrome. After your headache ends, you may feel tired, achy, and \"washed out.\" This may last for a day or so.  Self-care during a migraine  Here is what you can do:    Use a cold compress. Wrap a thin cloth around a cold pack, a cold can of soda, or a bag of frozen vegetables. Apply this to your temple or other pain site.    Drink fluids. If nausea makes it hard to drink, try sucking on ice.    Rest. If possible, lie down. Try not to bend over, as this may increase your pain. Sometimes laying in a dark quiet room can help the migraine from being aggravated.      Try caffeine. Some people find that drinking fluids with caffeine, such as coffee or tea, helps to lessen migraine pain.  Using medicines  Work with your healthcare provider to find the right medicines for you. Medicines for migraine may relieve pain (analgesics), relieve nausea, or attack the migraine's root causes " (migraine-specific medicines).  Rebound headache  Taking analgesics each day, or even several times a week, may lead to more frequent and severe headaches. These are called rebound headaches. If you think you're having rebound headaches, tell your healthcare provider. He or she can help you safely decrease your medicine. Rebound caffeine withdrawal headaches can also happen.    Date Last Reviewed: 10/9/2015    3568-1704 [employer]. [address] [place] [address], [place] [address]. All rights reserved. This information is not intended as a substitute for professional medical care. Always follow your healthcare professional's instructions.

## 2018-07-12 ENCOUNTER — PATIENT OUTREACH (OUTPATIENT)
Dept: CARE COORDINATION | Facility: CLINIC | Age: 58
End: 2018-07-12

## 2018-07-12 NOTE — PROGRESS NOTES
Clinic Care Coordination Contact    Situation: Patient chart reviewed by care coordinator.    Background: Patient on ED list today as she was seen for Migraine yesterday. She has only had this visit in the last 3  Months (last ED was over 6 mo ago) and her risk score is 39%. She does not meet criteria. However chart reviewed for potential CC needs.   Migraines are not new for patient. No new findings in ED. Was treated with Migraine protocol with improvement.    Assessment: No  CC needs noted.    Plan/Recommendations: No outreach planned at this time.    Poli ACEVEDO,RN- BC  Clinic Care Coordinator  [facility]  Phone: [phone]

## 2019-04-01 ENCOUNTER — TRANSFERRED RECORDS (OUTPATIENT)
Dept: HEALTH INFORMATION MANAGEMENT | Facility: CLINIC | Age: 59
End: 2019-04-01

## 2019-04-03 ENCOUNTER — TELEPHONE (OUTPATIENT)
Dept: FAMILY MEDICINE | Facility: CLINIC | Age: 59
End: 2019-04-03

## 2019-04-03 NOTE — TELEPHONE ENCOUNTER
Patient called to schedule an appointment for a hospital follow-up or appeared on a report showing that they were recently discharged from the hospital.    Patient was admitted to [facility]:  4/1/2019 [phone] ext [phone]  Discharged date: 4/3/2019  Reason for hospital admission:  Neck pain, HA, amnesia  Does patient have future appointment scheduled with provider? Yes Dr Waterman  Date of future appointment:  4/9/2018      This information will be used to help the care team plan for the patients upcoming visit.  The triage RN may determine that a follow up call is necessary and reach out to the patient via the phone number listed in the chart.     Please route this message on routine priority to the Triage RN pool.

## 2019-04-04 RX ORDER — PREDNISONE 20 MG/1
40 TABLET ORAL DAILY
COMMUNITY
End: 2019-05-15

## 2019-04-04 NOTE — TELEPHONE ENCOUNTER
ED / Discharge Outreach Protocol    Patient Contact    Attempt # 1    Was call answered?  No.  Left message on voicemail with information to call clinic back.     Salome Garces RN

## 2019-04-04 NOTE — TELEPHONE ENCOUNTER
"Hospital/TCU/ED for chronic condition Discharge Protocol    \"Hi, my name is Shilpa Kraft, a registered nurse, and I am calling from [facility].  I am calling to follow up and see how things are going for you after your recent emergency visit/hospital/TCU stay.\"    Tell me how you are doing now that you are home?\" Patient is reporting her neck still hurts and still has a headache.  She states she put her purse on her shoulder and had excruciating pain up into her neck and head.  She states she will be discussing PT with Dr. Waterman when she comes on Tue.  She bought a smaller purse and is instructed to use heat to the area for relaxation of muscles.      Discharge Instructions    \"Let's review your discharge instructions.  What is/are the follow-up recommendations?  Pt. Response: Get set up with PT at her appointment on 4/9/19    \"Has an appointment with your primary care provider been scheduled?\"   Yes. (confirm)    \"When you see the provider, I would recommend that you bring your medications with you.\"    Medications    \"Tell me what changed about your medicines when you discharged?\"    Changes to chronic meds?    0-1    \"What questions do you have about your medications?\"    None     New diagnoses of heart failure, COPD, diabetes, or MI?    No              Medication reconciliation completed? Yes  Was MTM referral placed (*Make sure to put transitions as reason for referral)?   No    Call Summary    \"What questions or concerns do you have about your recent visit and your follow-up care?\"     none    \"If you have questions or things don't continue to improve, we encourage you contact us through the main clinic number (give number).  Even if the clinic is not open, triage nurses are available 24/7 to help you.     We would like you to know that our clinic has extended hours (provide information).  We also have urgent care (provide details on closest location and hours/contact info)\"      \"Thank you for your " "time and take care!\"   Shilpa Kraft, BSN, RN             "

## 2019-04-09 ENCOUNTER — OFFICE VISIT (OUTPATIENT)
Dept: FAMILY MEDICINE | Facility: CLINIC | Age: 59
End: 2019-04-09
Payer: COMMERCIAL

## 2019-04-09 VITALS
RESPIRATION RATE: 12 BRPM | TEMPERATURE: 98.3 F | DIASTOLIC BLOOD PRESSURE: 84 MMHG | HEART RATE: 110 BPM | WEIGHT: 153.2 LBS | OXYGEN SATURATION: 98 % | SYSTOLIC BLOOD PRESSURE: 122 MMHG | BODY MASS INDEX: 30.88 KG/M2 | HEIGHT: 59 IN

## 2019-04-09 DIAGNOSIS — M54.2 NECK PAIN: ICD-10-CM

## 2019-04-09 DIAGNOSIS — G45.4 TRANSIENT GLOBAL AMNESIA: Primary | ICD-10-CM

## 2019-04-09 PROCEDURE — 99214 OFFICE O/P EST MOD 30 MIN: CPT | Performed by: OBSTETRICS & GYNECOLOGY

## 2019-04-09 RX ORDER — MULTIVITAMIN,THERAPEUTIC
1 TABLET ORAL
COMMUNITY
Start: 2019-04-04 | End: 2019-06-27

## 2019-04-09 RX ORDER — CETIRIZINE HYDROCHLORIDE 10 MG/1
10 TABLET ORAL
Status: ON HOLD | COMMUNITY
End: 2022-05-22

## 2019-04-09 RX ORDER — LANOLIN ALCOHOL/MO/W.PET/CERES
100 CREAM (GRAM) TOPICAL
COMMUNITY
Start: 2019-04-04 | End: 2019-05-04

## 2019-04-09 RX ORDER — FOLIC ACID 1 MG/1
1 TABLET ORAL
COMMUNITY
Start: 2019-04-04 | End: 2019-05-15

## 2019-04-09 ASSESSMENT — MIFFLIN-ST. JEOR: SCORE: 1182.6

## 2019-04-09 ASSESSMENT — PAIN SCALES - GENERAL: PAINLEVEL: MODERATE PAIN (4)

## 2019-04-09 NOTE — PROGRESS NOTES
Subjective: She was admitted to [facility] on  and was hospitalized for 3 days because of an episode of amnesia.  She drank heavily on the Saturday before the Monday she was admitted.  Drink 10 beers that day.  She states that this is not unusual for her but she mostly drinks on weekends.  Monday morning she went to work on  but then did not recall any of the entire day of work and by 3:00 in the afternoon they brought her to [facility] emergency room and then she was admitted to the hospital with amnesia.  Her memory began to clear within the first day in the hospital.  She underwent a CT scan of the head and neck which was unremarkable except for a small broad-based central disc protrusion at C4-5.  She underwent an MRI of the brain which was unremarkable except for some small vessel ischemic disease.  She underwent an EEG exam and urine tox screen and numerous blood tests and all was unremarkable.  She was advised to follow-up here as a post hospital visit.  Since discharge she has not had any further issues with amnesia or memory changes.    Her only current concern is neck and upper shoulder pain.  She gets tension headaches and she thinks they are due to her neck pain.  This disc bulge noted at C4-5 has been present for a while and she has had symptoms in this area for quite some time.  She has wanted to seek help from a neurosurgeon or a spinal injection or something for her pain.  Denies any radiculopathy symptoms in her arms.  No numbness or tingling or pain in either arm.    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  She states that she almost  from that episode.  Since then there has been no known sequelae neurologically.      The past medical history, social history, past surgical history and family history as shown below have been reviewed by me today.    Past Medical History:   Diagnosis Date     Diagnostic skin and sensitization tests (aka ALLERGENS) 10/29/15  "skin tests pos. for: T >> dog/DM only.      Esophageal reflux      Headache(784.0) 10/11/11    Admitted, suspected Viral cephalitis, meningitis     Migraine, unspecified, without mention of intractable migraine without mention of status migrainosus      Recurrent sinusitis      Varicella meningitis         Allergies   Allergen Reactions     Cephalexin Hcl Hives     Codeine Nausea and Vomiting     significant     Gabapentin Nausea and Vomiting     Morphine Hcl Nausea and Vomiting     Percocet [Oxycodone-Acetaminophen] Nausea and Vomiting     Significant; pt reports that plain Oxycodone she did fine with     Valium [Diazepam] Other (See Comments)     \"terrible nightmare\"     Vicodin [Hydrocodone-Acetaminophen] Nausea and Vomiting     significant     Cefazolin Rash     Current Outpatient Medications   Medication Sig Dispense Refill     cetirizine (ZYRTEC) 10 MG tablet Take 10 mg by mouth       Fexofenadine HCl (ALLEGRA PO)        folic acid (FOLVITE) 1 MG tablet Take 1 mg by mouth       Ibuprofen (ADVIL PO) Take 600 mg by mouth       multivitamin, therapeutic (THERA-VIT) TABS tablet Take 1 tablet by mouth       Naproxen Sodium (ALEVE PO)        predniSONE (DELTASONE) 20 MG tablet Take 40 mg by mouth daily For 6 days.       vitamin B1 (THIAMINE) 100 MG tablet Take 100 mg by mouth       Past Surgical History:   Procedure Laterality Date     C  DELIVERY ONLY      3 times     CHOLECYSTECTOMY, LAPOROSCOPIC  2007    Lysis of intestinal adhesions.     [facility] AFF SURGICAL PATHOLOGY       COLONOSCOPY N/A 10/31/2014    Procedure: COMBINED COLONOSCOPY, SINGLE OR MULTIPLE BIOPSY/POLYPECTOMY BY BIOPSY;  Surgeon: Leonard Dumont MD;  [facility]     HC LAP,FULGURATE/EXCISE LESIONS  10/04    mesh present as had abdominal wall surgery     HC UGI ENDOSCOPY, SIMPLE EXAM  10/26/2006     HYSTERECTOMY TOTAL ABDOMINAL  2007     RELEASE CARPAL TUNNEL Right 2016    Procedure: RELEASE CARPAL TUNNEL;  Surgeon: Leif Fine " "MD Javy;  [facility]     RELEASE CARPAL TUNNEL Left 10/5/2016    Procedure: RELEASE CARPAL TUNNEL;  Surgeon: Leif Fine MD;  [facility]     RELEASE CARPAL TUNNEL Right 11/23/2016    Procedure: RELEASE CARPAL TUNNEL;  Surgeon: Leif Fine MD;  [facility]     Social History     Socioeconomic History     Marital status: Single     Spouse name: None     Number of children: None     Years of education: None     Highest education level: None   Occupational History     None   Social Needs     Financial resource strain: None     Food insecurity:     Worry: None     Inability: None     Transportation needs:     Medical: None     Non-medical: None   Tobacco Use     Smoking status: Former Smoker     Packs/day: 0.25     Years: 0.50     Pack years: 0.12     Types: Cigarettes     Smokeless tobacco: Never Used     Tobacco comment: uses ecig now   Substance and Sexual Activity     Alcohol use: Yes     Alcohol/week: 0.0 oz     Comment: \"very rarely\"     Drug use: No     Sexual activity: Never     Partners: Male   Lifestyle     Physical activity:     Days per week: None     Minutes per session: None     Stress: None   Relationships     Social connections:     Talks on phone: None     Gets together: None     Attends Alevism service: None     Active member of club or organization: None     Attends meetings of clubs or organizations: None     Relationship status: None     Intimate partner violence:     Fear of current or ex partner: None     Emotionally abused: None     Physically abused: None     Forced sexual activity: None   Other Topics Concern     Parent/sibling w/ CABG, MI or angioplasty before 65F 55M? No   Social History Narrative     None     Family History   Problem Relation Age of Onset     Thyroid Disease Maternal Grandmother      Thyroid Disease Sister      Coronary Artery Disease No family hx of      Hypertension No family hx of      Hyperlipidemia No family hx of      Cancer - " "colorectal No family hx of      Ovarian Cancer No family hx of      Other Cancer No family hx of      Mental Illness No family hx of      Cerebrovascular Disease No family hx of      Asthma No family hx of      Known Genetic Syndrome No family hx of        ROS: A 12 point review of systems was done. Except for what is listed above in the HPI, the systems review is negative .      Objective: Vital signs: Blood pressure 122/84, pulse 110, temperature 98.3  F (36.8  C), temperature source Temporal, resp. rate 12, height 1.486 m (4' 10.5\"), weight 69.5 kg (153 lb 3.2 oz), SpO2 98 %, not currently breastfeeding.  HEENT:    Sclerae and conjunctiva are normal.   Ear canals and TMs look normal.  Nasal mucosa is pink  - no polyps or masses seen.  Throat is unremarkable . Mucous membranes are moist.   Neck is tender posteriorly- and she has limited ROM with lateral rotation-The thyroid feels normal.    Chest is clear to auscultation.  No wheezes, rales or rhonchi heard.  Cardiac exam is normal with s1, s2, no murmurs or adventitious sounds.Normal rate and rhythm is heard.     She does not seem to have any problem with mentation currently- oriented x 3 and mini mental status exam unremarkable-      Assessment/Plan: A total of 30 minutes were spent face-to-face with this patient during today's consultation, with more than 50% of that time devoted to conversation and counseling about the management decisions.       1.  58-year-old female with recent episode of amnesia.  She had been drinking prior to this but she states that he does this frequently and has not had this kind of problem before.  I am wondering if she could be developing some sort of mild encephalopathy.  I have suggested that she see a neurologist to be evaluated further.  Neurology referral will be made.    2.  She has symptoms related to cervical spine arthritis.  I am going to refer her for consideration of neck injection I told her that she does not have a " disc herniation and therefore I do not think that surgery will be indicated but she should have the evaluation and then decide whether the neck should be injected.    3.  I confronted her about her alcohol use.  She states that she only drinks heavily like this several times a month.  She really does not feel she has a problem and she is declining any kind of intervention.  I did bring up the idea that perhaps this memory loss could have been related to chronic alcohol use but she declines any intervention.         The above information was dictating using Dragon voice software and as a result there may be some irregularities that were not detected in my review of this note.    ELMER Waterman MD

## 2019-04-18 ENCOUNTER — OFFICE VISIT (OUTPATIENT)
Dept: NEUROSURGERY | Facility: CLINIC | Age: 59
End: 2019-04-18
Payer: COMMERCIAL

## 2019-04-18 VITALS
SYSTOLIC BLOOD PRESSURE: 117 MMHG | HEIGHT: 59 IN | BODY MASS INDEX: 30.64 KG/M2 | WEIGHT: 152 LBS | DIASTOLIC BLOOD PRESSURE: 76 MMHG

## 2019-04-18 DIAGNOSIS — M54.2 CERVICALGIA: Primary | ICD-10-CM

## 2019-04-18 PROCEDURE — 99203 OFFICE O/P NEW LOW 30 MIN: CPT | Performed by: NEUROLOGICAL SURGERY

## 2019-04-18 ASSESSMENT — MIFFLIN-ST. JEOR: SCORE: 1177.16

## 2019-04-18 ASSESSMENT — PAIN SCALES - GENERAL: PAINLEVEL: MILD PAIN (3)

## 2019-04-18 NOTE — PATIENT INSTRUCTIONS
Today's Visit    1. Orders for [facility]. They will call you to schedule within a few days. Phone number: [phone]. If no pain relief within 4 weeks, please call our clinic nurse to come up with the next step.  2. Dr. Rodriguez is referring you to [facility]. If you don't hear from their scheduling office within 2 business days, call  [phone] to schedule.        Please call our clinic with any questions or concerns    Nicole LEE RN ([facility] Nurse)  [facility] -- [address] [address]  Phone:  [phone]   Fax:  [phone]

## 2019-04-18 NOTE — PROGRESS NOTES
I was asked by Dr. Waterman to see this patient in consultation    58-year-old female with cervical spondylosis, cervicalgia.  Couple weeks of worsening neck pain radiating to the bilateral shoulders and occiput, worse with neck range of motion.  Neck pain and dizziness when she turns her head to the left.  Began after strenuous work.  MRI with mild spondylotic change, no stenosis.       Past Medical History:   Diagnosis Date     Diagnostic skin and sensitization tests (aka ALLERGENS) 10/29/15 skin tests pos. for: T >> dog/DM only.      Esophageal reflux      Headache(784.0) 10/11/11    Admitted, suspected Viral cephalitis, meningitis     Migraine, unspecified, without mention of intractable migraine without mention of status migrainosus      Recurrent sinusitis      Varicella meningitis      Past Surgical History:   Procedure Laterality Date     C  DELIVERY ONLY      3 times     CHOLECYSTECTOMY, LAPOROSCOPIC  2007    Lysis of intestinal adhesions.     [facility] AFF SURGICAL PATHOLOGY       COLONOSCOPY N/A 10/31/2014    Procedure: COMBINED COLONOSCOPY, SINGLE OR MULTIPLE BIOPSY/POLYPECTOMY BY BIOPSY;  Surgeon: Leonard Dumont MD;  [facility]     HC LAP,FULGURATE/EXCISE LESIONS  10/04    mesh present as had abdominal wall surgery     HC UGI ENDOSCOPY, SIMPLE EXAM  10/26/2006     HYSTERECTOMY TOTAL ABDOMINAL  2007     RELEASE CARPAL TUNNEL Right 2016    Procedure: RELEASE CARPAL TUNNEL;  Surgeon: Leif Fine MD;  [facility]     RELEASE CARPAL TUNNEL Left 10/5/2016    Procedure: RELEASE CARPAL TUNNEL;  Surgeon: Leif Fine MD;  [facility]     RELEASE CARPAL TUNNEL Right 2016    Procedure: RELEASE CARPAL TUNNEL;  Surgeon: Leif Fine MD;  [facility]     Social History     Socioeconomic History     Marital status: Single     Spouse name: Not on file     Number of children: Not on file     Years of education: Not on file     Highest education level: Not  "on file   Occupational History     Not on file   Social Needs     Financial resource strain: Not on file     Food insecurity:     Worry: Not on file     Inability: Not on file     Transportation needs:     Medical: Not on file     Non-medical: Not on file   Tobacco Use     Smoking status: Former Smoker     Packs/day: 0.25     Years: 0.50     Pack years: 0.12     Types: Cigarettes     Smokeless tobacco: Never Used     Tobacco comment: uses ecig now   Substance and Sexual Activity     Alcohol use: Yes     Alcohol/week: 0.0 oz     Comment: \"very rarely\"     Drug use: No     Sexual activity: Never     Partners: Male   Lifestyle     Physical activity:     Days per week: Not on file     Minutes per session: Not on file     Stress: Not on file   Relationships     Social connections:     Talks on phone: Not on file     Gets together: Not on file     Attends Restorationism service: Not on file     Active member of club or organization: Not on file     Attends meetings of clubs or organizations: Not on file     Relationship status: Not on file     Intimate partner violence:     Fear of current or ex partner: Not on file     Emotionally abused: Not on file     Physically abused: Not on file     Forced sexual activity: Not on file   Other Topics Concern     Parent/sibling w/ CABG, MI or angioplasty before 65F 55M? No   Social History Narrative     Not on file     Family History   Problem Relation Age of Onset     Thyroid Disease Maternal Grandmother      Thyroid Disease Sister      Coronary Artery Disease No family hx of      Hypertension No family hx of      Hyperlipidemia No family hx of      Cancer - colorectal No family hx of      Ovarian Cancer No family hx of      Other Cancer No family hx of      Mental Illness No family hx of      Cerebrovascular Disease No family hx of      Asthma No family hx of      Known Genetic Syndrome No family hx of         ROS: 10 point ROS neg other than the symptoms noted above in the " "HPI.    Physical Exam  /76   Ht 1.486 m (4' 10.5\")   Wt 68.9 kg (152 lb)   LMP  (LMP Unknown)   BMI 31.23 kg/m    HEENT:  Normocephalic, atraumatic.  PERRLA.  EOM s intact.  Visual fields full to gross exam  Neck:  Supple, non-tender, without lymphadenopathy.  Heart:  No peripheral edema  Lungs:  No SOB  Abdomen:  Non-distended.   Skin:  Warm and dry.  Extremities:  No edema, cyanosis or clubbing.  Psychiatric:  No apparent distress  Musculoskeletal:  Normal bulk and tone    NEUROLOGICAL EXAMINATION:     Mental status:  Alert and Oriented x 3, speech is fluent.  Cranial nerves:  II-XII intact.   Motor:    Shoulder Abduction:  Right:  5/5   Left:  5/5  Biceps:                      Right:  5/5   Left:  5/5  Triceps:                     Right:  5/5   Left:  5/5  Wrist Extensors:       Right:  5/5   Left:  5/5  Wrist Flexors:           Right:  5/5   Left:  5/5  interosseus :            Right:  5/5   Left:  5/5  Hip Flexion:                Right: 5/5  Left:  5/5  Quadriceps:             Right:  5/5  Left:  5/5  Hamstrings:             Right:  5/5  Left:  5/5  Gastroc Soleus:        Right:  5/5  Left:  5/5  Tib/Ant:                      Right:  5/5  Left:  5/5  EHL:                     Right:  5/5  Left:  5/5  Sensation:  Intact  Reflexes:  Negative Babinski.  Negative Clonus.  Negative Pitt's.  Coordination:  Smooth finger to nose testing.   Negative pronator drift.  Smooth tandem walking.    A/P:  58-year-old female with cervical spondylosis, cervicalgia    I had a discussion with the patient, reviewing the history, symptoms, and imaging  No significant stenosis  Will start on a course of physical therapy  Will refer to pain clinic for management and to consider trigger point injections versus radiofrequency ablation     "

## 2019-04-18 NOTE — LETTER
2019         RE: Fern Gonzalez  [address]  [facility] [address]        Dear Colleague,    Thank you for referring your patient, Fern Gonzalez, to the [facility]. Please see a copy of my visit note below.    I was asked by Dr. Waterman to see this patient in consultation    58-year-old female with cervical spondylosis, cervicalgia.  Couple weeks of worsening neck pain radiating to the bilateral shoulders and occiput, worse with neck range of motion.  Neck pain and dizziness when she turns her head to the left.  Began after strenuous work.  MRI with mild spondylotic change, no stenosis.       Past Medical History:   Diagnosis Date     Diagnostic skin and sensitization tests (aka ALLERGENS) 10/29/15 skin tests pos. for: T >> dog/DM only.      Esophageal reflux      Headache(784.0) 10/11/11    Admitted, suspected Viral cephalitis, meningitis     Migraine, unspecified, without mention of intractable migraine without mention of status migrainosus      Recurrent sinusitis      Varicella meningitis      Past Surgical History:   Procedure Laterality Date     C  DELIVERY ONLY      3 times     CHOLECYSTECTOMY, LAPOROSCOPIC  2007    Lysis of intestinal adhesions.     CL AFF SURGICAL PATHOLOGY       COLONOSCOPY N/A 10/31/2014    Procedure: COMBINED COLONOSCOPY, SINGLE OR MULTIPLE BIOPSY/POLYPECTOMY BY BIOPSY;  Surgeon: Leonard Dumont MD;  [facility]     HC LAP,FULGURATE/EXCISE LESIONS  10/04    mesh present as had abdominal wall surgery     HC UGI ENDOSCOPY, SIMPLE EXAM  10/26/2006     HYSTERECTOMY TOTAL ABDOMINAL  2007     RELEASE CARPAL TUNNEL Right 2016    Procedure: RELEASE CARPAL TUNNEL;  Surgeon: Leif Fine MD;  [facility]     RELEASE CARPAL TUNNEL Left 10/5/2016    Procedure: RELEASE CARPAL TUNNEL;  Surgeon: Leif Fine MD;  [facility]     RELEASE CARPAL TUNNEL Right 2016    Procedure: RELEASE CARPAL TUNNEL;  Surgeon:  "Leif Fine MD;  Location:  OR     Social History     Socioeconomic History     Marital status: Single     Spouse name: Not on file     Number of children: Not on file     Years of education: Not on file     Highest education level: Not on file   Occupational History     Not on file   Social Needs     Financial resource strain: Not on file     Food insecurity:     Worry: Not on file     Inability: Not on file     Transportation needs:     Medical: Not on file     Non-medical: Not on file   Tobacco Use     Smoking status: Former Smoker     Packs/day: 0.25     Years: 0.50     Pack years: 0.12     Types: Cigarettes     Smokeless tobacco: Never Used     Tobacco comment: uses ecig now   Substance and Sexual Activity     Alcohol use: Yes     Alcohol/week: 0.0 oz     Comment: \"very rarely\"     Drug use: No     Sexual activity: Never     Partners: Male   Lifestyle     Physical activity:     Days per week: Not on file     Minutes per session: Not on file     Stress: Not on file   Relationships     Social connections:     Talks on phone: Not on file     Gets together: Not on file     Attends Baptism service: Not on file     Active member of club or organization: Not on file     Attends meetings of clubs or organizations: Not on file     Relationship status: Not on file     Intimate partner violence:     Fear of current or ex partner: Not on file     Emotionally abused: Not on file     Physically abused: Not on file     Forced sexual activity: Not on file   Other Topics Concern     Parent/sibling w/ CABG, MI or angioplasty before 65F 55M? No   Social History Narrative     Not on file     Family History   Problem Relation Age of Onset     Thyroid Disease Maternal Grandmother      Thyroid Disease Sister      Coronary Artery Disease No family hx of      Hypertension No family hx of      Hyperlipidemia No family hx of      Cancer - colorectal No family hx of      Ovarian Cancer No family hx of      Other Cancer No " "family hx of      Mental Illness No family hx of      Cerebrovascular Disease No family hx of      Asthma No family hx of      Known Genetic Syndrome No family hx of         ROS: 10 point ROS neg other than the symptoms noted above in the HPI.    Physical Exam  /76   Ht 1.486 m (4' 10.5\")   Wt 68.9 kg (152 lb)   LMP  (LMP Unknown)   BMI 31.23 kg/m     HEENT:  Normocephalic, atraumatic.  PERRLA.  EOM s intact.  Visual fields full to gross exam  Neck:  Supple, non-tender, without lymphadenopathy.  Heart:  No peripheral edema  Lungs:  No SOB  Abdomen:  Non-distended.   Skin:  Warm and dry.  Extremities:  No edema, cyanosis or clubbing.  Psychiatric:  No apparent distress  Musculoskeletal:  Normal bulk and tone    NEUROLOGICAL EXAMINATION:     Mental status:  Alert and Oriented x 3, speech is fluent.  Cranial nerves:  II-XII intact.   Motor:    Shoulder Abduction:  Right:  5/5   Left:  5/5  Biceps:                      Right:  5/5   Left:  5/5  Triceps:                     Right:  5/5   Left:  5/5  Wrist Extensors:       Right:  5/5   Left:  5/5  Wrist Flexors:           Right:  5/5   Left:  5/5  interosseus :            Right:  5/5   Left:  5/5  Hip Flexion:                Right: 5/5  Left:  5/5  Quadriceps:             Right:  5/5  Left:  5/5  Hamstrings:             Right:  5/5  Left:  5/5  Gastroc Soleus:        Right:  5/5  Left:  5/5  Tib/Ant:                      Right:  5/5  Left:  5/5  EHL:                     Right:  5/5  Left:  5/5  Sensation:  Intact  Reflexes:  Negative Babinski.  Negative Clonus.  Negative Pitt's.  Coordination:  Smooth finger to nose testing.   Negative pronator drift.  Smooth tandem walking.    A/P:  58-year-old female with cervical spondylosis, cervicalgia    I had a discussion with the patient, reviewing the history, symptoms, and imaging  No significant stenosis  Will start on a course of physical therapy  Will refer to pain clinic for management and to consider trigger " point injections versus radiofrequency ablation         Again, thank you for allowing me to participate in the care of your patient.        Sincerely,        Shaggy Rodriguez MD

## 2019-04-22 ENCOUNTER — TELEPHONE (OUTPATIENT)
Dept: PODIATRY | Facility: CLINIC | Age: 59
End: 2019-04-22

## 2019-04-22 NOTE — TELEPHONE ENCOUNTER
Left VM for patient to schedule a new evaluation.            Lindsey CUMMINS    [facility]

## 2019-04-22 NOTE — LETTER
April 23, 2019    Fern Gonzalez  [address] [place] [address]    Dear Fern                                                                 Welcome to the [facility].  We are located at [address] [place] [address] [place] [address]. Your appointment at the [facility] has been scheduled on Monday, MAY 6TH at 3:30PM with INDERJIT Tinoco.    At your first visit, you will meet your team of caregivers who will help you to develop pain management strategies that will last a lifetime. You will meet with our support staff to review your insurance information, and collect your co-payment if required by your insurance company. You will also meet with a medical pain specialist and care coordinator who will assess your pain and develop a plan of care for your successful pain rehabilitation. You should expect to spend 1-2 hours at your first visit with us. Usually, patients work with us for a period of 6-12 months, and eventually return to their primary doctor once their pain management has stabilized.      To help us make your visit go as smoothly as possible, please bring the following items with you on your visit:     Completed Pain Questionnaire enclosed in this packet.  If you do not bring the completed questionnaire, we may have to reschedule your appointment.  List of any medicines that you are currently taking or have been prescribed  Important NON-[facility] medical information such as medical records or tests results (X-rays, or laboratory tests)  Your health insurance card  Financial resources to cover your co-payment or balance due at the time of service (cash, personal check, Visa, and MasterCard are acceptable methods of payment)     Due to the demand for new patient evaluations, you must notify the scheduling department 48 hours in advance if you are not able to keep this appointment. Failure to do so could affect your ability to reschedule with our clinic. Please  be aware that we will not prescribe any medications at your first visit.     Please call [phone] with any questions regarding your appointment. We look forward to meeting you and working to address your health care needs.     Sincerely,      [facility]

## 2019-04-23 NOTE — TELEPHONE ENCOUNTER
[facility] Referral      1. Confirmed address with patient? Yes  2. Confirmed phone number with patient? Yes  3. Confirmed referring provider? Yes  4. Is the PCP the same as the referring provider? No  5. Has the patient been to any previous pain clinics? No  (If yes, send PAVITHRA with welcome letter)  6. Which insurance are we to bill for this appointment?  [employer]    7. Informed pt of cancellation (48 hour) policy? Yes    REGARDING OPIOID MEDICATIONS: We will always address appropriateness of opioid pain medications, but we generally will not automatically take on a prescribing role. When we do take on prescribing of opioids for chronic pain, it is in collaboration with the referring physician for an intermediate period of time (months), with an expectation that the primary physician or provider will assume the prescribing role if medications are effective at stable doses with demonstrated compliance. Therefore, please do not assume that your prescribing responsibilities end on the day of pain clinic consultation.  8. Informed pt of prescribing policy? Yes    9.Please be aware that once you are established with a pain provider and location, you will need to continue have all future visits with that provider and location. It is best to determine what location is the most convenient for you and schedule with that one.    ** PATIENT INFORMED OF THIS POLICY Yes      9. Referring Provider: Dr. Jennifer Chery    [facility]

## 2019-05-15 ENCOUNTER — OFFICE VISIT (OUTPATIENT)
Dept: PODIATRY | Facility: CLINIC | Age: 59
End: 2019-05-15
Attending: NEUROLOGICAL SURGERY
Payer: COMMERCIAL

## 2019-05-15 VITALS
SYSTOLIC BLOOD PRESSURE: 112 MMHG | DIASTOLIC BLOOD PRESSURE: 84 MMHG | HEIGHT: 59 IN | TEMPERATURE: 98.2 F | BODY MASS INDEX: 30.54 KG/M2 | WEIGHT: 151.5 LBS

## 2019-05-15 DIAGNOSIS — M79.18 MYOFASCIAL PAIN: ICD-10-CM

## 2019-05-15 DIAGNOSIS — M54.2 CERVICALGIA: Primary | ICD-10-CM

## 2019-05-15 PROCEDURE — 99214 OFFICE O/P EST MOD 30 MIN: CPT | Mod: 25 | Performed by: PHYSICIAN ASSISTANT

## 2019-05-15 PROCEDURE — 20552 NJX 1/MLT TRIGGER POINT 1/2: CPT | Performed by: PHYSICIAN ASSISTANT

## 2019-05-15 RX ORDER — METHOCARBAMOL 500 MG/1
500 TABLET, FILM COATED ORAL 3 TIMES DAILY
Qty: 90 TABLET | Refills: 0 | Status: SHIPPED | OUTPATIENT
Start: 2019-05-15 | End: 2021-08-11

## 2019-05-15 ASSESSMENT — MIFFLIN-ST. JEOR: SCORE: 1182.83

## 2019-05-15 ASSESSMENT — PAIN SCALES - GENERAL: PAINLEVEL: SEVERE PAIN (6)

## 2019-05-15 NOTE — LETTER
"    5/15/2019         RE: Fern Gonzalez  [address]  [facility] [address]        Dear Colleague,    Thank you for referring your patient, Fern Gonzalez, to the [facility]. Please see a copy of my visit note below.    [facility] Consultation      This patient is being seen in consultation at the request of her provider Dr. Rodriguez.    Primary Care Provider is [facility], Jero Harley.    Please see the [facility] health questionnaire which the patient completed and reviewed with me in detail    CHIEF COMPLAINT:  Pain  -neck pain    HISTORY OF PRESENT ILLNESS:  Fern Gonzalez is a 58 year old female with history of neck pain     She has neck and shoulder pain. She also has left elbow pain from tennis elbow. Pain started 11 years ago. She states that the pain varies. Occasionally it is a stabbing pain. Other times its a dull throbbing pain. She states that it never quits hurting. Some days are better than others, but it never stops. She has been using ice packs, heating pads and Advil. She has to sleep with a neck pillow every night. If she doesn't she will wake up with an \"excruciating\" headache. She went to PT in 2007 when she had a similar pain and it helped 100%. She is going to go back to PT starting on 5/31/19. She does get tingling in her hands (history of carpal tunnel surgery on both hands).     This recent exacerbation of her pain started around 4/1/2019. She states that there was not an injury, but the week before at work she was lifting door panels, she feels that is what did it. She states that she put the door panel on her head and turned it and feels that this \"jammed my head into my spine\". She reports muscle spasms in her shoulder girdle as well. She feels an inflamed muscle on the right side as well.     Pain Information:   Onset/Progression:  Pain started 11 years ago.   Pain quality: Aching, Sharp and Shooting    Pain timing: " "Constant     Pain rating: intensity ranges from 2/10 to 10/10, and averages 3/10 on a 0-10 scale.   Aggravating factors include: Looking up or down for a long time.  Lifting huge parts from skid to my table   Relieving factors include: Putting my head straight.  Doing some motion movements.      Past Pain Treatments:   Pain Clinic: No   PT: Yes, last done in 2007  Psychologist: No  Relaxation techniques/biofeedback: No  Chiropractor: Yes, in 2007, it did not work. She was told her spine was \"too compact\" and that they couldn't do anything  Acupuncture: No  Pharmacotherapy:    Opioids: No     Non-opioids:  Yes   TENs Unit:Yes at PT, was very helpful.   Injections: No  Self-care:   Ice pack, heating pads  Surgeries related to pain: No     Current Pain Relevant Medications:    Ibuprofen 6 tabs today, does not take this much every day, does take at least 3/day      Previous Pain Relevant Medications: (H--helped; HI--Helped initially; SWH--Somewhat helpful; NH--No help; W--worse; SE--side effects; ?--Unsure if helpful)   NOTE: This medication information taken from patient's intake form, not medical records.   Opiates: Codeine NH SE vomiting, Hydrocodone NH SE vomiting, Hydromorphone H, Morphine NH SE vomiting, Oxycodone NH SE vomiting, Tramadol H  NSAIDS: Ibuprofen H, Toradol ?, Aleve H  Anti-migraine mediations: Prednisone [facility]  Muscle Relaxants: none  Neuropathics: Gabapentin SE nightmares  Anti-depressants: none  Anxiolytics: none  Topicals: none  Sleep aids: none  Other medications not covered above: none    FAMILY MEDICAL HISTORY:  Chronic pain: Yes, sister  Family history of headaches:  Yes, sister      PAST MEDICAL HISTORY:   Past Medical History:   Diagnosis Date     Diagnostic skin and sensitization tests (aka ALLERGENS) 10/29/15 skin tests pos. for: T >> dog/DM only.      Esophageal reflux      Headache(784.0) 10/11/11    Admitted, suspected Viral cephalitis, meningitis     Migraine, unspecified, without " "mention of intractable migraine without mention of status migrainosus      Recurrent sinusitis      Varicella meningitis          HEALTH AND LIFESTYLE PRACTICES:  Have you ever had any problems with alcohol or drug use? no  Have you ever felt you should cut down your use of alcohol/drugs?no  Have people ever annoyed you by criticizing your alcohol/drug use? no  Have you ever felt bad or guilty about your alcohol/drug use? no  Have you ever had a drink or taken a drug first thing in the morning for an eye-opener/hangover? no    SLEEP:  Do you snore heavily? yes  Do you wake up feeling rested? no  How many hours of sleep do you average per day? ?  What keep you from sleep? ?  Have you been diagnosed with sleep apnea? no  Do you wear a CPAP? no      ALLERGIES:  Allergies   Allergen Reactions     Cephalexin Hcl Hives     Codeine Nausea and Vomiting     significant     Gabapentin Nausea and Vomiting     Morphine Hcl Nausea and Vomiting     Percocet [Oxycodone-Acetaminophen] Nausea and Vomiting     Significant; pt reports that plain Oxycodone she did fine with     Valium [Diazepam] Other (See Comments)     \"terrible nightmare\"     Vicodin [Hydrocodone-Acetaminophen] Nausea and Vomiting     significant     Cefazolin Rash       MEDICATIONS:  Current Outpatient Medications   Medication Sig Dispense Refill     cetirizine (ZYRTEC) 10 MG tablet Take 10 mg by mouth       DIETARY MANAGEMENT PRODUCT PO        Fexofenadine HCl (ALLEGRA PO)        Ibuprofen (ADVIL PO) Take 600 mg by mouth       Naproxen Sodium (ALEVE PO)            REVIEW OF SYSTEMS:   Constitutional:  Negative  Eyes/Head: Dizziness and Headache  Ears/Nose/Throat: Negative  Allergy/Immune: Allergies  Skin:Negative  Hematologic/Lymphatic/Immunologic:Negative  Respiratory: Negative  Cardiovascular: Fainting  Gastrointestinal: Nausea and Vomiting  Endocrine: Negative  Musculoskeletal: Joint pain, Neck pain and Stiffness  Urinary:  Negative   Any bowel or bladder " "incontinence: Denies   Neurologic: Memory loss  Psychiatric: Negative    CURRENT FAMILY/SOCIAL SITUATION:  Living situation: Patient is .  She lives by herself.  Support system: She reports having just the right amount of support.  Occupation: She completed trade school and works in [employer]  Current stressors: None  Safety concerns: None    ABUSE/ASSAULT HISTORY:   Physical: Yes  Emotional: Yes  Sexual: Unanswered  Childhood Sexual Abuse: No    SUBSTANCE USE:  Any illicit drug use: used cocaine in 1980's, marijuana in high school  EtOH use: occasional use  Caffeine use: drinks coffee, 3 cups/day, 1 coke/day  Nicotine use: smokes 1/2ppd  Any use of prescriptions other than how they were prescribed: none    PHYSICAL EXAM    Vitals:   /84   Temp 98.2  F (36.8  C) (Temporal)   Ht 1.499 m (4' 11\")   Wt 68.7 kg (151 lb 8 oz)   LMP  (LMP Unknown)   BMI 30.60 kg/m     Body mass index is 30.6 kg/m .  4' 11\"  151 lbs 8 oz      Appearance:     A&O. Patient is appropriate.   Patient is in NAD.   Patient is well groomed and appears stated age.     HEENT:   Normocephalic, atraumatic, sclera, conjunctiva and pharynx normal. Pupils are equal, round and reactive to light. Hearing is adequate for exam. Uvula rises with phonation.   Neck: Supple. No deformities or adenopathy  Cardiovascular:  Heart has a regular rate and rhythm. Audible S1 and S2. No murmurs auscultated. No edema on bilateral lower extremities.   Respiratory: Lungs are clear to auscultation bilaterally. No wheezes or crackles.  Abdominal/Pelvic:   Soft, non-tender, no palpable organomegaly.  Skin:  No rashes, erythema, breakdowns, lesions to exposed skin.   Hematologic:  No bruises, petechiae or ecchymosis to exposed areas.  Psychiatric:  mentation appears normal., affect and mood normal  Musculoskeletal:  Posture upright, shoulders and pelvis are leveled.   Deltoid: R: 5/5 L: 5/5  Biceps: R: 5/5 L: 5/5  Triceps: R: 5/5 L: " 5/5  Intrinsic hand: R: 5/5   L: 5/5  Hip flexion: R: 5/5 L: 5/5  Knee ext: R: 5/5 L: 5/5  Knee flex: R: 5/5 L: 5/5  Dorsiflexion: R: 5/5 L: 5/5  Plantarflexion: R: 5/5 L: 5/5    Cervical spine:   Flex:  35 degrees, painful    Ext: 35 degrees, painful    Rotation to right: 70 degrees, pain free   Rotation to left: 70 degrees, painful    Tenderness in the cervical spine at midline. Yes   Tenderness in the cervical paraspinal muscles. Yes  Thoracic spine:    Tenderness in the thoracic spine at midline. Yes   Tenderness in the thoracic paraspinal muscles. No  Lumbar/Sacral spine:   Flexion:  75 degrees, pain free   Ext: 45 degrees, pain free   Rotation/ext to right: pain free   Rotation/ext to left: pain free   Tenderness in the lumbar spine at midline. No   Tenderness in the lumbar paraspinal muscles. No   Straight leg exam:    Right: negative    Left: negative   Tenderness over SI joint:      Right: negative     Left:  negative   Tenderness over piriformis:     Right: negative    Left:  negative   Tenderness over Trochanteric Bursa:     Right: negative    Left: negative    Gait pattern:  Able to walk on the heels and toes. Patient has a normal gait.     Neurological:   Deep Tendon Reflex exam:   Biceps:     R:  2/4   L: 2/4   Brachioradialis   R:  2/4   L: 2/4:   Patella:  R:  2/4   L: 2/4   Achilles:  R:  2/4   L: 2/4    Sensory exam:   Light touch: normal bilateral upper and lower extremities    Vibration: normal in bilateral upper extremities and bilateral lower extremities   Sharp: normal in bilateral upper extremities and bilateral lower extremities   No allodynia, dysesthesia, or hyperalgesia.      Previous Diagnostic Tests:   Imaging Studies:   MRI of the cervical spine 4/1/2019  Impression:  1.  Mild multilevel cervical degenerative disc disease as described.  2.  Small broad-based central disc protrusion at C4-5 producing mild flattening of the ventral thecal sac.  No evidence of nerve root compromise or  significant central canal stenosis at this level.  3.  Mild left-sided neuroforaminal narrowing at C2-3.        [place] Board of Pharmacy Data Base Reviewed:    NO; No concern for abuse or misuse of controlled medications based on this report.       ASSESSMENT:   Fern Gonzalez is a 58 year old female who presents today with the complaints of:    1. cervicalgia  2. Myofascial pain    She does have a significant amount of tenderness to light palpation to the trapezius on the right side.  The pain that she feels in this area also limits her movement of her neck.  We did talk about a multidisciplinary approach to pain management which may include physical therapy, behavioral health, injection therapy and medication management.  We discussed each 1 of these modalities and how they can help with chronic pain.  I do think a significant amount of the patient's pain is myofascial.  She does however have some degenerative changes which could also contribute to neck pain.    PLAN:    Diagnosis reviewed, treatment option addressed, and risk/benefits discussed.  Self-care instructions given.  I am recommending a multidisciplinary treatment plan to help this patient better manage her pain.       1. Physical Therapy: She currently has a visit scheduled for May 31, 2019.  She will keep this appointment as scheduled  2. Clinical Health Psychologist to address issues of relaxation, behavorial change, coping style, and other factors important to improvement: Not at this time  3. Diagnostic Studies: None at this time  4. Medication Management:   1. We will start on Robaxin 500 mg 3 times a day as needed for muscle spasm/pain  2. She can continue ibuprofen, max dosing is 800 mg 3 times a day or 2400 mg in 24 hours  5. Potential procedures: We will do trigger point injections today.  Please see procedure note below.  We could consider an epidural in the future.  6. Other treatments: Could consider having her see the chiropractor  here  7. Recommendations to PCP: Please see above    Follow up: 4 Weeks     TIME SPENT:   A total of 45  minutes was spent on the patient today, greater than 50% of that time was spent on face to face counseling and care coordination regarding diagnoses and treatment options as mentioned above.  This time was separate then timeout was taken for procedure.    I would like to thank Dr. Rodriguez for allowing me to participate in the management of this patient.     Mariela Joseph PA-C  [facility]    Pre Procedure diagnosis: myofascial Pain  Post procedure Diagnosis: Same  Procedure performed: trigger point injections  Anesthesia: none  Complications: none  Operators: Mariela Joseph PA-C     Indications:   Fern Gonzalez is a 58 year old female with a history of neck and muscle pain.  Exam shows myofascial pain of the muscle groups listed below and they have tried conservative treatment including medications.    Options/alternatives, benefits and risks were discussed with the patient including bleeding, infection, tissue trauma and pnuemothorax.  Questions were answered to her satisfaction and she agrees to proceed. Voluntary informed consent was obtained and signed.     Vitals were reviewed: Yes  Allergies were reviewed:  Yes   Medications were reviewed:  Yes   Pre-procedure pain score: 6/10    Procedure:  After getting informed consent, a Pause for the Cause was performed.    Trigger points were identified by patient, and marked when appropriate.  The area was prepped with Chloroprep.    Using clean technique, injections were completed using a 25G, 1.5 inch needle.  After negative aspiration, injection was completed.  A total of 5 locations were injected.  When possible, tissue was retracted from the chest wall to avoid lung injury.    Muscle groups injected:  Right trapezius   Right levator scapulae    Injection solution contained:  3.5ml of 1% lidocaine and 3.5ml of 0.5%  bupivacaine.    Hemostasis was achieved, the area was cleaned, and bandaids were placed when appropriate.  The patient tolerated the procedure well.  Breath sounds were normal.      Post-procedure pain score: 4/10  Follow-up includes:   -repeat [facility]          Again, thank you for allowing me to participate in the care of your patient.        Sincerely,        Mariela Joseph PA-C

## 2019-05-15 NOTE — PATIENT INSTRUCTIONS
After Visit Instructions:     Thank you for coming to [facility] for your care. It is my goal to partner with you to help you reach your optimal state of health.     I am recommending multidisciplinary care at this time.  The focus of care will be to continue gradual rehabilitation and pain management with medication adjustments as needed.    Continue daily self-care, identifying contributing factors, and monitoring variations in pain level. Continue to integrate self-care into your life.      1. Schedule physical therapy assessment/visit: continue your current plan  2. Schedule follow-up with Mariela Joseph PA-C in 4 weeks. You will need to make this appointment.   3. Procedures recommended: trigger point injection today, consider epidural in future   4. Medication recommendations:   1. Robaxin (methocarbamol) 500mg three times a day as needed for muscle spasms/pain  2. Continue Ibuprofen: max dosing is 800mg three times a day (2400mg in 24 hours)      Mariela Joseph PA-C  [facility]    Contact information: [facility]  Clinic Number:  [phone]   Call this number with any questions about your care and for scheduling assistance. Calls are returned Monday through Friday between 8 AM and 4:30 PM. We usually get back to you within 2 business days depending on the issue/request.           Medication Refills Policy:    For non-narcotic medications, please your pharmacy directly to request a refill and the pharmacy will call the Pain Management Center for authorization. Please allow 3-4 days for these refills.    For narcotic refills, call the nurse triage line and leave a message requesting your refill along with the name of the pharmacy that you use. Narcotic prescriptions will be mailed to your pharmacy or you may pick them up at the clinic.  Please call 7-10 days before your refill is due  The above policy allows adequate  time so that you do not run out of medication.    No Show - Late Cancellation - Late Arrival Policy  We believe regular attendance is key to your success in our program.    Any time you are unable to keep your appointment we ask that you call us at least 24 hours in advance to let us know. This will allow us to offer the appointment time to another patient. The following is our policy for missed appointments. This also applies to appointments cancelled with less than 24 hours notice.    After missing 3 appointments without calling first, we will cancel all of your future appointments at [facility].    At that point, you will not be able to resume services unless approved by your care team  We understand that unforseen circumstances arise, however, out of respect for all concerned and to provide this appointment to another patient, this policy will be enforced.    Please note that most follow up appointments are 30 minutes long. If you arrive late, your provider may not be able to see you for the entire 30 minutes. Please also note that if you arrive more than 15 minutes for any appointment, it may be rescheduled.    [facility]   Post Procedure Instructions    Today you had:  trigger point injections       Medications used:  lidocaine   bupivicaine           Go to the emergency room if you develop any shortness of breath    Monitor the injection sites for signs and symptoms of infection-fever, chills, redness, swelling, warmth, or drainage to areas.    You may have soreness at injection sites for up to 24 hours.    You may apply ice to the painful areas to help minimize the discomfort of the needle pokes.    Do not apply heat to sites for at least 12 hours.    You may use anti-inflammatory medications or Tylenol for pain control if necessary    Pain Clinic phone number during work hours Monday-Friday:  [phone]    After hours provider line: [phone]

## 2019-05-15 NOTE — PROGRESS NOTES
"[facility] Consultation      This patient is being seen in consultation at the request of her provider Dr. Rodriguez.    Primary Care Provider is [facility], [facility].    Please see the [facility] health questionnaire which the patient completed and reviewed with me in detail    CHIEF COMPLAINT:  Pain  -neck pain    HISTORY OF PRESENT ILLNESS:  Fern Gonzalez is a 58 year old female with history of neck pain     She has neck and shoulder pain. She also has left elbow pain from tennis elbow. Pain started 11 years ago. She states that the pain varies. Occasionally it is a stabbing pain. Other times its a dull throbbing pain. She states that it never quits hurting. Some days are better than others, but it never stops. She has been using ice packs, heating pads and Advil. She has to sleep with a neck pillow every night. If she doesn't she will wake up with an \"excruciating\" headache. She went to PT in 2007 when she had a similar pain and it helped 100%. She is going to go back to PT starting on 5/31/19. She does get tingling in her hands (history of carpal tunnel surgery on both hands).     This recent exacerbation of her pain started around 4/1/2019. She states that there was not an injury, but the week before at work she was lifting door panels, she feels that is what did it. She states that she put the door panel on her head and turned it and feels that this \"jammed my head into my spine\". She reports muscle spasms in her shoulder girdle as well. She feels an inflamed muscle on the right side as well.     Pain Information:   Onset/Progression:  Pain started 11 years ago.   Pain quality: Aching, Sharp and Shooting    Pain timing: Constant     Pain rating: intensity ranges from 2/10 to 10/10, and averages 3/10 on a 0-10 scale.   Aggravating factors include: Looking up or down for a long time.  Lifting huge parts from skid to my table   Relieving factors include: Putting my " "head straight.  Doing some motion movements.      Past Pain Treatments:   Pain Clinic: No   PT: Yes, last done in 2007  Psychologist: No  Relaxation techniques/biofeedback: No  Chiropractor: Yes, in 2007, it did not work. She was told her spine was \"too compact\" and that they couldn't do anything  Acupuncture: No  Pharmacotherapy:    Opioids: No     Non-opioids:  Yes   TENs Unit:Yes at PT, was very helpful.   Injections: No  Self-care:   Ice pack, heating pads  Surgeries related to pain: No     Current Pain Relevant Medications:    Ibuprofen 6 tabs today, does not take this much every day, does take at least 3/day      Previous Pain Relevant Medications: (H--helped; HI--Helped initially; SWH--Somewhat helpful; NH--No help; W--worse; SE--side effects; ?--Unsure if helpful)   NOTE: This medication information taken from patient's intake form, not medical records.   Opiates: Codeine NH SE vomiting, Hydrocodone NH SE vomiting, Hydromorphone H, Morphine NH SE vomiting, Oxycodone NH SE vomiting, Tramadol H  NSAIDS: Ibuprofen H, Toradol ?, Aleve H  Anti-migraine mediations: Prednisone [facility]  Muscle Relaxants: none  Neuropathics: Gabapentin SE nightmares  Anti-depressants: none  Anxiolytics: none  Topicals: none  Sleep aids: none  Other medications not covered above: none    FAMILY MEDICAL HISTORY:  Chronic pain: Yes, sister  Family history of headaches:  Yes, sister      PAST MEDICAL HISTORY:   Past Medical History:   Diagnosis Date     Diagnostic skin and sensitization tests (aka ALLERGENS) 10/29/15 skin tests pos. for: T >> dog/DM only.      Esophageal reflux      Headache(784.0) 10/11/11    Admitted, suspected Viral cephalitis, meningitis     Migraine, unspecified, without mention of intractable migraine without mention of status migrainosus      Recurrent sinusitis      Varicella meningitis          HEALTH AND LIFESTYLE PRACTICES:  Have you ever had any problems with alcohol or drug use? no  Have you ever felt you should " "cut down your use of alcohol/drugs?no  Have people ever annoyed you by criticizing your alcohol/drug use? no  Have you ever felt bad or guilty about your alcohol/drug use? no  Have you ever had a drink or taken a drug first thing in the morning for an eye-opener/hangover? no    SLEEP:  Do you snore heavily? yes  Do you wake up feeling rested? no  How many hours of sleep do you average per day? ?  What keep you from sleep? ?  Have you been diagnosed with sleep apnea? no  Do you wear a CPAP? no      ALLERGIES:  Allergies   Allergen Reactions     Cephalexin Hcl Hives     Codeine Nausea and Vomiting     significant     Gabapentin Nausea and Vomiting     Morphine Hcl Nausea and Vomiting     Percocet [Oxycodone-Acetaminophen] Nausea and Vomiting     Significant; pt reports that plain Oxycodone she did fine with     Valium [Diazepam] Other (See Comments)     \"terrible nightmare\"     Vicodin [Hydrocodone-Acetaminophen] Nausea and Vomiting     significant     Cefazolin Rash       MEDICATIONS:  Current Outpatient Medications   Medication Sig Dispense Refill     cetirizine (ZYRTEC) 10 MG tablet Take 10 mg by mouth       DIETARY MANAGEMENT PRODUCT PO        Fexofenadine HCl (ALLEGRA PO)        Ibuprofen (ADVIL PO) Take 600 mg by mouth       Naproxen Sodium (ALEVE PO)            REVIEW OF SYSTEMS:   Constitutional:  Negative  Eyes/Head: Dizziness and Headache  Ears/Nose/Throat: Negative  Allergy/Immune: Allergies  Skin:Negative  Hematologic/Lymphatic/Immunologic:Negative  Respiratory: Negative  Cardiovascular: Fainting  Gastrointestinal: Nausea and Vomiting  Endocrine: Negative  Musculoskeletal: Joint pain, Neck pain and Stiffness  Urinary:  Negative   Any bowel or bladder incontinence: Denies   Neurologic: Memory loss  Psychiatric: Negative    CURRENT FAMILY/SOCIAL SITUATION:  Living situation: Patient is .  She lives by herself.  Support system: She reports having just the right amount of support.  Occupation: She " "completed trade school and works in [employer]  Current stressors: None  Safety concerns: None    ABUSE/ASSAULT HISTORY:   Physical: Yes  Emotional: Yes  Sexual: Unanswered  Childhood Sexual Abuse: No    SUBSTANCE USE:  Any illicit drug use: used cocaine in 1980's, marijuana in high school  EtOH use: occasional use  Caffeine use: drinks coffee, 3 cups/day, 1 coke/day  Nicotine use: smokes 1/2ppd  Any use of prescriptions other than how they were prescribed: none    PHYSICAL EXAM    Vitals:   /84   Temp 98.2  F (36.8  C) (Temporal)   Ht 1.499 m (4' 11\")   Wt 68.7 kg (151 lb 8 oz)   LMP  (LMP Unknown)   BMI 30.60 kg/m    Body mass index is 30.6 kg/m .  4' 11\"  151 lbs 8 oz      Appearance:     A&O. Patient is appropriate.   Patient is in NAD.   Patient is well groomed and appears stated age.     HEENT:   Normocephalic, atraumatic, sclera, conjunctiva and pharynx normal. Pupils are equal, round and reactive to light. Hearing is adequate for exam. Uvula rises with phonation.   Neck: Supple. No deformities or adenopathy  Cardiovascular:  Heart has a regular rate and rhythm. Audible S1 and S2. No murmurs auscultated. No edema on bilateral lower extremities.   Respiratory: Lungs are clear to auscultation bilaterally. No wheezes or crackles.  Abdominal/Pelvic:   Soft, non-tender, no palpable organomegaly.  Skin:  No rashes, erythema, breakdowns, lesions to exposed skin.   Hematologic:  No bruises, petechiae or ecchymosis to exposed areas.  Psychiatric:  mentation appears normal., affect and mood normal  Musculoskeletal:  Posture upright, shoulders and pelvis are leveled.   Deltoid: R: 5/5 L: 5/5  Biceps: R: 5/5 L: 5/5  Triceps: R: 5/5 L: 5/5  Intrinsic hand: R: 5/5   L: 5/5  Hip flexion: R: 5/5 L: 5/5  Knee ext: R: 5/5 L: 5/5  Knee flex: R: 5/5 L: 5/5  Dorsiflexion: R: 5/5 L: 5/5  Plantarflexion: R: 5/5 L: 5/5    Cervical spine:   Flex:  35 degrees, painful    Ext: 35 degrees, painful "    Rotation to right: 70 degrees, pain free   Rotation to left: 70 degrees, painful    Tenderness in the cervical spine at midline. Yes   Tenderness in the cervical paraspinal muscles. Yes  Thoracic spine:    Tenderness in the thoracic spine at midline. Yes   Tenderness in the thoracic paraspinal muscles. No  Lumbar/Sacral spine:   Flexion:  75 degrees, pain free   Ext: 45 degrees, pain free   Rotation/ext to right: pain free   Rotation/ext to left: pain free   Tenderness in the lumbar spine at midline. No   Tenderness in the lumbar paraspinal muscles. No   Straight leg exam:    Right: negative    Left: negative   Tenderness over SI joint:      Right: negative     Left:  negative   Tenderness over piriformis:     Right: negative    Left:  negative   Tenderness over Trochanteric Bursa:     Right: negative    Left: negative    Gait pattern:  Able to walk on the heels and toes. Patient has a normal gait.     Neurological:   Deep Tendon Reflex exam:   Biceps:     R:  2/4   L: 2/4   Brachioradialis   R:  2/4   L: 2/4:   Patella:  R:  2/4   L: 2/4   Achilles:  R:  2/4   L: 2/4    Sensory exam:   Light touch: normal bilateral upper and lower extremities    Vibration: normal in bilateral upper extremities and bilateral lower extremities   Sharp: normal in bilateral upper extremities and bilateral lower extremities   No allodynia, dysesthesia, or hyperalgesia.      Previous Diagnostic Tests:   Imaging Studies:   MRI of the cervical spine 4/1/2019  Impression:  1.  Mild multilevel cervical degenerative disc disease as described.  2.  Small broad-based central disc protrusion at C4-5 producing mild flattening of the ventral thecal sac.  No evidence of nerve root compromise or significant central canal stenosis at this level.  3.  Mild left-sided neuroforaminal narrowing at C2-3.        [place] Board of Pharmacy Data Base Reviewed:    NO; No concern for abuse or misuse of controlled medications based on this report.        ASSESSMENT:   Fern Gonzalez is a 58 year old female who presents today with the complaints of:    1. cervicalgia  2. Myofascial pain    She does have a significant amount of tenderness to light palpation to the trapezius on the right side.  The pain that she feels in this area also limits her movement of her neck.  We did talk about a multidisciplinary approach to pain management which may include physical therapy, behavioral health, injection therapy and medication management.  We discussed each 1 of these modalities and how they can help with chronic pain.  I do think a significant amount of the patient's pain is myofascial.  She does however have some degenerative changes which could also contribute to neck pain.    PLAN:    Diagnosis reviewed, treatment option addressed, and risk/benefits discussed.  Self-care instructions given.  I am recommending a multidisciplinary treatment plan to help this patient better manage her pain.       1. Physical Therapy: She currently has a visit scheduled for May 31, 2019.  She will keep this appointment as scheduled  2. Clinical Health Psychologist to address issues of relaxation, behavorial change, coping style, and other factors important to improvement: Not at this time  3. Diagnostic Studies: None at this time  4. Medication Management:   1. We will start on Robaxin 500 mg 3 times a day as needed for muscle spasm/pain  2. She can continue ibuprofen, max dosing is 800 mg 3 times a day or 2400 mg in 24 hours  5. Potential procedures: We will do trigger point injections today.  Please see procedure note below.  We could consider an epidural in the future.  6. Other treatments: Could consider having her see the chiropractor here  7. Recommendations to PCP: Please see above    Follow up: 4 Weeks     TIME SPENT:   A total of 45  minutes was spent on the patient today, greater than 50% of that time was spent on face to face counseling and care coordination regarding  diagnoses and treatment options as mentioned above.  This time was separate then timeout was taken for procedure.    I would like to thank Dr. Rodriguez for allowing me to participate in the management of this patient.     Mariela Joseph PA-C  [facility]    Pre Procedure diagnosis: myofascial Pain  Post procedure Diagnosis: Same  Procedure performed: trigger point injections  Anesthesia: none  Complications: none  Operators: Mariela Joseph PA-C     Indications:   Fern Gonzalez is a 58 year old female with a history of neck and muscle pain.  Exam shows myofascial pain of the muscle groups listed below and they have tried conservative treatment including medications.    Options/alternatives, benefits and risks were discussed with the patient including bleeding, infection, tissue trauma and pnuemothorax.  Questions were answered to her satisfaction and she agrees to proceed. Voluntary informed consent was obtained and signed.     Vitals were reviewed: Yes  Allergies were reviewed:  Yes   Medications were reviewed:  Yes   Pre-procedure pain score: 6/10    Procedure:  After getting informed consent, a Pause for the Cause was performed.    Trigger points were identified by patient, and marked when appropriate.  The area was prepped with Chloroprep.    Using clean technique, injections were completed using a 25G, 1.5 inch needle.  After negative aspiration, injection was completed.  A total of 5 locations were injected.  When possible, tissue was retracted from the chest wall to avoid lung injury.    Muscle groups injected:  Right trapezius   Right levator scapulae    Injection solution contained:  3.5ml of 1% lidocaine and 3.5ml of 0.5% bupivacaine.    Hemostasis was achieved, the area was cleaned, and bandaids were placed when appropriate.  The patient tolerated the procedure well.  Breath sounds were normal.      Post-procedure pain score: 4/10  Follow-up includes:   -repeat prn      Mariela  Opal, PAC  [facility]

## 2019-05-31 ENCOUNTER — HOSPITAL ENCOUNTER (OUTPATIENT)
Dept: PHYSICAL THERAPY | Facility: CLINIC | Age: 59
Setting detail: THERAPIES SERIES
End: 2019-05-31
Attending: NEUROLOGICAL SURGERY
Payer: COMMERCIAL

## 2019-05-31 PROCEDURE — 97112 NEUROMUSCULAR REEDUCATION: CPT | Mod: GP | Performed by: PHYSICAL THERAPIST

## 2019-05-31 PROCEDURE — 97162 PT EVAL MOD COMPLEX 30 MIN: CPT | Mod: GP | Performed by: PHYSICAL THERAPIST

## 2019-06-04 NOTE — ADDENDUM NOTE
Encounter addended by: Elizabeth Guerrero, PT on: 6/4/2019 7:48 AM   Actions taken: Sign clinical note, Flowsheet accepted

## 2019-06-04 NOTE — PROGRESS NOTES
05/31/19 1500   General Information   Type of Visit Initial OP Ortho PT Evaluation   Start of Care Date 05/31/19   Referring Physician Shaggy Rodriguez MD   Patient/Family Goals Statement not sure  - get rid of pain   Orders Evaluate and Treat   Date of Order 04/19/19   Certification Required? No   Medical Diagnosis Cervicalgia   Surgical/Medical history reviewed Yes   Precautions/Limitations no known precautions/limitations   Weight-Bearing Status - LUE full weight-bearing   Weight-Bearing Status - RUE full weight-bearing   Weight-Bearing Status - LLE full weight-bearing   Weight-Bearing Status - RLE full weight-bearing       Present No   Body Part(s)   Body Part(s) Cervical Spine   Presentation and Etiology   Pertinent history of current problem (include personal factors and/or comorbidities that impact the POC) 59 yo female with history of neck and L arm symptoms. She had PT in 2007 and this was 100% effective. She also received exercises but forgot what they were. She works in . She has to lift 50# to 1# parts such as a door and she has to lift off skid. She does get a partner when weight is over 50#. She has been there x 1 yr on 5-7-19. One week before April 1, 2019 was lifted a door off skid and had to turn and she placed it on her head to balance and carry it. She felt like her spine was compressing. She had a CT of head and neck. She has a sister who has headaches and chronic pain history. No change cough sneeze or swallowing. No issues with deep breathing, vision - sees spots before migraines from neck.    Impairments A. Pain;D. Decreased ROM;F. Decreased strength and endurance;C. Swelling;K. Numbness;N. Headaches;Q. Dizziness  (swelling decr in R upper trap. )   Functional Limitations perform activities of daily living;perform required work activities;perform desired leisure / sports activities   Symptom Location Head: most behind R eye, base of skull bilaterally  "and stright up head and into the R eye and can go across to L eye as well: now: 6/10, range: 2-3/10 to 10/10 or beyond; Neck: base of skull and upper traps up center of spine to skull; excrucuating throbbing stabbing, now: 6/10, range: 2-3/10 to 10/10. L arm symptoms: tennis elbow as with repeated motion she cannot straighten and it is burning and tight mostly elbow and radial aspect of distal forearm now: 4/10, range: 1/10 to 10/10   How/Where did it occur At work  (carrying door on top of head/sleeping at sister cabin curled)   Onset date of current episode/exacerbation 03/25/19  (approx one week before 4-1-19)   Chronicity Chronic   Frequency of pain/symptoms A. Constant   Pain/symptoms are: Other   Pain symptoms comment wakes at night with it and worses throughout the day at work based on what she is doing eg looking up with inventory, looking down and lifting \"just hurts constantly\"   Pain/symptoms exacerbated by M. Other   Pain exacerbation comment L cervical rotation (dizzy, faint and throw up feeling), worse at rest, work tasks,sitting (45 minutes approx), driving (x 2 hours is pushing it),    Pain/symptoms eased by K. Other   Pain eased by comment Moving neck, muscle relaxant to sleep, cool pack   Progression of symptoms since onset: Worsened   Current / Previous Interventions   Diagnostic Tests: CT scan   CT Results Results   CT results Disc bulges and degenerative changes dissecation with broad based central disc protrusion and mild flattening of the ventral thecal sac see EPIC   Prior Level of Function   Functional Level Prior Comment independent   Current Level of Function   Current Community Support   (daughter lives 3 miles from her, lives alone)   Patient role/employment history A. Employed   Employment Comments see above   Living environment House/townhome   Home/community accessibility no concerns   Fall Risk Screen   Fall screen completed by PT   Have you fallen 2 or more times in the past year? " No   Have you fallen and had an injury in the past year? No   Is patient a fall risk? No   Abuse Screen (yes response referral indicated)   Feels Unsafe at Home or Work/School no   Feels Threatened by Someone no   Does Anyone Try to Keep You From Having Contact with Others or Doing Things Outside Your Home? no   Physical Signs of Abuse Present no   Functional Scales   Functional Scales Other   Other Scales  NDI: 10-26-26-52  (we discussed results)   Cervical Spine   Observation uncomfortable moving head a lot   Integumentary  negative   Posture forward head, L shoulder forward,    Cervical Flexion ROM 39 degrees with pain   Cervical Extension ROM 52 degrees with dizziness   Cervical Right Side Bending ROM 40 degrees with feeling of being super tight   Cervical Left Side Bending ROM 42 degrees with pain more than going to the R due to right sided pulling   Cervical Right Rotation ROM 30 degrees   Cervical Left Rotation ROM 80 degrees   Cervical/Thoracic/Shoulder ROM Comments No change with protraction, light pulling with end range cervical retraction   Shoulder/Wrist/Hand Strength Comments WNL bilateral UEs   Spurling Test negative   Cervical Distraction Test positive   Palpation Tightness upper traps and scalenes into mid thaoracic paraspinal muascles with some sensitivity with PA of the thoracic spine   Planned Therapy Interventions   Planned Therapy Interventions Comment Cervical pain with arm symptoms and decreased ROM   Planned Modality Interventions   Planned Modality Interventions Comments as needed for symptoms, traction may be of benefit   Clinical Impression   Criteria for Skilled Therapeutic Interventions Met yes, treatment indicated   PT Diagnosis Cervical pain   Influenced by the following impairments tightness, headache chronicity   Functional limitations due to impairments looking around environment especially L rotation which creates dizziness, resting   Clinical Presentation Evolving/Changing    Clinical Presentation Rationale chronicity, radiation of symptoms, decreased ROM   Clinical Decision Making (Complexity) Moderate complexity   Predicted Duration of Therapy Intervention (days/wks) 2 times per week x 6 weeks   Risk & Benefits of therapy have been explained Yes   Patient, Family & other staff in agreement with plan of care Yes   Clinical Impression Comments Fern has symptoms that may be facet related with decreased ROM and neural symptoms. Plan to assess neural glides next session. She is able to complete the cervical retraction and scapular sets without issues.    Education Assessment   Preferred Learning Style Listening;Demonstration;Reading   Barriers to Learning No barriers   ORTHO GOALS   PT Ortho Eval Goals 1;2   Ortho Goal 1   Goal Identifier AROM   Goal Description Fern will be able to look to her L without causing dizziness for driving and looking safely around her environment   Target Date 06/28/19   Ortho Goal 2   Goal Identifier Centering symptoms   Goal Description Fern will be able to complete her positioning and posture, home program to center her L arm symptoms so she can sleep at night and be able to rest without symptoms increasing.    Target Date 07/12/19   Total Evaluation Time   PT Elsy, Moderate Complexity Minutes ([id number]) 37     Thank you for referring Fern  to [facility] [place]    Elizabeth Guerrero, PT  [phone]

## 2019-06-11 NOTE — PROGRESS NOTES
[facility]    CHIEF COMPLAINT:   Pain  -Neck pain    INTERVAL HISTORY:  Last seen on 5/15/19.        Recommendations/plan at the last visit included:  1. Physical Therapy: She currently has a visit scheduled for May 31, 2019.  She will keep this appointment as scheduled  2. Clinical Health Psychologist to address issues of relaxation, behavorial change, coping style, and other factors important to improvement: Not at this time  3. Diagnostic Studies: None at this time  4. Medication Management:   1. We will start on Robaxin 500 mg 3 times a day as needed for muscle spasm/pain  2. She can continue ibuprofen, max dosing is 800 mg 3 times a day or 2400 mg in 24 hours  5. Potential procedures: We will do trigger point injections today.  Please see procedure note below.  We could consider an epidural in the future.  6. Other treatments: Could consider having her see the chiropractor here  7. Recommendations to PCP: Please see above     Follow up: 4 Weeks     Since her last visit, Fern Gonzalez reports:  - Her activity level was the same, but her mood has been worse.    - The trigger point injections did not start working until 3-4 days afterwards. It started up again about 2 weeks ago. She reports about 2 weeks of relief. She takes the Robaxin at night. It does help her get sleep, but she will wake up and feels like her neck is kinked. She then will do her stretches and then go back to sleep. She is unable to take the muscle relaxant during the day due to fatigue.    Pain Information:   Pain quality: Burning, tiring, shooting, exhausting, throbbing, penetrating, stabbing, miserable   Pain rating: intensity ranges from 3/10 to 8/10, and averages 4/10 on a 0-10 scale.   Pain today 6/10    SELF CARE:   How often do you practice SELF-CARE (relaxing, stretching, pacing, monitoring posture, taking mini-breaks) in a typical day:  Trying to do daily      CURRENT RELEVANT PAIN MEDICATIONS:   Robaxin 500mg  "at bedtime  Ibuprofen 6 tabs today, does not take this much every day, does take at least 3/day      Patient is using the medication as prescribed:  YES  Is your medication helpful? somewhat  Medication side effects? sedation    Previous Medications: (H--helped; HI--Helped initially; SWH-- somewhat helpful, NH--No help; W--worse; SE--side effects)   Opiates: Codeine NH SE vomiting, Hydrocodone NH SE vomiting, Hydromorphone H, Morphine NH SE vomiting, Oxycodone NH SE vomiting, Tramadol H  NSAIDS: Ibuprofen H, Toradol ?, Aleve H  Anti-migraine mediations: Prednisone SWH  Muscle Relaxants: Robaxin SWH SE sedation  Neuropathics: Gabapentin SE nightmares  Anti-depressants: none  Anxiolytics: none  Topicals: none  Sleep aids: none  Other medications not covered above: none        Past Pain Treatments:  Pain Clinic: No   PT: Yes, last done in 2007  Psychologist: No  Relaxation techniques/biofeedback: No  Chiropractor: Yes, in 2007, it did not work. She was told her spine was \"too compact\" and that they couldn't do anything  Acupuncture: No  Pharmacotherapy:               Opioids: No                Non-opioids:    Yes   TENs Unit:Yes at PT, was very helpful.   Injections: No  Self-care:   Ice pack, heating pads  Surgeries related to pain: No      Current Pain Relevant Medications:    Ibuprofen 6 tabs today, does not take this much every day, does take at least 3/day        [place] Board of Pharmacy Data Base Reviewed:    YES; As expected, no concern for misuse/abuse of controlled medications based on this report.    Medications:  Current Outpatient Medications   Medication Sig Dispense Refill     cetirizine (ZYRTEC) 10 MG tablet Take 10 mg by mouth       DIETARY MANAGEMENT PRODUCT PO        Fexofenadine HCl (ALLEGRA PO)        Ibuprofen (ADVIL PO) Take 600 mg by mouth       methocarbamol (ROBAXIN) 500 MG tablet Take 1 tablet (500 mg) by mouth 3 times daily 90 tablet 0     Naproxen Sodium (ALEVE PO)          Review of " "Systems: A 10-point review of systems was negative, with the exception of chronic pain issues, allergies, constipation and stress.      Social History: Reviewed; unchanged from previous consultation.      Family history: Reviewed; unchanged from previous consultation.     PHYSICAL EXAM:     Vitals:   /76   Temp 98.4  F (36.9  C) (Temporal)   Ht 1.486 m (4' 10.5\")   Wt 69.4 kg (153 lb)   LMP  (LMP Unknown)   BMI 31.43 kg/m    Body mass index is 31.43 kg/m .  4' 10.5\"  153 lbs 0 oz        Constitutional: healthy, alert and no distress  HEENT: Head atraumatic, normocephalic. Eyes without conjunctival injection or jaundice. Neck supple. No obvious neck masses.  Skin: No rash, lesions, or petechiae of exposed skin.   Psychiatric/mental status: Alert, without lethargy or stupor. Appropriate affect. Mood normal.       DIAGNOSTIC TESTS:  Imaging Studies:   no new imaging to review    Assessment:  Fern Gonzalez is a 58 year old female who presents today for follow up regarding her:    1.  cervicalgia  2.  Myofascial pain    She did get a couple of weeks of relief with the trigger point injections.  She is interested in trying something that can provide longer lasting relief.  We discussed doing an epidural steroid injection.  She is interested in this.  She would however like to repeat the trigger point injections while waiting for the epidural to be performed.  We could do that today.    Plan:    Diagnosis reviewed, treatment option addressed, and risk/benifits discussed.  Self-care instructions given.  I am recommending a multidisciplinary treatment plan to help this patient better manage pain.      1. Physical Therapy:  YES, continue current plan  2. Clinical Health Psychologist:  NO    3. Diagnostic Studies: None at this time  4. Medication Management:    1. Okay to take 1 and half tablets of Robaxin at bedtime  2. Lidocaine patches: Apply 1-3 patches to area of pain.  On for 12 hours off for 12 " hours  5. Further procedures recommended: Referred for cervical epidural.  If this does not provide long lasting relief we could consider facet mediated injections.  Trigger points today.  Please see procedure note below.  6. Recommendations to PCP.  See above      Follow up with this provider: 2 weeks after the epidural    Total time spent face to face was 15 minutes and more than 50% of face to face time was spent in counseling and/or coordination of care regarding the diagnosis and recommendations above.This was separate than time spent on procedure.      Mariela Joseph PA-C   [facility]    Pre Procedure Diagnosis: myofascial pain  Post procedure Diagnosis: Same  Procedure performed: trigger point injections  Anesthesia: none  Complications: none  Operators: Mariela Joseph PA-C     Indications:   Fern Gonzalez is a 58 year old female with a history of neck pain.  Exam shows myofascial pain of the muscle groups listed below and they have tried conservative treatment including medications.    Options/alternatives, benefits and risks were discussed with the patient including bleeding, infection, tissue trauma and pnuemothorax.  Questions were answered to her satisfaction and she agrees to proceed. Voluntary informed consent was obtained and signed.     Vitals were reviewed: Yes  Allergies were reviewed:  Yes   Medications were reviewed:  Yes   Pre-procedure pain score: 6/10    Procedure:  After getting informed consent, a Pause for the Cause was performed.    Trigger points were identified by patient, and marked when appropriate.  The area was prepped with Chloroprep.    Using clean technique, injections were completed using a 25G, 1.5 inch needle.  After negative aspiration, injection was completed.  A total of 6 locations were injected.  When possible, tissue was retracted from the chest wall to avoid lung injury.    Muscle groups injected:  Right trapezius  Right levator  scapuale    Injection solution contained:  5ml of 1% lidocaine and 5ml of 0.5% bupivacaine.    Hemostasis was achieved, the area was cleaned, and bandaids were placed when appropriate.  The patient tolerated the procedure well.  Breath sounds were normal.      Post-procedure pain score: 4-5/10  Follow-up includes:   -repeat prn      Mariela Joseph, PAC  [facility]

## 2019-06-12 ENCOUNTER — OFFICE VISIT (OUTPATIENT)
Dept: PALLIATIVE MEDICINE | Facility: CLINIC | Age: 59
End: 2019-06-12
Payer: COMMERCIAL

## 2019-06-12 VITALS
TEMPERATURE: 98.4 F | WEIGHT: 153 LBS | HEIGHT: 59 IN | SYSTOLIC BLOOD PRESSURE: 128 MMHG | DIASTOLIC BLOOD PRESSURE: 76 MMHG | BODY MASS INDEX: 30.84 KG/M2

## 2019-06-12 DIAGNOSIS — M54.2 CERVICALGIA: Primary | ICD-10-CM

## 2019-06-12 PROCEDURE — 20553 NJX 1/MLT TRIGGER POINTS 3/>: CPT | Performed by: PHYSICIAN ASSISTANT

## 2019-06-12 PROCEDURE — 99213 OFFICE O/P EST LOW 20 MIN: CPT | Mod: 25 | Performed by: PHYSICIAN ASSISTANT

## 2019-06-12 ASSESSMENT — MIFFLIN-ST. JEOR: SCORE: 1181.69

## 2019-06-12 ASSESSMENT — PAIN SCALES - GENERAL: PAINLEVEL: SEVERE PAIN (6)

## 2019-06-12 NOTE — PATIENT INSTRUCTIONS
After Visit Instructions:     Thank you for coming to [facility] for your care. It is my goal to partner with you to help you reach your optimal state of health.     I am recommending multidisciplinary care at this time.  The focus of care will be to continue gradual rehabilitation and pain management with medication adjustments as needed.    Continue daily self-care, identifying contributing factors, and monitoring variations in pain level. Continue to integrate self-care into your life.        1. Schedule physical therapy assessment/visit: continue current plan  2. Schedule follow-up with Mariela Joseph PA-C 2 weeks after epidural. You will need to make this appointment.   3. Procedures recommended: referred for cervical epidural. Trigger point injections today, see below   4. Medication recommendations:   1. Okay to take Robaxin 1.5 tablets at bedtime  2. Lidocaine Patches: Apply 1-3 patches to area of pain. On for 12 hours, off for 12 hours.       Mariela Joseph PA-C  [facility]    Contact information: [facility]  Clinic Number:  [phone]   Call this number with any questions about your care and for scheduling assistance. Calls are returned Monday through Friday between 8 AM and 4:30 PM. We usually get back to you within 2 business days depending on the issue/request.           Medication Refills Policy:    For non-narcotic medications, please your pharmacy directly to request a refill and the pharmacy will call the Pain Management Center for authorization. Please allow 3-4 days for these refills.    For narcotic refills, call the nurse triage line and leave a message requesting your refill along with the name of the pharmacy that you use. Narcotic prescriptions will be mailed to your pharmacy or you may pick them up at the clinic.  Please call 7-10 days before your refill is due  The above policy allows adequate  time so that you do not run out of medication.    No Show - Late Cancellation - Late Arrival Policy  We believe regular attendance is key to your success in our program.    Any time you are unable to keep your appointment we ask that you call us at least 24 hours in advance to let us know. This will allow us to offer the appointment time to another patient. The following is our policy for missed appointments. This also applies to appointments cancelled with less than 24 hours notice.    After missing 3 appointments without calling first, we will cancel all of your future appointments at [facility].    At that point, you will not be able to resume services unless approved by your care team  We understand that unforseen circumstances arise, however, out of respect for all concerned and to provide this appointment to another patient, this policy will be enforced.    Please note that most follow up appointments are 30 minutes long. If you arrive late, your provider may not be able to see you for the entire 30 minutes. Please also note that if you arrive more than 15 minutes for any appointment, it may be rescheduled.    [facility]   Post Procedure Instructions    Today you had:  trigger point injections     Medications used:  lidocaine   bupivicaine          Go to the emergency room if you develop any shortness of breath    Monitor the injection sites for signs and symptoms of infection-fever, chills, redness, swelling, warmth, or drainage to areas.    You may have soreness at injection sites for up to 24 hours.    You may apply ice to the painful areas to help minimize the discomfort of the needle pokes.    Do not apply heat to sites for at least 12 hours.    You may use anti-inflammatory medications or Tylenol for pain control if necessary    Pain Clinic phone number during work hours Monday-Friday:  [phone]    After hours provider line: [phone]

## 2019-06-13 ENCOUNTER — TELEPHONE (OUTPATIENT)
Dept: PALLIATIVE MEDICINE | Facility: CLINIC | Age: 59
End: 2019-06-13

## 2019-06-13 ENCOUNTER — TELEPHONE (OUTPATIENT)
Dept: SURGERY | Facility: CLINIC | Age: 59
End: 2019-06-13

## 2019-06-13 NOTE — TELEPHONE ENCOUNTER
Patient returned call  to schedule DIGNA  Date: 4/11/19   Time: 400pm  Dr. Rider    Instructed pt to have H&P and  for procedure.

## 2019-06-13 NOTE — TELEPHONE ENCOUNTER
Left detailed message requesting patient return call if she has any questions or concerns regarding the trigger point injections she received in clinic yesterday.  Radha Pathak, CMA

## 2019-06-19 ENCOUNTER — HOSPITAL ENCOUNTER (OUTPATIENT)
Dept: PHYSICAL THERAPY | Facility: OTHER | Age: 59
Setting detail: THERAPIES SERIES
End: 2019-06-19
Attending: NEUROLOGICAL SURGERY
Payer: COMMERCIAL

## 2019-06-19 PROCEDURE — 97110 THERAPEUTIC EXERCISES: CPT | Mod: GP | Performed by: PHYSICAL THERAPIST

## 2019-06-19 PROCEDURE — 97530 THERAPEUTIC ACTIVITIES: CPT | Mod: GP | Performed by: PHYSICAL THERAPIST

## 2019-06-24 NOTE — PROGRESS NOTES
Answers for HPI/ROS submitted by the patient on 2019   PHQ9 TOTAL SCORE: 10  YUAN 7 TOTAL SCORE: 6    [facility]  [id number]  [place] [address] [phone]  Dept: [phone]    PRE-OP EVALUATION:  Today's date: 2019    Fern Gonzalez (: 1960) presents for pre-operative evaluation assessment as requested by Dr. Rider.  She requires evaluation and anesthesia risk assessment prior to undergoing surgery/procedure for treatment of epidural.    Fax number for surgical facility: [facility]  Primary Physician: No Ref-Primary, Physician  Type of Anesthesia Anticipated: Local with MAC    Patient has a Health Care Directive or Living Will:  NO    Preop Questions 2019   Who is doing your surgery? Dr. Rider   What are you having done? epidural in my neck   Date of Surgery/Procedure: 2019   Facility or Hospital where procedure/surgery will be performed: [facility]   1.  Do you have a history of Heart attack, stroke, stent, coronary bypass surgery, or other heart surgery? No   2.  Do you ever have any pain or discomfort in your chest? No   3.  Do you have a history of  Heart Failure? No   4.   Are you troubled by shortness of breath when:  walking on a level surface, or up a slight hill, or at night? No   5.  Do you currently have a cold, bronchitis or other respiratory infection? No   6.  Do you have a cough, shortness of breath, or wheezing? YES - allergies   7.  Do you sometimes get pains in the calves of your legs when you walk? No   8. Do you or anyone in your family have previous history of blood clots? No   9.  Do you or does anyone in your family have a serious bleeding problem such as prolonged bleeding following surgeries or cuts? No   10. Have you ever had problems with anemia or been told to take iron pills? No   11. Have you had any abnormal blood loss such as black, tarry or bloody stools, or abnormal vaginal bleeding? No   12.  Have you ever had a blood transfusion? No   13. Have you or any of your relatives ever had problems with anesthesia? No   14. Do you have sleep apnea, excessive snoring or daytime drowsiness? YES - daytime drowiness   15. Do you have any prosthetic heart valves? No   16. Do you have prosthetic joints? No   17. Is there any chance that you may be pregnant? No         HPI:     HPI related to upcoming procedure:     Major depression- not currently on any medications.  Daughter age 34 with ovarian cancer- chemo and surgery.  Next scans are August.        See problem list for active medical problems.  Problems all longstanding and stable, except as noted/documented.  See ROS for pertinent symptoms related to these conditions.      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, right 11/08/2016     Priority: Medium     Lateral epicondylitis of right elbow 06/02/2016     Priority: Medium     Bilateral carpal tunnel syndrome 06/02/2016     Priority: Medium     Respiratory distress 09/01/2015     Priority: Medium     Wheezing-associated respiratory infection 09/01/2015     Priority: Medium     Major depressive disorder, recurrent episode, moderate (H) 05/14/2015     Priority: Medium     Tobacco use disorder 04/08/2015     Priority: Medium     Anxiety 08/15/2014     Priority: Medium     CARDIOVASCULAR SCREENING; LDL GOAL LESS THAN 160 10/07/2011     Priority: Medium     Meningitis due to viruses not elsewhere classified 10/03/2011     Priority: Medium     Herpes zoster with meningitis 10/03/2011     Priority: Medium     Chronic cholecystitis 01/22/2007     Priority: Medium      Past  Medical History:   Diagnosis Date     Diagnostic skin and sensitization tests (aka ALLERGENS) 10/29/15 skin tests pos. for: T >> dog/DM only.      Esophageal reflux      Headache(784.0) 10/11/11    Admitted, suspected Viral cephalitis, meningitis     Migraine, unspecified, without mention of intractable migraine without mention of status migrainosus      Recurrent sinusitis      Varicella meningitis      Past Surgical History:   Procedure Laterality Date     C  DELIVERY ONLY      3 times     CHOLECYSTECTOMY, LAPOROSCOPIC  2007    Lysis of intestinal adhesions.     CL AFF SURGICAL PATHOLOGY       COLONOSCOPY N/A 10/31/2014    Procedure: COMBINED COLONOSCOPY, SINGLE OR MULTIPLE BIOPSY/POLYPECTOMY BY BIOPSY;  Surgeon: Leonard Dumont MD;  Location: [facility]     HC LAP,FULGURATE/EXCISE LESIONS  10/04    mesh present as had abdominal wall surgery     HC UGI ENDOSCOPY, SIMPLE EXAM  10/26/2006     HYSTERECTOMY TOTAL ABDOMINAL  2007     RELEASE CARPAL TUNNEL Right 2016    Procedure: RELEASE CARPAL TUNNEL;  Surgeon: Leif Fine MD;  Location: [facility]     RELEASE CARPAL TUNNEL Left 10/5/2016    Procedure: RELEASE CARPAL TUNNEL;  Surgeon: Leif Fine MD;  Location: [facility] OR     RELEASE CARPAL TUNNEL Right 2016    Procedure: RELEASE CARPAL TUNNEL;  Surgeon: Leif Fine MD;  Location: [facility] OR     Current Outpatient Medications   Medication Sig Dispense Refill     cetirizine (ZYRTEC) 10 MG tablet Take 10 mg by mouth       Fexofenadine HCl (ALLEGRA PO)        Ibuprofen (ADVIL PO) Take 600 mg by mouth       methocarbamol (ROBAXIN) 500 MG tablet Take 1 tablet (500 mg) by mouth 3 times daily 90 tablet 0     DIETARY MANAGEMENT PRODUCT PO        Naproxen Sodium (ALEVE PO)        OTC products: vitamins, Advil, allegra     Allergies   Allergen Reactions     Cephalexin Hcl Hives     Codeine Nausea and Vomiting     significant     Gabapentin Nausea and Vomiting     Morphine Hcl Nausea and  "Vomiting     Percocet [Oxycodone-Acetaminophen] Nausea and Vomiting     Significant; pt reports that plain Oxycodone she did fine with     Valium [Diazepam] Other (See Comments)     \"terrible nightmare\"     Vicodin [Hydrocodone-Acetaminophen] Nausea and Vomiting     significant     Cefazolin Rash      Latex Allergy: NO    Social History     Tobacco Use     Smoking status: Current Every Day Smoker     Packs/day: 0.50     Years: 0.50     Pack years: 0.25     Types: Cigarettes     Smokeless tobacco: Never Used     Tobacco comment: uses ecig now   Substance Use Topics     Alcohol use: Yes     Alcohol/week: 0.0 oz     Comment: \"very rarely\"     History   Drug Use No       REVIEW OF SYSTEMS:   CONSTITUTIONAL: NEGATIVE for fever, chills, change in weight  INTEGUMENTARY/SKIN: NEGATIVE for worrisome rashes, moles or lesions  EYES: NEGATIVE for vision changes or irritation  ENT/MOUTH: NEGATIVE for ear, mouth and throat problems  RESP: NEGATIVE for significant cough or SOB  BREAST: NEGATIVE for masses, tenderness or discharge  CV: NEGATIVE for chest pain, palpitations or peripheral edema  GI: NEGATIVE for nausea, abdominal pain, heartburn, or change in bowel habits  : NEGATIVE for frequency, dysuria, or hematuria  MUSCULOSKELETAL: NEGATIVE for significant arthralgias or myalgia  NEURO: NEGATIVE for weakness, dizziness or paresthesias  ENDOCRINE: NEGATIVE for temperature intolerance, skin/hair changes  HEME: NEGATIVE for bleeding problems  PSYCHIATRIC: NEGATIVE for changes in mood or affect    EXAM:   /84   Pulse 82   Temp 98.1  F (36.7  C) (Temporal)   Resp 16   Ht 1.486 m (4' 10.5\")   Wt 68.2 kg (150 lb 6.4 oz)   LMP  (LMP Unknown)   BMI 30.90 kg/m      GENERAL APPEARANCE: healthy, alert and no distress     EYES: EOMI, PERRL     HENT: ear canals and TM's normal and nose and mouth without ulcers or lesions     NECK: no adenopathy, no asymmetry, masses, or scars and thyroid normal to palpation     RESP: lungs " clear to auscultation - no rales, rhonchi or wheezes     CV: regular rates and rhythm, normal S1 S2, no S3 or S4 and no murmur, click or rub     ABDOMEN:  soft, nontender, no HSM or masses and bowel sounds normal     MS: extremities normal- no gross deformities noted, no evidence of inflammation in joints, FROM in all extremities.     SKIN: no suspicious lesions or rashes     NEURO: Normal strength and tone, sensory exam grossly normal, mentation intact and speech normal     PSYCH: mentation appears normal. and affect normal/bright     LYMPHATICS: No cervical adenopathy    DIAGNOSTICS:   EKG: appears normal, NSR, normal axis, normal intervals, no acute ST/T changes c/w ischemia, no LVH by voltage criteria, unchanged from previous tracings    Recent Labs   Lab Test 11/18/16  1140 09/01/15  1048 03/13/15  1031  10/11/11  1655  09/29/11  1152   HGB 12.9 13.4 13.2   < > 12.3   < >  --    PLT  --  158 220   < > 278   < >  --    INR  --   --   --   --  1.03  --  1.08    140  --   --  142   < >  --    POTASSIUM 3.9 3.2*  --   --  3.8   < >  --    CR 0.63 0.56  --   --  0.54   < >  --     < > = values in this interval not displayed.        IMPRESSION:   Reason for surgery/procedure: neck pain  Diagnosis/reason for consult: herniated disc    The proposed surgical procedure is considered LOW risk.    REVISED CARDIAC RISK INDEX  The patient has the following serious cardiovascular risks for perioperative complications such as (MI, PE, VFib and 3  AV Block):  No serious cardiac risks  INTERPRETATION: 0 risks: Class I (very low risk - 0.4% complication rate)Answers for HPI/ROS submitted by the patient on 6/27/2019   PHQ9 TOTAL SCORE: 10  YUAN 7 TOTAL SCORE: 6      The patient has the following additional risks for perioperative complications:  No identified additional risks  The ASCVD Risk score (Tomás FENG Jr., et al., 2013) failed to calculate for the following reasons:    Cannot find a previous HDL lab    Cannot find a  previous total cholesterol lab  Morbid obesity      ICD-10-CM    1. Preop general physical exam Z01.818 EKG 12-lead complete w/read - Clinics   2. Major depressive disorder, recurrent episode, moderate (H) F33.1 Not in remission.  Declines medication or counseling.     3. Anxiety F41.9    4. Personal history of tobacco use Z87.891 Prof Fee: Shared Decision Making Visit for Lung Cancer Screening   5. Encounter for screening mammogram for breast cancer Z12.31 *MA Screening Digital Bilateral       RECOMMENDATIONS:       Cardiovascular Risk  Performs 4 METs exercise without symptoms (Light housework (dusting, washing dishes)) .       --Patient is to take all scheduled medications on the day of surgery EXCEPT for modifications listed below.    APPROVAL GIVEN to proceed with proposed procedure, without further diagnostic evaluation       Signed Electronically by: Mirta Fan NP    Copy of this evaluation report is provided to requesting physician.    [place] Preop Guidelines    Revised Cardiac Risk Index  Lung Cancer Screening Shared Decision Making Visit     Fern Gonzalez is not eligible for lung cancer screening on the basis of the information provided in my signed lung cancer screening order. eFrn's smoking history is below the threshold and so it is not recommended.    Patient is currently a smoker and so we did discuss that the only way to prevent lung cancer is to not smoke. Smoking cessation assistance was offered.    ShouldIScreen    Answers for HPI/ROS submitted by the patient on 6/27/2019   PHQ9 TOTAL SCORE: 10  YUAN 7 TOTAL SCORE: 6

## 2019-06-27 ENCOUNTER — OFFICE VISIT (OUTPATIENT)
Dept: FAMILY MEDICINE | Facility: OTHER | Age: 59
End: 2019-06-27
Payer: COMMERCIAL

## 2019-06-27 VITALS
HEIGHT: 59 IN | WEIGHT: 150.4 LBS | BODY MASS INDEX: 30.32 KG/M2 | TEMPERATURE: 98.1 F | RESPIRATION RATE: 16 BRPM | HEART RATE: 82 BPM | DIASTOLIC BLOOD PRESSURE: 84 MMHG | SYSTOLIC BLOOD PRESSURE: 116 MMHG

## 2019-06-27 DIAGNOSIS — F33.1 MAJOR DEPRESSIVE DISORDER, RECURRENT EPISODE, MODERATE (H): ICD-10-CM

## 2019-06-27 DIAGNOSIS — Z01.818 PREOP GENERAL PHYSICAL EXAM: Primary | ICD-10-CM

## 2019-06-27 DIAGNOSIS — F41.9 ANXIETY: ICD-10-CM

## 2019-06-27 DIAGNOSIS — Z87.891 PERSONAL HISTORY OF TOBACCO USE: ICD-10-CM

## 2019-06-27 DIAGNOSIS — Z12.31 ENCOUNTER FOR SCREENING MAMMOGRAM FOR BREAST CANCER: ICD-10-CM

## 2019-06-27 PROCEDURE — 93000 ELECTROCARDIOGRAM COMPLETE: CPT | Performed by: NURSE PRACTITIONER

## 2019-06-27 PROCEDURE — 99214 OFFICE O/P EST MOD 30 MIN: CPT | Performed by: NURSE PRACTITIONER

## 2019-06-27 ASSESSMENT — PATIENT HEALTH QUESTIONNAIRE - PHQ9
SUM OF ALL RESPONSES TO PHQ QUESTIONS 1-9: 10
SUM OF ALL RESPONSES TO PHQ QUESTIONS 1-9: 10

## 2019-06-27 ASSESSMENT — ANXIETY QUESTIONNAIRES
2. NOT BEING ABLE TO STOP OR CONTROL WORRYING: SEVERAL DAYS
GAD7 TOTAL SCORE: 6
6. BECOMING EASILY ANNOYED OR IRRITABLE: SEVERAL DAYS
GAD7 TOTAL SCORE: 6
1. FEELING NERVOUS, ANXIOUS, OR ON EDGE: NOT AT ALL
3. WORRYING TOO MUCH ABOUT DIFFERENT THINGS: MORE THAN HALF THE DAYS
5. BEING SO RESTLESS THAT IT IS HARD TO SIT STILL: SEVERAL DAYS
4. TROUBLE RELAXING: SEVERAL DAYS
7. FEELING AFRAID AS IF SOMETHING AWFUL MIGHT HAPPEN: NOT AT ALL
7. FEELING AFRAID AS IF SOMETHING AWFUL MIGHT HAPPEN: NOT AT ALL
GAD7 TOTAL SCORE: 6

## 2019-06-27 ASSESSMENT — PAIN SCALES - GENERAL: PAINLEVEL: NO PAIN (0)

## 2019-06-27 ASSESSMENT — MIFFLIN-ST. JEOR: SCORE: 1169.9

## 2019-06-28 ASSESSMENT — PATIENT HEALTH QUESTIONNAIRE - PHQ9: SUM OF ALL RESPONSES TO PHQ QUESTIONS 1-9: 10

## 2019-06-28 ASSESSMENT — ANXIETY QUESTIONNAIRES: GAD7 TOTAL SCORE: 6

## 2019-07-11 ENCOUNTER — ANESTHESIA EVENT (OUTPATIENT)
Dept: SURGERY | Facility: CLINIC | Age: 59
End: 2019-07-11
Payer: COMMERCIAL

## 2019-07-11 ENCOUNTER — HOSPITAL ENCOUNTER (OUTPATIENT)
Dept: GENERAL RADIOLOGY | Facility: CLINIC | Age: 59
End: 2019-07-11
Attending: ANESTHESIOLOGY | Admitting: ANESTHESIOLOGY
Payer: COMMERCIAL

## 2019-07-11 ENCOUNTER — ANESTHESIA (OUTPATIENT)
Dept: SURGERY | Facility: CLINIC | Age: 59
End: 2019-07-11
Payer: COMMERCIAL

## 2019-07-11 ENCOUNTER — HOSPITAL ENCOUNTER (OUTPATIENT)
Facility: CLINIC | Age: 59
Discharge: HOME | End: 2019-07-11
Attending: ANESTHESIOLOGY | Admitting: ANESTHESIOLOGY
Payer: COMMERCIAL

## 2019-07-11 VITALS
DIASTOLIC BLOOD PRESSURE: 84 MMHG | RESPIRATION RATE: 16 BRPM | HEIGHT: 59 IN | WEIGHT: 150.4 LBS | BODY MASS INDEX: 30.32 KG/M2 | OXYGEN SATURATION: 97 % | SYSTOLIC BLOOD PRESSURE: 119 MMHG | HEART RATE: 80 BPM

## 2019-07-11 DIAGNOSIS — M54.2 CERVICALGIA: ICD-10-CM

## 2019-07-11 PROCEDURE — 25000128 H RX IP 250 OP 636: Performed by: ANESTHESIOLOGY

## 2019-07-11 PROCEDURE — 40000277 XR SURGERY CARM FLUORO LESS THAN 5 MIN W STILLS: Mod: TC

## 2019-07-11 PROCEDURE — 25000125 ZZHC RX 250: Performed by: NURSE ANESTHETIST, CERTIFIED REGISTERED

## 2019-07-11 PROCEDURE — 25800030 ZZH RX IP 258 OP 636: Performed by: NURSE ANESTHETIST, CERTIFIED REGISTERED

## 2019-07-11 PROCEDURE — 62321 NJX INTERLAMINAR CRV/THRC: CPT | Performed by: ANESTHESIOLOGY

## 2019-07-11 PROCEDURE — 37000008 ZZH ANESTHESIA TECHNICAL FEE, 1ST 30 MIN: Performed by: ANESTHESIOLOGY

## 2019-07-11 PROCEDURE — 25000128 H RX IP 250 OP 636: Performed by: NURSE ANESTHETIST, CERTIFIED REGISTERED

## 2019-07-11 RX ORDER — IOPAMIDOL 612 MG/ML
INJECTION, SOLUTION INTRATHECAL PRN
Status: DISCONTINUED | OUTPATIENT
Start: 2019-07-11 | End: 2019-07-11 | Stop reason: HOSPADM

## 2019-07-11 RX ORDER — TRIAMCINOLONE ACETONIDE 40 MG/ML
INJECTION, SUSPENSION INTRA-ARTICULAR; INTRAMUSCULAR PRN
Status: DISCONTINUED | OUTPATIENT
Start: 2019-07-11 | End: 2019-07-11 | Stop reason: HOSPADM

## 2019-07-11 RX ORDER — PROPOFOL 10 MG/ML
INJECTION, EMULSION INTRAVENOUS PRN
Status: DISCONTINUED | OUTPATIENT
Start: 2019-07-11 | End: 2019-07-11

## 2019-07-11 RX ORDER — SODIUM CHLORIDE, SODIUM LACTATE, POTASSIUM CHLORIDE, CALCIUM CHLORIDE 600; 310; 30; 20 MG/100ML; MG/100ML; MG/100ML; MG/100ML
INJECTION, SOLUTION INTRAVENOUS CONTINUOUS
Status: DISCONTINUED | OUTPATIENT
Start: 2019-07-11 | End: 2019-07-11 | Stop reason: HOSPADM

## 2019-07-11 RX ORDER — LIDOCAINE HYDROCHLORIDE 20 MG/ML
INJECTION, SOLUTION INFILTRATION; PERINEURAL PRN
Status: DISCONTINUED | OUTPATIENT
Start: 2019-07-11 | End: 2019-07-11

## 2019-07-11 RX ADMIN — LIDOCAINE HYDROCHLORIDE 1 ML: 10 INJECTION, SOLUTION EPIDURAL; INFILTRATION; INTRACAUDAL; PERINEURAL at 08:51

## 2019-07-11 RX ADMIN — PROPOFOL 100 MG: 10 INJECTION, EMULSION INTRAVENOUS at 09:33

## 2019-07-11 RX ADMIN — SODIUM CHLORIDE, POTASSIUM CHLORIDE, SODIUM LACTATE AND CALCIUM CHLORIDE: 600; 310; 30; 20 INJECTION, SOLUTION INTRAVENOUS at 08:51

## 2019-07-11 RX ADMIN — LIDOCAINE HYDROCHLORIDE 50 MG: 20 INJECTION, SOLUTION INFILTRATION; PERINEURAL at 09:33

## 2019-07-11 ASSESSMENT — MIFFLIN-ST. JEOR: SCORE: 1169.9

## 2019-07-11 ASSESSMENT — LIFESTYLE VARIABLES: TOBACCO_USE: 1

## 2019-07-11 NOTE — ANESTHESIA POSTPROCEDURE EVALUATION
Patient: Fern Gonzalez    Procedure(s):  INJECTION, SPINE, CERVICAL 6-7, EPIDURAL    Diagnosis:cervicalgia  Diagnosis Additional Information: No value filed.    Anesthesia Type:  MAC    Note:  Anesthesia Post Evaluation    Patient location during evaluation: Phase 2 and Bedside  Patient participation: Able to fully participate in evaluation  Level of consciousness: awake and alert  Pain management: satisfactory to patient  Airway patency: patent  Cardiovascular status: stable  Respiratory status: spontaneous ventilation and room air  Hydration status: stable  PONV: none     Anesthetic complications: None    Comments: Appear to tolerate MAC with IV sedation well without anesthesia related problems / complications noted.  Pain level satisfactory per patient. No N  /  V.  No complaints per patient.  Will follow as needed.        Last vitals:  Vitals:    07/11/19 0843   BP: 112/86   Resp: 16         Electronically Signed By: CALE Dale CRNA  July 11, 2019  9:46 AM

## 2019-07-11 NOTE — ANESTHESIA PREPROCEDURE EVALUATION
Anesthesia Pre-Procedure Evaluation    Patient: Fern Gonzalez   MRN: [id number] : 1960          Preoperative Diagnosis: cervicalgia    Procedure(s):  INJECTION, SPINE, CERVICAL 6-7, EPIDURAL    Past Medical History:   Diagnosis Date     Diagnostic skin and sensitization tests (aka ALLERGENS) 10/29/15 skin tests pos. for: T >> dog/DM only.      Esophageal reflux      Headache(784.0) 10/11/11    Admitted, suspected Viral cephalitis, meningitis     Migraine, unspecified, without mention of intractable migraine without mention of status migrainosus      Recurrent sinusitis      Varicella meningitis      Past Surgical History:   Procedure Laterality Date     C  DELIVERY ONLY      3 times     CHOLECYSTECTOMY, LAPOROSCOPIC  2007    Lysis of intestinal adhesions.     [facility] AFF SURGICAL PATHOLOGY       COLONOSCOPY N/A 10/31/2014    Procedure: COMBINED COLONOSCOPY, SINGLE OR MULTIPLE BIOPSY/POLYPECTOMY BY BIOPSY;  Surgeon: Leonard Dumont MD;  Location: [facility]     HC LAP,FULGURATE/EXCISE LESIONS  10/04    mesh present as had abdominal wall surgery     HC UGI ENDOSCOPY, SIMPLE EXAM  10/26/2006     HYSTERECTOMY TOTAL ABDOMINAL       RELEASE CARPAL TUNNEL Right 2016    Procedure: RELEASE CARPAL TUNNEL;  Surgeon: Leif Fien MD;  Location: [facility]     RELEASE CARPAL TUNNEL Left 10/5/2016    Procedure: RELEASE CARPAL TUNNEL;  Surgeon: Leif Fine MD;  Location: [facility]     RELEASE CARPAL TUNNEL Right 2016    Procedure: RELEASE CARPAL TUNNEL;  Surgeon: Leif Fine MD;  Location: [facility]       Anesthesia Evaluation     . Pt has had prior anesthetic. Type: General and MAC    No history of anesthetic complications          ROS/MED HX    ENT/Pulmonary:     (+)tobacco use, 0.5 packs/day  , . .    Neurologic:  - neg neurologic ROS     Cardiovascular:  - neg cardiovascular ROS   (+) ----. : . . . :. . Previous cardiac testing date:results:date: results:ECG reviewed date:19  results:SR, 85bpm date: results:          METS/Exercise Tolerance:  4 - Raking leaves, gardening   Hematologic:  - neg hematologic  ROS       Musculoskeletal:   (+)  other musculoskeletal- bilateral carpal tunnel, ulnar nerve entrapment righ twrist      GI/Hepatic:     (+) GERD       Renal/Genitourinary:  - ROS Renal section negative       Endo:  - neg endo ROS       Psychiatric:     (+) psychiatric history depression and anxiety      Infectious Disease:  - neg infectious disease ROS       Malignancy:      - no malignancy   Other:    - neg other ROS                      Physical Exam  Normal systems: cardiovascular and pulmonary    Airway   Mallampati: II  TM distance: >3 FB  Neck ROM: limited    Dental   (+) upper dentures    Cardiovascular   Rhythm and rate: regular and normal      Pulmonary    breath sounds clear to auscultation            Lab Results   Component Value Date    WBC 6.2 09/01/2015    HGB 12.9 11/18/2016    HCT 39.1 09/01/2015     09/01/2015    CRP 14.1 (H) 09/01/2015    SED 9 10/11/2011     11/18/2016    POTASSIUM 3.9 11/18/2016    CHLORIDE 109 11/18/2016    CO2 22 11/18/2016    BUN 10 11/18/2016    CR 0.63 11/18/2016     (H) 11/18/2016    JOAQUIN 8.7 11/18/2016    PHOS 3.5 09/27/2011    MAG 2.1 10/11/2011    ALBUMIN 4.0 10/11/2011    PROTTOTAL 6.8 10/11/2011    ALT 31 10/11/2011    AST 23 10/11/2011    ALKPHOS 70 10/11/2011    BILITOTAL 0.3 10/11/2011    LIPASE 141 01/25/2007    AMYLASE 57 01/18/2007    PTT 31 10/11/2011    INR 1.03 10/11/2011    TSH 1.04 11/13/2015    T4 8.7 11/23/2011    T3 129 11/23/2011    HCG Negative 01/29/2007       Preop Vitals  BP Readings from Last 3 Encounters:   06/27/19 116/84   06/12/19 128/76   05/15/19 112/84    Pulse Readings from Last 3 Encounters:   06/27/19 82   04/09/19 110   07/10/18 75      Resp Readings from Last 3 Encounters:   06/27/19 16   04/09/19 12   07/10/18 16    SpO2 Readings from Last 3 Encounters:   04/09/19 98%   07/10/18 96%  "  07/10/18 97%      Temp Readings from Last 1 Encounters:   06/27/19 98.1  F (36.7  C) (Temporal)    Ht Readings from Last 1 Encounters:   06/27/19 1.486 m (4' 10.5\")      Wt Readings from Last 1 Encounters:   06/27/19 68.2 kg (150 lb 6.4 oz)    Estimated body mass index is 30.9 kg/m  as calculated from the following:    Height as of 6/27/19: 1.486 m (4' 10.5\").    Weight as of 6/27/19: 68.2 kg (150 lb 6.4 oz).       Anesthesia Plan      History & Physical Review  History and physical reviewed and following examination; no interval change.    ASA Status:  2 .    NPO Status:  > 8 hours    Plan for MAC with Propofol induction. Maintenance will be TIVA.  Reason for MAC:  Deep or markedly invasive procedure (G8)  PONV prophylaxis:  Ondansetron and Dexamethasone       Postoperative Care  Postoperative pain management:  Multi-modal analgesia.      Consents  Anesthetic plan, risks, benefits and alternatives discussed with:  Patient.  Use of blood products discussed: No .   .                 CALE Dawson CRNA  "

## 2019-07-11 NOTE — ANESTHESIA POSTPROCEDURE EVALUATION
Patient: Fern Gonzalez    Procedure(s):  INJECTION, SPINE, CERVICAL 6-7, EPIDURAL    Diagnosis:cervicalgia  Diagnosis Additional Information: No value filed.    Anesthesia Type:  MAC    Note:  Anesthesia Post Evaluation    Patient location during evaluation: Phase 2  Patient participation: Able to fully participate in evaluation  Level of consciousness: awake and alert  Pain management: adequate  Airway patency: patent  Cardiovascular status: acceptable and stable  Respiratory status: acceptable and room air  Hydration status: acceptable  PONV: none     Anesthetic complications: None    Comments:  Patient was happy with the anesthesia care received and no anesthesia related complications were noted.  I will follow up with the patient again if it is needed.        Last vitals:  Vitals:    07/11/19 0950 07/11/19 0955 07/11/19 1000   BP: 103/77  119/84   Pulse:  85 80   Resp:      SpO2:            Electronically Signed By: CALE Dawson CRNA  July 11, 2019  10:37 AM

## 2019-07-11 NOTE — OP NOTE
CHIEF COMPLAINT: Neck pain secondary to cervical spondylosis and cervical disc degeneration  PROCEDURE: C6-7 Interlaminar epidural steroid injection using fluoroscopic guidance with contrast dye.   PROCEDURE DETAILS: After written informed consent was obtained from the patient, the patient was escorted to the procedure room.  The patient was placed in the prone position.  A  time out  was conducted to verify patient identity, procedure to be performed, side, site, allergies and any special requirements.  The skin over the neck and upper back region were prepped and draped in normal sterile fashion. Fluoroscopy was used to identify the C6-7 interspace in an AP view and the skin was anesthetized with 2 mL of 1% lidocaine with bicarbonate buffer.  A 20-gauge 3-1/2 inch Tuohy needle was advanced using the loss of resistance technique with preservative free normal saline with fluoroscopic guidance. After negative aspiration for CSF and blood, 1.5 cc of Isovue contrast dye was injected revealing the appropriate cervical epidurogram without evidence of intrathecal or intravascular spread. Following this, a 3-mL solution of 40 mg of triamcinolone with 2 cc preservative-free normal saline was slowly injected.  After injection of the medication, as the needle tip was withdrawn, it was flushed with local anesthetic.  The patient was monitored with blood pressure and pulse oximetry machines with the assistance of an RN throughout the procedure.  The patient was alert and responsive to questions throughout the procedure.   The patient tolerated the procedure well and was observed in the post-procedural area.  The patient was dismissed without apparent complications.     DIAGNOSIS:  1. Neck pain secondary to cervical spondylosis and cervical disc degeneration  PLAN:  1. Performed a C6-7 interlaminar epidural steroid injection.   2. The patient was instructed to call the [facility] if today's procedure is not helpful.   I did look at her MRI and she does have facet arthropathy at C5-6 on the right side which is where she predominately has pain.  If the epidural injection is not helpful I would recommend screening her right C5-6 and C6-7 facet joints with medial branch blocks.    Des Rider MD  Diplomate of the American Board of Anesthesiology, Pain Medicine

## 2019-07-11 NOTE — ANESTHESIA CARE TRANSFER NOTE
Patient: Fern Gonzalez    Procedure(s):  INJECTION, SPINE, CERVICAL 6-7, EPIDURAL    Diagnosis: cervicalgia  Diagnosis Additional Information: No value filed.    Anesthesia Type:   MAC     Note:  Airway :Room Air  Patient transferred to:Phase II  Handoff Report: Identifed the Patient, Identified the Reponsible Provider, Reviewed the pertinent medical history, Discussed the surgical course, Reviewed Intra-OP anesthesia mangement and issues during anesthesia, Set expectations for post-procedure period and Allowed opportunity for questions and acknowledgement of understanding      Vitals: (Last set prior to Anesthesia Care Transfer)    CRNA VITALS  7/11/2019 0908 - 7/11/2019 0945      7/11/2019             Pulse:  90    SpO2:  99 %                Electronically Signed By: CALE Dale CRNA  July 11, 2019  9:45 AM

## 2019-08-16 ENCOUNTER — TELEPHONE (OUTPATIENT)
Dept: FAMILY MEDICINE | Facility: OTHER | Age: 59
End: 2019-08-16

## 2019-08-16 NOTE — TELEPHONE ENCOUNTER
Summary:    Patient is due/failing the following:   MAMMOGRAM and PHYSICAL    Action needed:   Patient needs office visit for Physical . and schedule a mammogram     Type of outreach:    Phone, left message for patient to call back.     Questions for provider review:    None                                                                                                                                    Jeannette Sridharnancy     Chart routed to Care Team .          Panel Management Review      Patient has the following on her problem list:     Depression / Dysthymia review    Measure:  Needs PHQ-9 score of 4 or less during index window.  Administer PHQ-9 and if score is 5 or more, send encounter to provider for next steps.    PHQ-9 SCORE 11/18/2016 3/15/2018 6/27/2019   PHQ-9 Total Score - - -   PHQ-9 Total Score MyChart - 4 (Minimal depression) 10 (Moderate depression)   PHQ-9 Total Score 6 4 10       If PHQ-9 recheck is 5 or more, route to provider for next steps.    Patient is due for:  PHQ9      Composite cancer screening  Chart review shows that this patient is due/due soon for the following Mammogram

## 2019-09-03 NOTE — ADDENDUM NOTE
Encounter addended by: Elizabeth Guerrero PT on: 9/3/2019 8:13 AM   Actions taken: Sign clinical note, Episode resolved

## 2019-09-03 NOTE — DISCHARGE SUMMARY
Outpatient Physical Therapy Discharge Note     Patient: Fern Gonzalez  : 1960    Beginning/End Dates of Reporting Period:  2 visits - 19 and 19    Referring Provider: Shaggy Rodriguez MD    Therapy Diagnosis: Cervical pain     Client Self Report: Please see initial evaluation.She canceled/did not keep her last appts    Objective Measurements:  At the time of her last session she reported:   Objective Measure: NDI  Details: 52%  Objective Measure: Frequency of PREP  Details: 3 x a day  Objective Measure: Effect of PREP  Details: Thinks they may help, not sure.     Goals:  Goal Identifier AROM   Goal Description Fern will be able to look to her L without causing dizziness for driving and looking safely around her environment   Target Date 19   Date Met      Progress:     Goal Identifier Centering symptoms   Goal Description Fern will be able to complete her positioning and posture, home program to center her L arm symptoms so she can sleep at night and be able to rest without symptoms increasing.    Target Date 19   Date Met      Progress:     Progress Toward Goals:   Not assessed this period.    Plan:  Discharge from therapy.    Discharge:    Reason for Discharge: Patient has failed to schedule further appointments.    Equipment Issued: none    Discharge Plan: Other services: underwent procedure    Thank you for referring Fern  to [facility]/Cherri Guerrero, PT  [phone]    .

## 2019-10-01 NOTE — PROGRESS NOTES
Outpatient Physical Therapy Discharge Note     Patient: Fern Gonzalez  : 1960    Beginning/End Dates of Reporting Period:  2019 to 2019  Fern has been seen for a total of 2 visits  overall.      Client Self Report: Fern is continuing with P.T. as we have hours that work better with her schedule. She has been working on her home program. Not sure if it has been helping yet. Today she reports neck pain 4/10 to the right bra strap, headache behind her right eye 4/10.     Objective Measurements:  Objective Measure: NDI  Details: 52%    Objective Measure: Frequency of PREP  Details: 3 x a day    Objective Measure: Effect of PREP  Details: Thinks they may help, not sure.     Objective Measure: Symptoms upon awakening     Objective Measure: Frequency of distal symptoms.      Treatment: positional exercise program, posture and body mechanics, postural strengthening program    Goals:  Goal Identifier AROM   Goal Description Fern will be able to look to her L without causing dizziness for driving and looking safely around her environment   Target Date 19   Date Met      Progress:     Goal Identifier Centering symptoms   Goal Description Fern will be able to complete her positioning and posture, home program to center her L arm symptoms so she can sleep at night and be able to rest without symptoms increasing.    Target Date 19   Date Met      Progress:     Progress Toward Goals:   Not assessed this period. Fern didn't continue with P.T.    Plan:  Discharge from therapy.    Discharge: Yes    Reason for Discharge: Patient chooses to discontinue therapy.    Equipment Issued: Theraband    Discharge Plan: Other services: Went in for cervical injections.    Thank you for this referral.    Lilian Medina P.T.

## 2019-10-01 NOTE — ADDENDUM NOTE
Encounter addended by: Lilian Medina PT on: 10/1/2019 10:10 AM   Actions taken: Clinical Note Signed

## 2019-10-07 ENCOUNTER — OFFICE VISIT (OUTPATIENT)
Dept: FAMILY MEDICINE | Facility: CLINIC | Age: 59
End: 2019-10-07
Payer: COMMERCIAL

## 2019-10-07 VITALS
HEART RATE: 105 BPM | SYSTOLIC BLOOD PRESSURE: 120 MMHG | OXYGEN SATURATION: 98 % | WEIGHT: 154 LBS | TEMPERATURE: 98 F | BODY MASS INDEX: 32.32 KG/M2 | HEIGHT: 58 IN | RESPIRATION RATE: 18 BRPM | DIASTOLIC BLOOD PRESSURE: 72 MMHG

## 2019-10-07 DIAGNOSIS — H00.015 HORDEOLUM EXTERNUM OF LEFT LOWER EYELID: Primary | ICD-10-CM

## 2019-10-07 DIAGNOSIS — F17.200 SMOKING: ICD-10-CM

## 2019-10-07 PROCEDURE — 99213 OFFICE O/P EST LOW 20 MIN: CPT | Performed by: FAMILY MEDICINE

## 2019-10-07 RX ORDER — ERYTHROMYCIN 5 MG/G
0.5 OINTMENT OPHTHALMIC 4 TIMES DAILY
Qty: 1 G | Refills: 0 | Status: SHIPPED | OUTPATIENT
Start: 2019-10-07 | End: 2021-01-22

## 2019-10-07 ASSESSMENT — PAIN SCALES - GENERAL: PAINLEVEL: WORST PAIN (10)

## 2019-10-07 ASSESSMENT — MIFFLIN-ST. JEOR: SCORE: 1173.29

## 2019-10-07 NOTE — PATIENT INSTRUCTIONS
Patient Education     Sty (or Stye)  A sty is an infection of the oil gland of the eyelid. It may develop into a small pocket of pus (an abscess). This can cause pain, redness, and swelling. In early stages, a sty is treated with antibiotic cream, eye drops, or a small towel soaked in warm water (a warm compress). More severe cases may need to be opened and drained by a healthcare provider.  Home care    Eye drops or ointment are usually prescribed to treat the infection. Use these as directed.     Artificial tears may also be used to lubricate the eye and make it more comfortable. You can buy these over the counter without a prescription. Talk with your healthcare provider before using any over-the-counter treatment for a sty.    Apply a warm, damp towel to the affected eye for at least 5 minutes, 3 to 4 times a day for a week. Warm compresses open the pores and speed the healing. But if the compresses are too hot, they may burn your eyelid.    Sometimes the sty will drain with this treatment alone. If this happens, keep using the antibiotic until all the redness and swelling are gone.    Wash your hands before and after touching the infected eyelid to avoid spreading the infection.    Don t squeeze or try to break open the sty.  Follow-up care  Follow up with your healthcare provider, or as advised.   When to seek medical advice  Call your healthcare provider right away if any of these occur:    Increase in swelling or redness around the eyelid after 48 to 72 hours    Increase in eye pain or the eyelid blisters    Increase in warmth--the eyelid feels hot    Drainage of blood or thick pus from the sty    Blister on the eyelid    Inability to open the eyelid due to swelling    Fever of 100.4 F (38 C) or above, or as directed by your provider    Vision changes    Headache or stiff neck    The sty comes back  Date Last Reviewed: 8/1/2017 2000-2018 [employer]. [address], [place]  [address]. All rights reserved. This information is not intended as a substitute for professional medical care. Always follow your healthcare professional's instructions.            Normal

## 2019-10-07 NOTE — PROGRESS NOTES
"Subjective     Fern Gonzalez is a 59 year old female who presents to clinic today for the following health issues:    HPI   Eye(s) Problem  Onset: 2-3 days.     Description:   Location: left  Pain: YES  Redness: YES    Accompanying Signs & Symptoms:  Discharge/mattering: YES  Swelling: yes  Visual changes: no   Fever: no   Nasal Congestion: no   Bothered by bright lights: no     History:   Trauma: no   Foreign body exposure: no     Precipitating factors:   Wearing contacts: no     Alleviating factors:  Improved by:     Therapies Tried and outcome:Hot packed last night. Pulls lid down and can see a pus pocket, it lima and drains.         Left lower lid on medial side is swollen, tender.  Has had some leakage of purulence with hot packing.    Reviewed and updated as needed this visit by Provider  Tobacco  Allergies  Meds  Problems  Med Hx  Surg Hx  Fam Hx         Review of Systems   ROS COMP: Constitutional, HEENT, cardiovascular, pulmonary, GI, , musculoskeletal, neuro, skin, endocrine and psych systems are negative, except as otherwise noted.      Objective    /72   Pulse 105   Temp 98  F (36.7  C) (Temporal)   Resp 18   Ht 1.473 m (4' 10\")   Wt 69.9 kg (154 lb)   LMP  (LMP Unknown)   SpO2 98%   Breastfeeding? No   BMI 32.19 kg/m    Body mass index is 32.19 kg/m .  Physical Exam  Constitutional:       Appearance: Normal appearance.   Eyes:      General: No scleral icterus.        Right eye: No foreign body, discharge or hordeolum.         Left eye: Hordeolum (medial lower lid.  white papule) present.No foreign body or discharge.      Extraocular Movements: Extraocular movements intact.      Conjunctiva/sclera:      Right eye: Right conjunctiva is not injected. No chemosis.     Left eye: Left conjunctiva is not injected. No chemosis.     Pupils: Pupils are equal, round, and reactive to light.   Neurological:      Mental Status: She is alert.                    Assessment & Plan " "    ASSESSMENT/ORDERS:    ICD-10-CM    1. Hordeolum externum of left lower eyelid H00.015 erythromycin (ROMYCIN) 5 MG/GM ophthalmic ointment   2. Smoking F17.200 TOBACCO CESSATION - FOR HEALTH MAINTENANCE     PLAN:  1.  Erythromycin ointment prescription due to severity of symptoms and size of lesion.  Usual stye care instructions given in addition to this.  See patient instructions for full details that I reviewed with patient.       Tobacco Cessation:   reports that she has been smoking cigarettes. She has a 0.25 pack-year smoking history. She has never used smokeless tobacco.  Tobacco Cessation Action Plan: Information offered: Patient not interested at this time      BMI:   Estimated body mass index is 32.19 kg/m  as calculated from the following:    Height as of this encounter: 1.473 m (4' 10\").    Weight as of this encounter: 69.9 kg (154 lb).               Return for recheck if symptoms worsen or fail to improve.    Jamar Sargent MD  [facility]    "

## 2020-02-04 ENCOUNTER — OFFICE VISIT (OUTPATIENT)
Dept: URGENT CARE | Facility: RETAIL CLINIC | Age: 60
End: 2020-02-04

## 2020-02-04 VITALS
HEART RATE: 104 BPM | OXYGEN SATURATION: 97 % | DIASTOLIC BLOOD PRESSURE: 69 MMHG | SYSTOLIC BLOOD PRESSURE: 100 MMHG | TEMPERATURE: 97.9 F

## 2020-02-04 DIAGNOSIS — J20.9 ACUTE BRONCHITIS WITH COEXISTING CONDITION REQUIRING PROPHYLACTIC TREATMENT: Primary | ICD-10-CM

## 2020-02-04 DIAGNOSIS — M26.609 TEMPOROMANDIBULAR JOINT DISORDER: ICD-10-CM

## 2020-02-04 PROCEDURE — 99213 OFFICE O/P EST LOW 20 MIN: CPT | Performed by: FAMILY MEDICINE

## 2020-02-04 RX ORDER — AZITHROMYCIN 250 MG/1
TABLET, FILM COATED ORAL
Qty: 6 TABLET | Refills: 0 | Status: SHIPPED | OUTPATIENT
Start: 2020-02-04 | End: 2021-01-22

## 2020-02-04 RX ORDER — ALBUTEROL SULFATE 90 UG/1
1-2 AEROSOL, METERED RESPIRATORY (INHALATION) EVERY 4 HOURS PRN
Qty: 1 INHALER | Refills: 0 | Status: SHIPPED | OUTPATIENT
Start: 2020-02-04

## 2020-02-04 NOTE — LETTER
[facility]  [address] [facility] [address]  Phone: [phone]    February 4, 2020        Fern Gonzalez  [address] [place] [address]          To whom it may concern:    RE: Fern Bhattlópez    Patient was seen and treated today at our clinic and missed work.    Please contact me for questions or concerns.      Sincerely,        Jonathan Chery MD

## 2020-02-04 NOTE — PROGRESS NOTES
SUBJECTIVE:  Fern Gonzalez is a 59 year old female who presents with right ear pain for 3 day(s).   Severity: moderate   Timing:gradual onset, still present and worsening  Additional symptoms include cough, headache, malaise and wheezing.      History of recurrent otitis: no    Past Medical History:   Diagnosis Date     Diagnostic skin and sensitization tests (aka ALLERGENS) 10/29/15 skin tests pos. for: T >> dog/DM only.      Esophageal reflux      Headache(784.0) 10/11/11    Admitted, suspected Viral cephalitis, meningitis     Migraine, unspecified, without mention of intractable migraine without mention of status migrainosus      Recurrent sinusitis      Varicella meningitis      Current Outpatient Medications   Medication Sig Dispense Refill     albuterol (PROAIR HFA/PROVENTIL HFA/VENTOLIN HFA) 108 (90 Base) MCG/ACT inhaler Inhale 1-2 puffs into the lungs every 4 hours as needed for shortness of breath / dyspnea or wheezing 1 Inhaler 0     azithromycin (ZITHROMAX Z-NATACHA) 250 MG tablet 2 tabs day one, then 1 tab daily 6 tablet 0     Ibuprofen (ADVIL PO) Take 600 mg by mouth       Naproxen Sodium (ALEVE PO)        cetirizine (ZYRTEC) 10 MG tablet Take 10 mg by mouth       DIETARY MANAGEMENT PRODUCT PO        erythromycin (ROMYCIN) 5 MG/GM ophthalmic ointment Place 0.5 inches Into the left eye 4 times daily (Patient not taking: Reported on 2/4/2020) 1 g 0     Fexofenadine HCl (ALLEGRA PO)        methocarbamol (ROBAXIN) 500 MG tablet Take 1 tablet (500 mg) by mouth 3 times daily (Patient not taking: Reported on 2/4/2020) 90 tablet 0     History   Smoking Status     Current Every Day Smoker     Packs/day: 0.50     Years: 0.50     Types: Cigarettes   Smokeless Tobacco     Never Used     Comment: uses ecig now       ROS:   Review of systems negative except as stated above.    OBJECTIVE:  /69   Pulse 104   Temp 97.9  F (36.6  C) (Temporal)   LMP  (LMP Unknown)   SpO2 97%   The right TM is normal: no  effusions, no erythema, and normal landmarks     The right auditory canal is normal and without drainage, edema or erythema  The left TM is normal: no effusions, no erythema, and normal landmarks  The left auditory canal is normal and without drainage, edema or erythema  Oropharynx exam is normal: no lesions, erythema, adenopathy or exudate and tender rt tmj.  GENERAL: alert and moderate distress  EYES: EOMI,  PERRL, conjunctiva clear  NECK: supple, non-tender to palpation, no adenopathy noted  RESP: expiratory wheezes throughout  CV: regular rates and rhythm, normal S1 S2, no murmur noted  SKIN: no suspicious lesions or rashes     ASSESSMENT:  Bronchitis with wheeze in smoker  Sinusitis,   TMJ dysfunction    PLAN:  Zithromax, albuterol inhaler, printed info on TMJ, quit smoking, no work, rest at home, see pcp 1 week for recheck  Sooner if worsening condition

## 2020-02-07 ENCOUNTER — TELEPHONE (OUTPATIENT)
Dept: FAMILY MEDICINE | Facility: CLINIC | Age: 60
End: 2020-02-07

## 2020-02-07 NOTE — TELEPHONE ENCOUNTER
Patient is due for a PHQ-9.  Index start date:10/28/2019  Index end date:2/25/2020    Please call patient.

## 2020-02-12 NOTE — TELEPHONE ENCOUNTER
I have attempted to contact pt to update a PHQ-9. I left a message for pt to return my call. Please transfer pt to the [place] team if I'm unavailable to take the call.  Stephanie Shaffer CMA ([facility])

## 2020-02-17 NOTE — TELEPHONE ENCOUNTER
I have attempted to contact pt to update a PHQ-9. I left a message for pt to return my call. Please transfer pt to the [place] team if I'm unavailable to take the call.  Stephanie Shaffer CMA ([facility])

## 2020-02-19 NOTE — TELEPHONE ENCOUNTER
I have attempted to contact pt to update a PHQ-9. I left a message for pt to return my call. Please transfer pt to the [place] team if I'm unavailable to take the call.  Stephanie Shaffer CMA ([facility])

## 2020-02-26 NOTE — TELEPHONE ENCOUNTER
Pt didn't return phone calls. PHQ-9 missed. I will close the encounter until further outreach. Stephanie Shaffer CMA ([facility])

## 2020-07-22 ENCOUNTER — HOSPITAL ENCOUNTER (EMERGENCY)
Facility: CLINIC | Age: 60
Discharge: HOME | End: 2020-07-22
Attending: EMERGENCY MEDICINE | Admitting: EMERGENCY MEDICINE
Payer: COMMERCIAL

## 2020-07-22 ENCOUNTER — APPOINTMENT (OUTPATIENT)
Dept: GENERAL RADIOLOGY | Facility: CLINIC | Age: 60
End: 2020-07-22
Attending: EMERGENCY MEDICINE
Payer: COMMERCIAL

## 2020-07-22 ENCOUNTER — TELEPHONE (OUTPATIENT)
Dept: FAMILY MEDICINE | Facility: CLINIC | Age: 60
End: 2020-07-22

## 2020-07-22 VITALS
HEART RATE: 71 BPM | RESPIRATION RATE: 18 BRPM | DIASTOLIC BLOOD PRESSURE: 66 MMHG | TEMPERATURE: 97.7 F | WEIGHT: 145.5 LBS | OXYGEN SATURATION: 97 % | SYSTOLIC BLOOD PRESSURE: 107 MMHG | BODY MASS INDEX: 30.41 KG/M2

## 2020-07-22 DIAGNOSIS — Z20.822 ENCOUNTER FOR LABORATORY TESTING FOR COVID-19 VIRUS: ICD-10-CM

## 2020-07-22 DIAGNOSIS — R11.0 NAUSEA: ICD-10-CM

## 2020-07-22 DIAGNOSIS — J01.90 ACUTE SINUSITIS WITH SYMPTOMS > 10 DAYS: ICD-10-CM

## 2020-07-22 DIAGNOSIS — K30 UPSET STOMACH: ICD-10-CM

## 2020-07-22 LAB
ALBUMIN SERPL-MCNC: 3.5 G/DL (ref 3.4–5)
ALP SERPL-CCNC: 89 U/L (ref 40–150)
ALT SERPL W P-5'-P-CCNC: 25 U/L (ref 0–50)
ANION GAP SERPL CALCULATED.3IONS-SCNC: 6 MMOL/L (ref 3–14)
AST SERPL W P-5'-P-CCNC: 17 U/L (ref 0–45)
BASOPHILS # BLD AUTO: 0.1 10E9/L (ref 0–0.2)
BASOPHILS NFR BLD AUTO: 0.8 %
BILIRUB SERPL-MCNC: 0.2 MG/DL (ref 0.2–1.3)
BUN SERPL-MCNC: 11 MG/DL (ref 7–30)
CALCIUM SERPL-MCNC: 9.2 MG/DL (ref 8.5–10.1)
CHLORIDE SERPL-SCNC: 106 MMOL/L (ref 94–109)
CO2 SERPL-SCNC: 27 MMOL/L (ref 20–32)
CREAT SERPL-MCNC: 0.58 MG/DL (ref 0.52–1.04)
DIFFERENTIAL METHOD BLD: NORMAL
EOSINOPHIL NFR BLD AUTO: 1.6 %
ERYTHROCYTE [DISTWIDTH] IN BLOOD BY AUTOMATED COUNT: 13.4 % (ref 10–15)
GFR SERPL CREATININE-BSD FRML MDRD: >90 ML/MIN/{1.73_M2}
GLUCOSE SERPL-MCNC: 81 MG/DL (ref 70–99)
HCT VFR BLD AUTO: 39 % (ref 35–47)
HGB BLD-MCNC: 13.3 G/DL (ref 11.7–15.7)
IMM GRANULOCYTES # BLD: 0 10E9/L (ref 0–0.4)
IMM GRANULOCYTES NFR BLD: 0.3 %
LIPASE SERPL-CCNC: 154 U/L (ref 73–393)
LYMPHOCYTES # BLD AUTO: 2.8 10E9/L (ref 0.8–5.3)
LYMPHOCYTES NFR BLD AUTO: 28.2 %
MAGNESIUM SERPL-MCNC: 2.2 MG/DL (ref 1.6–2.3)
MCH RBC QN AUTO: 31.7 PG (ref 26.5–33)
MCHC RBC AUTO-ENTMCNC: 34.1 G/DL (ref 31.5–36.5)
MCV RBC AUTO: 93 FL (ref 78–100)
MONOCYTES # BLD AUTO: 1 10E9/L (ref 0–1.3)
MONOCYTES NFR BLD AUTO: 10.3 %
NEUTROPHILS # BLD AUTO: 5.8 10E9/L (ref 1.6–8.3)
NEUTROPHILS NFR BLD AUTO: 58.8 %
NRBC # BLD AUTO: 0 10*3/UL
NRBC BLD AUTO-RTO: 0 /100
PLATELET # BLD AUTO: 264 10E9/L (ref 150–450)
POTASSIUM SERPL-SCNC: 4.1 MMOL/L (ref 3.4–5.3)
PROT SERPL-MCNC: 7 G/DL (ref 6.8–8.8)
RBC # BLD AUTO: 4.19 10E12/L (ref 3.8–5.2)
SODIUM SERPL-SCNC: 139 MMOL/L (ref 133–144)
WBC # BLD AUTO: 9.9 10E9/L (ref 4–11)

## 2020-07-22 PROCEDURE — C9803 HOPD COVID-19 SPEC COLLECT: HCPCS | Performed by: EMERGENCY MEDICINE

## 2020-07-22 PROCEDURE — 96361 HYDRATE IV INFUSION ADD-ON: CPT | Performed by: EMERGENCY MEDICINE

## 2020-07-22 PROCEDURE — 85025 COMPLETE CBC W/AUTO DIFF WBC: CPT | Performed by: EMERGENCY MEDICINE

## 2020-07-22 PROCEDURE — 83690 ASSAY OF LIPASE: CPT | Performed by: EMERGENCY MEDICINE

## 2020-07-22 PROCEDURE — 71045 X-RAY EXAM CHEST 1 VIEW: CPT | Mod: TC

## 2020-07-22 PROCEDURE — 96374 THER/PROPH/DIAG INJ IV PUSH: CPT | Performed by: EMERGENCY MEDICINE

## 2020-07-22 PROCEDURE — 80053 COMPREHEN METABOLIC PANEL: CPT | Performed by: EMERGENCY MEDICINE

## 2020-07-22 PROCEDURE — 83735 ASSAY OF MAGNESIUM: CPT | Performed by: EMERGENCY MEDICINE

## 2020-07-22 PROCEDURE — U0003 INFECTIOUS AGENT DETECTION BY NUCLEIC ACID (DNA OR RNA); SEVERE ACUTE RESPIRATORY SYNDROME CORONAVIRUS 2 (SARS-COV-2) (CORONAVIRUS DISEASE [COVID-19]), AMPLIFIED PROBE TECHNIQUE, MAKING USE OF HIGH THROUGHPUT TECHNOLOGIES AS DESCRIBED BY CMS-2020-01-R: HCPCS | Performed by: EMERGENCY MEDICINE

## 2020-07-22 PROCEDURE — 99284 EMERGENCY DEPT VISIT MOD MDM: CPT | Mod: Z6 | Performed by: EMERGENCY MEDICINE

## 2020-07-22 PROCEDURE — 25800030 ZZH RX IP 258 OP 636: Performed by: EMERGENCY MEDICINE

## 2020-07-22 PROCEDURE — 25000128 H RX IP 250 OP 636: Performed by: EMERGENCY MEDICINE

## 2020-07-22 PROCEDURE — 99284 EMERGENCY DEPT VISIT MOD MDM: CPT | Mod: 25 | Performed by: EMERGENCY MEDICINE

## 2020-07-22 RX ORDER — ONDANSETRON 2 MG/ML
4 INJECTION INTRAMUSCULAR; INTRAVENOUS
Status: COMPLETED | OUTPATIENT
Start: 2020-07-22 | End: 2020-07-22

## 2020-07-22 RX ORDER — DOXYCYCLINE 100 MG/1
100 CAPSULE ORAL 2 TIMES DAILY
Qty: 20 CAPSULE | Refills: 0 | Status: SHIPPED | OUTPATIENT
Start: 2020-07-22 | End: 2020-08-01

## 2020-07-22 RX ORDER — CODEINE PHOSPHATE AND GUAIFENESIN 10; 100 MG/5ML; MG/5ML
1-2 SOLUTION ORAL EVERY 4 HOURS PRN
Qty: 180 ML | Refills: 0 | Status: SHIPPED | OUTPATIENT
Start: 2020-07-22 | End: 2021-08-11

## 2020-07-22 RX ORDER — ONDANSETRON 2 MG/ML
4 INJECTION INTRAMUSCULAR; INTRAVENOUS EVERY 30 MIN PRN
Status: DISCONTINUED | OUTPATIENT
Start: 2020-07-22 | End: 2020-07-22 | Stop reason: HOSPADM

## 2020-07-22 RX ORDER — ONDANSETRON 4 MG/1
4 TABLET, ORALLY DISINTEGRATING ORAL EVERY 6 HOURS PRN
Qty: 10 TABLET | Refills: 0 | Status: SHIPPED | OUTPATIENT
Start: 2020-07-22 | End: 2020-07-25

## 2020-07-22 RX ADMIN — ONDANSETRON 4 MG: 2 INJECTION INTRAMUSCULAR; INTRAVENOUS at 13:58

## 2020-07-22 RX ADMIN — SODIUM CHLORIDE 1000 ML: 9 INJECTION, SOLUTION INTRAVENOUS at 13:57

## 2020-07-22 NOTE — LETTER
July 22, 2020      To Whom It May Concern:      Fern Gonzalez was seen in our Emergency Department today, 07/22/20.  She needs to be excused from work for the next 3 to 5 days depending on testing results.  Further work restrictions based on results.    Sincerely,        Raquel Lei MD

## 2020-07-22 NOTE — ED TRIAGE NOTES
Has had abdominal pain since Saturday. States has had a cough since July 6th and a headache for a couple weeks.

## 2020-07-22 NOTE — DISCHARGE INSTRUCTIONS
Take antibiotics as prescribed.  They can cause diarrhea symptoms as can any antibiotic, so take probiotics such as Culturelle or Florastor or eat yogurt to help replenish your gut bacteria.    Robitussin with codeine as needed for cough.  No driving while on this medication.    Zofran if needed for nausea.    Follow-up in clinic for continued symptoms.  Return for worsening, changes or concerns.     You will get a call for any positive COVID results.    I hope that you start to feel much better quickly!!

## 2020-07-22 NOTE — TELEPHONE ENCOUNTER
Reason for call:  Patient reporting a symptom    Symptom or request: abdominal pain and diarrhea     Duration (how long have symptoms been present): 4 days and worse     Have you been treated for this before? No    Additional comments: unable to reach a clinic RN and patient no longer wanted to hold after two attempts. Fern will be going to the ED in [place].     Phone Number patient can be reached at:  Cell number on file:    Telephone Information:   Mobile [phone]       Best Time:  Any      Can we leave a detailed message on this number:  No      Call taken on 7/22/2020 at 12:14 PM by Lisa Bennett

## 2020-07-22 NOTE — ED AVS SNAPSHOT
[facility] Emergency Department  [address] [place] [address]  Phone:  [phone]  Fax:  [phone]                                    Fern Gonzalez   MRN: [id number]    Department:  [facility] Emergency Department   Date of Visit:  7/22/2020           After Visit Summary Signature Page    I have received my discharge instructions, and my questions have been answered. I have discussed any challenges I see with this plan with the nurse or doctor.    ..........................................................................................................................................  Patient/Patient Representative Signature      ..........................................................................................................................................  Patient Representative Print Name and Relationship to Patient    ..................................................               ................................................  Date                                   Time    ..........................................................................................................................................  Reviewed by Signature/Title    ...................................................              ..............................................  Date                                               Time          22EPIC Rev 08/18

## 2020-07-23 LAB
SARS-COV-2 RNA SPEC QL NAA+PROBE: NOT DETECTED
SPECIMEN SOURCE: NORMAL

## 2020-07-23 NOTE — ED PROVIDER NOTES
"  History     Chief Complaint   Patient presents with     Abdominal Pain     Cough     HPI  History per patient and medical records    This is a 59-year-old female, history of migraines, sinusitis, GERD presenting with abdominal pain and cough.  Patient started having diarrhea symptoms after returning home from a  4 days ago.  This continued for a couple of days until yesterday morning when she had her last bowel movement.  No blackness or blood in the stools.  Since then she is continued to have nausea and intermittent cramping pain.  She feels a lot of \"gurgles\".  Slight bloating.  She is still passing gas.  She has not had any fevers or chills.  She had a productive cough for a couple of weeks which now is continuing but without any significant phlegm production.  She was exposed to a niece and a nephew who were ill around .  She is urinating normally and trying to eat normally.  She does smoke half a pack per day and reports that she was diagnosed with asthma and will occasionally use an inhaler.  She is status post cholecystectomy, appendectomy, hysterectomy.  She is also had a slight persistent headache for the last 2 weeks that does improve after Advil.  She does not know of any positive COVID exposures at this point.  Patient notes a significant postnasal drip.    Allergies:  Allergies   Allergen Reactions     Cephalexin Hcl Hives     Codeine Nausea and Vomiting     significant     Gabapentin Nausea and Vomiting     Morphine Hcl Nausea and Vomiting     Percocet [Oxycodone-Acetaminophen] Nausea and Vomiting     Significant; pt reports that plain Oxycodone she did fine with     Valium [Diazepam] Other (See Comments)     \"terrible nightmare\"     Vicodin [Hydrocodone-Acetaminophen] Nausea and Vomiting     significant     Cefazolin Rash       Problem List:    Patient Active Problem List    Diagnosis Date Noted     Postoperative state 2017     Priority: Medium     Palmar fasciitis/pain bilateral " 2017     Priority: Medium     Patient had bilateral carpal tunnel releases done       Numbness and tingling of both upper extremities while sleeping 2017     Priority: Medium     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, right 2016     Priority: Medium     Lateral epicondylitis of right elbow 2016     Priority: Medium     Bilateral carpal tunnel syndrome 2016     Priority: Medium     Respiratory distress 2015     Priority: Medium     Wheezing-associated respiratory infection 2015     Priority: Medium     Major depressive disorder, recurrent episode, moderate (H) 2015     Priority: Medium     Tobacco use disorder 2015     Priority: Medium     Anxiety 08/15/2014     Priority: Medium     CARDIOVASCULAR SCREENING; LDL GOAL LESS THAN 160 10/07/2011     Priority: Medium     Meningitis due to viruses not elsewhere classified 10/03/2011     Priority: Medium     Herpes zoster with meningitis 10/03/2011     Priority: Medium     Chronic cholecystitis 2007     Priority: Medium        Past Medical History:    Past Medical History:   Diagnosis Date     Diagnostic skin and sensitization tests (aka ALLERGENS) 10/29/15 skin tests pos. for: T >> dog/DM only.      Esophageal reflux      Headache(784.0) 10/11/11     Migraine, unspecified, without mention of intractable migraine without mention of status migrainosus      Recurrent sinusitis      Varicella meningitis        Past Surgical History:    Past Surgical History:   Procedure Laterality Date     C  DELIVERY ONLY      3 times     CHOLECYSTECTOMY, LAPOROSCOPIC  2007    Lysis of intestinal adhesions.     CL AFF SURGICAL PATHOLOGY       COLONOSCOPY N/A 10/31/2014    Procedure: COMBINED COLONOSCOPY, SINGLE OR MULTIPLE BIOPSY/POLYPECTOMY BY BIOPSY;  Surgeon: Leonard Dumont MD;  Location: [facility]     HC LAP,FULGURATE/EXCISE LESIONS  10/04    mesh present as had abdominal wall surgery     HC UGI ENDOSCOPY, SIMPLE  "EXAM  10/26/2006     HYSTERECTOMY TOTAL ABDOMINAL  2007     INJECT EPIDURAL CERVICAL Bilateral 7/11/2019    Procedure: INJECTION, SPINE, CERVICAL 6-7, EPIDURAL;  Surgeon: Des Rider MD;  Location: [facility]     RELEASE CARPAL TUNNEL Right 9/7/2016    Procedure: RELEASE CARPAL TUNNEL;  Surgeon: Leif Fine MD;  Location: [facility]     RELEASE CARPAL TUNNEL Left 10/5/2016    Procedure: RELEASE CARPAL TUNNEL;  Surgeon: Leif Fine MD;  Location: [facility]     RELEASE CARPAL TUNNEL Right 11/23/2016    Procedure: RELEASE CARPAL TUNNEL;  Surgeon: Leif Fine MD;  Location: [facility]       Family History:    Family History   Problem Relation Age of Onset     Thyroid Disease Maternal Grandmother      Thyroid Disease Sister      Coronary Artery Disease No family hx of      Hypertension No family hx of      Hyperlipidemia No family hx of      Cancer - colorectal No family hx of      Ovarian Cancer No family hx of      Other Cancer No family hx of      Mental Illness No family hx of      Cerebrovascular Disease No family hx of      Asthma No family hx of      Known Genetic Syndrome No family hx of        Social History:  Marital Status:  Single [1]  Social History     Tobacco Use     Smoking status: Current Every Day Smoker     Packs/day: 0.50     Years: 0.50     Pack years: 0.25     Types: Cigarettes     Smokeless tobacco: Never Used     Tobacco comment: uses ecig now   Substance Use Topics     Alcohol use: Yes     Alcohol/week: 0.0 standard drinks     Comment: \"very rarely\"     Drug use: No        Medications:    doxycycline hyclate (VIBRAMYCIN) 100 MG capsule  guaiFENesin-codeine (ROBITUSSIN AC) 100-10 MG/5ML solution  ondansetron (ZOFRAN ODT) 4 MG ODT tab  albuterol (PROAIR HFA/PROVENTIL HFA/VENTOLIN HFA) 108 (90 Base) MCG/ACT inhaler  azithromycin (ZITHROMAX Z-NATACHA) 250 MG tablet  cetirizine (ZYRTEC) 10 MG tablet  DIETARY MANAGEMENT PRODUCT PO  erythromycin (ROMYCIN) 5 MG/GM ophthalmic " ointment  Fexofenadine HCl (ALLEGRA PO)  Ibuprofen (ADVIL PO)  methocarbamol (ROBAXIN) 500 MG tablet  Naproxen Sodium (ALEVE PO)          Review of Systems   All other ROS reviewed and are negative or non-contributory except as stated in HPI.     Physical Exam   BP: 116/76  Pulse: 82  Heart Rate: 80  Temp: 97.7  F (36.5  C)  Resp: 20  Weight: 66 kg (145 lb 8 oz)  SpO2: 98 %      Physical Exam  Vitals signs and nursing note reviewed.   Constitutional:       Appearance: She is well-developed and normal weight.      Comments: Healthy-appearing female sitting in the bed.  Occasional dry cough   HENT:      Head: Normocephalic.      Right Ear: Tympanic membrane normal.      Left Ear: Tympanic membrane normal.      Nose: Nose normal.      Comments: Some maxillary sinus tenderness     Mouth/Throat:      Mouth: Mucous membranes are moist.      Pharynx: Oropharynx is clear.   Eyes:      Extraocular Movements: Extraocular movements intact.      Pupils: Pupils are equal, round, and reactive to light.   Neck:      Musculoskeletal: Normal range of motion and neck supple.   Cardiovascular:      Rate and Rhythm: Normal rate and regular rhythm.      Heart sounds: Normal heart sounds. No murmur.   Pulmonary:      Effort: Pulmonary effort is normal.      Breath sounds: Normal breath sounds.   Abdominal:      General: Bowel sounds are increased.      Palpations: Abdomen is soft.      Tenderness: There is abdominal tenderness (mild epigastric).   Musculoskeletal: Normal range of motion.   Skin:     General: Skin is warm and dry.      Findings: No rash.   Neurological:      General: No focal deficit present.      Mental Status: She is alert and oriented to person, place, and time.   Psychiatric:         Mood and Affect: Mood normal.         Behavior: Behavior normal.         ED Course (with Medical Decision Making)    Pt seen and examined by me.  RN and EPIC notes reviewed.       Patient with symptoms of diarrhea that is now resolved,  residual nausea and cramps.  No fevers.  She has had a cough for over 2 weeks that initially was productive but now is not.  This could all be viral including COVID.  Possible foodborne illness with regards to her abdominal symptoms.  Possible sinusitis or pneumonia.  Plan IV, labs, Zofran for nausea.  Chest x-ray, COVID swab.    Patient's labs are all completely normal.  Chest x-ray is clear.  COVID swab pending.  Zofran did help with her nausea.  I am going to discharge her with Zofran for nausea and because she has had sinus symptoms for over 2 weeks doxycycline for sinusitis.  She was given some Robitussin with codeine as needed for cough.  Work note was provided.  Plan follow-up for any positive COVID.  Return for worsening, changes or concerns.        Procedures      Results for orders placed or performed during the hospital encounter of 07/22/20 (from the past 24 hour(s))   CBC with platelets differential   Result Value Ref Range    WBC 9.9 4.0 - 11.0 10e9/L    RBC Count 4.19 3.8 - 5.2 10e12/L    Hemoglobin 13.3 11.7 - 15.7 g/dL    Hematocrit 39.0 35.0 - 47.0 %    MCV 93 78 - 100 fl    MCH 31.7 26.5 - 33.0 pg    MCHC 34.1 31.5 - 36.5 g/dL    RDW 13.4 10.0 - 15.0 %    Platelet Count 264 150 - 450 10e9/L    Diff Method Automated Method     % Neutrophils 58.8 %    % Lymphocytes 28.2 %    % Monocytes 10.3 %    % Eosinophils 1.6 %    % Basophils 0.8 %    % Immature Granulocytes 0.3 %    Nucleated RBCs 0 0 /100    Absolute Neutrophil 5.8 1.6 - 8.3 10e9/L    Absolute Lymphocytes 2.8 0.8 - 5.3 10e9/L    Absolute Monocytes 1.0 0.0 - 1.3 10e9/L    Absolute Basophils 0.1 0.0 - 0.2 10e9/L    Abs Immature Granulocytes 0.0 0 - 0.4 10e9/L    Absolute Nucleated RBC 0.0    Comprehensive metabolic panel   Result Value Ref Range    Sodium 139 133 - 144 mmol/L    Potassium 4.1 3.4 - 5.3 mmol/L    Chloride 106 94 - 109 mmol/L    Carbon Dioxide 27 20 - 32 mmol/L    Anion Gap 6 3 - 14 mmol/L    Glucose 81 70 - 99 mg/dL    Urea  Nitrogen 11 7 - 30 mg/dL    Creatinine 0.58 0.52 - 1.04 mg/dL    GFR Estimate >90 >60 mL/min/[1.73_m2]    GFR Estimate If Black >90 >60 mL/min/[1.73_m2]    Calcium 9.2 8.5 - 10.1 mg/dL    Bilirubin Total 0.2 0.2 - 1.3 mg/dL    Albumin 3.5 3.4 - 5.0 g/dL    Protein Total 7.0 6.8 - 8.8 g/dL    Alkaline Phosphatase 89 40 - 150 U/L    ALT 25 0 - 50 U/L    AST 17 0 - 45 U/L   Magnesium   Result Value Ref Range    Magnesium 2.2 1.6 - 2.3 mg/dL   Lipase   Result Value Ref Range    Lipase 154 73 - 393 U/L   XR Chest Port 1 View    Narrative    Examination:  XR CHEST PORT 1 VW    Date:  7/22/2020 2:17 PM     Clinical Information: Cough     Comparison: 9/1/2015      Impression    Impression: Lungs clear without acute airspace opacity or pleural  effusion. Normal cardiac size and pulmonary vascularity. No  pneumothorax. No significant osseous abnormality. Overall, no  significant change from previous.    BOOGIE IGNACIO MD       Medications   ondansetron (ZOFRAN) injection 4 mg (4 mg Intravenous Given 7/22/20 1358)   0.9% sodium chloride BOLUS (0 mLs Intravenous Stopped 7/22/20 9309)       Assessments & Plan      I have reviewed the findings, diagnosis, plan and need for follow up with the patient.  Discharge Medication List as of 7/22/2020  4:11 PM      START taking these medications    Details   doxycycline hyclate (VIBRAMYCIN) 100 MG capsule Take 1 capsule (100 mg) by mouth 2 times daily for 10 days, Disp-20 capsule,R-0, E-Prescribe      guaiFENesin-codeine (ROBITUSSIN AC) 100-10 MG/5ML solution Take 5-10 mLs by mouth every 4 hours as needed for cough, Disp-180 mL,R-0, E-Prescribe      ondansetron (ZOFRAN ODT) 4 MG ODT tab Take 1 tablet (4 mg) by mouth every 6 hours as needed for nausea or vomiting, Disp-10 tablet,R-0, E-Prescribe             Final diagnoses:   Acute sinusitis with symptoms > 10 days   Nausea   Upset stomach   Encounter for laboratory testing for COVID-19 virus     Disposition: Patient discharged home in  stable condition.  Plan as above.  Return for concerns.      Note: Chart documentation done in part with Dragon Voice Recognition software. Although reviewed after completion, some word and grammatical errors may remain.       7/22/2020   [facility] EMERGENCY DEPARTMENT     Raquel Lei MD  07/22/20 2003

## 2020-07-28 ENCOUNTER — TELEPHONE (OUTPATIENT)
Dept: FAMILY MEDICINE | Facility: OTHER | Age: 60
End: 2020-07-28

## 2020-07-28 NOTE — TELEPHONE ENCOUNTER
Reason for Call:  Request for results:    Name of test or procedure: lab     Date of test of procedure: 7/22     Location of the test or procedure: [facility]    OK to leave the result message on voice mail or with a family member? NO    Phone number Patient can be reached at:  Home number on file [phone] (home)    Additional comments: requesting lab results and would like COVID test results mailed     Call taken on 7/28/2020 at 10:43 AM by Lisa Bennett

## 2020-07-28 NOTE — TELEPHONE ENCOUNTER
Labs all appear normal.  Will have staff notify patient and mail results as requested.    Jamar Sargent MD

## 2020-07-28 NOTE — LETTER
July 28, 2020      Fern Gonzalez  [address] [facility] [address]        Dear ,    We are writing to inform you of your test results.    Your test results fall within the expected range(s) or remain unchanged from previous results.  Please continue with current treatment plan.      If you have any questions or concerns, please call the clinic at the number listed above.       Sincerely,        Jamar Sargent MD

## 2020-07-28 NOTE — TELEPHONE ENCOUNTER
Spoke with patient and informed of results below. Patient understood and results mailed to patient.   Jerrica Medellin MA

## 2020-07-29 NOTE — TELEPHONE ENCOUNTER
I called pt and informed her that I will fax the results to her work, but it was missing a number. I faxed it to [phone].Shilpa Valdviia MA

## 2020-07-29 NOTE — TELEPHONE ENCOUNTER
Fern is requesting a letter be sent to her employer with her COVID results. She would like to go back to work today if possible. Please fax to# [phone] [employer]   Attn:  Stephanie Iverson   .

## 2021-01-22 ENCOUNTER — OFFICE VISIT (OUTPATIENT)
Dept: FAMILY MEDICINE | Facility: CLINIC | Age: 61
End: 2021-01-22

## 2021-01-22 VITALS
SYSTOLIC BLOOD PRESSURE: 126 MMHG | HEART RATE: 78 BPM | TEMPERATURE: 98.1 F | WEIGHT: 154.7 LBS | OXYGEN SATURATION: 98 % | DIASTOLIC BLOOD PRESSURE: 82 MMHG | RESPIRATION RATE: 16 BRPM | BODY MASS INDEX: 32.33 KG/M2

## 2021-01-22 DIAGNOSIS — G43.901 MIGRAINE WITH STATUS MIGRAINOSUS, NOT INTRACTABLE, UNSPECIFIED MIGRAINE TYPE: Primary | ICD-10-CM

## 2021-01-22 PROCEDURE — 99213 OFFICE O/P EST LOW 20 MIN: CPT | Performed by: PHYSICIAN ASSISTANT

## 2021-01-22 RX ORDER — SUMATRIPTAN 25 MG/1
25 TABLET, FILM COATED ORAL
Qty: 16 TABLET | Refills: 0 | Status: SHIPPED | OUTPATIENT
Start: 2021-01-22 | End: 2021-09-01

## 2021-01-22 ASSESSMENT — PATIENT HEALTH QUESTIONNAIRE - PHQ9: SUM OF ALL RESPONSES TO PHQ QUESTIONS 1-9: 2

## 2021-01-22 NOTE — PROGRESS NOTES
Assessment & Plan     Migraine with status migrainosus, not intractable, unspecified migraine type  - SUMAtriptan (IMITREX) 25 MG tablet; Take 1 tablet (25 mg) by mouth at onset of headache for migraine May repeat in 2 hours. Max 8 tablets/24 hours.    Drink a full glass of water intake of Benadryl then go to sleep cold dark room.  Follow-up with PCP for physical and refills of medications if appropriate.    20 minutes spent on the date of the encounter doing chart review, patient visit and documentation     Return in about 1 month (around 2/22/2021) for Physical Exam with primary care provider.    DINO Johnson [facility]    Sona Michel is a 60 year old who presents to clinic today for the following health issues     HPI       Acute Illness  Acute illness concerns: right ear pain   Onset/Duration: x 2 days  Symptoms:  Fever: no  Chills/Sweats: YES- chills  Headache (location?): YES- behind right eye  Sinus Pressure: YES- on right side of face  Conjunctivitis:  no  Ear Pain: YES: right  Rhinorrhea: YES during this week  Congestion: YES today   Sore Throat: no  Cough: YES-productive of clear sputum, ongoing because of asthma  Wheeze: YES  Decreased Appetite: no  Nausea: YES  Vomiting: no  Diarrhea: no  Fatigue/Achiness: YES- fatigue  Sick/Strep Exposure: no  Therapies tried and outcome: advil, was taking yesterday it did not help    Anand presents to the clinic today for evaluation of a migraine headache.  She notes that she has had migraine headaches a few times in the past and was once hospitalized for couple days to control her pain.  2 nights ago she woke up at 3 AM with a migraine behind her right eye, which is her normal migraine location.  She felt nauseous but never vomited.  Since then her pain has improved but is still persistent.  She has pain behind her right eye, in her right sinus and feels very fatigued and intermittently nauseous.  She took Advil yesterday  without much relief.  She states she always has rhinorrhea which she believes is partially from wearing a mask and partially from the fumes at work.  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    Review of Systems   ROS negative except as stated above.      Objective    /82 (BP Location: Right arm)   Pulse 78   Temp 98.1  F (36.7  C) (Temporal)   Resp 16   Wt 70.2 kg (154 lb 11.2 oz)   LMP  (LMP Unknown)   SpO2 98%   BMI 32.33 kg/m    Body mass index is 32.33 kg/m .  Physical Exam   GENERAL: healthy, alert and no distress  EYES: Eyes grossly normal to inspection, PERRL and conjunctivae and sclerae normal  HENT: ear canals and TM's normal, nose and mouth without ulcers or lesions  NECK: no adenopathy, no asymmetry, masses, or scars and thyroid normal to palpation  RESP: lungs clear to auscultation - no rales, rhonchi or wheezes  CV: regular rate and rhythm, normal S1 S2, no S3 or S4, no murmur, click or rub, no peripheral edema and peripheral pulses strong  MS: no gross musculoskeletal defects noted, no edema  NEURO: Normal strength and tone, sensory exam grossly normal, mentation intact, cranial nerves 2-12 intact, gait normal including heel/toe/tandem walking, Romberg normal and rapid alternating movements normal    I spent a total of 15 minutes face-to-face with Fern Gonzalez during today's office visit.  Over 50% of this time was spent counseling the patient and/or coordinating care regarding migraine.  See note for details.

## 2021-01-22 NOTE — LETTER
[address] [address]  Phone: [phone]  Fax: [phone]        2021    Fern Gonzalez  [address] [facility] [address]  [phone] (home) [phone] (work)    :     1960      To Whom it May Concern:    This patient was seen in our clinic today. Please excuse her from work missed on  and . She may return to work on Monday, 2021.    Please contact me for questions or concerns.    Sincerely,      Carly Zamarripa PA-C

## 2021-01-22 NOTE — PATIENT INSTRUCTIONS
Patient Education     Migraine Headache   A migraine headache is an often severe type of headache. It's different from other types of headaches in that symptoms other than pain occur with the it. For instance, a classic migraine headache means visual symptoms (or aura) such as flashes of light, blind spots or other vision changes, warns you a headache is coming on. Nausea and vomiting, lightheadedness, sensitivity to light or sound, and other visual disturbances are common migraine symptoms. The pain may last from a few hours to several days. It's not clear why migraines occur, but certain factors called triggers can raise the risk of having a migraine attack. A migraine may be triggered by emotional stress or depression, or by hormone changes during the menstrual cycle. Other triggers include certain birth control pills, overuse of migraine medicines, alcohol or caffeine, foods with tyramine such as aged cheese and wine, eyestrain, weather changes, missed meals, or too little or too much sleep.  Home care  Follow these tips when taking care of yourself at home:    Don t drive yourself home if you were given pain medicine for your headache or are having visual symptoms. Instead, have someone else drive you home. Try to sleep when you get home. You should feel much better when you wake up.    Cold can help ease migraine symptoms. Put an ice pack wrapped in a thin towel on your forehead or at the base of your skull. Put heat on the back of your neck to help ease any neck spasm.    Drink only clear liquids or eat a light diet until your symptoms get better. This will help you prevent nausea and vomiting.  How to prevent migraines  Pay attention to what seems to trigger your headache. Try to stay away from the triggers when you can. If you have headaches often, consider keeping a headache diary. In it, write down what you were doing, feeling, or eating in the hours before each headache. Show this to your healthcare  provider to help find the cause of your headaches.  If stress seems to be a trigger for your headaches, figure out what is causing stress in your life. Learn new ways to handle your stress. Ideas include regular exercise, biofeedback, self-hypnosis, yoga, and meditation. Talk with your healthcare provider to find out more information about managing stress. Many books and digital media are also available on this subject.  Tyramine is a substance found in many foods. It can trigger a migraine in some people. These foods contain tyramine:    Chocolate    Yogurt    All cheeses, but especially aged cheeses    Smoked or pickled fish and meat, including herring, caviar, bologna, pepperoni, and salami    Liver    Avocados    Bananas    Figs    Raisins    Red wine  Try staying away from these foods for 1 to 2 months to see if you have fewer headaches.  How to treat future headaches    Take time out at the first sign of a headache, if possible. Find a quiet, dark, comfortable place to sit or lie down. Let yourself relax or sleep.    Put an ice pack wrapped in a thin towel on your forehead or on the area of greatest pain. A heating pad and massage may help if you are having a muscle spasm and tightness in your neck.    If you have been prescribed a medicine to stop a migraine headache, use this at the first warning sign of the headache for best results. First signs may be an aura or pain.    If you have been prescribed a medicine to prevent the headaches, it's important to take the medicine as directed. Many of these medicines may take a few weeks to start preventing headaches, so it's important to not give up on them right away. If you continue to have just as many headaches after taking these medicines for a while, talk with your doctor to see if the dose needs to be changed or if a different medicine is advised.    If you need to take medicine often for your migraine, talk with your healthcare provider about other ways to  prevent your headaches.    Follow-up care  Follow up with your healthcare provider, or as advised. Talk with your provider if you have frequent headaches. He or she can figure out a treatment plan. Ask if you can have medicine to take at home the next time you get a bad headache. This may keep you from having to visit the emergency department in the future. You may need to see a headache specialist (neurologist) if you continue to have headaches.  When to seek medical advice  Call your healthcare provider right away if any of these occur:    Your head pain gets worse, or doesn t get better within 24 hours    You can t keep liquids down (repeated vomiting)    Pain in your sinuses, ears, or throat    Fever of 100.4  F (38  C) or higher, or as directed by your healthcare provider    Stiff neck    Extreme drowsiness, confusion, or fainting    Dizziness, or dizziness with spinning sensation (vertigo)    Weakness or trouble feeling in an arm or leg, or on one side of your face    Trouble talking or seeing  [employer] last reviewed this educational content on 9/1/2019 2000-2020 [employer]. [address] [place] [address], [place] [address]. All rights reserved. This information is not intended as a substitute for professional medical care. Always follow your healthcare professional's instructions.

## 2021-06-15 ENCOUNTER — OFFICE VISIT (OUTPATIENT)
Dept: FAMILY MEDICINE | Facility: CLINIC | Age: 61
End: 2021-06-15
Payer: OTHER GOVERNMENT

## 2021-06-15 VITALS
SYSTOLIC BLOOD PRESSURE: 122 MMHG | DIASTOLIC BLOOD PRESSURE: 79 MMHG | HEART RATE: 77 BPM | OXYGEN SATURATION: 99 % | TEMPERATURE: 98.9 F

## 2021-06-15 DIAGNOSIS — J30.2 SEASONAL ALLERGIC RHINITIS, UNSPECIFIED TRIGGER: ICD-10-CM

## 2021-06-15 DIAGNOSIS — R05.9 COUGH: Primary | ICD-10-CM

## 2021-06-15 DIAGNOSIS — R06.2 WHEEZING: ICD-10-CM

## 2021-06-15 LAB
SARS-COV-2 RNA RESP QL NAA+PROBE: NORMAL
SPECIMEN SOURCE: NORMAL

## 2021-06-15 PROCEDURE — U0005 INFEC AGEN DETEC AMPLI PROBE: HCPCS | Performed by: PHYSICIAN ASSISTANT

## 2021-06-15 PROCEDURE — 99214 OFFICE O/P EST MOD 30 MIN: CPT | Performed by: PHYSICIAN ASSISTANT

## 2021-06-15 PROCEDURE — U0003 INFECTIOUS AGENT DETECTION BY NUCLEIC ACID (DNA OR RNA); SEVERE ACUTE RESPIRATORY SYNDROME CORONAVIRUS 2 (SARS-COV-2) (CORONAVIRUS DISEASE [COVID-19]), AMPLIFIED PROBE TECHNIQUE, MAKING USE OF HIGH THROUGHPUT TECHNOLOGIES AS DESCRIBED BY CMS-2020-01-R: HCPCS | Performed by: PHYSICIAN ASSISTANT

## 2021-06-15 RX ORDER — PREDNISONE 20 MG/1
40 TABLET ORAL DAILY
Qty: 10 TABLET | Refills: 0 | Status: SHIPPED | OUTPATIENT
Start: 2021-06-15 | End: 2021-06-20

## 2021-06-15 NOTE — PATIENT INSTRUCTIONS
Prednisone 40mg daiiy for 5 days  Rest! Your body needs more rest to heal.  Drink plenty of fluids (warm fluids like tea or soup are soothing and reduce cough)  Sit in the bathroom with a hot shower running and breathe in the steam.  Honey may soothe your sore throat and help manage your cough- may take straight or in warm water with lemon juice.  Avoid smoke (cigarettes, bonfires, fireplace, wood burning stoves).  Take Tylenol or an NSAID such as ibuprofen or naproxen as needed for pain.  Delsym (dextromethorphan polistirex) is an over the counter cough medication that lasts 12 hours.   Mucinex or Robitussin (guiafenesin) thin mucus and may help it to loosen more quickly  Good handwashing is the best way to prevent spread of germs  Present to emergency room if you develop trouble breathing, swallowing or cough-up blood.  Follow up with your primary care provider if symptoms worsen or fail to improve as expected.

## 2021-06-15 NOTE — LETTER
[address] [address]  Phone: [phone]  Fax: [phone]        6/15/2021    Fern Gonzalez  [address]  [facility] [address]  [phone] (home) [phone] (work)    :     1960      To Whom it May Concern:    Fern was evaluated today for symptoms that could be related to COVID-19. Per CDC guidelines, the following criteria must be met before it is recommended to be around others:    At least 10 days since symptoms first appeared and  At least 24 hours with no fever without fever-reducing medication and  Other symptoms of COVID-19 are improving. Loss of taste and smell may persist for weeks or months after recovery and need not delay the end of isolation    If her COVID test is negative, she may return to work as soon as she has a negative test result.    For more information, visit:   https://www.cdc.gov/coronavirus/2019-ncov/if-you-are-sick/isolation.html  https://www.cdc.gov/coronavirus/2019-ncov/daily-life-coping/nhdbmqkbv-uv-bbbe.html    Please contact me for questions or concerns.    Sincerely,        Carly Zamarripa PA-C

## 2021-06-15 NOTE — PROGRESS NOTES
Assessment & Plan     Cough  - Symptomatic COVID-19 Virus (Coronavirus) by PCR  - predniSONE (DELTASONE) 20 MG tablet; Take 2 tablets (40 mg) by mouth daily for 5 days    Wheezing  - predniSONE (DELTASONE) 20 MG tablet; Take 2 tablets (40 mg) by mouth daily for 5 days  Exam is normal today with no wheezing noted however she did recently use her albuterol inhaler.  Because the albuterol seems to be working for a short period of time and she has such a significant history of seasonal allergies we will treat with prednisone to help hasten the resolution of her symptoms.    Seasonal allergic rhinitis, unspecified trigger  Continue Nasacort and Claritin or Zyrtec daily    Covid test pending discussed isolation quarantine guidelines.  She requested that we email test result to [email or web address].    30 minutes spent on the date of the encounter doing chart review, patient visit and documentation     Return in about 3 weeks (around 7/6/2021) for Recheck with primary care provider if not improved.    Carly Zamarripa PA-C  [facility] YVONNE Michel is a 60 year old who presents for the following health issues     HPI     Acute Illness  Acute illness concerns: SINUS INFECTION   Onset/Duration: since Saturday- 4 days ago  Symptoms:  Fever: no  Chills/Sweats: no  Headache (location?): YES- since Saturday, upper lip numb more so when lying down, pain behind eyes, frontal and maxillary sinus area  Sinus Pressure: YES  Conjunctivitis:  no  Ear Pain: YES: right, pain only when lying down  Rhinorrhea: YES with post nasal drip  Congestion: YES  Sore Throat: YES- post nasal drip  Cough: YES-productive of clear sputum, with shortness of breath, worsening over time  Wheeze: YES  Decreased Appetite: no  Nausea: no  Vomiting: no  Diarrhea: no  Dysuria/Freq.: no  Dysuria or Hematuria: no  Fatigue/Achiness: YES  Sick/Strep Exposure: no  Therapies tried and outcome: inhaler and OTC vicks sinus and  mando Michel presents to the clinic today for evaluation of sinus symptoms and a cough that began on Saturday, 4 days ago.  She states her sinus symptoms seem to be staying the same but the cough is getting worse.  She has postnasal drainage which she thinks is causing a cough however she does feel short of breath with rest and activity.  The cough is usually productive but she is not sure what color the sputum is.  She states that she has been using her inhaler 4-5 times a day recently.  Does smoke it helps at first but it does not last.  She is feeling well she uses her inhaler about once every 2 weeks.  She does state that she has had seasonal allergies and walks through a wooded area and her symptoms began about 8 hours later.    Review of Systems   ROS negative except as stated above.        Objective    /79   Pulse 77   Temp 98.9  F (37.2  C) (Oral)   LMP  (LMP Unknown)   SpO2 99%   There is no height or weight on file to calculate BMI.  Physical Exam   GENERAL: healthy, alert and no distress  EYES: Eyes grossly normal to inspection, PERRL and conjunctivae and sclerae normal  HENT: ear canals and TM's normal, nose with boggy nasal turbinates with a blue hue bilaterally and mouth without ulcers or lesions  NECK: no adenopathy, no asymmetry, masses, or scars and thyroid normal to palpation  RESP: lungs clear to auscultation - no rales, rhonchi or wheezes  CV: regular rate and rhythm, normal S1 S2, no S3 or S4, no murmur, click or rub, no peripheral edema and peripheral pulses strong  MS: no gross musculoskeletal defects noted, no edema    No results found for any visits on 06/15/21.

## 2021-06-16 LAB
LABORATORY COMMENT REPORT: NORMAL
SARS-COV-2 RNA RESP QL NAA+PROBE: NEGATIVE
SPECIMEN SOURCE: NORMAL

## 2021-06-17 ENCOUNTER — TELEPHONE (OUTPATIENT)
Dept: FAMILY MEDICINE | Facility: CLINIC | Age: 61
End: 2021-06-17

## 2021-06-17 NOTE — TELEPHONE ENCOUNTER
Called and LM for patient to call back, please inform patient that her covid result is negative.  Catherine Rehman CMA ([facility])

## 2021-08-11 ENCOUNTER — OFFICE VISIT (OUTPATIENT)
Dept: FAMILY MEDICINE | Facility: CLINIC | Age: 61
End: 2021-08-11

## 2021-08-11 VITALS
HEART RATE: 94 BPM | DIASTOLIC BLOOD PRESSURE: 72 MMHG | OXYGEN SATURATION: 99 % | TEMPERATURE: 98.6 F | SYSTOLIC BLOOD PRESSURE: 105 MMHG

## 2021-08-11 DIAGNOSIS — J30.2 SEASONAL ALLERGIC RHINITIS, UNSPECIFIED TRIGGER: ICD-10-CM

## 2021-08-11 DIAGNOSIS — J01.10 ACUTE NON-RECURRENT FRONTAL SINUSITIS: Primary | ICD-10-CM

## 2021-08-11 PROBLEM — R41.3 AMNESIA: Status: ACTIVE | Noted: 2019-04-01

## 2021-08-11 PROCEDURE — U0003 INFECTIOUS AGENT DETECTION BY NUCLEIC ACID (DNA OR RNA); SEVERE ACUTE RESPIRATORY SYNDROME CORONAVIRUS 2 (SARS-COV-2) (CORONAVIRUS DISEASE [COVID-19]), AMPLIFIED PROBE TECHNIQUE, MAKING USE OF HIGH THROUGHPUT TECHNOLOGIES AS DESCRIBED BY CMS-2020-01-R: HCPCS | Performed by: FAMILY MEDICINE

## 2021-08-11 PROCEDURE — U0005 INFEC AGEN DETEC AMPLI PROBE: HCPCS | Performed by: FAMILY MEDICINE

## 2021-08-11 PROCEDURE — 99214 OFFICE O/P EST MOD 30 MIN: CPT | Performed by: FAMILY MEDICINE

## 2021-08-11 RX ORDER — FLUTICASONE PROPIONATE 50 MCG
2 SPRAY, SUSPENSION (ML) NASAL DAILY
Qty: 16 G | Refills: 1 | Status: ON HOLD | OUTPATIENT
Start: 2021-08-11 | End: 2022-05-22

## 2021-08-11 ASSESSMENT — ANXIETY QUESTIONNAIRES
5. BEING SO RESTLESS THAT IT IS HARD TO SIT STILL: NOT AT ALL
IF YOU CHECKED OFF ANY PROBLEMS ON THIS QUESTIONNAIRE, HOW DIFFICULT HAVE THESE PROBLEMS MADE IT FOR YOU TO DO YOUR WORK, TAKE CARE OF THINGS AT HOME, OR GET ALONG WITH OTHER PEOPLE: NOT DIFFICULT AT ALL
7. FEELING AFRAID AS IF SOMETHING AWFUL MIGHT HAPPEN: NOT AT ALL
1. FEELING NERVOUS, ANXIOUS, OR ON EDGE: NOT AT ALL
6. BECOMING EASILY ANNOYED OR IRRITABLE: NOT AT ALL
2. NOT BEING ABLE TO STOP OR CONTROL WORRYING: NOT AT ALL
GAD7 TOTAL SCORE: 0
3. WORRYING TOO MUCH ABOUT DIFFERENT THINGS: NOT AT ALL

## 2021-08-11 ASSESSMENT — PATIENT HEALTH QUESTIONNAIRE - PHQ9
5. POOR APPETITE OR OVEREATING: NOT AT ALL
SUM OF ALL RESPONSES TO PHQ QUESTIONS 1-9: 3

## 2021-08-11 NOTE — PROGRESS NOTES
Assessment & Plan       ICD-10-CM    1. Acute non-recurrent frontal sinusitis  J01.10 Symptomatic COVID-19 Virus (Coronavirus) by PCR Nasopharyngeal     fluticasone (FLONASE) 50 MCG/ACT nasal spray     amoxicillin-clavulanate (AUGMENTIN) 875-125 MG tablet     Symptomatic COVID-19 Virus (Coronavirus) by PCR Nasopharyngeal   2. Seasonal allergic rhinitis, unspecified trigger  J30.2 fluticasone (FLONASE) 50 MCG/ACT nasal spray     Informed her that based on the physical exam and clinical presentation, most likely she has uncontrolled allergic rhinitis with acute sinus infection.  I discussed with her about the nature of the condition and there treatment options.  Recommend to continue with Tylenol/Motrin for pain.  She has Zyrtec at home and encouraged to start taking daily until symptoms resolved and then as needed.  Started on the Flonase and she was taught how to use the nasal spray correctly.  Also encouraged to drink a lot of water.  Also started her on Augmentin and she was instructed to take it as prescribed.  Call in if develop diarrhea as it may cause C. difficile colitis.  Her asthma is overall controlled, continue with her rescue inhaler as needed.  Call in or follow-up develop if breathing concern.  Also follow-up with her primary care provider if symptom persists or gets worse or if has any concern.    I also screened for Covid infection although it is unlikely the case.  She was instructed isolation until the negative Covid screening test is confirmed.     Tobacco Cessation:   reports that she has been smoking cigarettes. She has a 0.25 pack-year smoking history. She has never used smokeless tobacco.  Tobacco Cessation Action Plan: Information offered: Patient not interested at this time    Return in about 1 month (around 9/11/2021) for Physical Exam with her primary care provider.    Abad Snyder Mai, MD  [facility]    Sona Michel is a 60 year old who presents for the  following health issues     HPI     Acute Illness  Acute illness concerns: Headache, nausea, diarrhea,Sore throat  Onset/Duration: 3 days  Symptoms:  Fever: Uncertain  Chills/Sweats: YES  Headache (location?): YES  Sinus Pressure: YES  Conjunctivitis:  no  Ear Pain: YES: right  Rhinorrhea: no  Congestion: no  Sore Throat: YES- Postnasal drainage  Cough: YES  Wheeze: YES  Decreased Appetite: YES  Nausea: YES  Vomiting: YES  Diarrhea: YES  Dysuria/Freq.: no  Dysuria or Hematuria: no  Fatigue/Achiness: YES  Sick/Strep Exposure: no  Therapies tried and outcome: Ibuprofen, nyquil    Nurses note above reviewed and confirmed with patient.  Stated that, 2 days ago, she was woken up in the middle night with nauseate and vomiting that were follow by bad HA which not gone away. Squeezing and pressure in feeling behind eyes and frontal head area bilaterally - it hurts and uncomfortable.  Constant headache that is not worse with light or noise.  No longer having the nausea.  Been having a lot of post nasal drainage with runny nose in the last couple weeks with runny nose, nasal congestion and sneezing.  Feel chill and feel feverish couple days ago.  Started having the ST this morning.  A lot of pressure with pain on the sinuses bilaterally.  Her teeth are also sensitive.  Started feeling the pressure on the ears.   Been coughing which is worse at night.  Has allergy induce asthma -no wheezing, chest pain, shortness of breath or chest tightness sensation.  Known to have allergy which has not been controlled.  Had couple diarrheal stool yesterday but today it was normal.  No change in her diet.  No exposure to any kind of illnesses.  Did not get the Covid vaccination.  Eating and drinking ok.  No other concern.    Review of Systems   Constitutional, HEENT, cardiovascular, pulmonary, gi and gu systems are negative, except as otherwise noted.      Objective    /72   Pulse 94   Temp 98.6  F (37  C)   LMP  (LMP Unknown)    SpO2 99%   There is no height or weight on file to calculate BMI.  Physical Exam   GENERAL: healthy, alert and no distress.  Speak in full sentences.  HENT: ear canals and TM's normal.  Nares are congested with clear drainage.  Oropharynx is pink and moist.  No tonsilar redness, exudate or hypertrophy.  Tender with palpation to the maxillary and frontal sinuses bilaterally.  No tenderness with elevation to the gums or teeth.  NECK: no adenopathy.  RESP: lungs clear to auscultation - no rales, rhonchi or wheezes.  Good respiratory effort throughout.  CV: regular rate and rhythm, no murmur.  ABDOMEN: soft, non-tender nondistended, normal bowel sound.  No CVA tenderness.      Results for orders placed or performed in visit on 08/11/21   SARS-COV2 (COVID-19) Virus RT-PCR     Status: Normal    Specimen: Nasopharyngeal; Swab   Result Value Ref Range    SARS CoV2 PCR Negative Negative    Narrative    Testing was performed using the debo SARS-CoV-2 assay on the debo  [employer] System. This test should be ordered for the detection of  SARS-CoV-2 in individuals who meet SARS-CoV-2 clinical and/or  epidemiological criteria. Test performance is unknown in asymptomatic  patients. This test is for in vitro diagnostic use under the FDA EUA  for laboratories certified under CLIA to perform high and/or moderate  complexity testing. This test has not been FDA cleared or approved. A  negative result does not rule out the presence of PCR inhibitors in  the specimen or target RNA in concentration below the limit of  detection for the assay. The possibility of a false negative should  be considered if the patient's recent exposure or clinical  presentation suggests COVID-19. This test was validated by the [facility]. This  laboratory is certified under the Clinical Laboratory Improvement  Amendments of 1988 (CLIA-88) as qualified to perform high and/or  moderate complexity laboratory  testing.   Symptomatic COVID-19 Virus (Coronavirus) by PCR Nasopharyngeal     Status: Normal    Specimen: Nasopharyngeal; Swab    Narrative    The following orders were created for panel order Symptomatic COVID-19 Virus (Coronavirus) by PCR Nasopharyngeal.  Procedure                               Abnormality         Status                     ---------                               -----------         ------                     SARS-COV2 (COVID-19) Vir...[[id number]]  Normal              Final result                 Please view results for these tests on the individual orders.

## 2021-08-11 NOTE — LETTER
[address] [address]  [phone]        August 11, 2021    Regarding:  Fern Gonzalez  [address]  [facility] [address]          To Whom It May Concern;    Please excuse Mrs. Gonzalez from work from 8/9/21 to perhaps 2-3 more days.  She will need to be off longer if she is positive for COVID.          Sincerely,        Abad Snyder Mai, MD

## 2021-08-11 NOTE — Clinical Note
Please let patient know that I recommended her to follow-up with primary care provider for physical or preventive care as she is due for it and for breast cancer screening and cholesterol screening.  Thank you

## 2021-08-12 LAB — SARS-COV-2 RNA RESP QL NAA+PROBE: NEGATIVE

## 2021-08-12 ASSESSMENT — ANXIETY QUESTIONNAIRES: GAD7 TOTAL SCORE: 0

## 2021-08-16 ENCOUNTER — TELEPHONE (OUTPATIENT)
Dept: FAMILY MEDICINE | Facility: CLINIC | Age: 61
End: 2021-08-16

## 2021-08-16 NOTE — TELEPHONE ENCOUNTER
Tried to reach patient, left message for patient to call the clinic back. If patient calls the clinic back, please relay the providers message to her and assist her in scheduling appointments if she agrees to it.    Thank you.    Yisel Heredia CMA

## 2021-08-16 NOTE — TELEPHONE ENCOUNTER
Abad Carson MD  [facility]  Please let patient know that I recommended her to follow-up with primary care provider for physical or preventive care as she is due for it and for breast cancer screening and cholesterol screening.  Thank you

## 2021-09-01 ENCOUNTER — VIRTUAL VISIT (OUTPATIENT)
Dept: FAMILY MEDICINE | Facility: CLINIC | Age: 61
End: 2021-09-01

## 2021-09-01 DIAGNOSIS — G43.901 MIGRAINE WITH STATUS MIGRAINOSUS, NOT INTRACTABLE, UNSPECIFIED MIGRAINE TYPE: ICD-10-CM

## 2021-09-01 DIAGNOSIS — J01.00 ACUTE MAXILLARY SINUSITIS, RECURRENCE NOT SPECIFIED: Primary | ICD-10-CM

## 2021-09-01 PROCEDURE — 99214 OFFICE O/P EST MOD 30 MIN: CPT | Mod: 95 | Performed by: FAMILY MEDICINE

## 2021-09-01 RX ORDER — RIZATRIPTAN BENZOATE 5 MG/1
5 TABLET ORAL
Qty: 10 TABLET | Refills: 1 | Status: SHIPPED | OUTPATIENT
Start: 2021-09-01

## 2021-09-01 RX ORDER — AZITHROMYCIN 250 MG/1
TABLET, FILM COATED ORAL
Qty: 6 TABLET | Refills: 0 | Status: SHIPPED | OUTPATIENT
Start: 2021-09-01 | End: 2021-09-06

## 2021-09-01 NOTE — LETTER
[address] [address]  Phone: [phone]  Fax: [phone]    September 1, 2021        Fern Gonzalez  [address] [place] [id number]          To whom it may concern:    RE: Fern Gonzalez    Patient was treated today at our clinic.  Please excuse her from work August 30 through September 1 for medical reasons.    Please contact me for questions or concerns.      Sincerely,        Ashish Bennett MD

## 2021-09-01 NOTE — PROGRESS NOTES
Anand is a 60 year old who is being evaluated via a billable telephone visit.      What phone number would you like to be contacted at? [phone]  How would you like to obtain your AVS? Mail a copy    Assessment & Plan     Acute maxillary sinusitis, recurrence not specified  She may not have totally cleared from her previous sinus infection treated with Augmentin.  She would like to try different antibiotic so we will try a Z-Adalberto.  She is quit smoking for the last 3 days I complemented her on this she states this is the beginning of the end.  - azithromycin (ZITHROMAX) 250 MG tablet; Take 2 tablets (500 mg) by mouth daily for 1 day, THEN 1 tablet (250 mg) daily for 4 days.    Migraine with status migrainosus, not intractable, unspecified migraine type  He has not had any luck with Imitrex up to 50 mg at a time.  We will try Maxalt.  Told her if little resolved she could try 2 at a time 10 mg.  She will follow-up with her primary on this.  - rizatriptan (MAXALT) 5 MG tablet; Take 1 tablet (5 mg) by mouth at onset of headache for migraine May repeat in 2 hours. Max 6 tablets/24 hours.                 No follow-ups on file.    Ashish Bennett MD  [facility]   Anand is a 60 year old who presents for the following health issues : Symptoms as noted below.  He is in no distress.  Feels a lot of this is allergy mediated and mediated by her smoking.    HPI     Acute Illness, needs work note(requesting it to be left at )  Acute illness concerns: vomiting, headache, stomach ache  Onset/Duration: 3 days  Symptoms:  Fever: no  Chills/Sweats: no  Headache (location?): YES- behind eyes, some improvement today  Sinus Pressure: no  Conjunctivitis:  no  Ear Pain: YES: bilateral pressure  Rhinorrhea: YES- drainage into back of throat   Congestion: YES- mild, has seasonal allergies   Sore Throat: no  Cough: YES-productive of clear sputum  Wheeze: YES  Decreased Appetite: YES- from  headache   Nausea: YES- when she smokes   Vomiting: YES  Diarrhea: no, has had abdominal pains  Dysuria/Freq.: no  Dysuria or Hematuria: no  Fatigue/Achiness: YES   Sick/Strep Exposure: no  Therapies tried and outcome: Imitrex- no relief, Advil no relief, Flonase- no relief, recently completed Augmentin for sinus infection         Review of Systems   Constitutional, HEENT, cardiovascular, pulmonary, gi and gu systems are negative, except as otherwise noted.      Objective           Vitals:  No vitals were obtained today due to virtual visit.    Physical Exam   healthy, alert and no distress  PSYCH: Alert and oriented times 3; coherent speech, normal   rate and volume, able to articulate logical thoughts, able   to abstract reason, no tangential thoughts, no hallucinations   or delusions  Her affect is normal and pleasant  RESP: No cough, no audible wheezing, able to talk in full sentences  Remainder of exam unable to be completed due to telephone visits                Phone call duration: 10 minutes

## 2022-03-21 ENCOUNTER — NURSE TRIAGE (OUTPATIENT)
Dept: NURSING | Facility: CLINIC | Age: 62
End: 2022-03-21

## 2022-03-21 NOTE — TELEPHONE ENCOUNTER
Triage call:     Patient calling with cough, headache, sore throat, chest discomfort. These symptoms started 3 weeks ago. She states they are worsening.   She reports she has wheezing and rattling in her chest. She uses an inhaler multiple times a day. Productive cough with yellow tinged mucus. Throughout the call she is coughing.   She reports pressure in her sternum. She states it is from coughing but describes it as a constant chest discomfort that feels like heavy pressure.     Per protocol, patient was advised to call 911. She declines this disposition. Educated patient on risk for heart attack. She verbalizes understanding but decides she will drive herself to the nearest emergency room at [facility].     Missy Goldberg RN   03/21/22 8:15 AM  [facility] Nurse Advisor    Reason for Disposition    Chest pain    Chest pain lasting longer than 5 minutes and ANY of the following:* Over 45 years old* Over 30 years old and at least one cardiac risk factor (e.g., diabetes, high blood pressure, high cholesterol, smoker, or strong family history of heart disease)* History of heart disease (i.e., angina, heart attack, heart failure, bypass surgery, takes nitroglycerin)* Pain is crushing, pressure-like, or heavy    Additional Information    Negative: Choking on something    Negative: Breathing stopped and hasn't returned    Negative: SEVERE difficulty breathing (e.g., struggling for each breath, speaks in single words, pulse > 120)    Negative: Bluish (or gray) lips or face    Negative: Difficult to awaken or acting confused (e.g., disoriented, slurred speech)    Negative: Passed out (i.e., fainted, collapsed and was not responding)    Negative: Wheezing started suddenly after medicine, an allergic food, or bee sting    Negative: Stridor    Negative: Slow, shallow and weak breathing    Negative: Sounds like a life-threatening emergency to the triager    Negative: Passed out (i.e., fainted, collapsed and was  not responding)    Negative: Difficult to awaken or acting confused (e.g., disoriented, slurred speech)    Negative: Shock suspected (e.g., cold/pale/clammy skin, too weak to stand, low BP, rapid pulse)    Negative: Severe difficulty breathing (e.g., struggling for each breath, speaks in single words)    Protocols used: BREATHING DIFFICULTY-A-OH, CHEST PAIN-A-OH    COVID 19 Nurse Triage Plan/Patient Instructions    Please be aware that novel coronavirus (COVID-19) may be circulating in the community. If you develop symptoms such as fever, cough, or SOB or if you have concerns about the presence of another infection including coronavirus (COVID-19), please contact your health care provider or visit [email or web address].     Disposition/Instructions    Call to EMS/911 recommended. Follow protocol based instructions.     Bring Your Own Device:  Please also bring your smart device(s) (smart phones, tablets, laptops) and their charging cables for your personal use and to communicate with your care team during your visit.    Thank you for taking steps to prevent the spread of this virus.  o Limit your contact with others.  o Wear a simple mask to cover your cough.  o Wash your hands well and often.    Resources    [facility]: About COVID-19: [email or web address]    CDC: What to Do If You're Sick: www.cdc.gov/coronavirus/2019-ncov/about/steps-when-sick.html    CDC: Ending Home Isolation: www.cdc.gov/coronavirus/2019-ncov/hcp/disposition-in-home-patients.html     CDC: Caring for Someone: www.cdc.gov/coronavirus/2019-ncov/if-you-are-sick/care-for-someone.html     [facility]: Interim Guidance for Hospital Discharge to Home: www.health.[facility].mn.us/diseases/coronavirus/hcp/hospdischarge.pdf    [facility] clinical trials (COVID-19 research studies): [email or web address]     Below are the COVID-19 hotlines at the [place] Department of Health ([facility]). Interpreters are  available.   o For health questions: Call 089-145-5920 or 1-893.348.4859 (7 a.m. to 7 p.m.)  o For questions about schools and childcare: Call 168-138-7388 or 1-244.553.6897 (7 a.m. to 7 p.m.)

## 2022-03-28 ENCOUNTER — APPOINTMENT (OUTPATIENT)
Dept: CT IMAGING | Facility: CLINIC | Age: 62
End: 2022-03-28
Attending: EMERGENCY MEDICINE

## 2022-03-28 ENCOUNTER — ANESTHESIA (OUTPATIENT)
Dept: EMERGENCY MEDICINE | Facility: CLINIC | Age: 62
End: 2022-03-28

## 2022-03-28 ENCOUNTER — ANESTHESIA EVENT (OUTPATIENT)
Dept: EMERGENCY MEDICINE | Facility: CLINIC | Age: 62
End: 2022-03-28

## 2022-03-28 ENCOUNTER — HOSPITAL ENCOUNTER (EMERGENCY)
Facility: CLINIC | Age: 62
Discharge: HOME | End: 2022-03-28
Attending: EMERGENCY MEDICINE | Admitting: EMERGENCY MEDICINE

## 2022-03-28 ENCOUNTER — APPOINTMENT (OUTPATIENT)
Dept: GENERAL RADIOLOGY | Facility: CLINIC | Age: 62
End: 2022-03-28
Attending: EMERGENCY MEDICINE

## 2022-03-28 ENCOUNTER — TELEPHONE (OUTPATIENT)
Dept: EMERGENCY MEDICINE | Facility: CLINIC | Age: 62
End: 2022-03-28

## 2022-03-28 VITALS
TEMPERATURE: 98.2 F | RESPIRATION RATE: 16 BRPM | DIASTOLIC BLOOD PRESSURE: 78 MMHG | HEART RATE: 84 BPM | SYSTOLIC BLOOD PRESSURE: 127 MMHG | OXYGEN SATURATION: 97 %

## 2022-03-28 DIAGNOSIS — R51.9 NONINTRACTABLE HEADACHE, UNSPECIFIED CHRONICITY PATTERN, UNSPECIFIED HEADACHE TYPE: ICD-10-CM

## 2022-03-28 LAB
ALBUMIN SERPL-MCNC: 4.2 G/DL (ref 3.4–5)
ALBUMIN UR-MCNC: NEGATIVE MG/DL
ALP SERPL-CCNC: 86 U/L (ref 40–150)
ALT SERPL W P-5'-P-CCNC: 28 U/L (ref 0–50)
AMPHETAMINES UR QL: NOT DETECTED
ANION GAP SERPL CALCULATED.3IONS-SCNC: 4 MMOL/L (ref 3–14)
APPEARANCE CSF: CLEAR
APPEARANCE UR: CLEAR
AST SERPL W P-5'-P-CCNC: 23 U/L (ref 0–45)
BACTERIA #/AREA URNS HPF: ABNORMAL /HPF
BARBITURATES UR QL SCN: NOT DETECTED
BASOPHILS # BLD AUTO: 0.1 10E3/UL (ref 0–0.2)
BASOPHILS NFR BLD AUTO: 1 %
BENZODIAZ UR QL SCN: NOT DETECTED
BILIRUB SERPL-MCNC: 0.3 MG/DL (ref 0.2–1.3)
BILIRUB UR QL STRIP: NEGATIVE
BUN SERPL-MCNC: 20 MG/DL (ref 7–30)
BUPRENORPHINE UR QL: NOT DETECTED
CALCIUM SERPL-MCNC: 9.5 MG/DL (ref 8.5–10.1)
CANNABINOIDS UR QL: NOT DETECTED
CHLORIDE BLD-SCNC: 108 MMOL/L (ref 94–109)
CO2 SERPL-SCNC: 25 MMOL/L (ref 20–32)
COCAINE UR QL SCN: NOT DETECTED
COHGB MFR BLD: 1.5 % (ref 0–2)
COLOR CSF: COLORLESS
COLOR UR AUTO: ABNORMAL
CREAT SERPL-MCNC: 0.56 MG/DL (ref 0.52–1.04)
D-METHAMPHET UR QL: NOT DETECTED
EOSINOPHIL # BLD AUTO: 0.4 10E3/UL (ref 0–0.7)
EOSINOPHIL NFR BLD AUTO: 2 %
ERYTHROCYTE [DISTWIDTH] IN BLOOD BY AUTOMATED COUNT: 14 % (ref 10–15)
ETHANOL SERPL-MCNC: <0.01 G/DL
FLUAV RNA SPEC QL NAA+PROBE: NEGATIVE
FLUBV RNA RESP QL NAA+PROBE: NEGATIVE
GFR SERPL CREATININE-BSD FRML MDRD: >90 ML/MIN/1.73M2
GLUCOSE BLD-MCNC: 102 MG/DL (ref 70–99)
GLUCOSE CSF-MCNC: 57 MG/DL (ref 40–70)
GLUCOSE UR STRIP-MCNC: NEGATIVE MG/DL
HCT VFR BLD AUTO: 42.7 % (ref 35–47)
HGB BLD-MCNC: 14.5 G/DL (ref 11.7–15.7)
HGB UR QL STRIP: NEGATIVE
IMM GRANULOCYTES # BLD: 0.1 10E3/UL
IMM GRANULOCYTES NFR BLD: 1 %
KETONES UR STRIP-MCNC: NEGATIVE MG/DL
LEUKOCYTE ESTERASE UR QL STRIP: NEGATIVE
LYMPHOCYTES # BLD AUTO: 2.5 10E3/UL (ref 0.8–5.3)
LYMPHOCYTES NFR BLD AUTO: 13 %
MCH RBC QN AUTO: 31.7 PG (ref 26.5–33)
MCHC RBC AUTO-ENTMCNC: 34 G/DL (ref 31.5–36.5)
MCV RBC AUTO: 93 FL (ref 78–100)
METHADONE UR QL SCN: NOT DETECTED
MONOCYTES # BLD AUTO: 1.2 10E3/UL (ref 0–1.3)
MONOCYTES NFR BLD AUTO: 6 %
MUCOUS THREADS #/AREA URNS LPF: PRESENT /LPF
NEUTROPHILS # BLD AUTO: 15.9 10E3/UL (ref 1.6–8.3)
NEUTROPHILS NFR BLD AUTO: 77 %
NITRATE UR QL: NEGATIVE
NRBC # BLD AUTO: 0 10E3/UL
NRBC BLD AUTO-RTO: 0 /100
OPIATES UR QL SCN: NOT DETECTED
OXYCODONE UR QL SCN: NOT DETECTED
PCP UR QL SCN: NOT DETECTED
PH UR STRIP: 6 [PH] (ref 5–7)
PLATELET # BLD AUTO: 286 10E3/UL (ref 150–450)
POTASSIUM BLD-SCNC: 4.3 MMOL/L (ref 3.4–5.3)
PROPOXYPH UR QL: NOT DETECTED
PROT CSF-MCNC: 42 MG/DL (ref 15–60)
PROT SERPL-MCNC: 7.4 G/DL (ref 6.8–8.8)
RBC # BLD AUTO: 4.57 10E6/UL (ref 3.8–5.2)
RBC URINE: 0 /HPF
SARS-COV-2 RNA RESP QL NAA+PROBE: NEGATIVE
SODIUM SERPL-SCNC: 137 MMOL/L (ref 133–144)
SP GR UR STRIP: 1.01 (ref 1–1.03)
SQUAMOUS EPITHELIAL: <1 /HPF
TRICYCLICS UR QL SCN: NOT DETECTED
TROPONIN I SERPL HS-MCNC: 8 NG/L
TUBE # CSF: 4
UROBILINOGEN UR STRIP-MCNC: NORMAL MG/DL
WBC # BLD AUTO: 20.2 10E3/UL (ref 4–11)
WBC # CSF MANUAL: 1 /UL (ref 0–5)
WBC URINE: 0 /HPF

## 2022-03-28 PROCEDURE — 99285 EMERGENCY DEPT VISIT HI MDM: CPT | Mod: 25 | Performed by: EMERGENCY MEDICINE

## 2022-03-28 PROCEDURE — 96361 HYDRATE IV INFUSION ADD-ON: CPT | Performed by: EMERGENCY MEDICINE

## 2022-03-28 PROCEDURE — 89050 BODY FLUID CELL COUNT: CPT | Performed by: EMERGENCY MEDICINE

## 2022-03-28 PROCEDURE — 87070 CULTURE OTHR SPECIMN AEROBIC: CPT | Performed by: EMERGENCY MEDICINE

## 2022-03-28 PROCEDURE — 87636 SARSCOV2 & INF A&B AMP PRB: CPT | Performed by: EMERGENCY MEDICINE

## 2022-03-28 PROCEDURE — 96374 THER/PROPH/DIAG INJ IV PUSH: CPT | Performed by: EMERGENCY MEDICINE

## 2022-03-28 PROCEDURE — 250N000011 HC RX IP 250 OP 636: Performed by: EMERGENCY MEDICINE

## 2022-03-28 PROCEDURE — 80306 DRUG TEST PRSMV INSTRMNT: CPT | Performed by: EMERGENCY MEDICINE

## 2022-03-28 PROCEDURE — 80053 COMPREHEN METABOLIC PANEL: CPT | Performed by: EMERGENCY MEDICINE

## 2022-03-28 PROCEDURE — 258N000003 HC RX IP 258 OP 636: Performed by: EMERGENCY MEDICINE

## 2022-03-28 PROCEDURE — 82945 GLUCOSE OTHER FLUID: CPT | Performed by: EMERGENCY MEDICINE

## 2022-03-28 PROCEDURE — 62270 DX LMBR SPI PNXR: CPT

## 2022-03-28 PROCEDURE — 70450 CT HEAD/BRAIN W/O DYE: CPT

## 2022-03-28 PROCEDURE — 85025 COMPLETE CBC W/AUTO DIFF WBC: CPT | Performed by: EMERGENCY MEDICINE

## 2022-03-28 PROCEDURE — 82375 ASSAY CARBOXYHB QUANT: CPT | Performed by: EMERGENCY MEDICINE

## 2022-03-28 PROCEDURE — 36415 COLL VENOUS BLD VENIPUNCTURE: CPT | Performed by: EMERGENCY MEDICINE

## 2022-03-28 PROCEDURE — 370N000003 HC ANESTHESIA WARD SERVICE

## 2022-03-28 PROCEDURE — C9803 HOPD COVID-19 SPEC COLLECT: HCPCS | Performed by: EMERGENCY MEDICINE

## 2022-03-28 PROCEDURE — 71045 X-RAY EXAM CHEST 1 VIEW: CPT

## 2022-03-28 PROCEDURE — 81001 URINALYSIS AUTO W/SCOPE: CPT | Performed by: EMERGENCY MEDICINE

## 2022-03-28 PROCEDURE — 82077 ASSAY SPEC XCP UR&BREATH IA: CPT | Performed by: EMERGENCY MEDICINE

## 2022-03-28 PROCEDURE — 87205 SMEAR GRAM STAIN: CPT | Performed by: EMERGENCY MEDICINE

## 2022-03-28 PROCEDURE — 84484 ASSAY OF TROPONIN QUANT: CPT | Performed by: EMERGENCY MEDICINE

## 2022-03-28 PROCEDURE — 99285 EMERGENCY DEPT VISIT HI MDM: CPT | Performed by: EMERGENCY MEDICINE

## 2022-03-28 PROCEDURE — 96375 TX/PRO/DX INJ NEW DRUG ADDON: CPT | Performed by: EMERGENCY MEDICINE

## 2022-03-28 PROCEDURE — 84157 ASSAY OF PROTEIN OTHER: CPT | Performed by: EMERGENCY MEDICINE

## 2022-03-28 RX ORDER — IPRATROPIUM BROMIDE AND ALBUTEROL SULFATE 2.5; .5 MG/3ML; MG/3ML
3 SOLUTION RESPIRATORY (INHALATION) EVERY 4 HOURS PRN
COMMUNITY
Start: 2022-03-21 | End: 2022-05-31

## 2022-03-28 RX ORDER — BENZONATATE 200 MG/1
200 CAPSULE ORAL 3 TIMES DAILY PRN
COMMUNITY
Start: 2022-03-21 | End: 2022-07-22

## 2022-03-28 RX ORDER — SODIUM CHLORIDE 9 MG/ML
INJECTION, SOLUTION INTRAVENOUS CONTINUOUS
Status: DISCONTINUED | OUTPATIENT
Start: 2022-03-28 | End: 2022-03-28 | Stop reason: HOSPADM

## 2022-03-28 RX ORDER — KETOROLAC TROMETHAMINE 15 MG/ML
15 INJECTION, SOLUTION INTRAMUSCULAR; INTRAVENOUS ONCE
Status: COMPLETED | OUTPATIENT
Start: 2022-03-28 | End: 2022-03-28

## 2022-03-28 RX ORDER — LEVOFLOXACIN 750 MG/1
750 TABLET, FILM COATED ORAL DAILY
COMMUNITY
Start: 2022-03-21 | End: 2022-03-30

## 2022-03-28 RX ORDER — PREDNISONE 20 MG/1
40 TABLET ORAL DAILY
Status: ON HOLD | COMMUNITY
Start: 2022-03-21 | End: 2022-05-23

## 2022-03-28 RX ADMIN — KETOROLAC TROMETHAMINE 15 MG: 15 INJECTION, SOLUTION INTRAMUSCULAR; INTRAVENOUS at 09:46

## 2022-03-28 RX ADMIN — PROCHLORPERAZINE EDISYLATE 10 MG: 5 INJECTION INTRAMUSCULAR; INTRAVENOUS at 09:47

## 2022-03-28 RX ADMIN — SODIUM CHLORIDE 1000 ML: 9 INJECTION, SOLUTION INTRAVENOUS at 09:39

## 2022-03-28 NOTE — TELEPHONE ENCOUNTER
Patients daughter called asking for an update about an appointment tomorrow. Please reach out when something is figured out.    Thank you

## 2022-03-28 NOTE — ANESTHESIA POSTPROCEDURE EVALUATION
Patient: Fern Gonzalez    Procedure: * No procedures listed *       Anesthesia Type:  Spinal    Note:  Disposition: Outpatient   Postop Pain Control: Uneventful            Sign Out: Well controlled pain   PONV: No   Neuro/Psych: Uneventful            Sign Out: Acceptable/Baseline neuro status   Airway/Respiratory: Uneventful            Sign Out: Acceptable/Baseline resp. status   CV/Hemodynamics: Uneventful            Sign Out: Acceptable CV status; No obvious hypovolemia; No obvious fluid overload   Other NRE:    DID A NON-ROUTINE EVENT OCCUR? No    Event details/Postop Comments:  Pt tolerated LP well. Paresthesia resolved immediately with redirection. Pt able to ambulate well postprocedure.            Last vitals:  Vitals Value Taken Time   /74 03/28/22 1130   Temp     Pulse 86 03/28/22 1130   Resp     SpO2 96 % 03/28/22 1145   Vitals shown include unvalidated device data.    Electronically Signed By: CALE Palomino CRNA  March 28, 2022  11:45 AM

## 2022-03-28 NOTE — TELEPHONE ENCOUNTER
My virtual, same day, request last being filled up already.  Please check to see any other provider can see her otherwise please have her to see Mirta later this week.

## 2022-03-28 NOTE — TELEPHONE ENCOUNTER
Dr Vazquez in the ED insisted that she be seen tomorrow and have an appt before she is discharged.  Can you work her in tomorrow Dr. Craig?

## 2022-03-28 NOTE — ED PROVIDER NOTES
"  History     Chief Complaint   Patient presents with     Headache     neck pain     Memory Loss     HPI  Fern Gonzalez is a 61 year old female who presents for evaluation of a headache and confusion.  Her daughter is here and provides the majority of the history.  Patient does not remember the last 72 hours by her report and what the patient is able to tell me.  She describes a significant frontal headache.  She tried driving to work but felt like she was going to pass out so she drove home.  She cannot remember any fever or other infectious symptoms.  Apparently she bought a car last week which she cannot remember.  Also her grandkids came to visit her yesterday and she has no recollection of this.  She had a similar episode in 2019 in [place] with transient amnesia that passed without definitive cause.  Her daughter states this was due to a pinched nerve in her neck they were told.  She has had no focal neurological deficit.  No vomiting.  Daughter states she was seen last week for pneumonia and put on antibiotics but unsure if she is taking any of the medication.        Allergies:  Allergies   Allergen Reactions     Cephalexin Hcl Hives     Codeine Nausea and Vomiting     significant     Gabapentin Nausea and Vomiting     Morphine Hcl Nausea and Vomiting     Percocet [Oxycodone-Acetaminophen] Nausea and Vomiting     Significant; pt reports that plain Oxycodone she did fine with     Valium [Diazepam] Other (See Comments)     \"terrible nightmare\"     Vicodin [Hydrocodone-Acetaminophen] Nausea and Vomiting     significant     Cefazolin Rash       Problem List:    Patient Active Problem List    Diagnosis Date Noted     Amnesia 04/01/2019     Priority: Medium     Postoperative state 09/25/2017     Priority: Medium     Palmar fasciitis/pain bilateral 05/23/2017     Priority: Medium     Patient had bilateral carpal tunnel releases done       Numbness and tingling of both upper extremities while sleeping 04/13/2017 "     Priority: Medium     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, right 11/08/2016     Priority: Medium     Lateral epicondylitis of right elbow 06/02/2016     Priority: Medium     Bilateral carpal tunnel syndrome 06/02/2016     Priority: Medium     Respiratory distress 09/01/2015     Priority: Medium     Wheezing-associated respiratory infection 09/01/2015     Priority: Medium     Major depressive disorder, recurrent episode, moderate (H) 05/14/2015     Priority: Medium     Tobacco use disorder 04/08/2015     Priority: Medium     Anxiety 08/15/2014     Priority: Medium     CARDIOVASCULAR SCREENING; LDL GOAL LESS THAN 160 10/07/2011     Priority: Medium     Meningitis due to viruses not elsewhere classified 10/03/2011     Priority: Medium     Herpes zoster with meningitis 10/03/2011     Priority: Medium     Chronic cholecystitis 01/22/2007     Priority: Medium        Past Medical History:    Past Medical History:   Diagnosis Date     Diagnostic skin and sensitization tests (aka ALLERGENS) 10/29/15 skin tests pos. for: T >> dog/DM only.      Esophageal reflux      Headache(784.0) 10/11/11     Migraine, unspecified, without mention of intractable migraine without mention of status migrainosus      Recurrent sinusitis      Varicella meningitis        Past Surgical History:    Past Surgical History:   Procedure Laterality Date     CHOLECYSTECTOMY, LAPOROSCOPIC  01/29/2007    Lysis of intestinal adhesions.     [facility] AFF SURGICAL PATHOLOGY       COLONOSCOPY N/A 10/31/2014    Procedure: COMBINED COLONOSCOPY, SINGLE OR MULTIPLE BIOPSY/POLYPECTOMY BY BIOPSY;  Surgeon: Leonard Dumont MD;  Location: [facility]     HC LAP,FULGURATE/EXCISE LESIONS  10/04    mesh present as had abdominal wall surgery     HYSTERECTOMY TOTAL ABDOMINAL  2007     INJECT EPIDURAL CERVICAL Bilateral 7/11/2019    Procedure: INJECTION, SPINE, CERVICAL 6-7, EPIDURAL;  Surgeon: Des Rider MD;  Location: [facility]     RELEASE CARPAL TUNNEL Right 9/7/2016     "Procedure: RELEASE CARPAL TUNNEL;  Surgeon: Leif Fine MD;  Location: [facility]     RELEASE CARPAL TUNNEL Left 10/5/2016    Procedure: RELEASE CARPAL TUNNEL;  Surgeon: Leif Fine MD;  Location: [facility]     RELEASE CARPAL TUNNEL Right 2016    Procedure: RELEASE CARPAL TUNNEL;  Surgeon: Leif Fine MD;  Location: [facility]     ZZC  DELIVERY ONLY      3 times     ZZHC UGI ENDOSCOPY, SIMPLE EXAM  10/26/2006       Family History:    Family History   Problem Relation Age of Onset     Thyroid Disease Maternal Grandmother      Thyroid Disease Sister      Coronary Artery Disease No family hx of      Hypertension No family hx of      Hyperlipidemia No family hx of      Cancer - colorectal No family hx of      Ovarian Cancer No family hx of      Other Cancer No family hx of      Mental Illness No family hx of      Cerebrovascular Disease No family hx of      Asthma No family hx of      Known Genetic Syndrome No family hx of        Social History:  Marital Status:  Single [1]  Social History     Tobacco Use     Smoking status: Current Every Day Smoker     Packs/day: 0.50     Years: 0.50     Pack years: 0.25     Types: Cigarettes     Smokeless tobacco: Never Used     Tobacco comment: uses ecig now   Vaping Use     Vaping Use: Never used   Substance Use Topics     Alcohol use: Yes     Alcohol/week: 0.0 standard drinks     Comment: \"very rarely\"     Drug use: No        Medications:    albuterol (PROAIR HFA/PROVENTIL HFA/VENTOLIN HFA) 108 (90 Base) MCG/ACT inhaler  benzonatate (TESSALON) 200 MG capsule  cetirizine (ZYRTEC) 10 MG tablet  DIETARY MANAGEMENT PRODUCT PO  fluticasone (FLONASE) 50 MCG/ACT nasal spray  Ibuprofen (ADVIL PO)  ipratropium - albuterol 0.5 mg/2.5 mg/3 mL (DUONEB) 0.5-2.5 (3) MG/3ML neb solution  levofloxacin (LEVAQUIN) 750 MG tablet  predniSONE (DELTASONE) 20 MG tablet  rizatriptan (MAXALT) 5 MG tablet          Review of Systems  All other systems are reviewed and are " negative    Physical Exam   BP: (!) 143/87  Pulse: 87  Temp: 97.8  F (36.6  C)  Resp: 16  SpO2: 98 %      Physical Exam  Vitals and nursing note reviewed.   Constitutional:       General: She is not in acute distress.     Appearance: She is well-developed. She is not diaphoretic.   HENT:      Head: Normocephalic and atraumatic.   Eyes:      General: No scleral icterus.     Pupils: Pupils are equal, round, and reactive to light.   Neck:      Comments: Normal range of motion of the neck without pain or nuchal rigidity.  Cardiovascular:      Rate and Rhythm: Normal rate and regular rhythm.      Heart sounds: Normal heart sounds. No murmur heard.  Pulmonary:      Effort: No respiratory distress.      Breath sounds: No stridor. No wheezing or rales.   Abdominal:      Palpations: Abdomen is soft.      Tenderness: There is no abdominal tenderness.   Musculoskeletal:         General: No tenderness.      Cervical back: Normal range of motion and neck supple.   Skin:     General: Skin is warm and dry.      Coloration: Skin is not pale.      Findings: No erythema or rash.   Neurological:      General: No focal deficit present.      Mental Status: She is alert.      Cranial Nerves: No cranial nerve deficit.      Motor: No weakness.         ED Course                 Procedures              Critical Care time:  none               Results for orders placed or performed during the hospital encounter of 03/28/22 (from the past 24 hour(s))   Urine Drugs of Abuse Screen    Narrative    The following orders were created for panel order Urine Drugs of Abuse Screen.  Procedure                               Abnormality         Status                     ---------                               -----------         ------                     Urine Drugs of Abuse Scr...[[id number]]  Normal              Final result                 Please view results for these tests on the individual orders.   Urine Drugs of Abuse Screen Panel 13   Result  Value Ref Range    Cannabinoids (82-ikw-4-carboxy-9-THC) Not Detected Not Detected, Indeterminate    Phencyclidine Not Detected Not Detected, Indeterminate    Cocaine (Benzoylecgonine) Not Detected Not Detected, Indeterminate    Methamphetamine (d-Methamphetamine) Not Detected Not Detected, Indeterminate    Opiates (Morphine) Not Detected Not Detected, Indeterminate    Amphetamine (d-Amphetamine) Not Detected Not Detected, Indeterminate    Benzodiazepines (Nordiazepam) Not Detected Not Detected, Indeterminate    Tricyclic Antidepressants (Desipramine) Not Detected Not Detected, Indeterminate    Methadone Not Detected Not Detected, Indeterminate    Barbiturates (Butalbital) Not Detected Not Detected, Indeterminate    Oxycodone Not Detected Not Detected, Indeterminate    Propoxyphene (Norpropoxyphene) Not Detected Not Detected, Indeterminate    Buprenorphine Not Detected Not Detected, Indeterminate   UA with Microscopic reflex to Culture    Specimen: Urine, NOS   Result Value Ref Range    Color Urine Straw Colorless, Straw, Light Yellow, Yellow    Appearance Urine Clear Clear    Glucose Urine Negative Negative mg/dL    Bilirubin Urine Negative Negative    Ketones Urine Negative Negative mg/dL    Specific Gravity Urine 1.008 1.003 - 1.035    Blood Urine Negative Negative    pH Urine 6.0 5.0 - 7.0    Protein Albumin Urine Negative Negative mg/dL    Urobilinogen Urine Normal Normal, 2.0 mg/dL    Nitrite Urine Negative Negative    Leukocyte Esterase Urine Negative Negative    Bacteria Urine Few (A) None Seen /HPF    Mucus Urine Present (A) None Seen /LPF    RBC Urine 0 <=2 /HPF    WBC Urine 0 <=5 /HPF    Squamous Epithelials Urine <1 <=1 /HPF    Narrative    Urine Culture not indicated   CBC with platelets differential    Narrative    The following orders were created for panel order CBC with platelets differential.  Procedure                               Abnormality         Status                     ---------                                -----------         ------                     CBC with platelets and d...[[id number]]  Abnormal            Final result                 Please view results for these tests on the individual orders.   Comprehensive metabolic panel   Result Value Ref Range    Sodium 137 133 - 144 mmol/L    Potassium 4.3 3.4 - 5.3 mmol/L    Chloride 108 94 - 109 mmol/L    Carbon Dioxide (CO2) 25 20 - 32 mmol/L    Anion Gap 4 3 - 14 mmol/L    Urea Nitrogen 20 7 - 30 mg/dL    Creatinine 0.56 0.52 - 1.04 mg/dL    Calcium 9.5 8.5 - 10.1 mg/dL    Glucose 102 (H) 70 - 99 mg/dL    Alkaline Phosphatase 86 40 - 150 U/L    AST 23 0 - 45 U/L    ALT 28 0 - 50 U/L    Protein Total 7.4 6.8 - 8.8 g/dL    Albumin 4.2 3.4 - 5.0 g/dL    Bilirubin Total 0.3 0.2 - 1.3 mg/dL    GFR Estimate >90 >60 mL/min/1.73m2   Troponin I   Result Value Ref Range    Troponin I High Sensitivity 8 <54 ng/L   Ethyl Alcohol Level   Result Value Ref Range    Alcohol ethyl <0.01 <=0.01 g/dL   CBC with platelets and differential   Result Value Ref Range    WBC Count 20.2 (H) 4.0 - 11.0 10e3/uL    RBC Count 4.57 3.80 - 5.20 10e6/uL    Hemoglobin 14.5 11.7 - 15.7 g/dL    Hematocrit 42.7 35.0 - 47.0 %    MCV 93 78 - 100 fL    MCH 31.7 26.5 - 33.0 pg    MCHC 34.0 31.5 - 36.5 g/dL    RDW 14.0 10.0 - 15.0 %    Platelet Count 286 150 - 450 10e3/uL    % Neutrophils 77 %    % Lymphocytes 13 %    % Monocytes 6 %    % Eosinophils 2 %    % Basophils 1 %    % Immature Granulocytes 1 %    NRBCs per 100 WBC 0 <1 /100    Absolute Neutrophils 15.9 (H) 1.6 - 8.3 10e3/uL    Absolute Lymphocytes 2.5 0.8 - 5.3 10e3/uL    Absolute Monocytes 1.2 0.0 - 1.3 10e3/uL    Absolute Eosinophils 0.4 0.0 - 0.7 10e3/uL    Absolute Basophils 0.1 0.0 - 0.2 10e3/uL    Absolute Immature Granulocytes 0.1 <=0.4 10e3/uL    Absolute NRBCs 0.0 10e3/uL   Carbon monoxide   Result Value Ref Range    Carbon Monoxide 1.5 0.0 - 2.0 %   Symptomatic; Unknown Influenza A/B & SARS-CoV2 (COVID-19) Virus PCR  Multiplex Nose    Specimen: Nose; Swab   Result Value Ref Range    Influenza A PCR Negative Negative    Influenza B PCR Negative Negative    SARS CoV2 PCR Negative Negative    Narrative    Testing was performed using the debo SARS-CoV-2 & Influenza A/B Assay on the debo Mattie System. This test should be ordered for the detection of SARS-CoV-2 and influenza viruses in individuals who meet clinical and/or epidemiological criteria. Test performance is unknown in asymptomatic patients. This test is for in vitro diagnostic use under the FDA EUA for laboratories certified under CLIA to perform moderate and/or high complexity testing. This test has not been FDA cleared or approved. A negative result does not rule out the presence of PCR inhibitors in the specimen or target RNA in concentration below the limit of detection for the assay. If only one viral target is positive but coinfection with multiple targets is suspected, the sample should be re-tested with another FDA cleared, approved or authorized test, if coinfection would change clinical management. [facility] are certified under the Clinical Laboratory Improvement Amendments of 1988 (CLIA-88) as  qualified to perform moderate and/or high complexity laboratory testing.   CT Head w/o Contrast    Narrative    CT HEAD W/O CONTRAST 3/28/2022 10:04 AM    INDICATION: Headache, intracranial hemorrhage suspected  TECHNIQUE: CT scan of the head without contrast. Dose reduction  techniques were used.  CONTRAST: None.  COMPARISON: 7/10/2018 head CT.    FINDINGS:   No intracranial hemorrhage, extraaxial collection, mass effect or CT  evidence of acute infarct.  Mild presumed chronic small vessel  ischemic changes. Mild generalized volume loss. The ventricles are  proportional to the sulci. Osseous structures are intact. Unremarkable  orbits. Paranasal sinuses are free of significant disease. Clear  mastoid air cells.      Impression    IMPRESSION:  Mild  age-related changes, as above, with no acute intracranial  abnormality.    SHAY DANIELS MD         SYSTEM ID:  [id number]   Glucose CSF:   Result Value Ref Range    Glucose CSF 57 40 - 70 mg/dL    Narrative    CSF glucose concentrations are about 60 percent of normal plasma glucose.  CSF glucose concentrations are about 60 percent of normal plasma glucose.   Protein total CSF:   Result Value Ref Range    Protein total CSF 42 15 - 60 mg/dL    Narrative    This is a lab developed test. It has not been cleared or approved by the FDA.   CSF Cell Count with Differential:    Narrative    The following orders were created for panel order CSF Cell Count with Differential:.  Procedure                               Abnormality         Status                     ---------                               -----------         ------                     Cell Count CSF[[id number]]                                   Final result                 Please view results for these tests on the individual orders.   Cell Count CSF   Result Value Ref Range    Tube Number 4     Color Colorless Colorless    Clarity Clear Clear    Total Nucleated Cells 1 0 - 5 /uL   XR Chest Port 1 View    Narrative    CHEST ONE VIEW PORTABLE   3/28/2022 12:22 PM     HISTORY: Pneumonia, confusion.    COMPARISON: 7/22/2020      Impression    IMPRESSION: Unremarkable single view of the chest.    CRISTINE OHARA MD         SYSTEM ID:  [id number]       Medications   0.9% sodium chloride BOLUS (0 mLs Intravenous Stopped 3/28/22 1100)     Followed by   sodium chloride 0.9% infusion (has no administration in time range)   prochlorperazine (COMPAZINE) injection 10 mg (10 mg Intravenous Given 3/28/22 0947)   ketorolac (TORADOL) injection 15 mg (15 mg Intravenous Given 3/28/22 0946)       Assessments & Plan (with Medical Decision Making)  61-year-old female who presented with frontal headache.  Also with some amnesia for the weekend's events.  No focal neurological deficit.   CT head negative.  Work-up as above without worrisome acute finding.  Noted leukocytosis, nonspecific.  LP without evidence for meningitis recurrence from 10 years ago fortunately.  After 5 hours in the emergency department, headache had cleared for the most part she stated and her thinking was much improved.  She appears stable for discharge home.  Have arranged for follow-up in clinic tomorrow.  Her daughter stated she would stay with her.  She can return at anytime if condition worsens, unable to get into clinic or any other concern.     I have reviewed the nursing notes.    I have reviewed the findings, diagnosis, plan and need for follow up with the patient.       Discharge Medication List as of 3/28/2022  1:41 PM          Final diagnoses:   Nonintractable headache, unspecified chronicity pattern, unspecified headache type       3/28/2022   [facility] EMERGENCY DEPT     Shay Bradford MD  03/28/22 1185

## 2022-03-28 NOTE — ANESTHESIA PROCEDURE NOTES
Lumbar puncture Procedure Note    Pre-Procedure   Staff -        CRNA: Zaid Mercado APRN CRNA       Performed By: CRNA       Location: ED       Procedure Start/Stop Times: 3/28/2022 11:06 AM and 3/28/2022 11:31 AM       Pre-Anesthestic Checklist: patient identified, IV checked, risks and benefits discussed, informed consent, monitors and equipment checked, pre-op evaluation and at physician/surgeon's request  Timeout:       Correct Patient: Yes        Correct Procedure: Yes        Correct Site: Yes        Correct Position: Yes   Procedure Documentation  Procedure: lumbar puncture       Patient Position: sitting       Patient Prep/Sterile Barriers: sterile gloves, mask       Skin prep: Betadine       Insertion Site: L3-4. (midline approach).       Needle Gauge: 25.        Needle Length (Inches): 3.5        Spinal Needle Type: Quincke       Introducer used       Introducer: 20 G       # of attempts: 1 and  # of redirects:  1    Assessment/Narrative         Paresthesias: Resolved.       LP fluid removal amount (ml): 4 (In 4 samples of 1ml each)       CSF fluid: clear.     Comments:  Called to the ED to perform LP for patient with unresolved headache and change in level of consciousness. Pt also has a elevated WBC. After consent was obtained prepped patient in a sterile fashion. After skin was localized, introducer was placed followed by the spinal needle. Pt did experience paraesthesia which resolved quickly with redirection. Specimen collected sterilely and handed to RN which was sent to lab. Pt tolerated well.

## 2022-03-28 NOTE — ANESTHESIA PREPROCEDURE EVALUATION
Anesthesia Pre-Procedure Evaluation    Patient: Fern Gonzalez   MRN: [id number] : 1960        Procedure : * No procedures listed *          Past Medical History:   Diagnosis Date     Diagnostic skin and sensitization tests (aka ALLERGENS) 10/29/15 skin tests pos. for: T >> dog/DM only.      Esophageal reflux      Headache(784.0) 10/11/11    Admitted, suspected Viral cephalitis, meningitis     Migraine, unspecified, without mention of intractable migraine without mention of status migrainosus      Recurrent sinusitis      Varicella meningitis       Past Surgical History:   Procedure Laterality Date     CHOLECYSTECTOMY, LAPOROSCOPIC  2007    Lysis of intestinal adhesions.     [facility]       COLONOSCOPY N/A 10/31/2014    Procedure: COMBINED COLONOSCOPY, SINGLE OR MULTIPLE BIOPSY/POLYPECTOMY BY BIOPSY;  Surgeon: Leonard Dumont MD;  Location: [facility]     HC LAP,FULGURATE/EXCISE LESIONS  10/04    mesh present as had abdominal wall surgery     HYSTERECTOMY TOTAL ABDOMINAL       INJECT EPIDURAL CERVICAL Bilateral 2019    Procedure: INJECTION, SPINE, CERVICAL 6-7, EPIDURAL;  Surgeon: Des Rider MD;  Location: [facility]     RELEASE CARPAL TUNNEL Right 2016    Procedure: RELEASE CARPAL TUNNEL;  Surgeon: Leif Fine MD;  Location: [facility]     RELEASE CARPAL TUNNEL Left 10/5/2016    Procedure: RELEASE CARPAL TUNNEL;  Surgeon: Leif Fine MD;  Location: [facility]     RELEASE CARPAL TUNNEL Right 2016    Procedure: RELEASE CARPAL TUNNEL;  Surgeon: Leif Fine MD;  Location: [facility]     ZZC  DELIVERY ONLY      3 times     ZZ UGI ENDOSCOPY, SIMPLE EXAM  10/26/2006      Allergies   Allergen Reactions     Cephalexin Hcl Hives     Codeine Nausea and Vomiting     significant     Gabapentin Nausea and Vomiting     Morphine Hcl Nausea and Vomiting     Percocet [Oxycodone-Acetaminophen] Nausea and Vomiting     Significant; pt reports that plain Oxycodone  "she did fine with     Valium [Diazepam] Other (See Comments)     \"terrible nightmare\"     Vicodin [Hydrocodone-Acetaminophen] Nausea and Vomiting     significant     Cefazolin Rash      Social History     Tobacco Use     Smoking status: Current Every Day Smoker     Packs/day: 0.50     Years: 0.50     Pack years: 0.25     Types: Cigarettes     Smokeless tobacco: Never Used     Tobacco comment: uses ecig now   Substance Use Topics     Alcohol use: Yes     Alcohol/week: 0.0 standard drinks     Comment: \"very rarely\"      Wt Readings from Last 1 Encounters:   01/22/21 70.2 kg (154 lb 11.2 oz)              OUTSIDE LABS:  CBC:   Lab Results   Component Value Date    WBC 20.2 (H) 03/28/2022    WBC 9.9 07/22/2020    HGB 14.5 03/28/2022    HGB 13.3 07/22/2020    HCT 42.7 03/28/2022    HCT 39.0 07/22/2020     03/28/2022     07/22/2020     BMP:   Lab Results   Component Value Date     03/28/2022     07/22/2020    POTASSIUM 4.3 03/28/2022    POTASSIUM 4.1 07/22/2020    CHLORIDE 108 03/28/2022    CHLORIDE 106 07/22/2020    CO2 25 03/28/2022    CO2 27 07/22/2020    BUN 20 03/28/2022    BUN 11 07/22/2020    CR 0.56 03/28/2022    CR 0.58 07/22/2020     (H) 03/28/2022    GLC 81 07/22/2020     COAGS:   Lab Results   Component Value Date    PTT 31 10/11/2011    INR 1.03 10/11/2011     POC:   Lab Results   Component Value Date    BGM 96 10/12/2011    HCG Negative 01/29/2007     HEPATIC:   Lab Results   Component Value Date    ALBUMIN 4.2 03/28/2022    PROTTOTAL 7.4 03/28/2022    ALT 28 03/28/2022    AST 23 03/28/2022    ALKPHOS 86 03/28/2022    BILITOTAL 0.3 03/28/2022     OTHER:   Lab Results   Component Value Date    LACT 1.4 09/25/2011    JOAQUIN 9.5 03/28/2022    PHOS 3.5 09/27/2011    MAG 2.2 07/22/2020    LIPASE 154 07/22/2020    AMYLASE 57 01/18/2007    TSH 1.04 11/13/2015    T4 8.7 11/23/2011    T3 129 11/23/2011    CRP 14.1 (H) 09/01/2015    SED 9 10/11/2011       Anesthesia Plan    ASA Status:  2  "  - Procedure: Procedure only, no anesthetic delivered   NPO Status:  NPO Appropriate    Anesthesia Type: Spinal (LP).              Consents            Postoperative Care            Comments:                CALE Palomino CRNA

## 2022-03-28 NOTE — ANESTHESIA CARE TRANSFER NOTE
Patient: Fern Gonzalez    Procedure: * No procedures listed *       Diagnosis: * No pre-op diagnosis entered *  Diagnosis Additional Information: No value filed.    Anesthesia Type:   Spinal     Note:    Oropharynx: oropharynx clear of all foreign objects and spontaneously breathing  Level of Consciousness: awake  Oxygen Supplementation: room air    Independent Airway: airway patency satisfactory and stable  Dentition: dentition unchanged    Report to RN Given: handoff report given  Patient transferred to: Emergency Department    Handoff Report: Identifed the Patient, Identified the Reponsible Provider, Reviewed the pertinent medical history, Discussed the surgical course, Reviewed Intra-OP anesthesia mangement and issues during anesthesia, Set expectations for post-procedure period and Allowed opportunity for questions and acknowledgement of understanding      Vitals:  Vitals Value Taken Time   /74 03/28/22 1130   Temp     Pulse 86 03/28/22 1130   Resp     SpO2 96 % 03/28/22 1141   Vitals shown include unvalidated device data.    Electronically Signed By: CALE Palomnio CRNA  March 28, 2022  11:43 AM

## 2022-03-28 NOTE — DISCHARGE INSTRUCTIONS
You may use your Excedrin as needed for headache.  You may also use ibuprofen up to 600 mg 4 times a day.  You should stay with someone today until follow-up tomorrow

## 2022-03-28 NOTE — TELEPHONE ENCOUNTER
I talked to Dr. Bradford's and he said it was ok to let her leave without an appt and to see if [facility] is in tomorrow to work her in.  If you can't work her in [facility] please route back to me and I can ask another provider.  Dr. Bradford is aware that if you are not in tomorrow I will find someone to work her in.

## 2022-03-28 NOTE — ED TRIAGE NOTES
"Has been sick for a couple weeks.  Daughter got a phone call that she felt dizzy, light headed. Pt did not know where she was, what was happening, and does not remember anything from this past weekend.  \"Her car has been trying to kill her ...\" she thinks she has carbon monoxide poisoning. Bought a new car and does not remember.    Bought is alert and oriented only to herself.  "

## 2022-03-28 NOTE — LETTER
March 28, 2022      To Whom It May Concern:      Fern Gonzalez was seen in our Emergency Department today, 03/28/22.  I expect her condition to improve over the next 3 days.  She may return to work  when improved.    Sincerely,        Shay Bradford MD

## 2022-03-28 NOTE — TELEPHONE ENCOUNTER
Reason for Call:  Other appointment    Detailed comments: ED Follow up per dr Shay Bradford would like pt to be seen by PCP 3/29/22 for memory loss/headache    Phone Number Patient can be reached at: Cell number on file:    Telephone Information:   Mobile [phone]       Best Time: ANY    Can we leave a detailed message on this number? YES    Call taken on 3/28/2022 at 12:21 PM by Kareen Perez

## 2022-03-28 NOTE — ED NOTES
Pt has gone to the bathroom four times in the past two hours, and urinated quite a significant amount each time. MD updated.

## 2022-03-28 NOTE — ED NOTES
Pt states her headache is back and pain 7/10 frontal lobe.  MD updated.   Assisted pt to and from bathroom.

## 2022-03-29 NOTE — TELEPHONE ENCOUNTER
Patient is going to see you tomorrow Dr. Craig.  I was unable to get her in today.  She is aware and is going much better and will wait to see you tomorrow.  Thanks Dr. Craig!!!!!!!

## 2022-03-29 NOTE — TELEPHONE ENCOUNTER
Dr. Avila's spot was taken so now he can't see her.  Dr. Leal can you work her in today?  Route back to me when done.

## 2022-03-30 ENCOUNTER — OFFICE VISIT (OUTPATIENT)
Dept: FAMILY MEDICINE | Facility: CLINIC | Age: 62
End: 2022-03-30

## 2022-03-30 VITALS
RESPIRATION RATE: 16 BRPM | DIASTOLIC BLOOD PRESSURE: 78 MMHG | SYSTOLIC BLOOD PRESSURE: 98 MMHG | BODY MASS INDEX: 30.97 KG/M2 | WEIGHT: 143.56 LBS | HEIGHT: 57 IN | HEART RATE: 113 BPM | TEMPERATURE: 99.6 F | OXYGEN SATURATION: 97 %

## 2022-03-30 DIAGNOSIS — Z12.31 VISIT FOR SCREENING MAMMOGRAM: ICD-10-CM

## 2022-03-30 DIAGNOSIS — G45.4 TRANSIENT GLOBAL AMNESIA: Primary | ICD-10-CM

## 2022-03-30 PROCEDURE — 99214 OFFICE O/P EST MOD 30 MIN: CPT | Performed by: FAMILY MEDICINE

## 2022-03-30 ASSESSMENT — PATIENT HEALTH QUESTIONNAIRE - PHQ9
SUM OF ALL RESPONSES TO PHQ QUESTIONS 1-9: 1
SUM OF ALL RESPONSES TO PHQ QUESTIONS 1-9: 1
10. IF YOU CHECKED OFF ANY PROBLEMS, HOW DIFFICULT HAVE THESE PROBLEMS MADE IT FOR YOU TO DO YOUR WORK, TAKE CARE OF THINGS AT HOME, OR GET ALONG WITH OTHER PEOPLE: NOT DIFFICULT AT ALL

## 2022-03-30 NOTE — LETTER
[address] [address]  Phone: [phone]  Fax: [phone]    March 30, 2022        [address] Gonzalez  [address] [place] [place] [facility] [address]          To whom it may concern:    RE: Fern Gonzalez    Patient was seen and treated today at our clinic and missed work.  May return to work without restriction    Please contact me for questions or concerns.      Sincerely,        Bulmaro Carig MD

## 2022-03-30 NOTE — PROGRESS NOTES
"  Assessment & Plan     Visit for screening mammogram  - *MA Screening Digital Bilateral; Future    Transient global amnesia  Fern Gonzalez is a 61 year old female who presents with 2nd episode of TGA in the last 4 years. Prior to that she had Varicella meningitis. After first episode in 2019 workup negative including EEG and MRI brain and cervical spine. In ED work up was again non diagnostic with CT, lab and toxicology. Her symptoms resolved in ED and she remains stable. She still doesn't remember much of the day but is gaining recall from when she pull over in her car, feeling anxious that something was wrong.   Her exam today is normal and non focal. Possible TGA vs atypical seizure vs TIA vs other related to prior Varicella meningitis. Will refer to Neurology for evaluation and further investigation.    - Adult Neurology  Referral; Future    Review of prior external note(s) from - CareEverywhere information from [facility] reviewed  Ordering of each unique test         BMI:   Estimated body mass index is 31.07 kg/m  as calculated from the following:    Height as of this encounter: 1.448 m (4' 9\").    Weight as of this encounter: 65.1 kg (143 lb 9 oz).   Weight management plan: Patient was referred to their PCP to discuss a diet and exercise plan.    Work on weight loss  Regular exercise    Return in about 1 month (around 4/30/2022) for Follow up, in person with Mirta Fan for recheck.    Bulmaro Craig MD  [facility] YVONNE Michel is a 61 year old who presents for the following health issues, here with her daughter    History of Present Illness       Mental Health Follow-up:                    Today's PHQ-9         PHQ-9 Total Score: 1  PHQ-9 Q9 Thoughts of better off dead/self-harm past 2 weeks :   (P) Not at all    How difficult have these problems made it for you to do your work, take care of things at home, or get along with other people: Not difficult at " "all        Migraines:   Since the patient's last clinic visit, headaches are: improved  The patient is getting headaches:  2 or 3times a month  She is not able to do normal daily activities when she has a migraine.  The patient is taking the following rescue/relief medications:  Ibuprofen (Advil, Motrin)   Patient states \"The relief is inconsistent\" from the rescue/relief medications.   The patient is taking the following medications to prevent migraines:  Other  In the past 4 weeks, the patient has gone to an Urgent Care or Emergency Room 1 time times due to headaches.    Reason for visit:  Follow up appointment  Symptom onset:  1-3 days ago    She eats 2-3 servings of fruits and vegetables daily.She consumes 3 sweetened beverage(s) daily.She exercises with enough effort to increase her heart rate 10 to 19 minutes per day.  She exercises with enough effort to increase her heart rate 3 or less days per week.   She is taking medications regularly.       ED/ Followup:    Facility:  [facility]  Date of visit: 03/28/2022  Reason for visit: Headache/memory loss/neck pain  Current Status: Feeling better but still complains of neck pain (discomfort)     Chief Complaint   Patient presents with     Headache       neck pain     Memory Loss      HPI  Fern Gonzalez is a 61 year old female who presents for evaluation of a headache and confusion.  Her daughter is here and provides the majority of the history.  Patient does not remember the last 72 hours by her report and what the patient is able to tell me.  She describes a significant frontal headache.  She tried driving to work but felt like she was going to pass out so she drove home.  She cannot remember any fever or other infectious symptoms.  Apparently she bought a car last week which she cannot remember.  Also her grandkids came to visit her yesterday and she has no recollection of this.  She had a similar episode in 2019 in [place] with " "transient amnesia that passed without definitive cause.  Her daughter states this was due to a pinched nerve in her neck they were told.  She has had no focal neurological deficit.  No vomiting.  Daughter states she was seen last week for pneumonia and put on antibiotics but unsure if she is taking any of the medication.           Allergies:        Allergies   Allergen Reactions     Cephalexin Hcl Hives     Codeine Nausea and Vomiting       significant     Gabapentin Nausea and Vomiting     Morphine Hcl Nausea and Vomiting     Percocet [Oxycodone-Acetaminophen] Nausea and Vomiting       Significant; pt reports that plain Oxycodone she did fine with     Valium [Diazepam] Other (See Comments)       \"terrible nightmare\"     Vicodin [Hydrocodone-Acetaminophen] Nausea and Vomiting       significant     Cefazolin Rash         Problem List:          Patient Active Problem List     Diagnosis Date Noted     Amnesia 04/01/2019       Priority: Medium     Postoperative state 09/25/2017       Priority: Medium     Palmar fasciitis/pain bilateral 05/23/2017       Priority: Medium       Patient had bilateral carpal tunnel releases done        Numbness and tingling of both upper extremities while sleeping 04/13/2017       Priority: Medium     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, right 11/08/2016       Priority: Medium     Lateral epicondylitis of right elbow 06/02/2016       Priority: Medium     Bilateral carpal tunnel syndrome 06/02/2016       Priority: Medium     Respiratory distress 09/01/2015       Priority: Medium     Wheezing-associated respiratory infection 09/01/2015       Priority: Medium     Major depressive disorder, recurrent episode, moderate (H) 05/14/2015       Priority: Medium     Tobacco use disorder 04/08/2015       Priority: Medium     Anxiety 08/15/2014       Priority: Medium     CARDIOVASCULAR SCREENING; LDL GOAL LESS THAN 160 10/07/2011       Priority: Medium     Meningitis due to viruses not elsewhere " classified 10/03/2011       Priority: Medium     Herpes zoster with meningitis 10/03/2011       Priority: Medium     Chronic cholecystitis 2007       Priority: Medium         Past Medical History:         Past Medical History:   Diagnosis Date     Diagnostic skin and sensitization tests (aka ALLERGENS) 10/29/15 skin tests pos. for: T >> dog/DM only.      Esophageal reflux       Headache(784.0) 10/11/11     Migraine, unspecified, without mention of intractable migraine without mention of status migrainosus       Recurrent sinusitis       Varicella meningitis           Past Surgical History:    Past Surgical History         Past Surgical History:   Procedure Laterality Date     CHOLECYSTECTOMY, LAPOROSCOPIC   2007     Lysis of intestinal adhesions.     CL AFF SURGICAL PATHOLOGY         COLONOSCOPY N/A 10/31/2014     Procedure: COMBINED COLONOSCOPY, SINGLE OR MULTIPLE BIOPSY/POLYPECTOMY BY BIOPSY;  Surgeon: Leonard Dumont MD;  Location: [facility]     HC LAP,FULGURATE/EXCISE LESIONS   10/04     mesh present as had abdominal wall surgery     HYSTERECTOMY TOTAL ABDOMINAL        INJECT EPIDURAL CERVICAL Bilateral 2019     Procedure: INJECTION, SPINE, CERVICAL 6-7, EPIDURAL;  Surgeon: Des Rider MD;  Location: [facility]     RELEASE CARPAL TUNNEL Right 2016     Procedure: RELEASE CARPAL TUNNEL;  Surgeon: Leif Fine MD;  Location: [facility]     RELEASE CARPAL TUNNEL Left 10/5/2016     Procedure: RELEASE CARPAL TUNNEL;  Surgeon: Leif Fine MD;  Location: [facility]     RELEASE CARPAL TUNNEL Right 2016     Procedure: RELEASE CARPAL TUNNEL;  Surgeon: Leif Fine MD;  Location: [facility] OR     ZZC  DELIVERY ONLY         3 times     ZZHC UGI ENDOSCOPY, SIMPLE EXAM   10/26/2006            Family History:    Family History         Family History   Problem Relation Age of Onset     Thyroid Disease Maternal Grandmother       Thyroid Disease Sister       Coronary Artery Disease No  "family hx of       Hypertension No family hx of       Hyperlipidemia No family hx of       Cancer - colorectal No family hx of       Ovarian Cancer No family hx of       Other Cancer No family hx of       Mental Illness No family hx of       Cerebrovascular Disease No family hx of       Asthma No family hx of       Known Genetic Syndrome No family hx of              Social History:  Marital Status:  Single [1]  Social History            Tobacco Use     Smoking status: Current Every Day Smoker       Packs/day: 0.50       Years: 0.50       Pack years: 0.25       Types: Cigarettes     Smokeless tobacco: Never Used     Tobacco comment: uses ecig now   Vaping Use     Vaping Use: Never used   Substance Use Topics     Alcohol use: Yes       Alcohol/week: 0.0 standard drinks       Comment: \"very rarely\"     Drug use: No         Medications:    albuterol (PROAIR HFA/PROVENTIL HFA/VENTOLIN HFA) 108 (90 Base) MCG/ACT inhaler  benzonatate (TESSALON) 200 MG capsule  cetirizine (ZYRTEC) 10 MG tablet  DIETARY MANAGEMENT PRODUCT PO  fluticasone (FLONASE) 50 MCG/ACT nasal spray  Ibuprofen (ADVIL PO)  ipratropium - albuterol 0.5 mg/2.5 mg/3 mL (DUONEB) 0.5-2.5 (3) MG/3ML neb solution  levofloxacin (LEVAQUIN) 750 MG tablet  predniSONE (DELTASONE) 20 MG tablet  rizatriptan (MAXALT) 5 MG tablet              Review of Systems  All other systems are reviewed and are negative     Physical Exam   BP: (!) 143/87  Pulse: 87  Temp: 97.8  F (36.6  C)  Resp: 16  SpO2: 98 %        Physical Exam  Vitals and nursing note reviewed.   Constitutional:       General: She is not in acute distress.     Appearance: She is well-developed. She is not diaphoretic.   HENT:      Head: Normocephalic and atraumatic.   Eyes:      General: No scleral icterus.     Pupils: Pupils are equal, round, and reactive to light.   Neck:      Comments: Normal range of motion of the neck without pain or nuchal rigidity.  Cardiovascular:      Rate and Rhythm: Normal rate and " regular rhythm.      Heart sounds: Normal heart sounds. No murmur heard.  Pulmonary:      Effort: No respiratory distress.      Breath sounds: No stridor. No wheezing or rales.   Abdominal:      Palpations: Abdomen is soft.      Tenderness: There is no abdominal tenderness.   Musculoskeletal:         General: No tenderness.      Cervical back: Normal range of motion and neck supple.   Skin:     General: Skin is warm and dry.      Coloration: Skin is not pale.      Findings: No erythema or rash.   Neurological:      General: No focal deficit present.      Mental Status: She is alert.      Cranial Nerves: No cranial nerve deficit.      Motor: No weakness.          ED Course               Procedures                Critical Care time:  none                    Results for orders placed or performed during the hospital encounter of 03/28/22 (from the past 24 hour(s))   Urine Drugs of Abuse Screen     Narrative     The following orders were created for panel order Urine Drugs of Abuse Screen.  Procedure                               Abnormality         Status                     ---------                               -----------         ------                     Urine Drugs of Abuse Scr...[[id number]]  Normal              Final result                  Please view results for these tests on the individual orders.   Urine Drugs of Abuse Screen Panel 13   Result Value Ref Range     Cannabinoids (52-cnd-7-carboxy-9-THC) Not Detected Not Detected, Indeterminate     Phencyclidine Not Detected Not Detected, Indeterminate     Cocaine (Benzoylecgonine) Not Detected Not Detected, Indeterminate     Methamphetamine (d-Methamphetamine) Not Detected Not Detected, Indeterminate     Opiates (Morphine) Not Detected Not Detected, Indeterminate     Amphetamine (d-Amphetamine) Not Detected Not Detected, Indeterminate     Benzodiazepines (Nordiazepam) Not Detected Not Detected, Indeterminate     Tricyclic Antidepressants (Desipramine) Not  Detected Not Detected, Indeterminate     Methadone Not Detected Not Detected, Indeterminate     Barbiturates (Butalbital) Not Detected Not Detected, Indeterminate     Oxycodone Not Detected Not Detected, Indeterminate     Propoxyphene (Norpropoxyphene) Not Detected Not Detected, Indeterminate     Buprenorphine Not Detected Not Detected, Indeterminate   UA with Microscopic reflex to Culture     Specimen: Urine, NOS   Result Value Ref Range     Color Urine Straw Colorless, Straw, Light Yellow, Yellow     Appearance Urine Clear Clear     Glucose Urine Negative Negative mg/dL     Bilirubin Urine Negative Negative     Ketones Urine Negative Negative mg/dL     Specific Gravity Urine 1.008 1.003 - 1.035     Blood Urine Negative Negative     pH Urine 6.0 5.0 - 7.0     Protein Albumin Urine Negative Negative mg/dL     Urobilinogen Urine Normal Normal, 2.0 mg/dL     Nitrite Urine Negative Negative     Leukocyte Esterase Urine Negative Negative     Bacteria Urine Few (A) None Seen /HPF     Mucus Urine Present (A) None Seen /LPF     RBC Urine 0 <=2 /HPF     WBC Urine 0 <=5 /HPF     Squamous Epithelials Urine <1 <=1 /HPF     Narrative     Urine Culture not indicated   CBC with platelets differential     Narrative     The following orders were created for panel order CBC with platelets differential.  Procedure                               Abnormality         Status                     ---------                               -----------         ------                     CBC with platelets and d...[[id number]]  Abnormal            Final result                  Please view results for these tests on the individual orders.   Comprehensive metabolic panel   Result Value Ref Range     Sodium 137 133 - 144 mmol/L     Potassium 4.3 3.4 - 5.3 mmol/L     Chloride 108 94 - 109 mmol/L     Carbon Dioxide (CO2) 25 20 - 32 mmol/L     Anion Gap 4 3 - 14 mmol/L     Urea Nitrogen 20 7 - 30 mg/dL     Creatinine 0.56 0.52 - 1.04 mg/dL     Calcium  9.5 8.5 - 10.1 mg/dL     Glucose 102 (H) 70 - 99 mg/dL     Alkaline Phosphatase 86 40 - 150 U/L     AST 23 0 - 45 U/L     ALT 28 0 - 50 U/L     Protein Total 7.4 6.8 - 8.8 g/dL     Albumin 4.2 3.4 - 5.0 g/dL     Bilirubin Total 0.3 0.2 - 1.3 mg/dL     GFR Estimate >90 >60 mL/min/1.73m2   Troponin I   Result Value Ref Range     Troponin I High Sensitivity 8 <54 ng/L   Ethyl Alcohol Level   Result Value Ref Range     Alcohol ethyl <0.01 <=0.01 g/dL   CBC with platelets and differential   Result Value Ref Range     WBC Count 20.2 (H) 4.0 - 11.0 10e3/uL     RBC Count 4.57 3.80 - 5.20 10e6/uL     Hemoglobin 14.5 11.7 - 15.7 g/dL     Hematocrit 42.7 35.0 - 47.0 %     MCV 93 78 - 100 fL     MCH 31.7 26.5 - 33.0 pg     MCHC 34.0 31.5 - 36.5 g/dL     RDW 14.0 10.0 - 15.0 %     Platelet Count 286 150 - 450 10e3/uL     % Neutrophils 77 %     % Lymphocytes 13 %     % Monocytes 6 %     % Eosinophils 2 %     % Basophils 1 %     % Immature Granulocytes 1 %     NRBCs per 100 WBC 0 <1 /100     Absolute Neutrophils 15.9 (H) 1.6 - 8.3 10e3/uL     Absolute Lymphocytes 2.5 0.8 - 5.3 10e3/uL     Absolute Monocytes 1.2 0.0 - 1.3 10e3/uL     Absolute Eosinophils 0.4 0.0 - 0.7 10e3/uL     Absolute Basophils 0.1 0.0 - 0.2 10e3/uL     Absolute Immature Granulocytes 0.1 <=0.4 10e3/uL     Absolute NRBCs 0.0 10e3/uL   Carbon monoxide   Result Value Ref Range     Carbon Monoxide 1.5 0.0 - 2.0 %   Symptomatic; Unknown Influenza A/B & SARS-CoV2 (COVID-19) Virus PCR Multiplex Nose     Specimen: Nose; Swab   Result Value Ref Range     Influenza A PCR Negative Negative     Influenza B PCR Negative Negative     SARS CoV2 PCR Negative Negative     Narrative     Testing was performed using the debo SARS-CoV-2 & Influenza A/B Assay on the debo Amttie System. This test should be ordered for the detection of SARS-CoV-2 and influenza viruses in individuals who meet clinical and/or epidemiological criteria. Test performance is unknown in asymptomatic  patients. This test is for in vitro diagnostic use under the FDA EUA for laboratories certified under CLIA to perform moderate and/or high complexity testing. This test has not been FDA cleared or approved. A negative result does not rule out the presence of PCR inhibitors in the specimen or target RNA in concentration below the limit of detection for the assay. If only one viral target is positive but coinfection with multiple targets is suspected, the sample should be re-tested with another FDA cleared, approved or authorized test, if coinfection would change clinical management. [facility] are certified under the Clinical Laboratory Improvement Amendments of 1988 (CLIA-88) as  qualified to perform moderate and/or high complexity laboratory testing.   CT Head w/o Contrast     Narrative     CT HEAD W/O CONTRAST 3/28/2022 10:04 AM     INDICATION: Headache, intracranial hemorrhage suspected  TECHNIQUE: CT scan of the head without contrast. Dose reduction  techniques were used.  CONTRAST: None.  COMPARISON: 7/10/2018 head CT.     FINDINGS:   No intracranial hemorrhage, extraaxial collection, mass effect or CT  evidence of acute infarct.  Mild presumed chronic small vessel  ischemic changes. Mild generalized volume loss. The ventricles are  proportional to the sulci. Osseous structures are intact. Unremarkable  orbits. Paranasal sinuses are free of significant disease. Clear  mastoid air cells.        Impression     IMPRESSION:  Mild age-related changes, as above, with no acute intracranial  abnormality.     SHAY DANIELS MD         SYSTEM ID:  [id number]   Glucose CSF:   Result Value Ref Range     Glucose CSF 57 40 - 70 mg/dL     Narrative     CSF glucose concentrations are about 60 percent of normal plasma glucose.  CSF glucose concentrations are about 60 percent of normal plasma glucose.   Protein total CSF:   Result Value Ref Range     Protein total CSF 42 15 - 60 mg/dL     Narrative     This is  a lab developed test. It has not been cleared or approved by the FDA.   CSF Cell Count with Differential:     Narrative     The following orders were created for panel order CSF Cell Count with Differential:.  Procedure                               Abnormality         Status                     ---------                               -----------         ------                     Cell Count CSF[[id number]]                                   Final result                  Please view results for these tests on the individual orders.   Cell Count CSF   Result Value Ref Range     Tube Number 4       Color Colorless Colorless     Clarity Clear Clear     Total Nucleated Cells 1 0 - 5 /uL   XR Chest Port 1 View     Narrative     CHEST ONE VIEW PORTABLE   3/28/2022 12:22 PM      HISTORY: Pneumonia, confusion.     COMPARISON: 7/22/2020        Impression     IMPRESSION: Unremarkable single view of the chest.     CRISTINE OHARA MD         SYSTEM ID:  [id number]         Medications   0.9% sodium chloride BOLUS (0 mLs Intravenous Stopped 3/28/22 1100)     Followed by   sodium chloride 0.9% infusion (has no administration in time range)   prochlorperazine (COMPAZINE) injection 10 mg (10 mg Intravenous Given 3/28/22 0947)   ketorolac (TORADOL) injection 15 mg (15 mg Intravenous Given 3/28/22 0946)         Assessments & Plan (with Medical Decision Making)  61-year-old female who presented with frontal headache.  Also with some amnesia for the weekend's events.  No focal neurological deficit.  CT head negative.  Work-up as above without worrisome acute finding.  Noted leukocytosis, nonspecific.  LP without evidence for meningitis recurrence from 10 years ago fortunately.  After 5 hours in the emergency department, headache had cleared for the most part she stated and her thinking was much improved.  She appears stable for discharge home.  Have arranged for follow-up in clinic tomorrow.  Her daughter stated she would stay with her.   She can return at anytime if condition worsens, unable to get into clinic or any other concern.      I have reviewed the nursing notes.     I have reviewed the findings, diagnosis, plan and need for follow up with the patient.        Discharge Medication List as of 3/28/2022  1:41 PM             Final diagnoses:   Nonintractable headache, unspecified chronicity pattern, unspecified headache type         3/28/2022   [facility] EMERGENCY DEPT     Shay Bradford MD  03/28/22 1422    Patient Active Problem List   Diagnosis     Chronic cholecystitis     Meningitis due to viruses not elsewhere classified     Herpes zoster with meningitis     CARDIOVASCULAR SCREENING; LDL GOAL LESS THAN 160     Anxiety     Tobacco use disorder     Major depressive disorder, recurrent episode, moderate (H)     Respiratory distress     Wheezing-associated respiratory infection     Lateral epicondylitis of right elbow     Bilateral carpal tunnel syndrome     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, right     Numbness and tingling of both upper extremities while sleeping     Palmar fasciitis/pain bilateral     Postoperative state     Amnesia     Current Outpatient Medications   Medication Sig Dispense Refill     albuterol (PROAIR HFA/PROVENTIL HFA/VENTOLIN HFA) 108 (90 Base) MCG/ACT inhaler Inhale 1-2 puffs into the lungs every 4 hours as needed for shortness of breath / dyspnea or wheezing 1 Inhaler 0     benzonatate (TESSALON) 200 MG capsule Take 200 mg by mouth 3 times daily as needed for cough       cetirizine (ZYRTEC) 10 MG tablet Take 10 mg by mouth       fluticasone (FLONASE) 50 MCG/ACT nasal spray Spray 2 sprays into both nostrils daily (Patient taking differently: Spray 2 sprays into both nostrils daily PRN) 16 g 1     Ibuprofen (ADVIL PO) Take 600 mg by mouth       ipratropium - albuterol 0.5 mg/2.5 mg/3 mL (DUONEB) 0.5-2.5 (3) MG/3ML neb solution Take 3 mLs by nebulization every 4 hours as needed       rizatriptan  "(MAXALT) 5 MG tablet Take 1 tablet (5 mg) by mouth at onset of headache for migraine May repeat in 2 hours. Max 6 tablets/24 hours. 10 tablet 1     predniSONE (DELTASONE) 20 MG tablet Take 40 mg by mouth daily For 5 days (Patient not taking: Reported on 3/30/2022)           Review of Systems   CONSTITUTIONAL:POSITIVE  for fatigue and NEGATIVE  for chills, fever , sweats and weight loss  ENT/MOUTH: NEGATIVE for ear, mouth and throat problems  RESP: NEGATIVE for significant cough or SOB  CV: NEGATIVE for chest pain, palpitations or peripheral edema  NEURO: NEGATIVE for weakness, dizziness or paresthesias and POSITIVE for memory problems and now resolved  PSYCHIATRIC: NEGATIVE for changes in mood or affect and POSITIVE forimpaired memory and now resolved  ROS otherwise negative      Objective    BP 98/78 (BP Location: Left arm, Patient Position: Sitting)   Pulse 113   Temp 99.6  F (37.6  C) (Temporal)   Resp 16   Ht 1.448 m (4' 9\")   Wt 65.1 kg (143 lb 9 oz)   LMP  (LMP Unknown)   SpO2 97%   BMI 31.07 kg/m    Body mass index is 31.07 kg/m .  Physical Exam   GENERAL: healthy, alert and no distress  NECK: no adenopathy, no asymmetry, masses, or scars and thyroid normal to palpation  RESP: lungs clear to auscultation - no rales, rhonchi or wheezes  CV: regular rate and rhythm, normal S1 S2, no S3 or S4, no murmur, click or rub, no peripheral edema and peripheral pulses strong  ABDOMEN: soft, nontender, no hepatosplenomegaly, no masses and bowel sounds normal  MS: no gross musculoskeletal defects noted, no edema  NEURO: Normal strength and tone, sensory exam grossly normal, light touch and pinprick normal, proprioception normal, mentation intact, oriented times 3, speech normal, cranial nerves 2-12 intact, DTR's normal and symmetric , gait normal including heel/toe/tandem walking, Romberg normal and rapid alternating movements normal  LYMPH: no cervical, supraclavicular, axillary, or inguinal " adenopathy    Admission on 03/28/2022, Discharged on 03/28/2022   Component Date Value Ref Range Status     Cannabinoids (94-gca-9-carboxy-9-T* 03/28/2022 Not Detected  Not Detected, Indeterminate Final    Cutoff for a negative cannabinoid is 50 ng/mL or less.     Phencyclidine 03/28/2022 Not Detected  Not Detected, Indeterminate Final    Cutoff for a negative PCP is 25 ng/mL or less.     Cocaine (Benzoylecgonine) 03/28/2022 Not Detected  Not Detected, Indeterminate Final    Cutoff for a negative cocaine is 150 ng/ml or less.     Methamphetamine (d-Methamphetamine) 03/28/2022 Not Detected  Not Detected, Indeterminate Final    Cutoff for a negative methamphetamine is 500 ng/ml or less.     Opiates (Morphine) 03/28/2022 Not Detected  Not Detected, Indeterminate Final    Cutoff for a negative opiate is 100 ng/ml or less.     Amphetamine (d-Amphetamine) 03/28/2022 Not Detected  Not Detected, Indeterminate Final    Cutoff for a negative amphetamine is 500 ng/mL or less.     Benzodiazepines (Nordiazepam) 03/28/2022 Not Detected  Not Detected, Indeterminate Final    Cutoff for a negative benzodiazepine is 150 ng/ml or less.     Tricyclic Antidepressants (Desipra* 03/28/2022 Not Detected  Not Detected, Indeterminate Final    Cutoff for a negative tricyclic antidepressant is 300 ng/ml or less.     Methadone 03/28/2022 Not Detected  Not Detected, Indeterminate Final    Cutoff for a negative methadone is 200 ng/ml or less.     Barbiturates (Butalbital) 03/28/2022 Not Detected  Not Detected, Indeterminate Final    Cutoff for a negative barbituate is 200 ng/ml or less.     Oxycodone 03/28/2022 Not Detected  Not Detected, Indeterminate Final    Cutoff for a negative oxycodone is 100 ng/mL or less.     Propoxyphene (Norpropoxyphene) 03/28/2022 Not Detected  Not Detected, Indeterminate Final    Cutoff for a negative propoxyphene is 300 ng/ml or less.     Buprenorphine 03/28/2022 Not Detected  Not Detected, Indeterminate Final     Cutoff for a negative buprenorphine is 10 ng/ml or less.     Sodium 03/28/2022 137  133 - 144 mmol/L Final     Potassium 03/28/2022 4.3  3.4 - 5.3 mmol/L Final    Specimen slightly hemolyzed, potassium may be falsely elevated.     Chloride 03/28/2022 108  94 - 109 mmol/L Final     Carbon Dioxide (CO2) 03/28/2022 25  20 - 32 mmol/L Final     Anion Gap 03/28/2022 4  3 - 14 mmol/L Final     Urea Nitrogen 03/28/2022 20  7 - 30 mg/dL Final     Creatinine 03/28/2022 0.56  0.52 - 1.04 mg/dL Final     Calcium 03/28/2022 9.5  8.5 - 10.1 mg/dL Final     Glucose 03/28/2022 102 (A) 70 - 99 mg/dL Final     Alkaline Phosphatase 03/28/2022 86  40 - 150 U/L Final     AST 03/28/2022 23  0 - 45 U/L Final    Specimen is hemolyzed which can falsely elevate AST. Analysis of a non-hemolyzed specimen may result in a lower value.     ALT 03/28/2022 28  0 - 50 U/L Final     Protein Total 03/28/2022 7.4  6.8 - 8.8 g/dL Final     Albumin 03/28/2022 4.2  3.4 - 5.0 g/dL Final     Bilirubin Total 03/28/2022 0.3  0.2 - 1.3 mg/dL Final     GFR Estimate 03/28/2022 >90  >60 mL/min/1.73m2 Final    Effective December 21, 2021 eGFRcr in adults is calculated using the 2021 CKD-EPI creatinine equation which includes age and gender ( et al., NEJM, DOI: 10.1056/LQDNyo8405735)     Troponin I High Sensitivity 03/28/2022 8  <54 ng/L Final    This Troponin-I result was obtained using a Siemens Dimension Vista High Sensitivity Troponin-I assay (TNIH). Effective 11/23/21, nine labs/sites in the [facility] switched from a Siemens Gill Contemporary Troponin I assay (CTNI) to a Siemens Gill High-Sensitivity Troponin I assay (TNIH).     Influenza A PCR 03/28/2022 Negative  Negative Final     Influenza B PCR 03/28/2022 Negative  Negative Final     SARS CoV2 PCR 03/28/2022 Negative  Negative Final    NEGATIVE: SARS-CoV-2 (COVID-19) RNA not detected, presumed negative.     Carbon Monoxide 03/28/2022 1.5  0.0 - 2.0 % Final    Carbon Monoxide Reference  Range:  0-1 month:  0-4%  1 month & older, nonsmoker: 0-2%  1 month & older, smokers:   0-10%    Some smokers may have higher levels.  Slight dyspnea may occur with even limited CO2 elevation.         Alcohol ethyl 03/28/2022 <0.01  <=0.01 g/dL Final     Color Urine 03/28/2022 Straw  Colorless, Straw, Light Yellow, Yellow Final     Appearance Urine 03/28/2022 Clear  Clear Final     Glucose Urine 03/28/2022 Negative  Negative mg/dL Final     Bilirubin Urine 03/28/2022 Negative  Negative Final     Ketones Urine 03/28/2022 Negative  Negative mg/dL Final     Specific Gravity Urine 03/28/2022 1.008  1.003 - 1.035 Final     Blood Urine 03/28/2022 Negative  Negative Final     pH Urine 03/28/2022 6.0  5.0 - 7.0 Final     Protein Albumin Urine 03/28/2022 Negative  Negative mg/dL Final     Urobilinogen Urine 03/28/2022 Normal  Normal, 2.0 mg/dL Final     Nitrite Urine 03/28/2022 Negative  Negative Final     Leukocyte Esterase Urine 03/28/2022 Negative  Negative Final     Bacteria Urine 03/28/2022 Few (A) None Seen /HPF Final     Mucus Urine 03/28/2022 Present (A) None Seen /LPF Final     RBC Urine 03/28/2022 0  <=2 /HPF Final     WBC Urine 03/28/2022 0  <=5 /HPF Final     Squamous Epithelials Urine 03/28/2022 <1  <=1 /HPF Final     WBC Count 03/28/2022 20.2 (A) 4.0 - 11.0 10e3/uL Final     RBC Count 03/28/2022 4.57  3.80 - 5.20 10e6/uL Final     Hemoglobin 03/28/2022 14.5  11.7 - 15.7 g/dL Final     Hematocrit 03/28/2022 42.7  35.0 - 47.0 % Final     MCV 03/28/2022 93  78 - 100 fL Final     MCH 03/28/2022 31.7  26.5 - 33.0 pg Final     MCHC 03/28/2022 34.0  31.5 - 36.5 g/dL Final     RDW 03/28/2022 14.0  10.0 - 15.0 % Final     Platelet Count 03/28/2022 286  150 - 450 10e3/uL Final     % Neutrophils 03/28/2022 77  % Final     % Lymphocytes 03/28/2022 13  % Final     % Monocytes 03/28/2022 6  % Final     % Eosinophils 03/28/2022 2  % Final     % Basophils 03/28/2022 1  % Final     % Immature Granulocytes 03/28/2022 1  % Final      NRBCs per 100 WBC 03/28/2022 0  <1 /100 Final     Absolute Neutrophils 03/28/2022 15.9 (A) 1.6 - 8.3 10e3/uL Final     Absolute Lymphocytes 03/28/2022 2.5  0.8 - 5.3 10e3/uL Final     Absolute Monocytes 03/28/2022 1.2  0.0 - 1.3 10e3/uL Final     Absolute Eosinophils 03/28/2022 0.4  0.0 - 0.7 10e3/uL Final     Absolute Basophils 03/28/2022 0.1  0.0 - 0.2 10e3/uL Final     Absolute Immature Granulocytes 03/28/2022 0.1  <=0.4 10e3/uL Final     Absolute NRBCs 03/28/2022 0.0  10e3/uL Final     Glucose CSF 03/28/2022 57  40 - 70 mg/dL Final     Protein total CSF 03/28/2022 42  15 - 60 mg/dL Final     Culture 03/28/2022 No growth after 1 day   Preliminary     Gram Stain Result 03/28/2022 No organisms seen   Preliminary     Gram Stain Result 03/28/2022 2+ WBC seen   Preliminary     Tube Number 03/28/2022 4   Final     Color 03/28/2022 Colorless  Colorless Final     Clarity 03/28/2022 Clear  Clear Final     Total Nucleated Cells 03/28/2022 1  0 - 5 /uL Final     XR Chest Port 1 View    Result Date: 3/28/2022  CHEST ONE VIEW PORTABLE   3/28/2022 12:22 PM HISTORY: Pneumonia, confusion. COMPARISON: 7/22/2020     IMPRESSION: Unremarkable single view of the chest. CRISTINE OHARA MD   SYSTEM ID:  [id number]    CT Head w/o Contrast    Result Date: 3/28/2022  CT HEAD W/O CONTRAST 3/28/2022 10:04 AM INDICATION: Headache, intracranial hemorrhage suspected TECHNIQUE: CT scan of the head without contrast. Dose reduction techniques were used. CONTRAST: None. COMPARISON: 7/10/2018 head CT. FINDINGS: No intracranial hemorrhage, extraaxial collection, mass effect or CT evidence of acute infarct.  Mild presumed chronic small vessel ischemic changes. Mild generalized volume loss. The ventricles are proportional to the sulci. Osseous structures are intact. Unremarkable orbits. Paranasal sinuses are free of significant disease. Clear mastoid air cells.     IMPRESSION: Mild age-related changes, as above, with no acute intracranial  abnormality. SHAY DANIELS MD   SYSTEM ID:  [id number]

## 2022-03-31 ASSESSMENT — PATIENT HEALTH QUESTIONNAIRE - PHQ9: SUM OF ALL RESPONSES TO PHQ QUESTIONS 1-9: 1

## 2022-04-02 LAB
BACTERIA CSF CULT: NO GROWTH
GRAM STAIN RESULT: NORMAL
GRAM STAIN RESULT: NORMAL

## 2022-04-07 ENCOUNTER — HOSPITAL ENCOUNTER (EMERGENCY)
Facility: CLINIC | Age: 62
Discharge: ANOTHER HOSPITAL | End: 2022-04-07
Attending: FAMILY MEDICINE | Admitting: FAMILY MEDICINE

## 2022-04-07 ENCOUNTER — APPOINTMENT (OUTPATIENT)
Dept: GENERAL RADIOLOGY | Facility: CLINIC | Age: 62
End: 2022-04-07
Attending: FAMILY MEDICINE

## 2022-04-07 VITALS
OXYGEN SATURATION: 96 % | HEART RATE: 122 BPM | WEIGHT: 144.3 LBS | BODY MASS INDEX: 31.23 KG/M2 | RESPIRATION RATE: 21 BRPM | DIASTOLIC BLOOD PRESSURE: 72 MMHG | SYSTOLIC BLOOD PRESSURE: 127 MMHG

## 2022-04-07 DIAGNOSIS — J45.41 MODERATE PERSISTENT ASTHMA WITH EXACERBATION: ICD-10-CM

## 2022-04-07 DIAGNOSIS — J96.01 ACUTE RESPIRATORY FAILURE WITH HYPOXIA (H): ICD-10-CM

## 2022-04-07 DIAGNOSIS — I21.4 NSTEMI (NON-ST ELEVATED MYOCARDIAL INFARCTION) (H): ICD-10-CM

## 2022-04-07 LAB
ALBUMIN SERPL-MCNC: 3.7 G/DL (ref 3.4–5)
ALP SERPL-CCNC: 81 U/L (ref 40–150)
ALT SERPL W P-5'-P-CCNC: 50 U/L (ref 0–50)
ANION GAP SERPL CALCULATED.3IONS-SCNC: 11 MMOL/L (ref 3–14)
AST SERPL W P-5'-P-CCNC: 83 U/L (ref 0–45)
BASE EXCESS BLDV CALC-SCNC: -3 MMOL/L (ref -7.7–1.9)
BASE EXCESS BLDV CALC-SCNC: -9.2 MMOL/L (ref -7.7–1.9)
BASOPHILS # BLD AUTO: 0.1 10E3/UL (ref 0–0.2)
BASOPHILS NFR BLD AUTO: 1 %
BILIRUB SERPL-MCNC: 0.2 MG/DL (ref 0.2–1.3)
BUN SERPL-MCNC: 19 MG/DL (ref 7–30)
CALCIUM SERPL-MCNC: 8.5 MG/DL (ref 8.5–10.1)
CHLORIDE BLD-SCNC: 108 MMOL/L (ref 94–109)
CO2 SERPL-SCNC: 18 MMOL/L (ref 20–32)
CREAT SERPL-MCNC: 0.61 MG/DL (ref 0.52–1.04)
D DIMER PPP FEU-MCNC: 0.27 UG/ML FEU (ref 0–0.5)
EOSINOPHIL # BLD AUTO: 1.1 10E3/UL (ref 0–0.7)
EOSINOPHIL NFR BLD AUTO: 8 %
ERYTHROCYTE [DISTWIDTH] IN BLOOD BY AUTOMATED COUNT: 14.4 % (ref 10–15)
FLUAV RNA SPEC QL NAA+PROBE: NEGATIVE
FLUBV RNA RESP QL NAA+PROBE: NEGATIVE
GFR SERPL CREATININE-BSD FRML MDRD: >90 ML/MIN/1.73M2
GLUCOSE BLD-MCNC: 291 MG/DL (ref 70–99)
HCO3 BLDV-SCNC: 18 MMOL/L (ref 21–28)
HCO3 BLDV-SCNC: 24 MMOL/L (ref 21–28)
HCT VFR BLD AUTO: 38.8 % (ref 35–47)
HGB BLD-MCNC: 12.8 G/DL (ref 11.7–15.7)
IMM GRANULOCYTES # BLD: 0 10E3/UL
IMM GRANULOCYTES NFR BLD: 0 %
LYMPHOCYTES # BLD AUTO: 3.2 10E3/UL (ref 0.8–5.3)
LYMPHOCYTES NFR BLD AUTO: 23 %
MCH RBC QN AUTO: 31.5 PG (ref 26.5–33)
MCHC RBC AUTO-ENTMCNC: 33 G/DL (ref 31.5–36.5)
MCV RBC AUTO: 96 FL (ref 78–100)
MONOCYTES # BLD AUTO: 1.5 10E3/UL (ref 0–1.3)
MONOCYTES NFR BLD AUTO: 11 %
NEUTROPHILS # BLD AUTO: 7.7 10E3/UL (ref 1.6–8.3)
NEUTROPHILS NFR BLD AUTO: 57 %
NRBC # BLD AUTO: 0 10E3/UL
NRBC BLD AUTO-RTO: 0 /100
NT-PROBNP SERPL-MCNC: 1782 PG/ML (ref 0–900)
O2/TOTAL GAS SETTING VFR VENT: 28 %
O2/TOTAL GAS SETTING VFR VENT: 30 %
PCO2 BLDV: 44 MM HG (ref 40–50)
PCO2 BLDV: 49 MM HG (ref 40–50)
PH BLDV: 7.22 [PH] (ref 7.32–7.43)
PH BLDV: 7.3 [PH] (ref 7.32–7.43)
PLATELET # BLD AUTO: 278 10E3/UL (ref 150–450)
PO2 BLDV: 41 MM HG (ref 25–47)
PO2 BLDV: 55 MM HG (ref 25–47)
POTASSIUM BLD-SCNC: 4.3 MMOL/L (ref 3.4–5.3)
PROT SERPL-MCNC: 6.8 G/DL (ref 6.8–8.8)
RBC # BLD AUTO: 4.06 10E6/UL (ref 3.8–5.2)
SARS-COV-2 RNA RESP QL NAA+PROBE: NEGATIVE
SODIUM SERPL-SCNC: 137 MMOL/L (ref 133–144)
TROPONIN I SERPL HS-MCNC: 446 NG/L
TROPONIN I SERPL HS-MCNC: 586 NG/L
WBC # BLD AUTO: 13.7 10E3/UL (ref 4–11)

## 2022-04-07 PROCEDURE — 82435 ASSAY OF BLOOD CHLORIDE: CPT | Performed by: FAMILY MEDICINE

## 2022-04-07 PROCEDURE — 85025 COMPLETE CBC W/AUTO DIFF WBC: CPT | Performed by: FAMILY MEDICINE

## 2022-04-07 PROCEDURE — 85379 FIBRIN DEGRADATION QUANT: CPT | Performed by: FAMILY MEDICINE

## 2022-04-07 PROCEDURE — C9803 HOPD COVID-19 SPEC COLLECT: HCPCS

## 2022-04-07 PROCEDURE — 99285 EMERGENCY DEPT VISIT HI MDM: CPT | Mod: 25

## 2022-04-07 PROCEDURE — 96365 THER/PROPH/DIAG IV INF INIT: CPT

## 2022-04-07 PROCEDURE — 82803 BLOOD GASES ANY COMBINATION: CPT | Performed by: FAMILY MEDICINE

## 2022-04-07 PROCEDURE — 87636 SARSCOV2 & INF A&B AMP PRB: CPT | Performed by: FAMILY MEDICINE

## 2022-04-07 PROCEDURE — 71045 X-RAY EXAM CHEST 1 VIEW: CPT

## 2022-04-07 PROCEDURE — 96375 TX/PRO/DX INJ NEW DRUG ADDON: CPT

## 2022-04-07 PROCEDURE — 83880 ASSAY OF NATRIURETIC PEPTIDE: CPT | Performed by: FAMILY MEDICINE

## 2022-04-07 PROCEDURE — 36415 COLL VENOUS BLD VENIPUNCTURE: CPT | Performed by: FAMILY MEDICINE

## 2022-04-07 PROCEDURE — 250N000013 HC RX MED GY IP 250 OP 250 PS 637: Performed by: FAMILY MEDICINE

## 2022-04-07 PROCEDURE — 99285 EMERGENCY DEPT VISIT HI MDM: CPT | Performed by: FAMILY MEDICINE

## 2022-04-07 PROCEDURE — 250N000009 HC RX 250: Performed by: FAMILY MEDICINE

## 2022-04-07 PROCEDURE — 250N000011 HC RX IP 250 OP 636: Performed by: FAMILY MEDICINE

## 2022-04-07 PROCEDURE — 96361 HYDRATE IV INFUSION ADD-ON: CPT

## 2022-04-07 PROCEDURE — 84484 ASSAY OF TROPONIN QUANT: CPT | Performed by: FAMILY MEDICINE

## 2022-04-07 PROCEDURE — 94640 AIRWAY INHALATION TREATMENT: CPT

## 2022-04-07 PROCEDURE — 258N000003 HC RX IP 258 OP 636: Performed by: FAMILY MEDICINE

## 2022-04-07 RX ORDER — IPRATROPIUM BROMIDE AND ALBUTEROL SULFATE 2.5; .5 MG/3ML; MG/3ML
3 SOLUTION RESPIRATORY (INHALATION)
Status: DISCONTINUED | OUTPATIENT
Start: 2022-04-07 | End: 2022-04-07 | Stop reason: HOSPADM

## 2022-04-07 RX ORDER — METHYLPREDNISOLONE SODIUM SUCCINATE 125 MG/2ML
125 INJECTION, POWDER, LYOPHILIZED, FOR SOLUTION INTRAMUSCULAR; INTRAVENOUS ONCE
Status: COMPLETED | OUTPATIENT
Start: 2022-04-07 | End: 2022-04-07

## 2022-04-07 RX ORDER — HEPARIN SODIUM 10000 [USP'U]/100ML
0-5000 INJECTION, SOLUTION INTRAVENOUS CONTINUOUS
Status: DISCONTINUED | OUTPATIENT
Start: 2022-04-07 | End: 2022-04-07 | Stop reason: HOSPADM

## 2022-04-07 RX ORDER — ONDANSETRON 2 MG/ML
4 INJECTION INTRAMUSCULAR; INTRAVENOUS ONCE
Status: COMPLETED | OUTPATIENT
Start: 2022-04-07 | End: 2022-04-07

## 2022-04-07 RX ORDER — IPRATROPIUM BROMIDE AND ALBUTEROL SULFATE 2.5; .5 MG/3ML; MG/3ML
3 SOLUTION RESPIRATORY (INHALATION) ONCE
Status: COMPLETED | OUTPATIENT
Start: 2022-04-07 | End: 2022-04-07

## 2022-04-07 RX ORDER — ASPIRIN 81 MG/1
324 TABLET, CHEWABLE ORAL ONCE
Status: COMPLETED | OUTPATIENT
Start: 2022-04-07 | End: 2022-04-07

## 2022-04-07 RX ORDER — ACETAMINOPHEN 325 MG/1
975 TABLET ORAL ONCE
Status: COMPLETED | OUTPATIENT
Start: 2022-04-07 | End: 2022-04-07

## 2022-04-07 RX ADMIN — ACETAMINOPHEN 975 MG: 325 TABLET, FILM COATED ORAL at 05:41

## 2022-04-07 RX ADMIN — ASPIRIN 81 MG 324 MG: 81 TABLET ORAL at 04:55

## 2022-04-07 RX ADMIN — METHYLPREDNISOLONE SODIUM SUCCINATE 125 MG: 125 INJECTION, POWDER, FOR SOLUTION INTRAMUSCULAR; INTRAVENOUS at 01:45

## 2022-04-07 RX ADMIN — IPRATROPIUM BROMIDE AND ALBUTEROL SULFATE 3 ML: .5; 3 SOLUTION RESPIRATORY (INHALATION) at 05:32

## 2022-04-07 RX ADMIN — ONDANSETRON 4 MG: 2 INJECTION INTRAMUSCULAR; INTRAVENOUS at 01:45

## 2022-04-07 RX ADMIN — SODIUM CHLORIDE 1000 ML: 9 INJECTION, SOLUTION INTRAVENOUS at 01:52

## 2022-04-07 RX ADMIN — HEPARIN SODIUM 800 UNITS/HR: 10000 INJECTION, SOLUTION INTRAVENOUS at 04:56

## 2022-04-07 RX ADMIN — IPRATROPIUM BROMIDE AND ALBUTEROL SULFATE 3 ML: .5; 3 SOLUTION RESPIRATORY (INHALATION) at 01:50

## 2022-04-07 NOTE — ED NOTES
Patient had asked that I call and update her daughter. I spoke with Adrien, gave her patient status. I will recall her when we have a disposition.

## 2022-04-07 NOTE — ED PROVIDER NOTES
[facility] ED Provider Note   Patient: Fern Gonzalez  MRN #:  [id number]  Date of Visit: April 7, 2022    CC:     Chief Complaint   Patient presents with     Shortness of Breath     HPI:  Fern Gonzalez is a 61 year old female who presented to the emergency department by EMS with acute respiratory distress with wheezing.  Patient reports that she has had symptoms for the past month since March 5.  Patient has had a productive cough of green sputum, without any blood.  She denies fever, chills, but does feel nauseated.  She states that she was even worse on Monday, and her symptoms lasted 5 hours before it started to improve.  She was too short of breath and sick to call 911.  Tonight, she woke up, felt wheezy and tight in her chest, and could not recover despite using her inhaler and nebs.  EMS states that she was blue, and put her on supplemental oxygen and DuoNeb.  Her oxygen saturations improved to 97% on a nonrebreather.  By the time she arrived, she was able to transition to nasal cannula at 2 L with oxygen saturations of 94% or better.  Patient is a smoker, who states that she stopped smoking on Monday when she became extremely sick.  She was recently in the emergency department on March 28 with headache, and transient global amnesia.  She had a negative Covid test at that time.    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, right 11/08/2016     Priority: Medium     Lateral epicondylitis of right elbow 06/02/2016     Priority: Medium     Bilateral carpal  "tunnel syndrome 06/02/2016     Priority: Medium     Respiratory distress 09/01/2015     Priority: Medium     Wheezing-associated respiratory infection 09/01/2015     Priority: Medium     Major depressive disorder, recurrent episode, moderate (H) 05/14/2015     Priority: Medium     Tobacco use disorder 04/08/2015     Priority: Medium     Anxiety 08/15/2014     Priority: Medium     CARDIOVASCULAR SCREENING; LDL GOAL LESS THAN 160 10/07/2011     Priority: Medium     Meningitis due to viruses not elsewhere classified 10/03/2011     Priority: Medium     Herpes zoster with meningitis 10/03/2011     Priority: Medium     Chronic cholecystitis 01/22/2007     Priority: Medium       Past Medical History:   Diagnosis Date     Diagnostic skin and sensitization tests (aka ALLERGENS) 10/29/15 skin tests pos. for: T >> dog/DM only.      Esophageal reflux      Headache(784.0) 10/11/11     Migraine, unspecified, without mention of intractable migraine without mention of status migrainosus      Recurrent sinusitis      Varicella meningitis        MEDS: albuterol (PROAIR HFA/PROVENTIL HFA/VENTOLIN HFA) 108 (90 Base) MCG/ACT inhaler  benzonatate (TESSALON) 200 MG capsule  cetirizine (ZYRTEC) 10 MG tablet  fluticasone (FLONASE) 50 MCG/ACT nasal spray  Ibuprofen (ADVIL PO)  ipratropium - albuterol 0.5 mg/2.5 mg/3 mL (DUONEB) 0.5-2.5 (3) MG/3ML neb solution  predniSONE (DELTASONE) 20 MG tablet  rizatriptan (MAXALT) 5 MG tablet        ALLERGIES:    Allergies   Allergen Reactions     Cephalexin Hcl Hives     Codeine Nausea and Vomiting     significant     Gabapentin Nausea and Vomiting     Morphine Hcl Nausea and Vomiting     Percocet [Oxycodone-Acetaminophen] Nausea and Vomiting     Significant; pt reports that plain Oxycodone she did fine with     Valium [Diazepam] Other (See Comments)     \"terrible nightmare\"     Vicodin [Hydrocodone-Acetaminophen] Nausea and Vomiting     significant     Cefazolin Rash       Past Surgical History: " "  Procedure Laterality Date     CHOLECYSTECTOMY, LAPOROSCOPIC  2007    Lysis of intestinal adhesions.     CL AFF SURGICAL PATHOLOGY       COLONOSCOPY N/A 10/31/2014    Procedure: COMBINED COLONOSCOPY, SINGLE OR MULTIPLE BIOPSY/POLYPECTOMY BY BIOPSY;  Surgeon: Leonard Dumont MD;  Location: [facility]     HC LAP,FULGURATE/EXCISE LESIONS  10/04    mesh present as had abdominal wall surgery     HYSTERECTOMY TOTAL ABDOMINAL  2007     INJECT EPIDURAL CERVICAL Bilateral 2019    Procedure: INJECTION, SPINE, CERVICAL 6-7, EPIDURAL;  Surgeon: Des Rider MD;  Location: [facility]     RELEASE CARPAL TUNNEL Right 2016    Procedure: RELEASE CARPAL TUNNEL;  Surgeon: Leif Fine MD;  Location: [facility]     RELEASE CARPAL TUNNEL Left 10/5/2016    Procedure: RELEASE CARPAL TUNNEL;  Surgeon: Leif Fine MD;  Location: [facility]     RELEASE CARPAL TUNNEL Right 2016    Procedure: RELEASE CARPAL TUNNEL;  Surgeon: Leif Fine MD;  Location:  OR     ZZC  DELIVERY ONLY      3 times     ZZ UGI ENDOSCOPY, SIMPLE EXAM  10/26/2006       Social History     Tobacco Use     Smoking status: Current Every Day Smoker     Packs/day: 0.50     Years: 0.50     Pack years: 0.25     Types: Cigarettes     Smokeless tobacco: Never Used     Tobacco comment: uses ecig now   Vaping Use     Vaping Use: Never used   Substance Use Topics     Alcohol use: Yes     Alcohol/week: 0.0 standard drinks     Comment: \"very rarely\"     Drug use: No         Review of Systems   Except as noted in HPI, all other systems were reviewed and are negative    Physical Exam     Vitals were reviewed  Patient Vitals for the past 12 hrs:   BP Pulse Resp SpO2 Weight   22 0415 122/72 108 19 97 % --   22 0400 117/72 113 14 98 % --   22 0345 106/74 108 12 98 % --   22 0330 119/73 106 21 97 % --   22 0315 115/78 102 20 97 % --   22 0300 113/68 105 14 99 % --   22 0245 107/75 116 (!) 58 96 % -- "   04/07/22 0230 125/73 108 16 98 % --   04/07/22 0215 118/67 110 12 98 % --   04/07/22 0200 123/82 107 12 99 % --   04/07/22 0145 (!) 124/26 109 15 98 % --   04/07/22 0130 (!) 150/67 (!) 121 (!) 41 94 % --   04/07/22 0126 128/80 (!) 125 28 94 % 65.5 kg (144 lb 4.8 oz)     GENERAL APPEARANCE: Poor color, moderate to severe respiratory distress  FACE: normal facies  EYES: Pupils are equal; sclerae nonicteric  HENT: normal external exam; mucous membranes are dry  NECK: no adenopathy or asymmetry  RESP: Moderate to severe respiratory distress with increased effort; bilateral expiratory wheeze, diminished breath sounds bilaterally  CV: Distant heart sounds, rapid rate  ABD: soft, no tenderness; no rebound or guarding; bowel sounds are normal  MS: no gross deformities noted; normal muscle tone.  EXT: No calf tenderness or pitting edema  SKIN: no worrisome rash  NEURO: no facial droop; no focal deficits, speech is in 3-4 word sentences        Available Lab/Imaging Results     Results for orders placed or performed during the hospital encounter of 04/07/22 (from the past 24 hour(s))   CBC with Platelets & Differential    Narrative    The following orders were created for panel order CBC with Platelets & Differential.  Procedure                               Abnormality         Status                     ---------                               -----------         ------                     CBC with platelets and d...[[id number]]  Abnormal            Final result                 Please view results for these tests on the individual orders.   Troponin I   Result Value Ref Range    Troponin I High Sensitivity 446 (HH) <54 ng/L   Blood gas venous   Result Value Ref Range    pH Venous 7.22 (L) 7.32 - 7.43    pCO2 Venous 44 40 - 50 mm Hg    pO2 Venous 55 (H) 25 - 47 mm Hg    Bicarbonate Venous 18 (L) 21 - 28 mmol/L    Base Excess/Deficit (+/-) -9.2 (L) -7.7 - 1.9 mmol/L    FIO2 28    Comprehensive metabolic panel   Result Value Ref  Range    Sodium 137 133 - 144 mmol/L    Potassium 4.3 3.4 - 5.3 mmol/L    Chloride 108 94 - 109 mmol/L    Carbon Dioxide (CO2) 18 (L) 20 - 32 mmol/L    Anion Gap 11 3 - 14 mmol/L    Urea Nitrogen 19 7 - 30 mg/dL    Creatinine 0.61 0.52 - 1.04 mg/dL    Calcium 8.5 8.5 - 10.1 mg/dL    Glucose 291 (H) 70 - 99 mg/dL    Alkaline Phosphatase 81 40 - 150 U/L    AST 83 (H) 0 - 45 U/L    ALT 50 0 - 50 U/L    Protein Total 6.8 6.8 - 8.8 g/dL    Albumin 3.7 3.4 - 5.0 g/dL    Bilirubin Total 0.2 0.2 - 1.3 mg/dL    GFR Estimate >90 >60 mL/min/1.73m2   Nt probnp inpatient (BNP)   Result Value Ref Range    N terminal Pro BNP Inpatient 1,782 (H) 0 - 900 pg/mL   CBC with platelets and differential   Result Value Ref Range    WBC Count 13.7 (H) 4.0 - 11.0 10e3/uL    RBC Count 4.06 3.80 - 5.20 10e6/uL    Hemoglobin 12.8 11.7 - 15.7 g/dL    Hematocrit 38.8 35.0 - 47.0 %    MCV 96 78 - 100 fL    MCH 31.5 26.5 - 33.0 pg    MCHC 33.0 31.5 - 36.5 g/dL    RDW 14.4 10.0 - 15.0 %    Platelet Count 278 150 - 450 10e3/uL    % Neutrophils 57 %    % Lymphocytes 23 %    % Monocytes 11 %    % Eosinophils 8 %    % Basophils 1 %    % Immature Granulocytes 0 %    NRBCs per 100 WBC 0 <1 /100    Absolute Neutrophils 7.7 1.6 - 8.3 10e3/uL    Absolute Lymphocytes 3.2 0.8 - 5.3 10e3/uL    Absolute Monocytes 1.5 (H) 0.0 - 1.3 10e3/uL    Absolute Eosinophils 1.1 (H) 0.0 - 0.7 10e3/uL    Absolute Basophils 0.1 0.0 - 0.2 10e3/uL    Absolute Immature Granulocytes 0.0 <=0.4 10e3/uL    Absolute NRBCs 0.0 10e3/uL   Symptomatic; Yes; 3/6/2022 Influenza A/B & SARS-CoV2 (COVID-19) Virus PCR Multiplex Nose    Specimen: Nose; Swab   Result Value Ref Range    Influenza A PCR Negative Negative    Influenza B PCR Negative Negative    SARS CoV2 PCR Negative Negative    Narrative    Testing was performed using the debo SARS-CoV-2 & Influenza A/B Assay on the debo Mattie System. This test should be ordered for the detection of SARS-CoV-2 and influenza viruses in individuals  who meet clinical and/or epidemiological criteria. Test performance is unknown in asymptomatic patients. This test is for in vitro diagnostic use under the FDA EUA for laboratories certified under CLIA to perform moderate and/or high complexity testing. This test has not been FDA cleared or approved. A negative result does not rule out the presence of PCR inhibitors in the specimen or target RNA in concentration below the limit of detection for the assay. If only one viral target is positive but coinfection with multiple targets is suspected, the sample should be re-tested with another FDA cleared, approved or authorized test, if coinfection would change clinical management. [facility] are certified under the Clinical Laboratory Improvement Amendments of 1988 (CLIA-88) as  qualified to perform moderate and/or high complexity laboratory testing.   XR Chest Port 1 View    Narrative    EXAM: XR CHEST PORT 1 VIEW  LOCATION: [facility]  DATE/TIME: 4/7/2022 1:59 AM    INDICATION: SOA  COMPARISON: 03/28/2022      Impression    IMPRESSION: Heart size normal. Lungs are clear. No pneumothorax or pleural effusion. Central sclerotic focus in the left humeral head likely reflects a very small bone infarction or less likely enchondroma.   Troponin I (now)   Result Value Ref Range    Troponin I High Sensitivity 586 (HH) <54 ng/L   Blood gas venous   Result Value Ref Range    pH Venous 7.30 (L) 7.32 - 7.43    pCO2 Venous 49 40 - 50 mm Hg    pO2 Venous 41 25 - 47 mm Hg    Bicarbonate Venous 24 21 - 28 mmol/L    Base Excess/Deficit (+/-) -3.0 -7.7 - 1.9 mmol/L    FIO2 30             Impression     Final diagnoses:   Acute respiratory failure with hypoxia (H)   NSTEMI (non-ST elevated myocardial infarction) (H)   Moderate persistent asthma with exacerbation         ED Course & Medical Decision Making   eFrn Gonzalez is a 61 year old female who presented to the emergency department  by EMS with acute respiratory distress, with EMS describing cyanosis at the scene, and decreased level of responsiveness.  Patient states she has been sick for the past month, with what she thought was an asthma exacerbation.  She has had a productive cough of greenish sputum, and severe shortness of breath.  She reports that her symptoms were worse on Monday, and she was so sick that she could not even call 911.  Her symptoms continued for about 5 hours until it started to dissipate.  She has been using her inhaler and nebulization treatments, but tonight her symptoms became worse.  She denies any chest pains, but her breathing was so severe that she actually had sweats and nausea but no vomiting.  Upon arrival, patient was still breathing rapidly 28-40 times per minute.  Heart rate was 125, blood pressure 128/80, oxygen saturations of 94% on 2 L of nasal cannula.  Patient had increase work of breathing still, and had bilateral expiratory wheeze with diminished breath sounds bilaterally.  Laboratory work-up reveals a venous blood gas with pH of 7.22, PCO2 of 44, PO2 of 55.  Comprehensive metabolic panel reveals normal electrolytes, glucose of 291, AST of 83, ALT of 50, BNP of 1782, white blood count of 13.7, hemoglobin of 12.8, platelet count of 278.  57% neutrophils.  SARS-CoV-2 PCR, and influenza screening tests were negative.  Chest x-ray reveals normal heart and lung.  No pneumothorax or pleural effusion.  Initial troponin was 446.  Patient was placed on BiPAP for several hours, with improvement in her repeat venous blood gas of 7.30, PCO2 of 49, PO2 of 41.  Troponin enzyme increased to 586.  Patient did not have any acute chest pain.  Patient was started on aspirin and heparin drip for possible NSTEMI.  She evidently has acute respiratory failure with hypoxemia, possibly from severe asthma exacerbation.  Patient received Solu-Medrol 125 mg IV, DuoNeb's, aspirin, and methylprednisolone.  There is concern for an  increase in her troponin enzyme despite respiratory improvement on BiPAP.  We will try to wean her off of BiPAP onto nasal cannula oxygen.        Medications   ipratropium - albuterol 0.5 mg/2.5 mg/3 mL (DUONEB) neb solution 3 mL (3 mLs Nebulization Given 4/7/22 0150)   heparin 25,000 units in 0.45% NaCl 250 mL ANTICOAGULANT infusion (800 Units/hr Intravenous New Bag 4/7/22 0456)   0.9% sodium chloride BOLUS (0 mLs Intravenous Stopped 4/7/22 0252)   methylPREDNISolone sodium succinate (solu-MEDROL) injection 125 mg (125 mg Intravenous Given 4/7/22 0145)   ondansetron (ZOFRAN) injection 4 mg (4 mg Intravenous Given 4/7/22 0145)   aspirin (ASA) chewable tablet 324 mg (324 mg Oral Given 4/7/22 0455)        5:12 AM: Discussed the case with Dr. Snell, hospitalist at [facility].  She has graciously excepted the patient in transfer.         Disclaimer: This note consists of words and symbols derived from keyboarding and dictation using voice recognition software.  As a result, there may be errors that have gone undetected.  Please consider this when interpreting information found in this note.       Teri Jose MD  04/07/22 [id number]

## 2022-04-07 NOTE — ED TRIAGE NOTES
PT comes in by EMS after being unresponsive at home. PT is SOB. Pt states she has been Sob for 1 month, but it got worse today. Pt took home inhaler and had duoneb treatment by EMS

## 2022-05-20 ENCOUNTER — HOSPITAL ENCOUNTER (INPATIENT)
Facility: CLINIC | Age: 62
LOS: 3 days | Discharge: HOME | DRG: 202 | End: 2022-05-23
Attending: FAMILY MEDICINE | Admitting: PEDIATRICS

## 2022-05-20 ENCOUNTER — APPOINTMENT (OUTPATIENT)
Dept: GENERAL RADIOLOGY | Facility: CLINIC | Age: 62
DRG: 202 | End: 2022-05-20
Attending: FAMILY MEDICINE

## 2022-05-20 DIAGNOSIS — R65.10 SIRS (SYSTEMIC INFLAMMATORY RESPONSE SYNDROME) (H): ICD-10-CM

## 2022-05-20 DIAGNOSIS — Z11.52 ENCOUNTER FOR SCREENING LABORATORY TESTING FOR SEVERE ACUTE RESPIRATORY SYNDROME CORONAVIRUS 2 (SARS-COV-2): ICD-10-CM

## 2022-05-20 DIAGNOSIS — J30.2 SEASONAL ALLERGIC RHINITIS, UNSPECIFIED TRIGGER: ICD-10-CM

## 2022-05-20 DIAGNOSIS — J44.1 COPD EXACERBATION (H): ICD-10-CM

## 2022-05-20 DIAGNOSIS — J01.10 ACUTE NON-RECURRENT FRONTAL SINUSITIS: ICD-10-CM

## 2022-05-20 DIAGNOSIS — J20.6 ACUTE BRONCHITIS DUE TO RHINOVIRUS: Primary | ICD-10-CM

## 2022-05-20 DIAGNOSIS — J96.01 ACUTE RESPIRATORY FAILURE WITH HYPOXIA (H): ICD-10-CM

## 2022-05-20 DIAGNOSIS — F17.210 CIGARETTE SMOKER: ICD-10-CM

## 2022-05-20 DIAGNOSIS — E87.6 HYPOPOTASSEMIA: ICD-10-CM

## 2022-05-20 DIAGNOSIS — J45.52 SEVERE PERSISTENT ASTHMA WITH STATUS ASTHMATICUS (H): ICD-10-CM

## 2022-05-20 PROBLEM — T38.0X5A STEROID-INDUCED HYPERGLYCEMIA: Status: ACTIVE | Noted: 2022-05-20

## 2022-05-20 PROBLEM — R79.89 ELEVATED LACTIC ACID LEVEL: Status: ACTIVE | Noted: 2022-05-20

## 2022-05-20 PROBLEM — J30.89 PERENNIAL ALLERGIC RHINITIS WITH SEASONAL VARIATION: Status: ACTIVE | Noted: 2022-05-20

## 2022-05-20 PROBLEM — R73.9 HYPERGLYCEMIA: Status: ACTIVE | Noted: 2022-05-20

## 2022-05-20 LAB
ALBUMIN SERPL-MCNC: 3.8 G/DL (ref 3.4–5)
ALP SERPL-CCNC: 70 U/L (ref 40–150)
ALT SERPL W P-5'-P-CCNC: 19 U/L (ref 0–50)
ANION GAP SERPL CALCULATED.3IONS-SCNC: 9 MMOL/L (ref 3–14)
AST SERPL W P-5'-P-CCNC: 15 U/L (ref 0–45)
BASE EXCESS BLDV CALC-SCNC: -1 MMOL/L (ref -7.7–1.9)
BASE EXCESS BLDV CALC-SCNC: -3.3 MMOL/L (ref -7.7–1.9)
BASOPHILS # BLD AUTO: 0.1 10E3/UL (ref 0–0.2)
BASOPHILS NFR BLD AUTO: 0 %
BILIRUB SERPL-MCNC: 0.3 MG/DL (ref 0.2–1.3)
BUN SERPL-MCNC: 11 MG/DL (ref 7–30)
CALCIUM SERPL-MCNC: 8.8 MG/DL (ref 8.5–10.1)
CHLORIDE BLD-SCNC: 110 MMOL/L (ref 94–109)
CO2 SERPL-SCNC: 22 MMOL/L (ref 20–32)
CREAT SERPL-MCNC: 0.5 MG/DL (ref 0.52–1.04)
EOSINOPHIL # BLD AUTO: 0 10E3/UL (ref 0–0.7)
EOSINOPHIL NFR BLD AUTO: 0 %
ERYTHROCYTE [DISTWIDTH] IN BLOOD BY AUTOMATED COUNT: 13.8 % (ref 10–15)
FLUAV RNA SPEC QL NAA+PROBE: NEGATIVE
FLUBV RNA RESP QL NAA+PROBE: NEGATIVE
GFR SERPL CREATININE-BSD FRML MDRD: >90 ML/MIN/1.73M2
GLUCOSE BLD-MCNC: 200 MG/DL (ref 70–99)
GLUCOSE BLDC GLUCOMTR-MCNC: 188 MG/DL (ref 70–99)
GLUCOSE BLDC GLUCOMTR-MCNC: 314 MG/DL (ref 70–99)
HCO3 BLDV-SCNC: 21 MMOL/L (ref 21–28)
HCO3 BLDV-SCNC: 22 MMOL/L (ref 21–28)
HCT VFR BLD AUTO: 37.9 % (ref 35–47)
HGB BLD-MCNC: 12.7 G/DL (ref 11.7–15.7)
IMM GRANULOCYTES # BLD: 0.1 10E3/UL
IMM GRANULOCYTES NFR BLD: 0 %
LACTATE SERPL-SCNC: 1.5 MMOL/L (ref 0.7–2)
LACTATE SERPL-SCNC: 3.3 MMOL/L (ref 0.7–2)
LACTATE SERPL-SCNC: 3.3 MMOL/L (ref 0.7–2)
LACTATE SERPL-SCNC: 5 MMOL/L (ref 0.7–2)
LYMPHOCYTES # BLD AUTO: 0.8 10E3/UL (ref 0.8–5.3)
LYMPHOCYTES NFR BLD AUTO: 5 %
MAGNESIUM SERPL-MCNC: 2.3 MG/DL (ref 1.6–2.3)
MCH RBC QN AUTO: 31.8 PG (ref 26.5–33)
MCHC RBC AUTO-ENTMCNC: 33.5 G/DL (ref 31.5–36.5)
MCV RBC AUTO: 95 FL (ref 78–100)
MONOCYTES # BLD AUTO: 1.3 10E3/UL (ref 0–1.3)
MONOCYTES NFR BLD AUTO: 8 %
NEUTROPHILS # BLD AUTO: 14.8 10E3/UL (ref 1.6–8.3)
NEUTROPHILS NFR BLD AUTO: 87 %
NRBC # BLD AUTO: 0 10E3/UL
NRBC BLD AUTO-RTO: 0 /100
NT-PROBNP SERPL-MCNC: 140 PG/ML (ref 0–900)
O2/TOTAL GAS SETTING VFR VENT: 30 %
O2/TOTAL GAS SETTING VFR VENT: 30 %
PCO2 BLDV: 32 MM HG (ref 40–50)
PCO2 BLDV: 34 MM HG (ref 40–50)
PH BLDV: 7.4 [PH] (ref 7.32–7.43)
PH BLDV: 7.45 [PH] (ref 7.32–7.43)
PLATELET # BLD AUTO: 226 10E3/UL (ref 150–450)
PO2 BLDV: 60 MM HG (ref 25–47)
PO2 BLDV: 83 MM HG (ref 25–47)
POTASSIUM BLD-SCNC: 3.4 MMOL/L (ref 3.4–5.3)
POTASSIUM BLD-SCNC: 3.5 MMOL/L (ref 3.4–5.3)
PROCALCITONIN SERPL-MCNC: 0.09 NG/ML
PROT SERPL-MCNC: 6.8 G/DL (ref 6.8–8.8)
RBC # BLD AUTO: 3.99 10E6/UL (ref 3.8–5.2)
SARS-COV-2 RNA RESP QL NAA+PROBE: NEGATIVE
SODIUM SERPL-SCNC: 141 MMOL/L (ref 133–144)
TROPONIN I SERPL HS-MCNC: 5 NG/L
WBC # BLD AUTO: 17 10E3/UL (ref 4–11)

## 2022-05-20 PROCEDURE — 200N000001 HC R&B ICU

## 2022-05-20 PROCEDURE — 96367 TX/PROPH/DG ADDL SEQ IV INF: CPT | Performed by: FAMILY MEDICINE

## 2022-05-20 PROCEDURE — 96361 HYDRATE IV INFUSION ADD-ON: CPT | Performed by: FAMILY MEDICINE

## 2022-05-20 PROCEDURE — 94640 AIRWAY INHALATION TREATMENT: CPT

## 2022-05-20 PROCEDURE — 84145 PROCALCITONIN (PCT): CPT | Performed by: PEDIATRICS

## 2022-05-20 PROCEDURE — 84484 ASSAY OF TROPONIN QUANT: CPT | Performed by: FAMILY MEDICINE

## 2022-05-20 PROCEDURE — 85025 COMPLETE CBC W/AUTO DIFF WBC: CPT | Performed by: FAMILY MEDICINE

## 2022-05-20 PROCEDURE — 83605 ASSAY OF LACTIC ACID: CPT | Performed by: FAMILY MEDICINE

## 2022-05-20 PROCEDURE — 87486 CHLMYD PNEUM DNA AMP PROBE: CPT | Performed by: PEDIATRICS

## 2022-05-20 PROCEDURE — 258N000003 HC RX IP 258 OP 636: Performed by: FAMILY MEDICINE

## 2022-05-20 PROCEDURE — 36415 COLL VENOUS BLD VENIPUNCTURE: CPT | Performed by: FAMILY MEDICINE

## 2022-05-20 PROCEDURE — 96365 THER/PROPH/DIAG IV INF INIT: CPT | Performed by: FAMILY MEDICINE

## 2022-05-20 PROCEDURE — 83605 ASSAY OF LACTIC ACID: CPT | Mod: 91 | Performed by: PEDIATRICS

## 2022-05-20 PROCEDURE — 36415 COLL VENOUS BLD VENIPUNCTURE: CPT | Performed by: PEDIATRICS

## 2022-05-20 PROCEDURE — 99223 1ST HOSP IP/OBS HIGH 75: CPT | Mod: AI | Performed by: PEDIATRICS

## 2022-05-20 PROCEDURE — 250N000011 HC RX IP 250 OP 636: Performed by: FAMILY MEDICINE

## 2022-05-20 PROCEDURE — 36415 COLL VENOUS BLD VENIPUNCTURE: CPT | Performed by: INTERNAL MEDICINE

## 2022-05-20 PROCEDURE — 258N000003 HC RX IP 258 OP 636: Performed by: PEDIATRICS

## 2022-05-20 PROCEDURE — 99285 EMERGENCY DEPT VISIT HI MDM: CPT | Mod: CS | Performed by: FAMILY MEDICINE

## 2022-05-20 PROCEDURE — 96366 THER/PROPH/DIAG IV INF ADDON: CPT | Performed by: FAMILY MEDICINE

## 2022-05-20 PROCEDURE — 250N000009 HC RX 250: Performed by: FAMILY MEDICINE

## 2022-05-20 PROCEDURE — 99285 EMERGENCY DEPT VISIT HI MDM: CPT | Mod: CS,25 | Performed by: FAMILY MEDICINE

## 2022-05-20 PROCEDURE — 83735 ASSAY OF MAGNESIUM: CPT | Performed by: PEDIATRICS

## 2022-05-20 PROCEDURE — 250N000013 HC RX MED GY IP 250 OP 250 PS 637: Performed by: PEDIATRICS

## 2022-05-20 PROCEDURE — 83880 ASSAY OF NATRIURETIC PEPTIDE: CPT | Performed by: FAMILY MEDICINE

## 2022-05-20 PROCEDURE — C9803 HOPD COVID-19 SPEC COLLECT: HCPCS | Performed by: FAMILY MEDICINE

## 2022-05-20 PROCEDURE — 94640 AIRWAY INHALATION TREATMENT: CPT | Mod: 76

## 2022-05-20 PROCEDURE — 250N000009 HC RX 250: Performed by: PEDIATRICS

## 2022-05-20 PROCEDURE — 82803 BLOOD GASES ANY COMBINATION: CPT | Performed by: FAMILY MEDICINE

## 2022-05-20 PROCEDURE — 5A09457 ASSISTANCE WITH RESPIRATORY VENTILATION, 24-96 CONSECUTIVE HOURS, CONTINUOUS POSITIVE AIRWAY PRESSURE: ICD-10-PCS | Performed by: FAMILY MEDICINE

## 2022-05-20 PROCEDURE — 94660 CPAP INITIATION&MGMT: CPT

## 2022-05-20 PROCEDURE — 96375 TX/PRO/DX INJ NEW DRUG ADDON: CPT | Performed by: FAMILY MEDICINE

## 2022-05-20 PROCEDURE — 250N000012 HC RX MED GY IP 250 OP 636 PS 637: Performed by: PEDIATRICS

## 2022-05-20 PROCEDURE — 84132 ASSAY OF SERUM POTASSIUM: CPT | Performed by: PEDIATRICS

## 2022-05-20 PROCEDURE — 93005 ELECTROCARDIOGRAM TRACING: CPT | Performed by: FAMILY MEDICINE

## 2022-05-20 PROCEDURE — 82803 BLOOD GASES ANY COMBINATION: CPT | Performed by: INTERNAL MEDICINE

## 2022-05-20 PROCEDURE — 250N000011 HC RX IP 250 OP 636: Performed by: PEDIATRICS

## 2022-05-20 PROCEDURE — 93010 ELECTROCARDIOGRAM REPORT: CPT | Performed by: FAMILY MEDICINE

## 2022-05-20 PROCEDURE — 83605 ASSAY OF LACTIC ACID: CPT | Performed by: INTERNAL MEDICINE

## 2022-05-20 PROCEDURE — 999N000157 HC STATISTIC RCP TIME EA 10 MIN: Performed by: FAMILY MEDICINE

## 2022-05-20 PROCEDURE — 87636 SARSCOV2 & INF A&B AMP PRB: CPT | Performed by: FAMILY MEDICINE

## 2022-05-20 PROCEDURE — 71045 X-RAY EXAM CHEST 1 VIEW: CPT

## 2022-05-20 PROCEDURE — 80053 COMPREHEN METABOLIC PANEL: CPT | Performed by: FAMILY MEDICINE

## 2022-05-20 RX ORDER — SODIUM CHLORIDE, SODIUM LACTATE, POTASSIUM CHLORIDE, CALCIUM CHLORIDE 600; 310; 30; 20 MG/100ML; MG/100ML; MG/100ML; MG/100ML
INJECTION, SOLUTION INTRAVENOUS CONTINUOUS
Status: DISCONTINUED | OUTPATIENT
Start: 2022-05-20 | End: 2022-05-21

## 2022-05-20 RX ORDER — IPRATROPIUM BROMIDE AND ALBUTEROL SULFATE 2.5; .5 MG/3ML; MG/3ML
3 SOLUTION RESPIRATORY (INHALATION) EVERY 4 HOURS
Status: DISCONTINUED | OUTPATIENT
Start: 2022-05-20 | End: 2022-05-22

## 2022-05-20 RX ORDER — ALBUTEROL SULFATE 5 MG/ML
2.5 SOLUTION RESPIRATORY (INHALATION)
Status: COMPLETED | OUTPATIENT
Start: 2022-05-20 | End: 2022-05-20

## 2022-05-20 RX ORDER — ALBUTEROL SULFATE 0.83 MG/ML
2.5 SOLUTION RESPIRATORY (INHALATION)
Status: DISCONTINUED | OUTPATIENT
Start: 2022-05-20 | End: 2022-05-23 | Stop reason: HOSPADM

## 2022-05-20 RX ORDER — ALBUTEROL SULFATE 0.83 MG/ML
3 SOLUTION RESPIRATORY (INHALATION) EVERY 4 HOURS
Status: DISCONTINUED | OUTPATIENT
Start: 2022-05-20 | End: 2022-05-23 | Stop reason: HOSPADM

## 2022-05-20 RX ORDER — ALBUTEROL SULFATE 0.83 MG/ML
SOLUTION RESPIRATORY (INHALATION)
Status: DISCONTINUED
Start: 2022-05-20 | End: 2022-05-20 | Stop reason: HOSPADM

## 2022-05-20 RX ORDER — BUDESONIDE 0.5 MG/2ML
0.5 INHALANT ORAL 2 TIMES DAILY
Status: DISCONTINUED | OUTPATIENT
Start: 2022-05-20 | End: 2022-05-23 | Stop reason: HOSPADM

## 2022-05-20 RX ORDER — DEXTROSE MONOHYDRATE 25 G/50ML
25-50 INJECTION, SOLUTION INTRAVENOUS
Status: DISCONTINUED | OUTPATIENT
Start: 2022-05-20 | End: 2022-05-23 | Stop reason: HOSPADM

## 2022-05-20 RX ORDER — AZITHROMYCIN 500 MG/1
500 INJECTION, POWDER, LYOPHILIZED, FOR SOLUTION INTRAVENOUS EVERY 24 HOURS
Status: DISCONTINUED | OUTPATIENT
Start: 2022-05-20 | End: 2022-05-21

## 2022-05-20 RX ORDER — ALBUTEROL SULFATE 0.83 MG/ML
2.5 SOLUTION RESPIRATORY (INHALATION)
Status: DISCONTINUED | OUTPATIENT
Start: 2022-05-20 | End: 2022-05-20

## 2022-05-20 RX ORDER — PREDNISONE 20 MG/1
40 TABLET ORAL
Status: DISCONTINUED | OUTPATIENT
Start: 2022-05-22 | End: 2022-05-23 | Stop reason: HOSPADM

## 2022-05-20 RX ORDER — ONDANSETRON 2 MG/ML
4 INJECTION INTRAMUSCULAR; INTRAVENOUS EVERY 6 HOURS PRN
Status: DISCONTINUED | OUTPATIENT
Start: 2022-05-20 | End: 2022-05-23 | Stop reason: HOSPADM

## 2022-05-20 RX ORDER — IPRATROPIUM BROMIDE AND ALBUTEROL SULFATE 2.5; .5 MG/3ML; MG/3ML
3 SOLUTION RESPIRATORY (INHALATION) EVERY 4 HOURS
Status: DISCONTINUED | OUTPATIENT
Start: 2022-05-20 | End: 2022-05-20

## 2022-05-20 RX ORDER — SODIUM CHLORIDE 9 MG/ML
INJECTION, SOLUTION INTRAVENOUS CONTINUOUS
Status: DISCONTINUED | OUTPATIENT
Start: 2022-05-20 | End: 2022-05-20

## 2022-05-20 RX ORDER — IPRATROPIUM BROMIDE AND ALBUTEROL SULFATE 2.5; .5 MG/3ML; MG/3ML
3 SOLUTION RESPIRATORY (INHALATION) ONCE
Status: COMPLETED | OUTPATIENT
Start: 2022-05-20 | End: 2022-05-20

## 2022-05-20 RX ORDER — ONDANSETRON 4 MG/1
4 TABLET, ORALLY DISINTEGRATING ORAL EVERY 6 HOURS PRN
Status: DISCONTINUED | OUTPATIENT
Start: 2022-05-20 | End: 2022-05-23 | Stop reason: HOSPADM

## 2022-05-20 RX ORDER — ALBUTEROL SULFATE 0.83 MG/ML
3 SOLUTION RESPIRATORY (INHALATION) EVERY 4 HOURS
Status: DISCONTINUED | OUTPATIENT
Start: 2022-05-20 | End: 2022-05-20

## 2022-05-20 RX ORDER — IPRATROPIUM BROMIDE AND ALBUTEROL SULFATE 2.5; .5 MG/3ML; MG/3ML
3 SOLUTION RESPIRATORY (INHALATION) EVERY 4 HOURS PRN
Status: DISCONTINUED | OUTPATIENT
Start: 2022-05-20 | End: 2022-05-20

## 2022-05-20 RX ORDER — METHYLPREDNISOLONE SODIUM SUCCINATE 40 MG/ML
40 INJECTION, POWDER, LYOPHILIZED, FOR SOLUTION INTRAMUSCULAR; INTRAVENOUS EVERY 8 HOURS
Status: COMPLETED | OUTPATIENT
Start: 2022-05-20 | End: 2022-05-21

## 2022-05-20 RX ORDER — METHYLPREDNISOLONE SODIUM SUCCINATE 125 MG/2ML
125 INJECTION, POWDER, LYOPHILIZED, FOR SOLUTION INTRAMUSCULAR; INTRAVENOUS ONCE
Status: COMPLETED | OUTPATIENT
Start: 2022-05-20 | End: 2022-05-20

## 2022-05-20 RX ORDER — NICOTINE POLACRILEX 4 MG
15-30 LOZENGE BUCCAL
Status: DISCONTINUED | OUTPATIENT
Start: 2022-05-20 | End: 2022-05-23 | Stop reason: HOSPADM

## 2022-05-20 RX ORDER — IPRATROPIUM BROMIDE AND ALBUTEROL SULFATE 2.5; .5 MG/3ML; MG/3ML
SOLUTION RESPIRATORY (INHALATION)
Status: DISCONTINUED
Start: 2022-05-20 | End: 2022-05-20 | Stop reason: HOSPADM

## 2022-05-20 RX ORDER — ACETAMINOPHEN 325 MG/1
975 TABLET ORAL EVERY 6 HOURS PRN
Status: DISCONTINUED | OUTPATIENT
Start: 2022-05-20 | End: 2022-05-23 | Stop reason: HOSPADM

## 2022-05-20 RX ORDER — BUDESONIDE 0.5 MG/2ML
0.5 INHALANT ORAL 2 TIMES DAILY PRN
COMMUNITY
Start: 2022-04-09

## 2022-05-20 RX ORDER — ENOXAPARIN SODIUM 100 MG/ML
40 INJECTION SUBCUTANEOUS EVERY 24 HOURS
Status: DISCONTINUED | OUTPATIENT
Start: 2022-05-20 | End: 2022-05-23 | Stop reason: HOSPADM

## 2022-05-20 RX ORDER — ACETAMINOPHEN 325 MG/1
650 TABLET ORAL EVERY 4 HOURS PRN
Status: DISCONTINUED | OUTPATIENT
Start: 2022-05-20 | End: 2022-05-20

## 2022-05-20 RX ORDER — MAGNESIUM SULFATE 1 G/100ML
1 INJECTION INTRAVENOUS ONCE
Status: COMPLETED | OUTPATIENT
Start: 2022-05-20 | End: 2022-05-20

## 2022-05-20 RX ORDER — NICOTINE 21 MG/24HR
1 PATCH, TRANSDERMAL 24 HOURS TRANSDERMAL DAILY
Status: DISCONTINUED | OUTPATIENT
Start: 2022-05-20 | End: 2022-05-23 | Stop reason: HOSPADM

## 2022-05-20 RX ADMIN — SODIUM CHLORIDE: 9 INJECTION, SOLUTION INTRAVENOUS at 09:29

## 2022-05-20 RX ADMIN — ALBUTEROL SULFATE 2.5 MG: 2.5 SOLUTION RESPIRATORY (INHALATION) at 08:01

## 2022-05-20 RX ADMIN — ALBUTEROL SULFATE 2.5 MG: 5 SOLUTION RESPIRATORY (INHALATION) at 17:12

## 2022-05-20 RX ADMIN — IPRATROPIUM BROMIDE AND ALBUTEROL SULFATE 3 ML: 2.5; .5 SOLUTION RESPIRATORY (INHALATION) at 07:20

## 2022-05-20 RX ADMIN — IPRATROPIUM BROMIDE AND ALBUTEROL SULFATE 3 ML: 2.5; .5 SOLUTION RESPIRATORY (INHALATION) at 08:01

## 2022-05-20 RX ADMIN — AZITHROMYCIN MONOHYDRATE 500 MG: 500 INJECTION, POWDER, LYOPHILIZED, FOR SOLUTION INTRAVENOUS at 09:29

## 2022-05-20 RX ADMIN — ALBUTEROL SULFATE 2.5 MG: 2.5 SOLUTION RESPIRATORY (INHALATION) at 12:39

## 2022-05-20 RX ADMIN — SODIUM CHLORIDE, POTASSIUM CHLORIDE, SODIUM LACTATE AND CALCIUM CHLORIDE: 600; 310; 30; 20 INJECTION, SOLUTION INTRAVENOUS at 18:44

## 2022-05-20 RX ADMIN — BUDESONIDE 0.5 MG: 0.5 INHALANT RESPIRATORY (INHALATION) at 22:09

## 2022-05-20 RX ADMIN — ALBUTEROL SULFATE 2.5 MG: 5 SOLUTION RESPIRATORY (INHALATION) at 18:31

## 2022-05-20 RX ADMIN — SODIUM CHLORIDE 500 ML: 9 INJECTION, SOLUTION INTRAVENOUS at 08:03

## 2022-05-20 RX ADMIN — SODIUM CHLORIDE, POTASSIUM CHLORIDE, SODIUM LACTATE AND CALCIUM CHLORIDE 1000 ML: 600; 310; 30; 20 INJECTION, SOLUTION INTRAVENOUS at 18:43

## 2022-05-20 RX ADMIN — Medication 1 PATCH: at 18:43

## 2022-05-20 RX ADMIN — METHYLPREDNISOLONE SODIUM SUCCINATE 125 MG: 125 INJECTION, POWDER, FOR SOLUTION INTRAMUSCULAR; INTRAVENOUS at 07:20

## 2022-05-20 RX ADMIN — INSULIN ASPART 2 UNITS: 100 INJECTION, SOLUTION INTRAVENOUS; SUBCUTANEOUS at 22:22

## 2022-05-20 RX ADMIN — FAMOTIDINE 20 MG: 10 INJECTION INTRAVENOUS at 22:12

## 2022-05-20 RX ADMIN — IPRATROPIUM BROMIDE AND ALBUTEROL SULFATE 3 ML: 2.5; .5 SOLUTION RESPIRATORY (INHALATION) at 16:30

## 2022-05-20 RX ADMIN — IPRATROPIUM BROMIDE AND ALBUTEROL SULFATE 3 ML: 2.5; .5 SOLUTION RESPIRATORY (INHALATION) at 06:41

## 2022-05-20 RX ADMIN — ALBUTEROL SULFATE 2.5 MG: 2.5 SOLUTION RESPIRATORY (INHALATION) at 16:31

## 2022-05-20 RX ADMIN — METHYLPREDNISOLONE SODIUM SUCCINATE 40 MG: 40 INJECTION, POWDER, FOR SOLUTION INTRAMUSCULAR; INTRAVENOUS at 18:43

## 2022-05-20 RX ADMIN — ALBUTEROL SULFATE 2.5 MG: 5 SOLUTION RESPIRATORY (INHALATION) at 17:39

## 2022-05-20 RX ADMIN — IPRATROPIUM BROMIDE AND ALBUTEROL SULFATE 3 ML: 2.5; .5 SOLUTION RESPIRATORY (INHALATION) at 12:39

## 2022-05-20 RX ADMIN — INSULIN ASPART 7 UNITS: 100 INJECTION, SOLUTION INTRAVENOUS; SUBCUTANEOUS at 18:58

## 2022-05-20 RX ADMIN — ALBUTEROL SULFATE 2.5 MG: 2.5 SOLUTION RESPIRATORY (INHALATION) at 22:18

## 2022-05-20 RX ADMIN — ENOXAPARIN SODIUM 40 MG: 40 INJECTION SUBCUTANEOUS at 18:43

## 2022-05-20 RX ADMIN — MAGNESIUM SULFATE HEPTAHYDRATE 1 G: 1 INJECTION, SOLUTION INTRAVENOUS at 08:36

## 2022-05-20 RX ADMIN — IPRATROPIUM BROMIDE AND ALBUTEROL SULFATE 3 ML: 2.5; .5 SOLUTION RESPIRATORY (INHALATION) at 19:46

## 2022-05-20 RX ADMIN — ALBUTEROL SULFATE 2.5 MG: 5 SOLUTION RESPIRATORY (INHALATION) at 18:08

## 2022-05-20 ASSESSMENT — ACTIVITIES OF DAILY LIVING (ADL)
CONCENTRATING,_REMEMBERING_OR_MAKING_DECISIONS_DIFFICULTY: NO
ADLS_ACUITY_SCORE: 20
TOILETING_ISSUES: NO
WEAR_GLASSES_OR_BLIND: YES
FALL_HISTORY_WITHIN_LAST_SIX_MONTHS: NO
CHANGE_IN_FUNCTIONAL_STATUS_SINCE_ONSET_OF_CURRENT_ILLNESS/INJURY: NO
ADLS_ACUITY_SCORE: 20
DIFFICULTY_EATING/SWALLOWING: NO
ADLS_ACUITY_SCORE: 35
DOING_ERRANDS_INDEPENDENTLY_DIFFICULTY: NO
ADLS_ACUITY_SCORE: 20
WALKING_OR_CLIMBING_STAIRS_DIFFICULTY: NO
ADLS_ACUITY_SCORE: 20
ADLS_ACUITY_SCORE: 20
VISION_MANAGEMENT: GLASSES
DRESSING/BATHING_DIFFICULTY: NO

## 2022-05-20 NOTE — H&P
[facility]    History and Physical - Hospitalist Service       Date of Admission:  5/20/2022    Assessment & Plan      Fern Gonzalez is a 61 year old female with asthma and ongoing cigarette smoking admitted on 5/20/2022. She presents with symptoms, signs, test results concerning for life-threatening status asthmaticus with acute hypoxic respiratory failure.  She is at high risk for an adverse outcome including worsening respiratory failure and death, so hospitalization is considered medically necessary.  Based on the presently available information, hospitalization for at least 2 midnights is anticipated.    Principal Problem:    Severe persistent asthma with status asthmaticus  Assessment: Most recent PFTs December 2015 demonstrating mild airway obstruction with reversibility at that time, known chronic allergies, ongoing smoking, recent hospitalization in April for severe asthma exacerbation, now presenting with life-threatening status asthmaticus despite outpatient treatment with inhaled corticosteroids and oral antihistamines, suspect status asthmaticus triggered by combination of ongoing smoking and seasonal allergic rhinitis superimposed upon perennial allergic rhinitis  Plan: ICU admission for continuous BiPAP, monitor blood gases and oxygenation closely, continue IV corticosteroids, start bronchodilators every 30 minutes for the next 2 hours and then wean to every 2 hours if improved, anticipate transition from BiPAP to oxygen supplementation once work of breathing improves, check respiratory panel PCR testing    Active Problems:    Acute respiratory failure with hypoxia (H)  Assessment: Severe respiratory distress and severe hypoxia concerning for acute hypoxic respiratory failure due to status asthmaticus  Plan: ICU admission for BiPAP, treat asthma exacerbation, reconsider intubation for mechanical ventilation if worsening      SIRS (systemic inflammatory response  syndrome) (H)-tachycardia, tachypnea, leukocytosis  Assessment: Suspect SIRS due to status asthmaticus, lower suspicion for bacterial infection  Plan: Continue Zithromax for now due to severe asthma exacerbation, check procalcitonin, monitor for symptoms and signs of bacterial infection and broaden empiric antibiotic coverage if there is increasing concern for bacterial infection and/or sepsis      Elevated lactic acid level  Assessment: Rising and now abnormal lactic acid level this afternoon, worsened compared with the emergency room earlier this morning, suspect due to life-threatening status asthmaticus and respiratory failure rather than bacterial infection and sepsis, frequent bronchodilators might also exacerbate elevated lactic acid level but level this afternoon is elevated even though she has not been receiving frequent doses of bronchodilator so far  Plan: Treat status asthmaticus and respiratory failure, optimize oxygenation, administer IV fluid bolus and recheck lactic acid, not broadening antibiotic coverage empirically at this time due to lack of suspicion for bacterial infection and sepsis      Hypopotassemia  Assessment: Potassium 3.4, could contribute to symptoms of generalized weakness  Plan: Potassium supplementation, check magnesium and add magnesium supplementation if indicated      Perennial allergic rhinitis with seasonal variation  Assessment: Suspect worsening seasonal allergic rhinitis currently based on history  Plan: Systemic corticosteroids, resume oral antihistamines when she can take oral medication      Steroid-induced hyperglycemia  Assessment: Random blood sugar 200 today even without recent corticosteroids, hemoglobin A1c in April was 5.8 and not known to have diabetes, anticipate worsening hyperglycemia due to corticosteroids  Plan: Follow blood sugars every 4 hours while n.p.o. and start NovoLog high correction scale to optimize glycemic control      Tobacco use  disorder  Assessment: Ongoing  Plan: Offer nicotine replacement, smoking cessation advised      Major depressive disorder, recurrent episode, moderate (H)  Assessment: Chronic, not specifically assessed at this time due to severity of respiratory distress, does not appear to be taking specific antidepressant medication currently  Plan: Reassess clinically during hospitalization as indicated       Diet: NPO for Medical/Clinical Reasons Except for: Meds, Other; Specify: sips water with meds    DVT Prophylaxis: Enoxaparin (Lovenox) SQ  [place] Catheter: Not present  Central Lines: None  Cardiac Monitoring: ACTIVE order. Indication: ICU  Code Status: Full Code      Clinically Significant Risk Factors Present on Admission                      Disposition Plan   Expected Discharge:  unknown  Anticipated discharge location:  Awaiting care coordination huddle  Delays:  Admitted to ICU today       The patient's care was discussed with the Bedside Nurse.    Osmar Jimenez MD  Hospitalist Service  [facility]  Securely message with the Vocera Web Console (learn more here)  Text page via [facility] Paging/Directory         ______________________________________________________________________    Chief Complaint   Shortness of breath    History is obtained from the patient, electronic health record and emergency department physician    History of Present Illness   Fern Gonzalez is a 61 year old female who says she was in her usual health until May 16 when she developed sore throat.  She thinks she had some blisters in the back of her throat at that time.  Her sore throat improved over the last few days.  She also has some nasal congestion but does have allergies that are worse this time a year.  She started to have dry cough that progressed to cough productive of neon green phlegm.  She was using her nebulizer and inhaler at home which did not seem to help.  She was also taking her allergy pills.   She awoke from sleep overnight last night due to severe shortness of breath.  She again tried using her inhalers and nebulizer without benefit and therefore presented to the emergency room for evaluation.  In the ER she was noted to be in severe respiratory distress and was severely hypoxic with oxygen saturation 88% in room air, so she was quickly started on BiPAP in the emergency room.  According to Dr. Lacy, patient improved after starting BiPAP.  She was also treated in the ER with IV Solu-Medrol and several doses of bronchodilators.  Patient says that she did temporarily feel better after nebulizer treatments in the ER today but that she remains short of breath.  Dr. Lacy indicates that they attempted to transition the patient from BiPAP to low-flow nasal cannula oxygen supplementation which was not tolerated clinically due to increased work of breathing, so BiPAP was restarted and the patient was admitted to the ICU.  Patient is now seen in her ICU room for evaluation.    Review of Systems    The 10 point Review of Systems is negative other than noted in the HPI or here.     Past Medical History    I have reviewed this patient's medical history and updated it with pertinent information if needed.   Past Medical History:   Diagnosis Date     Diagnostic skin and sensitization tests (aka ALLERGENS) 10/29/15 skin tests pos. for: T >> dog/DM only.      Esophageal reflux      Headache(784.0) 10/11/2011    Admitted, suspected Viral cephalitis, meningitis     Migraine, unspecified, without mention of intractable migraine without mention of status migrainosus      Recurrent sinusitis      Uncomplicated asthma      Varicella meningitis      Patient was admitted as an inpatient from 4/7 to 4/9/2022 for asthma exacerbation with acute hypoxic respiratory failure and acute myocardial injury in [place].  She was discharged on tapering course of systemic corticosteroids as well as new prescription for Pulmicort  "nebulization treatments.    Past Surgical History   I have reviewed this patient's surgical history and updated it with pertinent information if needed.  Past Surgical History:   Procedure Laterality Date     CHOLECYSTECTOMY, LAPOROSCOPIC  2007    Lysis of intestinal adhesions.     CL AFF SURGICAL PATHOLOGY       COLONOSCOPY N/A 10/31/2014    Procedure: COMBINED COLONOSCOPY, SINGLE OR MULTIPLE BIOPSY/POLYPECTOMY BY BIOPSY;  Surgeon: Leonard Dumont MD;  Location: [facility]     HC LAP,FULGURATE/EXCISE LESIONS  10/04    mesh present as had abdominal wall surgery     HYSTERECTOMY TOTAL ABDOMINAL       INJECT EPIDURAL CERVICAL Bilateral 2019    Procedure: INJECTION, SPINE, CERVICAL 6-7, EPIDURAL;  Surgeon: Des Rider MD;  Location: [facility] OR     RELEASE CARPAL TUNNEL Right 2016    Procedure: RELEASE CARPAL TUNNEL;  Surgeon: Leif Fine MD;  Location: [facility]     RELEASE CARPAL TUNNEL Left 10/5/2016    Procedure: RELEASE CARPAL TUNNEL;  Surgeon: Leif Fine MD;  Location: [facility]     RELEASE CARPAL TUNNEL Right 2016    Procedure: RELEASE CARPAL TUNNEL;  Surgeon: Leif Fine MD;  Location: [facility] OR     ZZC  DELIVERY ONLY      3 times     ZZHC UGI ENDOSCOPY, SIMPLE EXAM  10/26/2006       Social History   I have reviewed this patient's social history and updated it with pertinent information if needed.  Social History     Tobacco Use     Smoking status: Current Every Day Smoker     Packs/day: 0.50     Years: 0.50     Pack years: 0.25     Types: Cigarettes     Smokeless tobacco: Never Used     Tobacco comment: uses ecig now   Vaping Use     Vaping Use: Never used   Substance Use Topics     Alcohol use: Yes     Alcohol/week: 0.0 standard drinks     Comment: \"very rarely\"     Drug use: No       Family History   I have reviewed this patient's family history and updated it with pertinent information if needed.  Family History   Problem Relation Age of Onset     Thyroid " Disease Maternal Grandmother      Thyroid Disease Sister      Coronary Artery Disease No family hx of      Hypertension No family hx of      Hyperlipidemia No family hx of      Cancer - colorectal No family hx of      Ovarian Cancer No family hx of      Other Cancer No family hx of      Mental Illness No family hx of      Cerebrovascular Disease No family hx of      Asthma No family hx of      Known Genetic Syndrome No family hx of      Patient reports that her grandmother  of an asthma attack after having lived with asthma her whole life.    Prior to Admission Medications   Prior to Admission Medications   Prescriptions Last Dose Informant Patient Reported? Taking?   Ibuprofen (ADVIL PO) Unknown at Unknown time  Yes Yes   Sig: Take 600 mg by mouth   albuterol (PROAIR HFA/PROVENTIL HFA/VENTOLIN HFA) 108 (90 Base) MCG/ACT inhaler 2022 at 0600  No Yes   Sig: Inhale 1-2 puffs into the lungs every 4 hours as needed for shortness of breath / dyspnea or wheezing   benzonatate (TESSALON) 200 MG capsule 2022 at Unknown time  Yes Yes   Sig: Take 200 mg by mouth 3 times daily as needed for cough   budesonide (PULMICORT) 0.5 MG/2ML neb solution   Yes Yes   Sig: Inhale 0.5 mg into the lungs 2 times daily   cetirizine (ZYRTEC) 10 MG tablet 2022 at 0600  Yes Yes   Sig: Take 10 mg by mouth   fluticasone (FLONASE) 50 MCG/ACT nasal spray Unknown at Unknown time  No Yes   Sig: Spray 2 sprays into both nostrils daily   Patient taking differently: Spray 2 sprays into both nostrils daily PRN   ipratropium - albuterol 0.5 mg/2.5 mg/3 mL (DUONEB) 0.5-2.5 (3) MG/3ML neb solution 2022 at 0600  Yes Yes   Sig: Take 3 mLs by nebulization every 4 hours as needed   predniSONE (DELTASONE) 20 MG tablet Unknown at Unknown time  Yes Yes   Sig: Take 40 mg by mouth daily For 5 days   rizatriptan (MAXALT) 5 MG tablet Unknown at Unknown time  No Yes   Sig: Take 1 tablet (5 mg) by mouth at onset of headache for migraine May repeat  "in 2 hours. Max 6 tablets/24 hours.      Facility-Administered Medications: None     Allergies   Allergies   Allergen Reactions     Cephalexin Hcl Hives     Codeine Nausea and Vomiting     significant     Gabapentin Nausea and Vomiting     Morphine Hcl Nausea and Vomiting     Percocet [Oxycodone-Acetaminophen] Nausea and Vomiting     Significant; pt reports that plain Oxycodone she did fine with     Valium [Diazepam] Other (See Comments)     \"terrible nightmare\"     Vicodin [Hydrocodone-Acetaminophen] Nausea and Vomiting     significant     Cefazolin Rash       Physical Exam   Vital Signs: Temp: 98.2  F (36.8  C) Temp src: Oral BP: (!) 155/91 Pulse: 117   Resp: 23 SpO2: 97 % O2 Device: BiPAP/CPAP   FiO2 (%): 30 %  Resp: 23    Patient Vitals for the past 24 hrs:   BP Temp Temp src Pulse Resp SpO2 Height Weight   05/20/22 1600 (!) 155/91 98.2  F (36.8  C) Oral 117 23 97 % -- --   05/20/22 1500 131/88 -- -- 112 (!) 32 95 % -- --   05/20/22 1400 112/78 98  F (36.7  C) Oral 109 (!) 38 95 % -- --   05/20/22 1331 -- 98  F (36.7  C) Temporal -- -- -- -- --   05/20/22 1230 113/74 -- -- 109 21 95 % -- --   05/20/22 1145 -- -- -- 114 26 97 % -- --   05/20/22 1130 111/68 -- -- 112 22 95 % -- --   05/20/22 1100 113/65 -- -- 112 21 94 % -- --   05/20/22 1045 -- -- -- 113 20 94 % -- --   05/20/22 1030 107/66 -- -- 114 20 94 % -- --   05/20/22 1015 -- -- -- 115 19 94 % -- --   05/20/22 1000 94/75 -- -- 115 23 96 % -- --   05/20/22 0915 -- -- -- (!) 125 26 96 % -- --   05/20/22 0900 (!) 121/95 -- -- 120 23 96 % -- --   05/20/22 0830 134/72 -- -- (!) 131 21 93 % -- --   05/20/22 0815 -- -- -- (!) 138 29 98 % -- --   05/20/22 0800 120/69 -- -- (!) 123 25 90 % -- --   05/20/22 0715 (!) 127/98 -- -- (!) 129 21 96 % -- --   05/20/22 0645 110/77 -- -- -- -- 95 % -- --   05/20/22 0642 -- -- -- (!) 149 29 (!) 88 % 1.448 m (4' 9\") 63.5 kg (140 lb)     Weight: 140 lbs 0 oz Body mass index is 30.3 kg/m .  Wt Readings from Last 4 Encounters: " "  05/20/22 63.5 kg (140 lb)   04/07/22 65.5 kg (144 lb 4.8 oz)   03/30/22 65.1 kg (143 lb 9 oz)   01/22/21 70.2 kg (154 lb 11.2 oz)     General Appearance: Mildly obese acutely ill-appearing middle-aged woman with obvious respiratory distress even while on BiPAP although able to speak full sentences  Eyes: Anicteric sclerae, clear conjunctivae  HEENT: No ear canal drainage, nares appear clear, limited visualization of oropharynx appears normal without obvious mucosal lesions and with moist mucous membranes  Neck: Trachea midline, no stridor, symmetric, nontender, no crepitus  Chest: No gross anomalies, symmetric excursion  Respiratory: Tachypneic and using accessory muscles  Cardiovascular: Mildly tachycardic with regular rhythm, good radial pulse, brisk capillary refill, no peripheral edema  GI: Normal bowel sounds, mildly obese abdomen, soft, nontender  Lymph/Hematologic: No cervical or supraclavicular lymphadenopathy  Skin: Normal color, no rash, some periorbital darkening of the skin consistent with \"allergic shiner\"  Musculoskeletal: No gross limb anomalies, no lower extremity cords or tenderness  Neurologic: Alert and maintains wakefulness and attention, answers questions appropriately, appears oriented, follows simple instructions, purposefully and symmetrically moves limbs  Psychiatric: Appears moderately anxious in the context of respiratory distress    Data   Data reviewed today: I reviewed all medications, new labs and imaging results over the last 24 hours. I personally reviewed the EKG tracing showing Sinus tachycardia.    Recent Labs   Lab 05/20/22  0715   WBC 17.0*   HGB 12.7   MCV 95         POTASSIUM 3.4   CHLORIDE 110*   CO2 22   BUN 11   CR 0.50*   ANIONGAP 9   JOAQUIN 8.8   *   ALBUMIN 3.8   PROTTOTAL 6.8   BILITOTAL 0.3   ALKPHOS 70   ALT 19   AST 15     Normal troponin and BNP  Lactic acid normal at 1.5 at 7:15 AM, increased to 3.3 at 4 PM    Venous Blood Gas  Recent Labs   Lab " 05/20/22  0715   PHV 7.45*   PCO2V 32*   PO2V 60*   HCO3V 22   STEVIE -1.0   O2PER 30     Recent Results (from the past 24 hour(s))   XR Chest Port 1 View    Narrative    CHEST ONE VIEW PORTABLE   5/20/2022 7:51 AM     HISTORY: Shortness of air.    COMPARISON: 4/7/2022.      Impression    IMPRESSION: Unremarkable single view of the chest.     CRISTINE OHARA MD         SYSTEM ID:  [id number]

## 2022-05-20 NOTE — PROGRESS NOTES
Sepsis Evaluation Progress Note    I was called to see Fern Gonzalez due to abnormal vital signs triggering the Sepsis SIRS screening alert. She is not known to have an infection.     Physical Exam   Vital Signs:  Temp: 98.2  F (36.8  C) Temp src: Oral BP: (!) 155/91 Pulse: 117   Resp: 23 SpO2: 97 % O2 Device: BiPAP/CPAP Oxygen Delivery: 2 LPM     General: acutely ill appearing  Mental Status: baseline mental status.     Remainder of physical exam is significant for moderate to severe respiratory distress with use of accessory muscles, tachypnea, diminished breath sounds throughout, and diffuse high-pitched expiratory wheezing.  She currently is able to speak full sentences.  She is tachycardic.    Data   Lactic Acid   Date Value Ref Range Status   05/20/2022 3.3 (H) 0.7 - 2.0 mmol/L Final   05/20/2022 1.5 0.7 - 2.0 mmol/L Final   09/25/2011 1.4 0.7 - 2.1 mmol/L Final       Assessment & Plan   NO EVIDENCE OF SEPSIS at this time.  Vital sign, physical exam, and lab findings are due to asthma exacerbation with acute hypoxic respiratory failure.     Disposition: The patient will remain on the current unit. We will continue to monitor this patient closely..  Osmar Jimenez MD    Sepsis Criteria   Sepsis: 2+ SIRS criteria due to infection  Severe Sepsis: Sepsis AND 1+ new sign of acute organ dysfunction (Note: lactate >2 or acute encephalopathy each qualify as organ dysfunction)  Septic Shock: Sepsis AND hypotension despite volume resuscitation with 30 ml/kg crystalloid or lactate >=4  Note: HYPOTENSION is defined as 2 BP readings measured 3 hrs apart that have a SBP <90, MAP <65, or decrease >40 mmHg, occurring 6 hrs before or after t-zero

## 2022-05-20 NOTE — ED TRIAGE NOTES
Arrived via private vehicle in resp distress; reporting asthma flareup that was unresponsive to home med interventions.     Triage Assessment     Row Name 05/20/22 0644       Triage Assessment (Adult)    Airway WDL X;airway symptoms    Additional Documentation Breath Sounds (Group)       Respiratory WDL    Respiratory WDL rhythm/pattern;cough    Rhythm/Pattern, Respiratory labored;shortness of breath;tachypneic;pattern regular    Cough Frequency frequent    Cough Type congested       Breath Sounds    Breath Sounds All Fields    All Lung Fields Breath Sounds wheezes, high-pitched       Skin Circulation/Temperature WDL    Skin Circulation/Temperature WDL WDL       Cardiac WDL    Cardiac WDL X  tachy       Peripheral/Neurovascular WDL    Peripheral Neurovascular WDL WDL       Cognitive/Neuro/Behavioral WDL    Cognitive/Neuro/Behavioral WDL WDL

## 2022-05-20 NOTE — ED PROVIDER NOTES
Transfer of Care Note  This patient was signed out to me and I assumed care from Dr. Roger at change of shift.  Fern Gonzalez is a 61 year old female who presented to the emergency department with a chief complaint of Cough and Shortness of Breath  .  Please see the original providers history and physical for complete details.    The following issues were signed out to me to follow up on:  Labs and dispostion      Pertant Lab/Imaging Findings During this Visit was     Results for orders placed or performed during the hospital encounter of 05/20/22 (from the past 24 hour(s))   Symptomatic; Yes; 5/16/2022 Influenza A/B & SARS-CoV2 (COVID-19) Virus PCR Multiplex Nasopharyngeal    Specimen: Nasopharyngeal; Swab   Result Value Ref Range    Influenza A PCR Negative Negative    Influenza B PCR Negative Negative    SARS CoV2 PCR Negative Negative    Narrative    Testing was performed using the debo SARS-CoV-2 & Influenza A/B Assay on the debo Mattie System. This test should be ordered for the detection of SARS-CoV-2 and influenza viruses in individuals who meet clinical and/or epidemiological criteria. Test performance is unknown in asymptomatic patients. This test is for in vitro diagnostic use under the FDA EUA for laboratories certified under CLIA to perform moderate and/or high complexity testing. This test has not been FDA cleared or approved. A negative result does not rule out the presence of PCR inhibitors in the specimen or target RNA in concentration below the limit of detection for the assay. If only one viral target is positive but coinfection with multiple targets is suspected, the sample should be re-tested with another FDA cleared, approved or authorized test, if coinfection would change clinical management. [facility] are certified under the Clinical Laboratory Improvement Amendments of 1988 (CLIA-88) as  qualified to perform moderate and/or high complexity laboratory testing.   CBC  with platelets differential    Narrative    The following orders were created for panel order CBC with platelets differential.  Procedure                               Abnormality         Status                     ---------                               -----------         ------                     CBC with platelets and d...[[id number]]  Abnormal            Final result                 Please view results for these tests on the individual orders.   Comprehensive metabolic panel   Result Value Ref Range    Sodium 141 133 - 144 mmol/L    Potassium 3.4 3.4 - 5.3 mmol/L    Chloride 110 (H) 94 - 109 mmol/L    Carbon Dioxide (CO2) 22 20 - 32 mmol/L    Anion Gap 9 3 - 14 mmol/L    Urea Nitrogen 11 7 - 30 mg/dL    Creatinine 0.50 (L) 0.52 - 1.04 mg/dL    Calcium 8.8 8.5 - 10.1 mg/dL    Glucose 200 (H) 70 - 99 mg/dL    Alkaline Phosphatase 70 40 - 150 U/L    AST 15 0 - 45 U/L    ALT 19 0 - 50 U/L    Protein Total 6.8 6.8 - 8.8 g/dL    Albumin 3.8 3.4 - 5.0 g/dL    Bilirubin Total 0.3 0.2 - 1.3 mg/dL    GFR Estimate >90 >60 mL/min/1.73m2   Blood gas venous   Result Value Ref Range    pH Venous 7.45 (H) 7.32 - 7.43    pCO2 Venous 32 (L) 40 - 50 mm Hg    pO2 Venous 60 (H) 25 - 47 mm Hg    Bicarbonate Venous 22 21 - 28 mmol/L    Base Excess/Deficit (+/-) -1.0 -7.7 - 1.9 mmol/L    FIO2 30    Lactic acid whole blood   Result Value Ref Range    Lactic Acid 1.5 0.7 - 2.0 mmol/L   Troponin I   Result Value Ref Range    Troponin I High Sensitivity 5 <54 ng/L   Nt probnp inpatient (BNP)   Result Value Ref Range    N terminal Pro BNP Inpatient 140 0 - 900 pg/mL   CBC with platelets and differential   Result Value Ref Range    WBC Count 17.0 (H) 4.0 - 11.0 10e3/uL    RBC Count 3.99 3.80 - 5.20 10e6/uL    Hemoglobin 12.7 11.7 - 15.7 g/dL    Hematocrit 37.9 35.0 - 47.0 %    MCV 95 78 - 100 fL    MCH 31.8 26.5 - 33.0 pg    MCHC 33.5 31.5 - 36.5 g/dL    RDW 13.8 10.0 - 15.0 %    Platelet Count 226 150 - 450 10e3/uL    % Neutrophils 87 %     % Lymphocytes 5 %    % Monocytes 8 %    % Eosinophils 0 %    % Basophils 0 %    % Immature Granulocytes 0 %    NRBCs per 100 WBC 0 <1 /100    Absolute Neutrophils 14.8 (H) 1.6 - 8.3 10e3/uL    Absolute Lymphocytes 0.8 0.8 - 5.3 10e3/uL    Absolute Monocytes 1.3 0.0 - 1.3 10e3/uL    Absolute Eosinophils 0.0 0.0 - 0.7 10e3/uL    Absolute Basophils 0.1 0.0 - 0.2 10e3/uL    Absolute Immature Granulocytes 0.1 <=0.4 10e3/uL    Absolute NRBCs 0.0 10e3/uL   XR Chest Port 1 View    Narrative    CHEST ONE VIEW PORTABLE   5/20/2022 7:51 AM     HISTORY: Shortness of air.    COMPARISON: 4/7/2022.      Impression    IMPRESSION: Unremarkable single view of the chest.     CRISTINE OHARA MD         SYSTEM ID:  [id number]         My focused follow up physical exam shows:   Vitals:  B/P: 134/72, T: Data Unavailable, P: 131, R: 21  Gen:  Pt appears stable and no apparent distress      ED Course & Medical Decision Making:  Patient is feeling a little bit better after being on the BiPAP.  Patient continues to have significant wheezing.  Patient was given a DuoNeb and Solu-Medrol.  Labs started to come back and are mostly unremarkable.  Patient's white count was elevated but x-ray was clear and COVID was negative.  I had respiratory therapy come down and evaluate the patient.  Venous blood gas shows the patient is not retaining CO2 and is actually more hyperventilating.  Respiratory therapy recommended taking the patient off of BiPAP and monitoring.  Patient continues to have increased work of breathing and continues to wheeze.  We will try couple more nebs and also try some IV magnesium.  Patient may turn around over the next 24 hours once the steroids start to kick in but patient is not safe to go home.  She is reluctant to come in the hospital but understands how sick she is right now.  Unfortunately there are no beds anywhere around right now so we will call throughout the stay to try and find a bed for placement.  We will continue to  monitor.  If her symptoms worsen, may need to put her back on BiPAP.  Want to go back on BiPAP after about 40 minutes.  Off just for increased work of breathing.  Vitals continue to be stable.  We will go ahead and put her on this just to help with her work of breathing for the next few hours.  Hopefully once the steroids start to kick in and continue nebs things will start to turn around.  A bed did open up here and so we will be able to admit the patient to the ICU here.  I discussed the case with our hospitalist, Dr. Jimenez who will accept the patient.         Impression and Disposition:  COPD exacerbation           This note was completed in part using Dragon voice recognition, and may contain word and grammatical errors.        Daniel Lacy MD  05/20/22 4240

## 2022-05-20 NOTE — ED TRIAGE NOTES
Asthma flare up started last night. Home inhaler and nebs not effective.  Having worsening SOB this am. Had sore throat Monday, cough started yesterday.  Concerned her home neb machine not working.

## 2022-05-20 NOTE — ED NOTES
Pt working to breath, oxygen saturations stable on the nasal cannula.  Pt was on a nasal cannula and felt like she wanted to go back on the BiPAP.  Pt up to the bedside commode prior to switching back.  Spoke with provider.  Pt switched back to the BiPAP, notified RT.  Attempted to give report to [facility], due to change of pt status back to BiPAP they are unable to accept pt. Updated provider and pt.  Family at bedside.

## 2022-05-20 NOTE — PLAN OF CARE
S-(situation): Patient arrives to room 217 via cart from ED    B-(background): COPD exacerbation    A-(assessment): A&Ox4. VSS with NC at 2L. LS diminished. Able to pivot transfer from cart to bed. Skin intact. Denies pain and nausea.     R-(recommendations): Orders reviewed with patient. Will monitor patient per MD orders.     Inpatient nursing criteria listed below were met:    Health care directives status obtained and documented: Yes  SCD's Documented: No  Skin issues/needs documented:NA  Isolation addressed and Signage used: NA  Fall Prevention: Care plan updated NA Education given and documented NA  Care Plan initiated and Co-Morbidities added: Yes  Education Assessment documented:Yes  Admission Education Documented: Yes  If present CAUTI/CLABI Education done: NA  New medication patient education completed and documented (Possible Side Effects of Common Medications handout): Yes  Allergies Reviewed: Yes  Admission Medication Reconciliation completed: Yes  Home medications if not able to send immediately home with family stored here: NA  Reminder note placed in discharge instructions regarding home meds: NA  Individualized care needs/preferences addressed and charted: Yes  Provider Notified that patient has arrived to the unit: Yes

## 2022-05-20 NOTE — ED PROVIDER NOTES
History     Chief Complaint   Patient presents with     Cough     Shortness of Breath     HPI  Fern Gonzalez is a 61 year old female who presents to the ED with increasing shortness of breath.  She states she has asthma and has been using her inhalers frequently.  Does not think her neb machine works.  Started with a sore throat on Monday, 4 days ago then her cough started yesterday.  Felt like her asthma kicked in about 1:00 this morning.  Last inhaler use was about 15 minutes prior to coming to the ED.  Was on steroids after her recent hospital stay just over a month ago.    Was seen in the ED on 4/7/2022 ended up on BiPAP for a few hours which seemed to help.  Troponins were elevated and she was transferred to [place] where she was hospitalized until 4/9/2022.      Admission Date: 4/7/2022  Discharge Date: 04/09/2022  HOSPITAL SUMMARY   Discharge Diagnosis  1. Acute hypoxemic respiratory failure secondary to asthma exacerbation.   2. Myocardial injury without myocardial infarction.   3. Mild hyperglycemia. A1c 5.8, prediabetes  4. Leukocytosis. Due to steroids.    SUMMARY OF HOSPITALIZATION  Fern Gonzalez is a 61 Y female with history of anxiety, depression, migraine, asthma, seasonal allergies admitted on 4/7/2022 after presenting with acute asthma exacerbation. Has had more difficulty with her asthma related to occupational exposure of fumes at work for which she has not given a respirator. Was seen a second week of March with dyspnea and cough, and prescribed antibiotics for possible community-acquired pneumonia as well as prescribed nebulizers. Due to ongoing symptoms, she presented to the ER. She was found to have mildly elevated high-sensitivity troponin and was transferred to our facility for further care on heparin infusion. Denied any chest pain. Troponins down trended and echo without WMA or any significant abnormality. She was started on IV Solu-Medrol with improvement in her respiratory  "symptoms.          Allergies:  Allergies   Allergen Reactions     Cephalexin Hcl Hives     Codeine Nausea and Vomiting     significant     Gabapentin Nausea and Vomiting     Morphine Hcl Nausea and Vomiting     Percocet [Oxycodone-Acetaminophen] Nausea and Vomiting     Significant; pt reports that plain Oxycodone she did fine with     Valium [Diazepam] Other (See Comments)     \"terrible nightmare\"     Vicodin [Hydrocodone-Acetaminophen] Nausea and Vomiting     significant     Cefazolin Rash       Problem List:    Patient Active Problem List    Diagnosis Date Noted     Amnesia 04/01/2019     Priority: Medium     Postoperative state 09/25/2017     Priority: Medium     Palmar fasciitis/pain bilateral 05/23/2017     Priority: Medium     Patient had bilateral carpal tunnel releases done       Numbness and tingling of both upper extremities while sleeping 04/13/2017     Priority: Medium     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, right 11/08/2016     Priority: Medium     Lateral epicondylitis of right elbow 06/02/2016     Priority: Medium     Bilateral carpal tunnel syndrome 06/02/2016     Priority: Medium     Respiratory distress 09/01/2015     Priority: Medium     Wheezing-associated respiratory infection 09/01/2015     Priority: Medium     Major depressive disorder, recurrent episode, moderate (H) 05/14/2015     Priority: Medium     Tobacco use disorder 04/08/2015     Priority: Medium     Anxiety 08/15/2014     Priority: Medium     CARDIOVASCULAR SCREENING; LDL GOAL LESS THAN 160 10/07/2011     Priority: Medium     Meningitis due to viruses not elsewhere classified 10/03/2011     Priority: Medium     Herpes zoster with meningitis 10/03/2011     Priority: Medium     Chronic cholecystitis 01/22/2007     Priority: Medium        Past Medical History:    Past Medical History:   Diagnosis Date     Diagnostic skin and sensitization tests (aka ALLERGENS) 10/29/15 skin tests pos. for: T >> dog/DM only.      Esophageal reflux      " Headache(784.0) 10/11/11     Migraine, unspecified, without mention of intractable migraine without mention of status migrainosus      Recurrent sinusitis      Varicella meningitis        Past Surgical History:    Past Surgical History:   Procedure Laterality Date     CHOLECYSTECTOMY, LAPOROSCOPIC  2007    Lysis of intestinal adhesions.     [facility] SURGICAL PATHOLOGY       COLONOSCOPY N/A 10/31/2014    Procedure: COMBINED COLONOSCOPY, SINGLE OR MULTIPLE BIOPSY/POLYPECTOMY BY BIOPSY;  Surgeon: Leonard Dumont MD;  Location: [facility]     HC LAP,FULGURATE/EXCISE LESIONS  10/04    mesh present as had abdominal wall surgery     HYSTERECTOMY TOTAL ABDOMINAL       INJECT EPIDURAL CERVICAL Bilateral 2019    Procedure: INJECTION, SPINE, CERVICAL 6-7, EPIDURAL;  Surgeon: Des Rider MD;  Location: [facility]     RELEASE CARPAL TUNNEL Right 2016    Procedure: RELEASE CARPAL TUNNEL;  Surgeon: Leif Fine MD;  Location: [facility]     RELEASE CARPAL TUNNEL Left 10/5/2016    Procedure: RELEASE CARPAL TUNNEL;  Surgeon: Leif Fine MD;  Location: [facility]     RELEASE CARPAL TUNNEL Right 2016    Procedure: RELEASE CARPAL TUNNEL;  Surgeon: Leif Fine MD;  Location: [facility]     ZZC  DELIVERY ONLY      3 times     ZZHC UGI ENDOSCOPY, SIMPLE EXAM  10/26/2006       Family History:    Family History   Problem Relation Age of Onset     Thyroid Disease Maternal Grandmother      Thyroid Disease Sister      Coronary Artery Disease No family hx of      Hypertension No family hx of      Hyperlipidemia No family hx of      Cancer - colorectal No family hx of      Ovarian Cancer No family hx of      Other Cancer No family hx of      Mental Illness No family hx of      Cerebrovascular Disease No family hx of      Asthma No family hx of      Known Genetic Syndrome No family hx of        Social History:  Marital Status:  Single [1]  Social History     Tobacco Use     Smoking status: Current Every  "Day Smoker     Packs/day: 0.50     Years: 0.50     Pack years: 0.25     Types: Cigarettes     Smokeless tobacco: Never Used     Tobacco comment: uses ecig now   Vaping Use     Vaping Use: Never used   Substance Use Topics     Alcohol use: Yes     Alcohol/week: 0.0 standard drinks     Comment: \"very rarely\"     Drug use: No        Medications:    albuterol (PROAIR HFA/PROVENTIL HFA/VENTOLIN HFA) 108 (90 Base) MCG/ACT inhaler  benzonatate (TESSALON) 200 MG capsule  cetirizine (ZYRTEC) 10 MG tablet  fluticasone (FLONASE) 50 MCG/ACT nasal spray  Ibuprofen (ADVIL PO)  ipratropium - albuterol 0.5 mg/2.5 mg/3 mL (DUONEB) 0.5-2.5 (3) MG/3ML neb solution  predniSONE (DELTASONE) 20 MG tablet  rizatriptan (MAXALT) 5 MG tablet          Review of Systems   Unable to perform ROS: Severe respiratory distress       Physical Exam   Pulse: (!) 149  Resp: 29  Height: 144.8 cm (4' 9\")  Weight: 63.5 kg (140 lb) (pt reported)  SpO2: (S) (!) 88 %      Physical Exam  Constitutional:       General: She is in acute distress ( moderate).   HENT:      Head: Normocephalic and atraumatic.      Mouth/Throat:      Mouth: Mucous membranes are moist.      Pharynx: Oropharynx is clear.   Cardiovascular:      Rate and Rhythm: Regular rhythm. Tachycardia present.   Pulmonary:      Effort: Tachypnea and respiratory distress present.      Breath sounds: No stridor. Wheezing (mild/mod diffuse) present.   Abdominal:      Palpations: Abdomen is soft.      Tenderness: There is no abdominal tenderness.   Musculoskeletal:         General: No tenderness.      Right lower leg: No edema.      Left lower leg: No edema.   Skin:     General: Skin is warm and dry.   Neurological:      General: No focal deficit present.      Mental Status: She is alert and oriented to person, place, and time.   Psychiatric:         Mood and Affect: Mood normal.         ED Course                 Procedures              EKG Interpretation:      Interpreted by Servando Elliott, " MD  Time reviewed: 6:55 AM   Symptoms at time of EKG: Shortness of breath  Rhythm: sinus tachycardia  Rate: 140  Axis: Normal  Ectopy: none  Conduction: normal  ST Segments/ T Waves: No acute ischemic changes  Q Waves: none  Comparison to prior: Unchanged from 4/7/2022    Clinical Impression: Sinus tachycardia at 140 bpm.  No acute ischemic changes.  No significant change from previous.              No results found for this or any previous visit (from the past 24 hour(s)).    Medications   ipratropium - albuterol 0.5 mg/2.5 mg/3 mL (DUONEB) neb solution 3 mL (3 mLs Nebulization Given 5/20/22 0607)       Assessments & Plan (with Medical Decision Making)  61-year-old female with a history of asthma started with a sore throat 4 days ago then developed a cough yesterday.  Asthma kicked in about 1:00 this morning.  She has been using her inhaler frequently through the night.  Does not think that her neb machine is working.  Recently hospitalized for a couple of days at [place] with similar symptoms.  Was on BiPAP for a few hours here in the ED before she was transferred which seemed to help.    On exam she does have mild to moderate diffuse wheezing throughout.  Sinus tachycardia at 140 bpm.  She was evaluated immediately upon arrival to room 3.  DuoNeb was started.  We went ahead and started her on BiPAP since that seemed to turn around fairly well last time.  IV will be placed.  Solu-Medrol ordered.  Dr. Lacy assumed her care at change of shift.  Please see his note for final diagnosis and disposition.     I have reviewed the nursing notes.    I have reviewed the findings, diagnosis, plan and need for follow up with the patient.       New Prescriptions    No medications on file       5/20/2022   [facility] EMERGENCY DEPT     Servando Elliott MD  05/20/22 0601

## 2022-05-21 PROBLEM — J20.6 ACUTE BRONCHITIS DUE TO RHINOVIRUS: Status: ACTIVE | Noted: 2022-05-21

## 2022-05-21 LAB
ANION GAP SERPL CALCULATED.3IONS-SCNC: 5 MMOL/L (ref 3–14)
BASE EXCESS BLDV CALC-SCNC: -1.1 MMOL/L (ref -7.7–1.9)
BUN SERPL-MCNC: 7 MG/DL (ref 7–30)
C PNEUM DNA SPEC QL NAA+PROBE: NOT DETECTED
CALCIUM SERPL-MCNC: 8.7 MG/DL (ref 8.5–10.1)
CHLORIDE BLD-SCNC: 115 MMOL/L (ref 94–109)
CO2 SERPL-SCNC: 24 MMOL/L (ref 20–32)
CREAT SERPL-MCNC: 0.38 MG/DL (ref 0.52–1.04)
FLUAV H1 2009 PAND RNA SPEC QL NAA+PROBE: NOT DETECTED
FLUAV H1 RNA SPEC QL NAA+PROBE: NOT DETECTED
FLUAV H3 RNA SPEC QL NAA+PROBE: NOT DETECTED
FLUAV RNA SPEC QL NAA+PROBE: NOT DETECTED
FLUBV RNA SPEC QL NAA+PROBE: NOT DETECTED
GFR SERPL CREATININE-BSD FRML MDRD: >90 ML/MIN/1.73M2
GLUCOSE BLD-MCNC: 170 MG/DL (ref 70–99)
GLUCOSE BLDC GLUCOMTR-MCNC: 146 MG/DL (ref 70–99)
GLUCOSE BLDC GLUCOMTR-MCNC: 146 MG/DL (ref 70–99)
GLUCOSE BLDC GLUCOMTR-MCNC: 150 MG/DL (ref 70–99)
GLUCOSE BLDC GLUCOMTR-MCNC: 159 MG/DL (ref 70–99)
GLUCOSE BLDC GLUCOMTR-MCNC: 164 MG/DL (ref 70–99)
GLUCOSE BLDC GLUCOMTR-MCNC: 177 MG/DL (ref 70–99)
HADV DNA SPEC QL NAA+PROBE: NOT DETECTED
HCO3 BLDV-SCNC: 23 MMOL/L (ref 21–28)
HCOV PNL SPEC NAA+PROBE: NOT DETECTED
HMPV RNA SPEC QL NAA+PROBE: NOT DETECTED
HPIV1 RNA SPEC QL NAA+PROBE: NOT DETECTED
HPIV2 RNA SPEC QL NAA+PROBE: NOT DETECTED
HPIV3 RNA SPEC QL NAA+PROBE: NOT DETECTED
HPIV4 RNA SPEC QL NAA+PROBE: NOT DETECTED
LACTATE SERPL-SCNC: 2.7 MMOL/L (ref 0.7–2)
M PNEUMO DNA SPEC QL NAA+PROBE: NOT DETECTED
O2/TOTAL GAS SETTING VFR VENT: 100 %
PCO2 BLDV: 36 MM HG (ref 40–50)
PH BLDV: 7.41 [PH] (ref 7.32–7.43)
PO2 BLDV: 84 MM HG (ref 25–47)
POTASSIUM BLD-SCNC: 3.3 MMOL/L (ref 3.4–5.3)
POTASSIUM BLD-SCNC: 3.6 MMOL/L (ref 3.4–5.3)
PROCALCITONIN SERPL-MCNC: <0.05 NG/ML
RSV RNA SPEC QL NAA+PROBE: NOT DETECTED
RSV RNA SPEC QL NAA+PROBE: NOT DETECTED
RV+EV RNA SPEC QL NAA+PROBE: DETECTED
SODIUM SERPL-SCNC: 144 MMOL/L (ref 133–144)
WBC # BLD AUTO: 27.4 10E3/UL (ref 4–11)

## 2022-05-21 PROCEDURE — 258N000003 HC RX IP 258 OP 636: Performed by: PEDIATRICS

## 2022-05-21 PROCEDURE — 250N000009 HC RX 250: Performed by: PEDIATRICS

## 2022-05-21 PROCEDURE — 36415 COLL VENOUS BLD VENIPUNCTURE: CPT | Performed by: PEDIATRICS

## 2022-05-21 PROCEDURE — 250N000011 HC RX IP 250 OP 636: Performed by: PEDIATRICS

## 2022-05-21 PROCEDURE — 94640 AIRWAY INHALATION TREATMENT: CPT | Mod: 76

## 2022-05-21 PROCEDURE — 82310 ASSAY OF CALCIUM: CPT | Performed by: PEDIATRICS

## 2022-05-21 PROCEDURE — 85048 AUTOMATED LEUKOCYTE COUNT: CPT | Performed by: PEDIATRICS

## 2022-05-21 PROCEDURE — 84145 PROCALCITONIN (PCT): CPT | Performed by: PEDIATRICS

## 2022-05-21 PROCEDURE — 250N000013 HC RX MED GY IP 250 OP 250 PS 637: Performed by: PEDIATRICS

## 2022-05-21 PROCEDURE — 999N000157 HC STATISTIC RCP TIME EA 10 MIN

## 2022-05-21 PROCEDURE — 99291 CRITICAL CARE FIRST HOUR: CPT | Performed by: PEDIATRICS

## 2022-05-21 PROCEDURE — 200N000001 HC R&B ICU

## 2022-05-21 PROCEDURE — 82803 BLOOD GASES ANY COMBINATION: CPT | Performed by: PEDIATRICS

## 2022-05-21 PROCEDURE — 94640 AIRWAY INHALATION TREATMENT: CPT

## 2022-05-21 PROCEDURE — 84132 ASSAY OF SERUM POTASSIUM: CPT | Performed by: PEDIATRICS

## 2022-05-21 PROCEDURE — 83605 ASSAY OF LACTIC ACID: CPT | Performed by: PEDIATRICS

## 2022-05-21 PROCEDURE — 94660 CPAP INITIATION&MGMT: CPT

## 2022-05-21 RX ORDER — CETIRIZINE HYDROCHLORIDE 10 MG/1
10 TABLET ORAL DAILY
Status: DISCONTINUED | OUTPATIENT
Start: 2022-05-21 | End: 2022-05-23 | Stop reason: HOSPADM

## 2022-05-21 RX ORDER — POTASSIUM CHLORIDE 1500 MG/1
20 TABLET, EXTENDED RELEASE ORAL ONCE
Status: COMPLETED | OUTPATIENT
Start: 2022-05-21 | End: 2022-05-21

## 2022-05-21 RX ADMIN — Medication 1 PATCH: at 08:16

## 2022-05-21 RX ADMIN — SODIUM CHLORIDE, POTASSIUM CHLORIDE, SODIUM LACTATE AND CALCIUM CHLORIDE: 600; 310; 30; 20 INJECTION, SOLUTION INTRAVENOUS at 04:38

## 2022-05-21 RX ADMIN — AZITHROMYCIN MONOHYDRATE 500 MG: 500 INJECTION, POWDER, LYOPHILIZED, FOR SOLUTION INTRAVENOUS at 08:17

## 2022-05-21 RX ADMIN — IPRATROPIUM BROMIDE AND ALBUTEROL SULFATE 3 ML: 2.5; .5 SOLUTION RESPIRATORY (INHALATION) at 19:40

## 2022-05-21 RX ADMIN — POTASSIUM CHLORIDE 20 MEQ: 1500 TABLET, EXTENDED RELEASE ORAL at 06:48

## 2022-05-21 RX ADMIN — IPRATROPIUM BROMIDE AND ALBUTEROL SULFATE 3 ML: 2.5; .5 SOLUTION RESPIRATORY (INHALATION) at 03:02

## 2022-05-21 RX ADMIN — BUDESONIDE 0.5 MG: 0.5 INHALANT RESPIRATORY (INHALATION) at 08:28

## 2022-05-21 RX ADMIN — FAMOTIDINE 20 MG: 10 INJECTION INTRAVENOUS at 08:16

## 2022-05-21 RX ADMIN — ALBUTEROL SULFATE 2.5 MG: 2.5 SOLUTION RESPIRATORY (INHALATION) at 22:04

## 2022-05-21 RX ADMIN — CETIRIZINE HYDROCHLORIDE 10 MG: 10 TABLET, FILM COATED ORAL at 12:39

## 2022-05-21 RX ADMIN — METHYLPREDNISOLONE SODIUM SUCCINATE 40 MG: 40 INJECTION, POWDER, FOR SOLUTION INTRAMUSCULAR; INTRAVENOUS at 08:16

## 2022-05-21 RX ADMIN — BUDESONIDE 0.5 MG: 0.5 INHALANT RESPIRATORY (INHALATION) at 22:04

## 2022-05-21 RX ADMIN — INSULIN ASPART 1 UNITS: 100 INJECTION, SOLUTION INTRAVENOUS; SUBCUTANEOUS at 01:54

## 2022-05-21 RX ADMIN — IPRATROPIUM BROMIDE AND ALBUTEROL SULFATE 3 ML: 2.5; .5 SOLUTION RESPIRATORY (INHALATION) at 15:00

## 2022-05-21 RX ADMIN — IPRATROPIUM BROMIDE AND ALBUTEROL SULFATE 3 ML: 2.5; .5 SOLUTION RESPIRATORY (INHALATION) at 00:12

## 2022-05-21 RX ADMIN — ALBUTEROL SULFATE 2.5 MG: 2.5 SOLUTION RESPIRATORY (INHALATION) at 14:57

## 2022-05-21 RX ADMIN — IPRATROPIUM BROMIDE AND ALBUTEROL SULFATE 3 ML: 2.5; .5 SOLUTION RESPIRATORY (INHALATION) at 08:28

## 2022-05-21 RX ADMIN — ENOXAPARIN SODIUM 40 MG: 40 INJECTION SUBCUTANEOUS at 17:01

## 2022-05-21 RX ADMIN — INSULIN ASPART 1 UNITS: 100 INJECTION, SOLUTION INTRAVENOUS; SUBCUTANEOUS at 06:45

## 2022-05-21 RX ADMIN — IPRATROPIUM BROMIDE AND ALBUTEROL SULFATE 3 ML: 2.5; .5 SOLUTION RESPIRATORY (INHALATION) at 23:53

## 2022-05-21 RX ADMIN — ALBUTEROL SULFATE 2.5 MG: 2.5 SOLUTION RESPIRATORY (INHALATION) at 18:20

## 2022-05-21 RX ADMIN — METHYLPREDNISOLONE SODIUM SUCCINATE 40 MG: 40 INJECTION, POWDER, FOR SOLUTION INTRAMUSCULAR; INTRAVENOUS at 17:01

## 2022-05-21 RX ADMIN — IPRATROPIUM BROMIDE AND ALBUTEROL SULFATE 3 ML: 2.5; .5 SOLUTION RESPIRATORY (INHALATION) at 12:15

## 2022-05-21 RX ADMIN — INSULIN ASPART 1 UNITS: 100 INJECTION, SOLUTION INTRAVENOUS; SUBCUTANEOUS at 08:16

## 2022-05-21 RX ADMIN — ALBUTEROL SULFATE 2.5 MG: 2.5 SOLUTION RESPIRATORY (INHALATION) at 06:49

## 2022-05-21 RX ADMIN — METHYLPREDNISOLONE SODIUM SUCCINATE 40 MG: 40 INJECTION, POWDER, FOR SOLUTION INTRAMUSCULAR; INTRAVENOUS at 01:55

## 2022-05-21 RX ADMIN — ALBUTEROL SULFATE 2.5 MG: 2.5 SOLUTION RESPIRATORY (INHALATION) at 01:56

## 2022-05-21 ASSESSMENT — ACTIVITIES OF DAILY LIVING (ADL)
ADLS_ACUITY_SCORE: 20

## 2022-05-21 NOTE — PROGRESS NOTES
Sepsis Evaluation Progress Note    I was called to see Fern Gonzalez due to abnormal vital signs triggering the Sepsis SIRS screening alert. She is not known to have a bacterial infection although does have Rhinovirus.     Physical Exam   Vital Signs:  Temp: 98  F (36.7  C) Temp src: Oral BP: 110/69 Pulse: (!) 131   Resp: 13 SpO2: 93 % O2 Device: BiPAP/CPAP       General: in no acute distress easily speaks full sentences  Mental Status: baseline mental status.     Remainder of physical exam is significant for normal respiratory effort, diminished breath sounds throughout, and no wheezing currently.  Heart rate is tachycardic with regular rhythm, radial pulse is strong, and capillary refill is normal.    Data   Lactic Acid   Date Value Ref Range Status   05/21/2022 2.7 (H) 0.7 - 2.0 mmol/L Final   05/20/2022 3.3 (H) 0.7 - 2.0 mmol/L Final   09/25/2011 1.4 0.7 - 2.1 mmol/L Final       Assessment & Plan   NO EVIDENCE OF SEPSIS at this time.  Vital sign, physical exam, and lab findings are due to asthma flare, bronchodilators & respiratory failure.    Disposition: The patient will remain on the current unit. We will continue to monitor this patient closely..  Osmar Jimenez MD    Sepsis Criteria   Sepsis: 2+ SIRS criteria due to infection  Severe Sepsis: Sepsis AND 1+ new sign of acute organ dysfunction (Note: lactate >2 or acute encephalopathy each qualify as organ dysfunction)  Septic Shock: Sepsis AND hypotension despite volume resuscitation with 30 ml/kg crystalloid or lactate >=4  Note: HYPOTENSION is defined as 2 BP readings measured 3 hrs apart that have a SBP <90, MAP <65, or decrease >40 mmHg, occurring 6 hrs before or after t-zero

## 2022-05-21 NOTE — PROGRESS NOTES
[facility]    Medicine Progress Note - Hospitalist Service    Date of Admission:  5/20/2022    Assessment & Plan      Fern Gonzalez is a 61 year old female with asthma and ongoing cigarette smoking who presented with several days of cold symptoms and worsening shortness of breath and was admitted on 5/20/2022 due to concerns for life-threatening status asthmaticus with acute hypoxic respiratory failure.  She is improving though remains critically ill due to life-threatening respiratory failure.    Principal Problem:    Severe persistent asthma with status asthmaticus  Assessment: suspect status asthmaticus triggered by combination of rhinovirus acute respiratory tract infection, ongoing smoking and seasonal allergic rhinitis superimposed upon perennial allergic rhinitis  Plan: May start to wean off BiPAP for progressively increasing periods of time as tolerated clinically although anticipate she will continue to require intermittent BiPAP over the next 24 hours, monitor oxygenation closely, continue corticosteroids, continue bronchodilators every 2 hours today, may advance diet and begin to advance activity when she is off BiPAP, discussed Dupixent treatment and its approved indications including moderate to severe eosinophilic asthma or steroid-dependent asthma and/or asthma with comorbid atopic dermatitis but the patient does not appear to have been diagnosed with any of the approved indications for Dupixent to date, inpatient pulmonology consultation is not available at this hospital but discussed possibility of outpatient referral to pulmonology after discharge    Active Problems:    Acute respiratory failure with hypoxia (H)  Assessment: due to status asthmaticus, improving  Plan: Switch from continuous to intermittent BiPAP today, treat asthma exacerbation, anticipate ongoing ICU monitoring for at least another 24 hours      SIRS (systemic inflammatory response syndrome)  (H)-tachycardia, tachypnea, leukocytosis  Assessment: due to status asthmaticus and viral infection, low procalcitonin, worsening leukocytosis likely due to corticosteroids, bacterial infection not suspected  Plan: discontinue Zithromax, treat asthma exacerbation, monitor WBC      Elevated lactic acid level  Assessment: Sandi to peak 5.0 overnight and subsequently improved, likely due to combination of status asthmaticus with respiratory failure but also frequent administration of bronchodilators, doubt bacterial infection and sepsis  Plan: Treat status asthmaticus and respiratory failure and optimize oxygenation      Hypopotassemia  Assessment: Worsened today possibly exacerbated by frequent bronchodilator administration  Plan: Potassium supplementation per standard protocol      Perennial allergic rhinitis with seasonal variation  Assessment: Suspect worsening seasonal allergic rhinitis currently based on history  Plan: Systemic corticosteroids, resume oral antihistamine today      Steroid-induced hyperglycemia  Assessment: Random blood sugar 200 at admission even without recent corticosteroids, hemoglobin A1c in April was 5.8 and not known to have diabetes, has had persistent hyperglycemia probably due to corticosteroids but seems to be improving as doses of corticosteroids are decreased  Plan: Follow blood sugars before meals and at bedtime and continue NovoLog high correction scale to optimize glycemic control      Tobacco use disorder  Assessment: Ongoing  Plan: nicotine replacement, smoking cessation advised      Major depressive disorder, recurrent episode, moderate (H)  Assessment: Chronic, not specifically assessed at this time due to severity of respiratory distress, does not appear to be taking specific antidepressant medication currently  Plan: Reassess clinically during hospitalization as indicated         Diet: Clear Liquid Diet    DVT Prophylaxis: Enoxaparin (Lovenox) SQ  Yeboah Catheter: Not  present  Central Lines: None  Cardiac Monitoring: ACTIVE order. Indication: ICU  Code Status: Full Code      Disposition Plan   Expected Discharge:  2 to 3 days  Anticipated discharge location:  Awaiting care coordination huddle  Delays:  Not medically stable       The patient's care was discussed with the Bedside Nurse and Patient.  Total critical care time 34 minutes so far today including time spent reassessing respiratory status and adjusting BiPAP treatment of life-threatening respiratory failure.    Osmar Jimenez MD  Hospitalist [facility]  Securely message with the Vocera Web Console (learn more here)  Text page via [employer] Paging/Directory         Clinically Significant Risk Factors Present on Admission                      ______________________________________________________________________    Interval History   She says she is starting to feel better.  She wonders whether she has to keep the BiPAP mask on today.  She is hungry.  She is less short of breath.  She does notice benefit from nebulization treatments.  Cough has improved.  She has been afebrile.  She has been persistently tachycardic but blood pressure has been normal.  Oxygenation has improved.  Patient wonders whether Dupixent would be an appropriate treatment for her asthma.  She is not known to have atopic dermatitis or nasal polyposis and has not been dependent on oral corticosteroids historically for asthma treatment.    Data reviewed today: I reviewed all medications, new labs and imaging results over the last 24 hours. I personally reviewed cardiac monitor which is demonstrated sinus tachycardia but no dysrhythmias.    Physical Exam   Vital Signs: Temp: 98.1  F (36.7  C) Temp src: Oral BP: 124/78 Pulse: (!) 137   Resp: 17 SpO2: 92 % O2 Device: BiPAP/CPAP 12/6, FiO2 30%  BiPAP measurements: Tidal volume 630, minute ventilation 11.6    Patient Vitals for the past 24 hrs:   BP Temp Temp src Pulse  "Resp SpO2 Height Weight   05/21/22 0900 124/78 -- -- (!) 137 17 92 % -- 66.2 kg (146 lb)   05/21/22 0800 124/81 98.1  F (36.7  C) Oral (!) 129 26 97 % -- --   05/21/22 0700 117/76 -- -- (!) 129 27 93 % -- --   05/21/22 0600 -- -- -- 114 18 97 % -- --   05/21/22 0500 121/76 -- -- (!) 121 (!) 40 96 % -- --   05/21/22 0400 120/72 -- -- (!) 121 21 96 % -- --   05/21/22 0300 130/88 98.3  F (36.8  C) Axillary (!) 128 25 92 % -- --   05/21/22 0200 106/71 -- -- 117 19 97 % -- --   05/21/22 0100 118/73 -- -- (!) 124 15 96 % -- --   05/21/22 0000 108/63 -- -- (!) 121 20 98 % -- --   05/20/22 2300 107/70 -- -- (!) 122 16 98 % -- --   05/20/22 2200 119/68 97.9  F (36.6  C) Axillary (!) 126 22 99 % -- --   05/20/22 2100 108/65 -- -- (!) 128 20 97 % -- --   05/20/22 2000 133/76 99.4  F (37.4  C) Oral (!) 137 24 95 % -- --   05/20/22 1900 132/70 -- -- (!) 135 (!) 33 95 % -- --   05/20/22 1800 127/66 -- -- (!) 142 16 98 % -- --   05/20/22 1700 125/81 -- -- (!) 126 15 97 % -- --   05/20/22 1600 (!) 155/91 98.2  F (36.8  C) Oral 117 23 97 % -- --   05/20/22 1500 131/88 -- -- 112 (!) 32 95 % -- --   05/20/22 1400 112/78 98  F (36.7  C) Oral 109 (!) 38 95 % 1.448 m (4' 9\") 64.8 kg (142 lb 12.8 oz)   05/20/22 1331 -- 98  F (36.7  C) Temporal -- -- -- -- --   05/20/22 1230 113/74 -- -- 109 21 95 % -- --   05/20/22 1145 -- -- -- 114 26 97 % -- --   05/20/22 1130 111/68 -- -- 112 22 95 % -- --   05/20/22 1100 113/65 -- -- 112 21 94 % -- --   05/20/22 1045 -- -- -- 113 20 94 % -- --   05/20/22 1030 107/66 -- -- 114 20 94 % -- --   05/20/22 1015 -- -- -- 115 19 94 % -- --     Weight: 146 lbs 0 oz     General Appearance: No acute distress resting in bed using BiPAP, able to speak full sentences  Respiratory: Normal respiratory effort, diminished breath sounds throughout, scattered expiratory wheezes with prolonged 2 to 3-second expiratory phase  Cardiovascular: Tachycardic with regular rhythm, good radial pulse, brisk capillary refill  Skin: " No rash  Other: Alert and maintains wakefulness and attention, no confusion    Data   Recent Labs   Lab 05/21/22  0813 05/21/22  0640 05/21/22  0514 05/20/22 2215 05/20/22 1959 05/20/22  1845 05/20/22  0715   WBC  --   --  27.4*  --   --   --  17.0*   HGB  --   --   --   --   --   --  12.7   MCV  --   --   --   --   --   --  95   PLT  --   --   --   --   --   --  226   NA  --   --  144  --   --   --  141   POTASSIUM  --   --  3.3*  --  3.5  --  3.4   CHLORIDE  --   --  115*  --   --   --  110*   CO2  --   --  24  --   --   --  22   BUN  --   --  7  --   --   --  11   CR  --   --  0.38*  --   --   --  0.50*   ANIONGAP  --   --  5  --   --   --  9   JOAQUIN  --   --  8.7  --   --   --  8.8   * 146* 170*   < >  --    < > 200*   ALBUMIN  --   --   --   --   --   --  3.8   PROTTOTAL  --   --   --   --   --   --  6.8   BILITOTAL  --   --   --   --   --   --  0.3   ALKPHOS  --   --   --   --   --   --  70   ALT  --   --   --   --   --   --  19   AST  --   --   --   --   --   --  15    < > = values in this interval not displayed.     Respiratory PCR panel testing was positive for rhinovirus/enterovirus    Lactic acid increased from 3.3 at 4 PM yesterday to 5.0 at 8 PM and then improved to 3.3 at 10:45 PM    Procalcitonin yesterday was 0.09 and today is pending    Blood sugars ranged 146-314 over the last day with most readings less than 190    Venous Blood Gas  Recent Labs   Lab 05/21/22  0514 05/20/22 2246 05/20/22  0715   PHV 7.41 7.40 7.45*   PCO2V 36* 34* 32*   PO2V 84* 83* 60*   HCO3V 23 21 22   STEVIE -1.1 -3.3 -1.0   O2PER 100 30 30     Medications     lactated ringers 100 mL/hr at 05/21/22 0438       albuterol  3 mL Nebulization Q4H     azithromycin  500 mg Intravenous Q24H     budesonide  0.5 mg Nebulization BID     enoxaparin ANTICOAGULANT  40 mg Subcutaneous Q24H     famotidine  20 mg Intravenous BID     insulin aspart  1-12 Units Subcutaneous Q4H     ipratropium - albuterol 0.5 mg/2.5 mg/3 mL  3 mL  Nebulization Q4H     methylPREDNISolone  40 mg Intravenous Q8H    Followed by     [START ON 5/22/2022] predniSONE  40 mg Oral Daily with breakfast     nicotine  1 patch Transdermal Daily     nicotine   Transdermal Q8H

## 2022-05-21 NOTE — PROGRESS NOTES
62 yo F just admitted to ICU with acute severe bronchospasm/asthma needing bipap 12/6  30% but VBG without hypercapnia, CXR is clear and RR charted at only 20.  But HR is 135-140 probably from albuterol plus respiratory distress.     On nebs plus 40 q 8 steroids.  Plus azithro.     Had similar presentation lat month needing 3 day hospitalization.      P continue current therapy.  will watch for worsening tachycardia and/or tachypnea.     -------------------------------------------------------  8:11 PM  Updated Note  camera'ed in on patient who is awake and alert, mild tachypnea but lying t 30% comfortable,.  On nasal cannula about to be placed back on bipap.     Repeat LA 5.5     A: rising LA but seems well perfused and making urine and BP ok. Got 1 L LR bolus and getting 100 ml/hr  So tachycardia and LA probably seconday to beta agonist plus WOB.  Nebs have been decreased to q 2 so will place on bipap and repeat LA in 3 hours.  Will give bipap continuously with TV goal > 450 with 30 minute breaks overnight.        -------------------------------------------------------  10:57 PM  Updated Note  Lactate now decreased, VBG remains ok and HR decrease to 125.      -------------------------------------------------------  12:50 AM  Updated Note  Positive for rhinovirus.  Droplet precautions.

## 2022-05-21 NOTE — PLAN OF CARE
Goal Outcome Evaluation:    Plan of Care Reviewed With: patient     Overall Patient Progress: improving    Outcome Evaluation: Patient using BiPAP as needed. Majority of the day has been on room air without BiPAP. LS expiratory wheezes, cough infrequent and non-productive. Reporting improvement with nebulizers. Tele reading sinus tach, 120-140s. Mild shortness of breath with activity, up to bedside commode independently. Tolerating regular diet, denies nausea. Formed BM today. Voiding well with good urine output. Denying pain.

## 2022-05-22 LAB
ANION GAP SERPL CALCULATED.3IONS-SCNC: 3 MMOL/L (ref 3–14)
BASE EXCESS BLDV CALC-SCNC: 2.2 MMOL/L (ref -7.7–1.9)
CHLORIDE BLD-SCNC: 117 MMOL/L (ref 94–109)
CO2 SERPL-SCNC: 26 MMOL/L (ref 20–32)
GLUCOSE BLDC GLUCOMTR-MCNC: 112 MG/DL (ref 70–99)
GLUCOSE BLDC GLUCOMTR-MCNC: 135 MG/DL (ref 70–99)
GLUCOSE BLDC GLUCOMTR-MCNC: 159 MG/DL (ref 70–99)
GLUCOSE BLDC GLUCOMTR-MCNC: 98 MG/DL (ref 70–99)
HCO3 BLDV-SCNC: 26 MMOL/L (ref 21–28)
LACTATE SERPL-SCNC: 0.9 MMOL/L (ref 0.7–2)
O2/TOTAL GAS SETTING VFR VENT: 21 %
PCO2 BLDV: 37 MM HG (ref 40–50)
PH BLDV: 7.46 [PH] (ref 7.32–7.43)
PO2 BLDV: 83 MM HG (ref 25–47)
POTASSIUM BLD-SCNC: 4.2 MMOL/L (ref 3.4–5.3)
POTASSIUM BLD-SCNC: 4.2 MMOL/L (ref 3.4–5.3)
SODIUM SERPL-SCNC: 146 MMOL/L (ref 133–144)
WBC # BLD AUTO: 29.9 10E3/UL (ref 4–11)

## 2022-05-22 PROCEDURE — 83605 ASSAY OF LACTIC ACID: CPT | Performed by: PEDIATRICS

## 2022-05-22 PROCEDURE — 99232 SBSQ HOSP IP/OBS MODERATE 35: CPT | Performed by: PEDIATRICS

## 2022-05-22 PROCEDURE — 250N000012 HC RX MED GY IP 250 OP 636 PS 637: Performed by: PEDIATRICS

## 2022-05-22 PROCEDURE — 94640 AIRWAY INHALATION TREATMENT: CPT | Mod: 76

## 2022-05-22 PROCEDURE — 250N000013 HC RX MED GY IP 250 OP 250 PS 637: Performed by: PEDIATRICS

## 2022-05-22 PROCEDURE — 80051 ELECTROLYTE PANEL: CPT | Performed by: PEDIATRICS

## 2022-05-22 PROCEDURE — 250N000009 HC RX 250: Performed by: PEDIATRICS

## 2022-05-22 PROCEDURE — 120N000001 HC R&B MED SURG/OB

## 2022-05-22 PROCEDURE — 94640 AIRWAY INHALATION TREATMENT: CPT

## 2022-05-22 PROCEDURE — 250N000011 HC RX IP 250 OP 636: Performed by: PEDIATRICS

## 2022-05-22 PROCEDURE — 85048 AUTOMATED LEUKOCYTE COUNT: CPT | Performed by: PEDIATRICS

## 2022-05-22 PROCEDURE — 82803 BLOOD GASES ANY COMBINATION: CPT | Performed by: PEDIATRICS

## 2022-05-22 PROCEDURE — 36415 COLL VENOUS BLD VENIPUNCTURE: CPT | Performed by: PEDIATRICS

## 2022-05-22 PROCEDURE — 84132 ASSAY OF SERUM POTASSIUM: CPT | Performed by: PEDIATRICS

## 2022-05-22 RX ORDER — FLUTICASONE PROPIONATE 50 MCG
2 SPRAY, SUSPENSION (ML) NASAL DAILY PRN
COMMUNITY
Start: 2022-05-22 | End: 2022-07-22

## 2022-05-22 RX ORDER — CETIRIZINE HYDROCHLORIDE 10 MG/1
10 TABLET ORAL DAILY
COMMUNITY
Start: 2022-05-22 | End: 2022-11-18

## 2022-05-22 RX ADMIN — ENOXAPARIN SODIUM 40 MG: 40 INJECTION SUBCUTANEOUS at 17:21

## 2022-05-22 RX ADMIN — IPRATROPIUM BROMIDE AND ALBUTEROL SULFATE 3 ML: 2.5; .5 SOLUTION RESPIRATORY (INHALATION) at 05:16

## 2022-05-22 RX ADMIN — PREDNISONE 40 MG: 20 TABLET ORAL at 07:51

## 2022-05-22 RX ADMIN — IPRATROPIUM BROMIDE AND ALBUTEROL SULFATE 3 ML: 2.5; .5 SOLUTION RESPIRATORY (INHALATION) at 08:13

## 2022-05-22 RX ADMIN — BUDESONIDE 0.5 MG: 0.5 INHALANT RESPIRATORY (INHALATION) at 08:14

## 2022-05-22 RX ADMIN — CETIRIZINE HYDROCHLORIDE 10 MG: 10 TABLET, FILM COATED ORAL at 07:51

## 2022-05-22 RX ADMIN — Medication 1 PATCH: at 07:51

## 2022-05-22 RX ADMIN — ALBUTEROL SULFATE 2.5 MG: 2.5 SOLUTION RESPIRATORY (INHALATION) at 13:03

## 2022-05-22 RX ADMIN — ALBUTEROL SULFATE 2.5 MG: 2.5 SOLUTION RESPIRATORY (INHALATION) at 16:12

## 2022-05-22 RX ADMIN — BUDESONIDE 0.5 MG: 0.5 INHALANT RESPIRATORY (INHALATION) at 21:43

## 2022-05-22 RX ADMIN — ALBUTEROL SULFATE 2.5 MG: 2.5 SOLUTION RESPIRATORY (INHALATION) at 19:28

## 2022-05-22 ASSESSMENT — ACTIVITIES OF DAILY LIVING (ADL)
ADLS_ACUITY_SCORE: 20

## 2022-05-22 NOTE — PROGRESS NOTES
[facility]    Medicine Progress Note - Hospitalist Service    Date of Admission:  5/20/2022    Assessment & Plan        Fern Gonzalez is a 61 year old female with asthma and ongoing cigarette smoking who presented with several days of cold symptoms and worsening shortness of breath and was admitted on 5/20/2022 due to concerns for life-threatening status asthmaticus with acute hypoxic respiratory failure.  She is improving with resolving status asthmaticus and respiratory failure.     Principal Problem:    Severe persistent asthma with status asthmaticus  Assessment: suspect status asthmaticus triggered by combination of rhinovirus acute respiratory tract infection, ongoing smoking and seasonal allergic rhinitis superimposed upon perennial allergic rhinitis and chronic severe persistent asthma, has not required BiPAP or oxygen supplementation since about 3 PM yesterday afternoon  Plan: continue corticosteroids now switching from IV Solu-Medrol to oral prednisone today,  wean scheduled bronchodilators to every 4 hours today, may advance activity as tolerated today,  transfer to floor off of telemetry and continuous oximetry, discussed no return to work after discharge until reevaluated by PCP this coming week, recommended outpatient referral after discharge to asthma specialist either allergist or pulmonologist whichever is first available with which she was agreeable     Active Problems:    Acute respiratory failure with hypoxia (H)  Assessment: due to status asthmaticus,  resolving  Plan: Transfer to floor, spotcheck oxygen saturations       SIRS (systemic inflammatory response syndrome) (H)-tachycardia, tachypnea, leukocytosis  Assessment: due to status asthmaticus and viral infection, worsening leukocytosis likely due to corticosteroids, bacterial infection not suspected  Plan:  Continue to treat asthma exacerbation, monitor WBC       Elevated lactic acid level  Assessment: Sandi  to peak 5.0 and subsequently improved and now normal today,  suspect due to combination of status asthmaticus with respiratory failure but also frequent administration of bronchodilators, doubt bacterial infection and sepsis  Plan: Treat status asthmaticus and respiratory failure and optimize oxygenation       Hypopotassemia  Assessment:  Improved today after supplementation, was possibly exacerbated by frequent bronchodilator administration  Plan: Potassium supplementation per standard protocol       Perennial allergic rhinitis with seasonal variation  Assessment: Suspect worsening seasonal allergic rhinitis currently based on history  Plan: Systemic corticosteroids, continue oral antihistamine , discussed allergy referral       Steroid-induced hyperglycemia  Assessment: Random blood sugar 200 at admission even without recent corticosteroids, hemoglobin A1c in April was 5.8 and not known to have diabetes, has had persistent hyperglycemia probably due to corticosteroids but seems to be improving as doses of corticosteroids are decreased, may have prediabetes  Plan: Continue to follow blood sugars before meals and at bedtime  during hospitalization, continue NovoLog high correction scale to optimize glycemic control during hospitalization although do not anticipate insulin treatment after discharge, recommend outpatient follow-up with PCP after tapering corticosteroids to recheck for prediabetes or diabetes       Tobacco use disorder  Assessment: Ongoing  Plan: nicotine replacement, smoking cessation advised       Major depressive disorder, recurrent episode, moderate (H)  Assessment: Chronic, not specifically assessed during this hospitalization, does not appear to be taking specific antidepressant medication currently  Plan: Reassess clinically during hospitalization as indicated       Diet: Regular Diet Adult    DVT Prophylaxis: Enoxaparin (Lovenox) SQ  Yeboah Catheter: Not present  Central Lines: None  Cardiac  Monitoring: ACTIVE order. Indication: ICU  Code Status: Full Code      Disposition Plan   Expected Discharge: 05/23/2022     Anticipated discharge location:  Awaiting care coordination huddle  Delays:  Not yet medically stable       The patient's care was discussed with the Bedside Nurse and Patient.    Osmar Jimenez MD  Hospitalist Service  [facility]  Securely message with the Vocera Web Console (learn more here)  Text page via [employer] Paging/Directory         Clinically Significant Risk Factors Present on Admission                 ______________________________________________________________________    Interval History   She feels much better today.  She has not needed to use BiPAP since about 3 PM yesterday afternoon.  She also weaned off of oxygen supplementation late yesterday and overnight.  She has no new complaints.  She remains afebrile.  Tachycardia persists.  Blood pressure has been normal.  She is tolerating good oral intake.  She has exertional dyspnea but is starting to tolerate advancing ambulatory activity.    Data reviewed today: I reviewed all medications, new labs and imaging results over the last 24 hours. I personally reviewed cardiac monitor which demonstrates sinus tachycardia but no dysrhythmias over the last day.    Physical Exam   Vital Signs: Temp: 97.6  F (36.4  C) Temp src: Oral BP: 131/75 Pulse: (!) 122   Resp: 23 SpO2: 97 %       Patient Vitals for the past 24 hrs:   BP Temp Temp src Pulse Resp SpO2 Weight   05/22/22 0850 131/75 -- -- (!) 122 23 97 % --   05/22/22 0800 -- 97.6  F (36.4  C) Oral 116 27 95 % --   05/22/22 0700 130/85 -- -- 115 (!) 45 98 % --   05/22/22 0600 127/86 -- -- 114 26 97 % --   05/22/22 0515 121/76 97.3  F (36.3  C) Oral (!) 124 (!) 45 95 % --   05/22/22 0500 -- -- -- 112 18 94 % 66.5 kg (146 lb 9.6 oz)   05/22/22 0400 123/85 -- -- 114 18 94 % --   05/22/22 0300 123/74 -- -- 115 18 95 % --   05/22/22 0200 101/79 -- -- (!) 121  27 95 % --   05/22/22 0100 109/63 -- -- (!) 121 19 95 % --   05/22/22 0000 124/78 98.7  F (37.1  C) Oral (!) 130 (!) 110 94 % --   05/21/22 2300 109/71 -- -- (!) 123 19 94 % --   05/21/22 2200 110/85 99  F (37.2  C) Oral (!) 124 30 94 % --   05/21/22 2100 116/78 -- -- (!) 129 26 95 % --   05/21/22 2000 129/70 -- -- (!) 134 17 95 % --   05/21/22 1951 -- 99.3  F (37.4  C) Oral -- -- -- --   05/21/22 1900 125/63 -- -- (!) 133 24 96 % --   05/21/22 1800 112/55 -- -- (!) 128 28 97 % --   05/21/22 1700 110/69 -- -- (!) 131 13 93 % --   05/21/22 1600 109/68 98  F (36.7  C) Oral (!) 135 26 98 % --   05/21/22 1500 116/84 -- -- (!) 131 18 98 % --   05/21/22 1400 115/76 -- -- (!) 129 28 94 % --   05/21/22 1300 119/81 -- -- (!) 131 25 92 % --   05/21/22 1200 117/73 98.2  F (36.8  C) Oral (!) 129 17 93 % --   05/21/22 1100 123/81 -- -- (!) 128 25 90 % --     Weight: 146 lbs 9.6 oz  General Appearance: 61-year-old woman who appears to be resting comfortably in bed, mildly tachypneic but able to speak full sentences  Respiratory: No use of accessory muscles, severely diminished breath sounds throughout with clear lung fields  Cardiovascular: Tachycardic with regular rhythm, palpable radial pulse with normal capillary refill    Data   Recent Labs   Lab 05/22/22  0750 05/22/22  0514 05/21/22  2202 05/21/22  1659 05/21/22  1240 05/21/22  1114 05/21/22  0640 05/21/22  0514 05/20/22  1845 05/20/22  0715   WBC  --  29.9*  --   --   --   --   --  27.4*  --  17.0*   HGB  --   --   --   --   --   --   --   --   --  12.7   MCV  --   --   --   --   --   --   --   --   --  95   PLT  --   --   --   --   --   --   --   --   --  226   NA  --  146*  --   --   --   --   --  144  --  141   POTASSIUM  --  4.2  4.2  --   --   --  3.6  --  3.3*   < > 3.4   CHLORIDE  --  117*  --   --   --   --   --  115*  --  110*   CO2  --  26  --   --   --   --   --  24  --  22   BUN  --   --   --   --   --   --   --  7  --  11   CR  --   --   --   --   --   --    --  0.38*  --  0.50*   ANIONGAP  --  3  --   --   --   --   --  5  --  9   JOAQUIN  --   --   --   --   --   --   --  8.7  --  8.8   *  --  150* 164*   < >  --    < > 170*   < > 200*   ALBUMIN  --   --   --   --   --   --   --   --   --  3.8   PROTTOTAL  --   --   --   --   --   --   --   --   --  6.8   BILITOTAL  --   --   --   --   --   --   --   --   --  0.3   ALKPHOS  --   --   --   --   --   --   --   --   --  70   ALT  --   --   --   --   --   --   --   --   --  19   AST  --   --   --   --   --   --   --   --   --  15    < > = values in this interval not displayed.     Blood sugars ranged 146-177 over the last day  Lactic acid 0.9 this morning, improved from 2.7 about 5 PM yesterday afternoon    Venous Blood Gas  Recent Labs   Lab 05/22/22  0514 05/21/22  0514 05/20/22  2246 05/20/22  0715   PHV 7.46* 7.41 7.40 7.45*   PCO2V 37* 36* 34* 32*   PO2V 83* 84* 83* 60*   HCO3V 26 23 21 22   STEVIE 2.2* -1.1 -3.3 -1.0   O2PER 21 100 30 30     Medications       albuterol  3 mL Nebulization Q4H     budesonide  0.5 mg Nebulization BID     cetirizine  10 mg Oral Daily     enoxaparin ANTICOAGULANT  40 mg Subcutaneous Q24H     insulin aspart  1-12 Units Subcutaneous TID w/meals     ipratropium - albuterol 0.5 mg/2.5 mg/3 mL  3 mL Nebulization Q4H     nicotine  1 patch Transdermal Daily     nicotine   Transdermal Q8H     predniSONE  40 mg Oral Daily with breakfast

## 2022-05-22 NOTE — PLAN OF CARE
Goal Outcome Evaluation:    Plan of Care Reviewed With: patient     Overall Patient Progress: improving    Outcome Evaluation: Patient on room air all night.  Tolerating well. Lung sounds with persistent expiratory wheezing, but patient feels breathing overall improving. Still is short of breath with any exertion.

## 2022-05-22 NOTE — PLAN OF CARE
S- Transfer to Med-surg 254 from .    B- Asthma exacerbation    A- Brief systems assessment: A&Ox4. VSS on room air. LS expiratory wheezes throughout. Mild dyspnea on exertion otherwise breathing reported as comfortable when at rest. Independent with activity and tolerating moving within the room.    R- Transfer to Med-surg 254 per physician orders. Continue to monitor pt and update physician as needed.      Code status: Full Code  Skin: Intact  Fall Risk: No  Isolation and Signage: Droplet  Medication drips upon transfer: Saline locked  Blue Bin checked and medications transfer out with patient: Yes

## 2022-05-23 ENCOUNTER — TELEPHONE (OUTPATIENT)
Dept: FAMILY MEDICINE | Facility: CLINIC | Age: 62
End: 2022-05-23

## 2022-05-23 VITALS
WEIGHT: 146.6 LBS | HEIGHT: 57 IN | OXYGEN SATURATION: 92 % | HEART RATE: 100 BPM | BODY MASS INDEX: 31.63 KG/M2 | RESPIRATION RATE: 18 BRPM | DIASTOLIC BLOOD PRESSURE: 69 MMHG | SYSTOLIC BLOOD PRESSURE: 118 MMHG | TEMPERATURE: 98.4 F

## 2022-05-23 LAB
BASE EXCESS BLDV CALC-SCNC: 4.2 MMOL/L (ref -7.7–1.9)
CREAT SERPL-MCNC: 0.46 MG/DL (ref 0.52–1.04)
GFR SERPL CREATININE-BSD FRML MDRD: >90 ML/MIN/1.73M2
GLUCOSE BLDC GLUCOMTR-MCNC: 140 MG/DL (ref 70–99)
GLUCOSE BLDC GLUCOMTR-MCNC: 75 MG/DL (ref 70–99)
HCO3 BLDV-SCNC: 29 MMOL/L (ref 21–28)
O2/TOTAL GAS SETTING VFR VENT: 21 %
PCO2 BLDV: 42 MM HG (ref 40–50)
PH BLDV: 7.44 [PH] (ref 7.32–7.43)
PLATELET # BLD AUTO: 225 10E3/UL (ref 150–450)
PO2 BLDV: 86 MM HG (ref 25–47)
POTASSIUM BLD-SCNC: 3.3 MMOL/L (ref 3.4–5.3)
WBC # BLD AUTO: 18.3 10E3/UL (ref 4–11)

## 2022-05-23 PROCEDURE — 82803 BLOOD GASES ANY COMBINATION: CPT | Performed by: PEDIATRICS

## 2022-05-23 PROCEDURE — 85049 AUTOMATED PLATELET COUNT: CPT | Performed by: PEDIATRICS

## 2022-05-23 PROCEDURE — 94640 AIRWAY INHALATION TREATMENT: CPT

## 2022-05-23 PROCEDURE — 94640 AIRWAY INHALATION TREATMENT: CPT | Mod: 76

## 2022-05-23 PROCEDURE — 99239 HOSP IP/OBS DSCHRG MGMT >30: CPT | Performed by: INTERNAL MEDICINE

## 2022-05-23 PROCEDURE — 84132 ASSAY OF SERUM POTASSIUM: CPT | Performed by: PEDIATRICS

## 2022-05-23 PROCEDURE — 36415 COLL VENOUS BLD VENIPUNCTURE: CPT | Performed by: PEDIATRICS

## 2022-05-23 PROCEDURE — 82565 ASSAY OF CREATININE: CPT | Performed by: PEDIATRICS

## 2022-05-23 PROCEDURE — 250N000012 HC RX MED GY IP 250 OP 636 PS 637: Performed by: PEDIATRICS

## 2022-05-23 PROCEDURE — 85048 AUTOMATED LEUKOCYTE COUNT: CPT | Performed by: PEDIATRICS

## 2022-05-23 PROCEDURE — 250N000013 HC RX MED GY IP 250 OP 250 PS 637: Performed by: PEDIATRICS

## 2022-05-23 PROCEDURE — 250N000009 HC RX 250: Performed by: PEDIATRICS

## 2022-05-23 RX ORDER — POTASSIUM CHLORIDE 750 MG/1
10 TABLET, EXTENDED RELEASE ORAL DAILY
Qty: 5 TABLET | Refills: 0 | Status: SHIPPED | OUTPATIENT
Start: 2022-05-23 | End: 2022-05-28

## 2022-05-23 RX ORDER — POTASSIUM CHLORIDE 1500 MG/1
40 TABLET, EXTENDED RELEASE ORAL ONCE
Status: COMPLETED | OUTPATIENT
Start: 2022-05-23 | End: 2022-05-23

## 2022-05-23 RX ORDER — PREDNISONE 20 MG/1
TABLET ORAL
Qty: 10 TABLET | Refills: 0 | Status: SHIPPED | OUTPATIENT
Start: 2022-05-23 | End: 2022-05-31

## 2022-05-23 RX ADMIN — ALBUTEROL SULFATE 2.5 MG: 2.5 SOLUTION RESPIRATORY (INHALATION) at 12:47

## 2022-05-23 RX ADMIN — PREDNISONE 40 MG: 20 TABLET ORAL at 09:17

## 2022-05-23 RX ADMIN — Medication 1 PATCH: at 09:21

## 2022-05-23 RX ADMIN — POTASSIUM CHLORIDE 40 MEQ: 1500 TABLET, EXTENDED RELEASE ORAL at 09:18

## 2022-05-23 RX ADMIN — ALBUTEROL SULFATE 2.5 MG: 2.5 SOLUTION RESPIRATORY (INHALATION) at 07:19

## 2022-05-23 RX ADMIN — BUDESONIDE 0.5 MG: 0.5 INHALANT RESPIRATORY (INHALATION) at 09:22

## 2022-05-23 RX ADMIN — CETIRIZINE HYDROCHLORIDE 10 MG: 10 TABLET, FILM COATED ORAL at 09:18

## 2022-05-23 RX ADMIN — ALBUTEROL SULFATE 2.5 MG: 2.5 SOLUTION RESPIRATORY (INHALATION) at 04:07

## 2022-05-23 RX ADMIN — ALBUTEROL SULFATE 2.5 MG: 2.5 SOLUTION RESPIRATORY (INHALATION) at 00:35

## 2022-05-23 ASSESSMENT — ACTIVITIES OF DAILY LIVING (ADL)
ADLS_ACUITY_SCORE: 20

## 2022-05-23 NOTE — TELEPHONE ENCOUNTER
Patient will be discharging from hospital, wondering if she can be worked in next week for hospital follow up.     Salome Garces RN

## 2022-05-23 NOTE — TELEPHONE ENCOUNTER
I left a call back message.    I  Am calling with the following per Mirta Fan NP.:  I can see her May 31st at 1000.  Mirta Fan, CNP

## 2022-05-23 NOTE — PLAN OF CARE
Goal Outcome Evaluation:    Plan of Care Reviewed With: patient     Overall Patient Progress: improving    Outcome Evaluation: Vss. Lungs have expiratory wheezes. Pt denies pain. Up independently to the bathroom, tolerates activity well. Denies SOA when up. Albuterol nebs as scheduled.

## 2022-05-23 NOTE — DISCHARGE INSTRUCTIONS
Mirta Fan's team will be calling you to set up this appointment.    If you do not hear from them within 48hrs please call the clinic.  [phone]

## 2022-05-23 NOTE — PLAN OF CARE
"Goal Outcome Evaluation:    Plan of Care Reviewed With: patient     Overall Patient Progress: improving    S-(situation): Patient discharged to home via wheelchair with daughter.    B-(background): Severe Asthma with status asthmaticus    A-(assessment): Vital signs:  Temp: 98.4  F (36.9  C) Temp src: Oral BP: 118/69 Pulse: 100   Resp: 18 SpO2: 92 % O2 Device: None (Room air) Oxygen Delivery: 2 LPM Height: 144.8 cm (4' 9\") Weight: 66.5 kg (146 lb 9.6 oz) Denies pain. Lungs are diminished with expiratory wheezing, receiving nebs. Independent in room. Slight dyspnea with exertion. Remains on room air in low to mid 90s.     R-(recommendations): Discharge instructions reviewed with patient. Listed belongings gathered and returned to patient.        Discharge Nursing Criteria:     Care Plan and Patient education resolved:     New Medications- pt has been educated about purpose and side effects:     Vaccines  Influenza status verified at discharge:      MISC  Prescriptions if needed, hard copies sent with patient  NA  Home medications returned to patient: NA  Medication Bin checked and emptied on discharge Yes  Patient reports post-discharge pain management plan is effective: Yes          "

## 2022-05-23 NOTE — PLAN OF CARE
"Goal Outcome Evaluation:    Plan of Care Reviewed With: patient          Outcome Evaluation: LS sattered wheezes.  RA.  reports SOB improving.  prednisone po.  nebs.  showered this afternoon.    /76 (BP Location: Left arm)   Pulse 100   Temp 97  F (36.1  C) (Oral)   Resp 18   Ht 1.448 m (4' 9\")   Wt 66.5 kg (146 lb 9.6 oz)   LMP  (LMP Unknown)   SpO2 94%   BMI 31.72 kg/m      "

## 2022-05-23 NOTE — DISCHARGE SUMMARY
[facility]    Discharge Summary  Hospitalist    Date of Admission:  5/20/2022  Date of Discharge:  5/23/2022  Discharging Provider: Leonard Ly MD, MD  Date of Service (when I saw the patient): 05/23/22    Discharge Diagnoses    Status asthmaticus  Acute respiratory failure with hypoxia  Severe persistent asthma  Lactic acidosis  Hypokalemia  Steroid-induced hyperglycemia    History of Present Illness  &Hospital Course   Fern Gonzalez is an 61 year old female with a history of severe persistent asthma and ongoing cigarette smoking who presents with several days of cold symptoms and worsening shortness of breath.  Patient did require some supplemental oxygen, intravenous steroids and BiPAP.  Its likely suspected patient's status asthmaticus was triggered by combination of the rhinovirus infection and ongoing smoking and seasonal allergies.  Chest x-ray was clear without evidence of pneumonia.  Patient was weaned off BiPAP and on oxygen and the IV's steroids were switched to oral prednisone.  Patient is now ambulatory and doing much better.  Did discuss with her seeing a pulmonologist as she is never seen one in the past.  Patient did have some hypokalemia which was replaced.  Will supplement for an additional 5 days postdischarge.  She did have some hyperglycemia which was felt due to steroids but her sugars have been under 200 with minimal intervention.  Of note hemoglobin A1c was 5.8 last month.      Leonard Ly MD, MD    Significant Results and Procedures   See above and below      Pending Results   These results will be followed up by Mirta Fan   Unresulted Labs Ordered in the Past 30 Days of this Admission     No orders found from 4/20/2022 to 5/21/2022.          Code Status   Full Code       Primary Care Physician   Mirta Fan    Physical Exam   Temp: 98.4  F (36.9  C) Temp src: Oral BP: 118/69 Pulse: 100   Resp: 18 SpO2: 92 % O2 Device: None (Room  air)    Vitals:    05/20/22 1400 05/21/22 0900 05/22/22 0500   Weight: 64.8 kg (142 lb 12.8 oz) 66.2 kg (146 lb) 66.5 kg (146 lb 9.6 oz)     Vital Signs with Ranges  Temp:  [97  F (36.1  C)-98.4  F (36.9  C)] 98.4  F (36.9  C)  Pulse:  [] 100  Resp:  [18-20] 18  BP: (118-145)/(69-81) 118/69  SpO2:  [92 %-95 %] 92 %  I/O last 3 completed shifts:  In: 480 [P.O.:480]  Out: 1800 [Urine:1800]    Constitutional: Awake, alert, cooperative, no apparent distress.   Eyes: Conjunctiva and pupils examined and normal.  HEENT: Moist mucous membranes, normal dentition.  Respiratory: Clear to auscultation bilaterally, no crackles or wheezing.  Occasional upper airway sound.  Air movement is good bilaterally  Cardiovascular: Regular rate and rhythm, normal S1 and S2, and no murmur noted.  GI: Soft, non-distended, non-tender, normal bowel sounds.        Discharge Disposition   Discharged to home  Condition at discharge: Stable    Consultations This Hospital Stay   RESPIRATORY CARE IP CONSULT  RESPIRATORY CARE IP CONSULT  SMOKING CESSATION PROGRAM IP CONSULT    Time Spent on this Encounter   ILeonard MD, personally saw the patient today and spent greater than 30 minutes discharging this patient.    Discharge Orders      Reason for your hospital stay    Acute asthma exacerbation, bronchitis     Follow-up and recommended labs and tests     Follow up with primary care provider, Mirta Fan, within 5 days for hospital follow- up.  No follow up labs or test are needed.  Reheck potassium, was needing replacement.     Activity    Your activity upon discharge: activity as tolerated     When to contact your care team    Call if increasing short of breath, fever 101 or greater, cough.     Follow-up and recommended labs and tests     Follow up with pulmonologist in future, primary provider can assist.     Diet    Follow this diet upon discharge: Orders Placed This Encounter      Regular Diet Adult     Discharge  Medications   Current Discharge Medication List      START taking these medications    Details   potassium chloride ER (KLOR-CON M) 10 MEQ CR tablet Take 1 tablet (10 mEq) by mouth daily for 5 days  Qty: 5 tablet, Refills: 0    Associated Diagnoses: Hypopotassemia         CONTINUE these medications which have CHANGED    Details   cetirizine (ZYRTEC) 10 MG tablet Take 1 tablet (10 mg) by mouth daily      fluticasone (FLONASE) 50 MCG/ACT nasal spray Spray 2 sprays into both nostrils daily as needed for allergies PRN    Associated Diagnoses: Acute non-recurrent frontal sinusitis; Seasonal allergic rhinitis, unspecified trigger      predniSONE (DELTASONE) 20 MG tablet Take 2 tablets (40 mg) by mouth daily for 2 days, THEN 1.5 tablets (30 mg) daily for 2 days, THEN 1 tablet (20 mg) daily for 2 days, THEN 0.5 tablets (10 mg) daily for 2 days.  Qty: 10 tablet, Refills: 0    Associated Diagnoses: Acute bronchitis due to Rhinovirus; SIRS (systemic inflammatory response syndrome) (H); Acute respiratory failure with hypoxia (H); Severe persistent asthma with status asthmaticus         CONTINUE these medications which have NOT CHANGED    Details   albuterol (PROAIR HFA/PROVENTIL HFA/VENTOLIN HFA) 108 (90 Base) MCG/ACT inhaler Inhale 1-2 puffs into the lungs every 4 hours as needed for shortness of breath / dyspnea or wheezing  Qty: 1 Inhaler, Refills: 0    Comments: Pharmacy may dispense brand covered by insurance (Proair, or proventil or ventolin or generic albuterol inhaler)  Associated Diagnoses: Acute bronchitis with coexisting condition requiring prophylactic treatment      benzonatate (TESSALON) 200 MG capsule Take 200 mg by mouth 3 times daily as needed for cough      budesonide (PULMICORT) 0.5 MG/2ML neb solution Inhale 0.5 mg into the lungs 2 times daily      ipratropium - albuterol 0.5 mg/2.5 mg/3 mL (DUONEB) 0.5-2.5 (3) MG/3ML neb solution Take 3 mLs by nebulization every 4 hours as needed      rizatriptan (MAXALT) 5  "MG tablet Take 1 tablet (5 mg) by mouth at onset of headache for migraine May repeat in 2 hours. Max 6 tablets/24 hours.  Qty: 10 tablet, Refills: 1    Associated Diagnoses: Migraine with status migrainosus, not intractable, unspecified migraine type         STOP taking these medications       Ibuprofen (ADVIL PO) Comments:   Reason for Stopping:             Allergies   Allergies   Allergen Reactions     Cephalexin Hcl Hives     Codeine Nausea and Vomiting     significant     Gabapentin Nausea and Vomiting     Morphine Hcl Nausea and Vomiting     Percocet [Oxycodone-Acetaminophen] Nausea and Vomiting     Significant; pt reports that plain Oxycodone she did fine with     Valium [Diazepam] Other (See Comments)     \"terrible nightmare\"     Vicodin [Hydrocodone-Acetaminophen] Nausea and Vomiting     significant     Cefazolin Rash     Data   Most Recent 3 CBC's:Recent Labs   Lab Test 05/23/22  0530 05/22/22  0514 05/21/22  0514 05/20/22  0715 04/07/22  0137 03/28/22  0939   WBC 18.3* 29.9* 27.4* 17.0* 13.7* 20.2*   HGB  --   --   --  12.7 12.8 14.5   MCV  --   --   --  95 96 93     --   --  226 278 286      Most Recent 3 BMP's:  Recent Labs   Lab Test 05/23/22  1142 05/23/22  0756 05/23/22  0530 05/22/22  2115 05/22/22  0750 05/22/22  0514 05/21/22  1240 05/21/22  1114 05/21/22  0640 05/21/22  0514 05/20/22  1845 05/20/22  0715 04/07/22  0137   NA  --   --   --   --   --  146*  --   --   --  144  --  141 137   POTASSIUM  --   --  3.3*  --   --  4.2  4.2  --  3.6  --  3.3*   < > 3.4 4.3   CHLORIDE  --   --   --   --   --  117*  --   --   --  115*  --  110* 108   CO2  --   --   --   --   --  26  --   --   --  24  --  22 18*   BUN  --   --   --   --   --   --   --   --   --  7  --  11 19   CR  --   --  0.46*  --   --   --   --   --   --  0.38*  --  0.50* 0.61   ANIONGAP  --   --   --   --   --  3  --   --   --  5  --  9 11   JOAQUIN  --   --   --   --   --   --   --   --   --  8.7  --  8.8 8.5   * 75  --  98   < " >  --    < >  --    < > 170*   < > 200* 291*    < > = values in this interval not displayed.     Most Recent 2 LFT's:  Recent Labs   Lab Test 05/20/22  0715 04/07/22  0137   AST 15 83*   ALT 19 50   ALKPHOS 70 81   BILITOTAL 0.3 0.2     Most Recent INR's and Anticoagulation Dosing History:  Anticoagulation Dose History     Recent Dosing and Labs Latest Ref Rng & Units 9/29/2011 10/11/2011    INR 0.86 - 1.14 1.08 1.03        Most Recent 3 Troponin's:  Recent Labs   Lab Test 09/01/15  1048   TROPI <0.015  The 99th percentile for upper reference range is 0.045 ug/L.  Troponin values in   the range of 0.045 - 0.120 ug/L may be associated with risks of adverse   clinical events.       Most Recent Cholesterol Panel:  Recent Labs   Lab Test 11/13/15  0840   CHOL 202*      HDL 60   TRIG 99     Most Recent 6 Bacteria Isolates From Any Culture (See EPIC Reports for Culture Details):No lab results found.  Most Recent TSH, T4 and A1c Labs:  Recent Labs   Lab Test 11/13/15  0840   TSH 1.04     Results for orders placed or performed during the hospital encounter of 05/20/22   XR Chest Port 1 View    Narrative    CHEST ONE VIEW PORTABLE   5/20/2022 7:51 AM     HISTORY: Shortness of air.    COMPARISON: 4/7/2022.      Impression    IMPRESSION: Unremarkable single view of the chest.     CRISTINE OHARA MD         SYSTEM ID:  [id number]

## 2022-05-24 ENCOUNTER — PATIENT OUTREACH (OUTPATIENT)
Dept: CARE COORDINATION | Facility: CLINIC | Age: 62
End: 2022-05-24

## 2022-05-24 DIAGNOSIS — J45.52 SEVERE PERSISTENT ASTHMA WITH STATUS ASTHMATICUS (H): Primary | ICD-10-CM

## 2022-05-24 DIAGNOSIS — Z71.89 OTHER SPECIFIED COUNSELING: ICD-10-CM

## 2022-05-24 NOTE — PROGRESS NOTES
Clinic Care Coordination Contact  [facility]: Post-Discharge Note  SITUATION                                                      Admission:    Admission Date: 05/20/22   Reason for Admission: Severe persistent asthma with status asthmaticus  Discharge:   Discharge Date: 05/23/22  Discharge Diagnosis: Severe persistent asthma with status asthmaticus    BACKGROUND                                                      Per hospital discharge summary and inpatient provider notes:    Fern Gonzalez is a 61 year old female who says she was in her usual health until May 16 when she developed sore throat.  She thinks she had some blisters in the back of her throat at that time.  Her sore throat improved over the last few days.  She also has some nasal congestion but does have allergies that are worse this time a year.  She started to have dry cough that progressed to cough productive of neon green phlegm.  She was using her nebulizer and inhaler at home which did not seem to help.  She was also taking her allergy pills.  She awoke from sleep overnight last night due to severe shortness of breath.  She again tried using her inhalers and nebulizer without benefit and therefore presented to the emergency room for evaluation.  In the ER she was noted to be in severe respiratory distress and was severely hypoxic with oxygen saturation 88% in room air, so she was quickly started on BiPAP in the emergency room.  According to Dr. Lacy, patient improved after starting BiPAP.  She was also treated in the ER with IV Solu-Medrol and several doses of bronchodilators.  Patient says that she did temporarily feel better after nebulizer treatments in the ER today but that she remains short of breath.  Dr. Lacy indicates that they attempted to transition the patient from BiPAP to low-flow nasal cannula oxygen supplementation which was not tolerated clinically due to increased work of breathing, so BiPAP was restarted and the  "patient was admitted to the ICU.  Patient is now seen in her ICU room for evaluation.    ASSESSMENT      Enrollment  Primary Care Care Coordination Status: Declined    Discharge Assessment  How are you doing now that you are home?: \"Feeling better after having an asthma attack this morning\"  How are your symptoms? (Red Flag symptoms escalate to triage hotline per guidelines): Improved  Do you feel your condition is stable enough to be safe at home until your provider visit?: Yes  Does the patient have their discharge instructions? : Yes  Does the patient have questions regarding their discharge instructions? : No  Were you started on any new medications or were there changes to any of your previous medications? : Yes  Does the patient have all of their medications?: Yes  Do you have questions regarding any of your medications? : No  Do you have all of your needed medical supplies or equipment (DME)?  (i.e. oxygen tank, CPAP, cane, etc.): No - What equipment or supplies are needed? (Nebulizer)  Discharge follow-up appointment scheduled within 14 calendar days? : Yes  Discharge Follow Up Appointment Date: 05/31/22  Discharge Follow Up Appointment Scheduled with?: Primary Care Provider    Post-op ([facility] Only)  If the patient had a surgery or procedure, do they have any questions for a nurse?: No    PLAN                                                      Outpatient Plan:    Follow up with primary care provider, Mirta Fan, within 5 days for hospital follow- up.  No follow up labs or test are needed.  Reheck potassium, was needing replacement.    Future Appointments   Date Time Provider Department Center   5/31/2022 10:00 AM Mirta Fan NP [facility]   7/29/2022  3:00 PM Tao Harrison MD [facility]   8/17/2022  3:00 PM Parviz Gudino MD [facility]         For any urgent concerns, please contact our 24 hour nurse triage line: [phone] ([phone])   "       Santino LINDSAY  [phone]  [facility]

## 2022-05-25 NOTE — TELEPHONE ENCOUNTER
I did send that to the pharmacy- please print off and bring to [facility].marked as high priority as she was just discharged for an asthma flare.  Mirta Fan, CNP

## 2022-05-31 ENCOUNTER — OFFICE VISIT (OUTPATIENT)
Dept: FAMILY MEDICINE | Facility: CLINIC | Age: 62
End: 2022-05-31

## 2022-05-31 VITALS
WEIGHT: 141.38 LBS | BODY MASS INDEX: 30.59 KG/M2 | SYSTOLIC BLOOD PRESSURE: 96 MMHG | OXYGEN SATURATION: 97 % | RESPIRATION RATE: 16 BRPM | HEART RATE: 85 BPM | TEMPERATURE: 98.8 F | DIASTOLIC BLOOD PRESSURE: 68 MMHG

## 2022-05-31 DIAGNOSIS — F17.200 TOBACCO USE DISORDER: ICD-10-CM

## 2022-05-31 DIAGNOSIS — E87.6 HYPOKALEMIA: ICD-10-CM

## 2022-05-31 DIAGNOSIS — Z13.220 SCREENING FOR HYPERLIPIDEMIA: ICD-10-CM

## 2022-05-31 DIAGNOSIS — J45.52 SEVERE PERSISTENT ASTHMA WITH STATUS ASTHMATICUS (H): Primary | ICD-10-CM

## 2022-05-31 LAB
ANION GAP SERPL CALCULATED.3IONS-SCNC: 6 MMOL/L (ref 3–14)
BASOPHILS # BLD MANUAL: 0.1 10E3/UL (ref 0–0.2)
BASOPHILS NFR BLD MANUAL: 1 %
BUN SERPL-MCNC: 22 MG/DL (ref 7–30)
CALCIUM SERPL-MCNC: 9.6 MG/DL (ref 8.5–10.1)
CHLORIDE BLD-SCNC: 105 MMOL/L (ref 94–109)
CO2 SERPL-SCNC: 28 MMOL/L (ref 20–32)
CREAT SERPL-MCNC: 0.72 MG/DL (ref 0.52–1.04)
EOSINOPHIL # BLD MANUAL: 0.1 10E3/UL (ref 0–0.7)
EOSINOPHIL NFR BLD MANUAL: 1 %
ERYTHROCYTE [DISTWIDTH] IN BLOOD BY AUTOMATED COUNT: 13.7 % (ref 10–15)
GFR SERPL CREATININE-BSD FRML MDRD: >90 ML/MIN/1.73M2
GLUCOSE BLD-MCNC: 103 MG/DL (ref 70–99)
HCT VFR BLD AUTO: 42.6 % (ref 35–47)
HGB BLD-MCNC: 14.6 G/DL (ref 11.7–15.7)
LYMPHOCYTES # BLD MANUAL: 3.7 10E3/UL (ref 0.8–5.3)
LYMPHOCYTES NFR BLD MANUAL: 25 %
MCH RBC QN AUTO: 32.2 PG (ref 26.5–33)
MCHC RBC AUTO-ENTMCNC: 34.3 G/DL (ref 31.5–36.5)
MCV RBC AUTO: 94 FL (ref 78–100)
MONOCYTES # BLD MANUAL: 1.5 10E3/UL (ref 0–1.3)
MONOCYTES NFR BLD MANUAL: 10 %
NEUTROPHILS # BLD MANUAL: 9.3 10E3/UL (ref 1.6–8.3)
NEUTROPHILS NFR BLD MANUAL: 63 %
PLAT MORPH BLD: ABNORMAL
PLATELET # BLD AUTO: 254 10E3/UL (ref 150–450)
POTASSIUM BLD-SCNC: 4.2 MMOL/L (ref 3.4–5.3)
RBC # BLD AUTO: 4.53 10E6/UL (ref 3.8–5.2)
RBC MORPH BLD: ABNORMAL
SODIUM SERPL-SCNC: 139 MMOL/L (ref 133–144)
WBC # BLD AUTO: 14.7 10E3/UL (ref 4–11)

## 2022-05-31 PROCEDURE — 80048 BASIC METABOLIC PNL TOTAL CA: CPT | Performed by: NURSE PRACTITIONER

## 2022-05-31 PROCEDURE — 85007 BL SMEAR W/DIFF WBC COUNT: CPT | Performed by: NURSE PRACTITIONER

## 2022-05-31 PROCEDURE — 85027 COMPLETE CBC AUTOMATED: CPT | Performed by: NURSE PRACTITIONER

## 2022-05-31 PROCEDURE — 99495 TRANSJ CARE MGMT MOD F2F 14D: CPT | Performed by: NURSE PRACTITIONER

## 2022-05-31 PROCEDURE — 36415 COLL VENOUS BLD VENIPUNCTURE: CPT | Performed by: NURSE PRACTITIONER

## 2022-05-31 RX ORDER — BUDESONIDE AND FORMOTEROL FUMARATE DIHYDRATE 160; 4.5 UG/1; UG/1
2 AEROSOL RESPIRATORY (INHALATION) 2 TIMES DAILY
Qty: 10.2 G | Refills: 3 | Status: SHIPPED | OUTPATIENT
Start: 2022-05-31 | End: 2022-07-22

## 2022-05-31 RX ORDER — IPRATROPIUM BROMIDE AND ALBUTEROL SULFATE 2.5; .5 MG/3ML; MG/3ML
3 SOLUTION RESPIRATORY (INHALATION) EVERY 4 HOURS PRN
Qty: 90 ML | Refills: 1 | Status: SHIPPED | OUTPATIENT
Start: 2022-05-31 | End: 2024-10-04

## 2022-05-31 RX ORDER — MONTELUKAST SODIUM 10 MG/1
10 TABLET ORAL AT BEDTIME
Qty: 90 TABLET | Refills: 0 | Status: SHIPPED | OUTPATIENT
Start: 2022-05-31 | End: 2022-07-22

## 2022-05-31 ASSESSMENT — PAIN SCALES - GENERAL: PAINLEVEL: NO PAIN (0)

## 2022-05-31 NOTE — PROGRESS NOTES
Assessment & Plan     Severe persistent asthma with status asthmaticus  Will add symbicort daily, singulair to manage allergies and refill nebulizer.  Referral to Pulmonology due to recurrent hospitalizaitons.  It has been triggered by a work chemical in past not the last time though and she is currently no longer exposed to this . Check labs after hospitalization.  Discussed tobacco cessation- she is down to 3 cigarettes a day.    - ipratropium - albuterol 0.5 mg/2.5 mg/3 mL (DUONEB) 0.5-2.5 (3) MG/3ML neb solution; Take 1 vial (3 mLs) by nebulization every 4 hours as needed  - budesonide-formoterol (SYMBICORT) 160-4.5 MCG/ACT Inhaler; Inhale 2 puffs into the lungs 2 times daily  - Adult Pulmonary Medicine Referral; Future  - Basic metabolic panel  (Ca, Cl, CO2, Creat, Gluc, K, Na, BUN); Future  - CBC with platelets and differential; Future  - montelukast (SINGULAIR) 10 MG tablet; Take 1 tablet (10 mg) by mouth At Bedtime  - Nebulizer and Supplies Order for DME - ONLY FOR DME  - Basic metabolic panel  (Ca, Cl, CO2, Creat, Gluc, K, Na, BUN)  - CBC with platelets and differential    Tobacco use disorder  As above    Hypokalemia  recheck    Screening for hyperlipidemia  Will check - is fasting this am      38 minutes spent on the date of the encounter doing chart review, review of test results, interpretation of tests, patient visit and documentation       Return in about 4 weeks (around 6/28/2022) for recheck with PCP.    Mirta Fan NP  [facility]    Sona Michel is a 61 year old who presents for the following health issues     History of Present Illness     Asthma:  She presents for follow up of asthma.  She has no cough, no wheezing, and some shortness of breath. She is using a relief medication daily. She typically misses taking her controller medication 1 time(s) per week.Patient is aware of the following triggers: animal dander, dust mites, exercise or sports, humidity,  "mold, pollens, smoke, strong odors and fumes and upper respiratory infections. The patient has had a visit to the Emergency Room, Urgent Care or Hospital due to asthma since the last clinic visit. She has been to the Emergency Room or Urgent Care 2 times.She has had a Hospitalization 2 times.    She eats 2-3 servings of fruits and vegetables daily.She consumes 1 sweetened beverage(s) daily.She exercises with enough effort to increase her heart rate 10 to 19 minutes per day.  She exercises with enough effort to increase her heart rate 3 or less days per week.   She is taking medications regularly.       Post Discharge Outreach 5/24/2022   Admission Date 5/20/2022   Reason for Admission Severe persistent asthma with status asthmaticus   Discharge Date 5/23/2022   Discharge Diagnosis Severe persistent asthma with status asthmaticus   How are you doing now that you are home? \"Feeling better after having an asthma attack this morning\"   How are your symptoms? (Red Flag symptoms escalate to triage hotline per guidelines) Improved   Do you feel your condition is stable enough to be safe at home until your provider visit? Yes   Does the patient have their discharge instructions?  Yes   Does the patient have questions regarding their discharge instructions?  No   Were you started on any new medications or were there changes to any of your previous medications?  Yes   Does the patient have all of their medications? Yes   Do you have questions regarding any of your medications?  No   Do you have all of your needed medical supplies or equipment (DME)?  (i.e. oxygen tank, CPAP, cane, etc.) No - What equipment or supplies are needed?   Discharge follow-up appointment scheduled within 14 calendar days?  Yes   Discharge Follow Up Appointment Date 5/31/2022   Discharge Follow Up Appointment Scheduled with? Primary Care Provider     Hospital Follow-up Visit:    Hospital/Nursing Home/IP Rehab Facility: [facility] " Center  Date of Admission: 05/20/2022  Date of Discharge: 05/23/2022  Reason(s) for Admission: Severe persistent asthma with status asthmaticus       Was your hospitalization related to COVID-19? No   Problems taking medications regularly:  None  Medication changes since discharge: None  Problems adhering to non-medication therapy:  None    Summary of hospitalization:  [facility] discharge summary reviewed  Diagnostic Tests/Treatments reviewed.  Follow up needed: referral Pulmonology  Other Healthcare Providers Involved in Patient s Care:         None  Update since discharge: improved.       Post Discharge Medication Reconciliation: discharge medications reconciled and changed, per note/orders.  Plan of care communicated with patient              Finished prednisone two days ago.  Since then   Has had steroid inhaler in past.  Has had some shortness of breath walking up here.  Discharged 8 days ago.  It has been triggered by chemicals in past- they felt triggered by rhinovirus.  Has severe asthma attack. Felt like being smothered. Takes allegra and zyrtec.  Has taken nasal sprays but gets bloody noses.      Review of Systems   Constitutional, HEENT, cardiovascular, pulmonary, GI, , musculoskeletal, neuro, skin, endocrine and psych systems are negative, except as otherwise noted.      Objective    BP 96/68 (BP Location: Left arm, Patient Position: Sitting, Cuff Size: Adult Regular)   Pulse 85   Temp 98.8  F (37.1  C) (Temporal)   Resp 16   Wt 64.1 kg (141 lb 6 oz)   LMP  (LMP Unknown)   SpO2 97%   BMI 30.59 kg/m    Body mass index is 30.59 kg/m .  Physical Exam   GENERAL: healthy, alert and no distress  EYES: Eyes grossly normal to inspection, PERRL and conjunctivae and sclerae normal  HENT: ear canals and TM's normal, nose and mouth without ulcers or lesions  NECK: no adenopathy, no asymmetry, masses, or scars and thyroid normal to palpation  RESP: lungs clear to auscultation - no rales,  rhonchi or wheezes  CV: regular rate and rhythm, normal S1 S2, no S3 or S4, no murmur, click or rub, no peripheral edema and peripheral pulses strong  ABDOMEN: soft, nontender, no hepatosplenomegaly, no masses and bowel sounds normal  MS: no gross musculoskeletal defects noted, no edema    No results found for this or any previous visit (from the past 24 hour(s)).

## 2022-05-31 NOTE — LETTER
[address] [address]  Phone: [phone]  Fax: [phone]    May 31, 2022        Fern Gonzalez  [address] [facility] [address]          To whom it may concern:    RE: Fern Gonzalez    Patient was seen and treated today at our clinic and missed work.  She was seen initially on 5/20/22 and missed work from 5/20/22 to 5/31/22.  She may return to work on 6/1/22.      Please contact me for questions or concerns.      Sincerely,        Mirta Fan NP

## 2022-06-03 ENCOUNTER — TELEPHONE (OUTPATIENT)
Dept: FAMILY MEDICINE | Facility: CLINIC | Age: 62
End: 2022-06-03

## 2022-06-03 NOTE — TELEPHONE ENCOUNTER
----- Message from Mirta Fan NP sent at 6/2/2022  3:47 PM CDT -----  Please call patient.  Her potassium, electrolytes, and kidney function are normal.  Blood sugar is elevated but you were not fasting so this is normal.  Your white blood cell count has improved.  We will check one more time to make sure it returns to normal.  Mirta Fan, CNP

## 2022-06-09 DIAGNOSIS — J45.52 SEVERE PERSISTENT ASTHMA WITH STATUS ASTHMATICUS (H): ICD-10-CM

## 2022-06-14 RX ORDER — IPRATROPIUM BROMIDE AND ALBUTEROL SULFATE 2.5; .5 MG/3ML; MG/3ML
3 SOLUTION RESPIRATORY (INHALATION) EVERY 4 HOURS PRN
Qty: 90 ML | Refills: 1 | OUTPATIENT
Start: 2022-06-14

## 2022-06-14 NOTE — TELEPHONE ENCOUNTER
Prescription was sent 5/31/22 for #90mL with 1 refills.  Pharmacy notified via E-Prescribe refusal.    HARMONY NickN, RN

## 2022-06-25 ENCOUNTER — HEALTH MAINTENANCE LETTER (OUTPATIENT)
Age: 62
End: 2022-06-25

## 2022-07-05 NOTE — PROGRESS NOTES
Ferndylan Gonzalez  Gender: female  : 1960  [address]  [facility] [address]  [phone] (home)     Medical Record: [id number]  Pharmacy:    [facility] [address] [place], [place] - [address] [facility] [address] [place], [place] - [address] [facility] [place], [place] - [address] [facility]   Primary Care Provider: Mirta Fan    Parent's names are: Winter Contreras (mother) and Data Unavailable (father).      [facility]  2022     Discharge Phone Call:  Key Words/Key Times    First attempt at callback. No answer,

## 2022-07-22 ENCOUNTER — OFFICE VISIT (OUTPATIENT)
Dept: FAMILY MEDICINE | Facility: CLINIC | Age: 62
End: 2022-07-22

## 2022-07-22 VITALS
WEIGHT: 145 LBS | OXYGEN SATURATION: 97 % | BODY MASS INDEX: 31.38 KG/M2 | TEMPERATURE: 97.8 F | SYSTOLIC BLOOD PRESSURE: 98 MMHG | HEART RATE: 101 BPM | DIASTOLIC BLOOD PRESSURE: 62 MMHG

## 2022-07-22 DIAGNOSIS — T38.0X5A STEROID-INDUCED HYPERGLYCEMIA: ICD-10-CM

## 2022-07-22 DIAGNOSIS — J45.52 SEVERE PERSISTENT ASTHMA WITH STATUS ASTHMATICUS (H): Primary | ICD-10-CM

## 2022-07-22 DIAGNOSIS — J30.89 PERENNIAL ALLERGIC RHINITIS WITH SEASONAL VARIATION: ICD-10-CM

## 2022-07-22 DIAGNOSIS — J30.2 PERENNIAL ALLERGIC RHINITIS WITH SEASONAL VARIATION: ICD-10-CM

## 2022-07-22 DIAGNOSIS — Z13.220 SCREENING FOR HYPERLIPIDEMIA: ICD-10-CM

## 2022-07-22 DIAGNOSIS — R73.9 STEROID-INDUCED HYPERGLYCEMIA: ICD-10-CM

## 2022-07-22 PROBLEM — M72.0 PALMAR FASCIITIS: Status: RESOLVED | Noted: 2017-05-23 | Resolved: 2022-07-22

## 2022-07-22 PROBLEM — Z98.890 POSTOPERATIVE STATE: Status: RESOLVED | Noted: 2017-09-25 | Resolved: 2022-07-22

## 2022-07-22 PROBLEM — E87.6 HYPOPOTASSEMIA: Status: RESOLVED | Noted: 2022-05-20 | Resolved: 2022-07-22

## 2022-07-22 PROBLEM — R20.0 NUMBNESS AND TINGLING OF BOTH UPPER EXTREMITIES WHILE SLEEPING: Status: RESOLVED | Noted: 2017-04-13 | Resolved: 2022-07-22

## 2022-07-22 PROBLEM — R20.2 NUMBNESS AND TINGLING OF BOTH UPPER EXTREMITIES WHILE SLEEPING: Status: RESOLVED | Noted: 2017-04-13 | Resolved: 2022-07-22

## 2022-07-22 PROBLEM — J20.6 ACUTE BRONCHITIS DUE TO RHINOVIRUS: Status: RESOLVED | Noted: 2022-05-21 | Resolved: 2022-07-22

## 2022-07-22 PROBLEM — R79.89 ELEVATED LACTIC ACID LEVEL: Status: RESOLVED | Noted: 2022-05-20 | Resolved: 2022-07-22

## 2022-07-22 LAB
ANION GAP SERPL CALCULATED.3IONS-SCNC: 6 MMOL/L (ref 3–14)
BUN SERPL-MCNC: 14 MG/DL (ref 7–30)
CALCIUM SERPL-MCNC: 9 MG/DL (ref 8.5–10.1)
CHLORIDE BLD-SCNC: 110 MMOL/L (ref 94–109)
CHOLEST SERPL-MCNC: 196 MG/DL
CO2 SERPL-SCNC: 28 MMOL/L (ref 20–32)
CREAT SERPL-MCNC: 0.63 MG/DL (ref 0.52–1.04)
FASTING STATUS PATIENT QL REPORTED: YES
GFR SERPL CREATININE-BSD FRML MDRD: >90 ML/MIN/1.73M2
GLUCOSE BLD-MCNC: 96 MG/DL (ref 70–99)
HDLC SERPL-MCNC: 52 MG/DL
LDLC SERPL CALC-MCNC: 111 MG/DL
NONHDLC SERPL-MCNC: 144 MG/DL
POTASSIUM BLD-SCNC: 4.1 MMOL/L (ref 3.4–5.3)
SODIUM SERPL-SCNC: 144 MMOL/L (ref 133–144)
TRIGL SERPL-MCNC: 163 MG/DL

## 2022-07-22 PROCEDURE — 99214 OFFICE O/P EST MOD 30 MIN: CPT | Performed by: NURSE PRACTITIONER

## 2022-07-22 PROCEDURE — 80048 BASIC METABOLIC PNL TOTAL CA: CPT | Performed by: NURSE PRACTITIONER

## 2022-07-22 PROCEDURE — 36415 COLL VENOUS BLD VENIPUNCTURE: CPT | Performed by: NURSE PRACTITIONER

## 2022-07-22 PROCEDURE — 80061 LIPID PANEL: CPT | Performed by: NURSE PRACTITIONER

## 2022-07-22 RX ORDER — BUDESONIDE AND FORMOTEROL FUMARATE DIHYDRATE 160; 4.5 UG/1; UG/1
2 AEROSOL RESPIRATORY (INHALATION) 2 TIMES DAILY
Qty: 10.2 G | Refills: 3 | Status: SHIPPED | OUTPATIENT
Start: 2022-07-22 | End: 2024-10-04

## 2022-07-22 RX ORDER — MONTELUKAST SODIUM 10 MG/1
10 TABLET ORAL AT BEDTIME
Qty: 90 TABLET | Refills: 0 | Status: SHIPPED | OUTPATIENT
Start: 2022-07-22 | End: 2023-02-21

## 2022-07-22 ASSESSMENT — ENCOUNTER SYMPTOMS: WHEEZING: 1

## 2022-07-22 ASSESSMENT — ASTHMA QUESTIONNAIRES: ACT_TOTALSCORE: 18

## 2022-07-22 ASSESSMENT — PAIN SCALES - GENERAL: PAINLEVEL: NO PAIN (0)

## 2022-07-22 NOTE — PROGRESS NOTES
Assessment & Plan     Severe persistent asthma with status asthmaticus  Marked improvement with starting singulair, symbicort.  Did not tolerate flonase had nose bleeds . Did quit her job where she felt it was aggrivated with chemicals and is starting a new job.  Still smoking and cessation stressed again though she has cut back.  Has appointment with pulmonology next week.    - budesonide-formoterol (SYMBICORT) 160-4.5 MCG/ACT Inhaler; Inhale 2 puffs into the lungs 2 times daily  - Basic metabolic panel  (Ca, Cl, CO2, Creat, Gluc, K, Na, BUN); Future  - montelukast (SINGULAIR) 10 MG tablet; Take 1 tablet (10 mg) by mouth At Bedtime  - Basic metabolic panel  (Ca, Cl, CO2, Creat, Gluc, K, Na, BUN)    Screening for hyperlipidemia  screen  - Lipid panel reflex to direct LDL Fasting; Future  - Lipid panel reflex to direct LDL Fasting    Steroid-induced hyperglycemia  recheck    Perennial allergic rhinitis with seasonal variation  Controlled on singulair.       35 minutes spent on the date of the encounter doing chart review, review of test results, interpretation of tests, patient visit and documentation        Nicotine/Tobacco Cessation:  She reports that she has been smoking cigarettes. She has smoked for the past 0.50 years. She has never used smokeless tobacco.  Nicotine/Tobacco Cessation Plan:   Information offered: Patient not interested at this time        Return in about 6 months (around 1/22/2023) for Physical Exam.    Mirta Fan NP  [facility] YVONNE Michel is a 61 year old, presenting for the following health issues:  Allergies      Allergies    History of Present Illness     Asthma:  She presents for follow up of asthma.  She has no cough, no wheezing, and no shortness of breath. She is using a relief medication daily. She does not miss any doses of her controller medication throughout the week.Patient is aware of the following triggers: same as previous visit,  humidity, mold, occupational exposure, smoke and strong odors and fumes. The patient has not had a visit to the Emergency Room, Urgent Care or Hospital due to asthma since the last clinic visit.     She eats 0-1 servings of fruits and vegetables daily.She consumes 0 sweetened beverage(s) daily.She exercises with enough effort to increase her heart rate 30 to 60 minutes per day.  She exercises with enough effort to increase her heart rate 3 or less days per week.   She is taking medications regularly.     Stopped flonase- gives her bloody noses.          Review of Systems   Respiratory: Positive for wheezing.       Constitutional, HEENT, cardiovascular, pulmonary, GI, , musculoskeletal, neuro, skin, endocrine and psych systems are negative, except as otherwise noted.      Objective    BP 98/62   Pulse 101   Temp 97.8  F (36.6  C) (Temporal)   Wt 65.8 kg (145 lb)   LMP  (LMP Unknown)   SpO2 97%   BMI 31.38 kg/m    Body mass index is 31.38 kg/m .  Physical Exam   GENERAL: healthy, alert and no distress  NECK: no adenopathy, no asymmetry, masses, or scars and thyroid normal to palpation  RESP: lungs clear to auscultation - no rales, rhonchi or wheezes  CV: regular rate and rhythm, normal S1 S2, no S3 or S4, no murmur, click or rub, no peripheral edema and peripheral pulses strong  ABDOMEN: soft, nontender, no hepatosplenomegaly, no masses and bowel sounds normal  MS: no gross musculoskeletal defects noted, no edema    Results for orders placed or performed in visit on 07/22/22   Basic metabolic panel  (Ca, Cl, CO2, Creat, Gluc, K, Na, BUN)     Status: Abnormal   Result Value Ref Range    Sodium 144 133 - 144 mmol/L    Potassium 4.1 3.4 - 5.3 mmol/L    Chloride 110 (H) 94 - 109 mmol/L    Carbon Dioxide (CO2) 28 20 - 32 mmol/L    Anion Gap 6 3 - 14 mmol/L    Urea Nitrogen 14 7 - 30 mg/dL    Creatinine 0.63 0.52 - 1.04 mg/dL    Calcium 9.0 8.5 - 10.1 mg/dL    Glucose 96 70 - 99 mg/dL    GFR Estimate >90 >60  mL/min/1.73m2   Lipid panel reflex to direct LDL Fasting     Status: Abnormal   Result Value Ref Range    Cholesterol 196 <200 mg/dL    Triglycerides 163 (H) <150 mg/dL    Direct Measure HDL 52 >=50 mg/dL    LDL Cholesterol Calculated 111 (H) <=100 mg/dL    Non HDL Cholesterol 144 (H) <130 mg/dL    Patient Fasting > 8hrs? Yes     Narrative    Cholesterol  Desirable:  <200 mg/dL    Triglycerides  Normal:  Less than 150 mg/dL  Borderline High:  150-199 mg/dL  High:  200-499 mg/dL  Very High:  Greater than or equal to 500 mg/dL    Direct Measure HDL  Female:  Greater than or equal to 50 mg/dL   Male:  Greater than or equal to 40 mg/dL    LDL Cholesterol  Desirable:  <100mg/dL  Above Desirable:  100-129 mg/dL   Borderline High:  130-159 mg/dL   High:  160-189 mg/dL   Very High:  >= 190 mg/dL    Non HDL Cholesterol  Desirable:  130 mg/dL  Above Desirable:  130-159 mg/dL  Borderline High:  160-189 mg/dL  High:  190-219 mg/dL  Very High:  Greater than or equal to 220 mg/dL               .  ..

## 2022-11-17 ENCOUNTER — TELEPHONE (OUTPATIENT)
Dept: FAMILY MEDICINE | Facility: CLINIC | Age: 62
End: 2022-11-17

## 2022-11-17 NOTE — TELEPHONE ENCOUNTER
Patient calling to state she is scheduled for a virtual for work comp injury and stating she now received forms that need completing. Virtual changed to an office visit with instructions to bring all of her work comp information with, and to arrive and check in at 1:40 pm tomorrow. Ivory Pulliam LPN

## 2022-11-18 ENCOUNTER — OFFICE VISIT (OUTPATIENT)
Dept: FAMILY MEDICINE | Facility: CLINIC | Age: 62
End: 2022-11-18
Payer: OTHER MISCELLANEOUS

## 2022-11-18 VITALS
RESPIRATION RATE: 10 BRPM | WEIGHT: 149 LBS | SYSTOLIC BLOOD PRESSURE: 90 MMHG | OXYGEN SATURATION: 97 % | BODY MASS INDEX: 31.28 KG/M2 | HEIGHT: 58 IN | TEMPERATURE: 97.6 F | DIASTOLIC BLOOD PRESSURE: 60 MMHG | HEART RATE: 89 BPM

## 2022-11-18 DIAGNOSIS — S39.012D STRAIN OF LUMBAR REGION, SUBSEQUENT ENCOUNTER: Primary | ICD-10-CM

## 2022-11-18 PROCEDURE — 99213 OFFICE O/P EST LOW 20 MIN: CPT | Performed by: FAMILY MEDICINE

## 2022-11-18 ASSESSMENT — PATIENT HEALTH QUESTIONNAIRE - PHQ9
10. IF YOU CHECKED OFF ANY PROBLEMS, HOW DIFFICULT HAVE THESE PROBLEMS MADE IT FOR YOU TO DO YOUR WORK, TAKE CARE OF THINGS AT HOME, OR GET ALONG WITH OTHER PEOPLE: NOT DIFFICULT AT ALL
SUM OF ALL RESPONSES TO PHQ QUESTIONS 1-9: 0
SUM OF ALL RESPONSES TO PHQ QUESTIONS 1-9: 0

## 2022-11-18 NOTE — PROGRESS NOTES
"  Assessment & Plan     Strain of lumbar region, subsequent encounter  Acute strain at work on 10/18/22 at [facility] in [place]. Was seen in Urgent Care with treatment with muscle relaxant and steroids. All symptoms resolved and she has been back at work without symptoms or restrictions since 10/26/22.  Exam normal,  Workability Form completed.              BMI:   Estimated body mass index is 30.86 kg/m  as calculated from the following:    Height as of this encounter: 1.48 m (4' 10.27\").    Weight as of this encounter: 67.6 kg (149 lb).   Weight management plan: Patient was referred to their PCP to discuss a diet and exercise plan.    Work on weight loss  Regular exercise    No follow-ups on file.    Bulmaro Craig MD  [facility] YVONNE Michel is a 62 year old, presenting for the following health issues:  Forms (Work comp )      History of Present Illness       Back Pain:  She presents for follow up of back pain. Patient's back pain is a new problem.    Original cause of back pain: lifting  First noticed back pain: 1-4 weeks ago  Patient feels back pain: rarelyLocation of back pain:  Left lower back  Description of back pain: other  Back pain spreads: left thigh    Since patient first noticed back pain, pain is: rapidly improving  Does back pain interfere with her job:  Not applicable  On a scale of 1-10 (10 being the worst), patient describes pain as:  1  What makes back pain worse: nothing  Acupuncture: not tried  Acetaminophen: helpful  Activity or exercise: helpful  Chiropractor:  Not tried  Cold: not tried  Heat: helpful  Massage: not tried  Muscle relaxants: helpful  NSAIDS: helpful  Opioids: not tried  Physical Therapy: not tried  Rest: helpful  Steroid Injection: not tried  Stretching: helpful  Surgery: not tried  TENS unit: not tried  Topical pain relievers: helpful  Other healthcare providers patient is seeing for back pain: Other    She eats 0-1 servings of fruits and " vegetables daily.She consumes 0 sweetened beverage(s) daily.She exercises with enough effort to increase her heart rate 60 or more minutes per day.  She exercises with enough effort to increase her heart rate 6 days per week.   She is taking medications regularly.    Today's PHQ-9         PHQ-9 Total Score: 0    PHQ-9 Q9 Thoughts of better off dead/self-harm past 2 weeks :   Not at all    How difficult have these problems made it for you to do your work, take care of things at home, or get along with other people: Not difficult at all       Patient Active Problem List   Diagnosis     CARDIOVASCULAR SCREENING; LDL GOAL LESS THAN 160     Anxiety     Tobacco use disorder     Major depressive disorder, recurrent episode, moderate (H)     Amnesia     Severe persistent asthma with status asthmaticus     Acute respiratory failure with hypoxia (H)     SIRS (systemic inflammatory response syndrome) (H)-tachycardia, tachypnea, leukocytosis     Perennial allergic rhinitis with seasonal variation     Steroid-induced hyperglycemia       Current Outpatient Medications   Medication Sig Dispense Refill     albuterol (PROAIR HFA/PROVENTIL HFA/VENTOLIN HFA) 108 (90 Base) MCG/ACT inhaler Inhale 1-2 puffs into the lungs every 4 hours as needed for shortness of breath / dyspnea or wheezing 1 Inhaler 0     budesonide (PULMICORT) 0.5 MG/2ML neb solution Inhale 0.5 mg into the lungs 2 times daily       budesonide-formoterol (SYMBICORT) 160-4.5 MCG/ACT Inhaler Inhale 2 puffs into the lungs 2 times daily 10.2 g 3     ipratropium - albuterol 0.5 mg/2.5 mg/3 mL (DUONEB) 0.5-2.5 (3) MG/3ML neb solution Take 1 vial (3 mLs) by nebulization every 4 hours as needed 90 mL 1     montelukast (SINGULAIR) 10 MG tablet Take 1 tablet (10 mg) by mouth At Bedtime 90 tablet 0     Multiple Vitamins-Minerals (CENTRUM SILVER 50+WOMEN) TABS Take 1 tablet by mouth daily       rizatriptan (MAXALT) 5 MG tablet Take 1 tablet (5 mg) by mouth at onset of headache for  "migraine May repeat in 2 hours. Max 6 tablets/24 hours. 10 tablet 1           Review of Systems   Constitutional, HEENT, cardiovascular, pulmonary, gi and gu systems are negative, except as otherwise noted.      Objective    BP 90/60   Pulse 89   Temp 97.6  F (36.4  C)   Resp 10   Ht 1.48 m (4' 10.27\")   Wt 67.6 kg (149 lb)   LMP  (LMP Unknown)   SpO2 97%   BMI 30.86 kg/m    Body mass index is 30.86 kg/m .  Physical Exam   GENERAL: healthy, alert and no distress  Comprehensive back pain exam:  No tenderness, Range of motion not limited by pain, Lower extremity strength functional and equal on both sides, Lower extremity reflexes within normal limits bilaterally, Lower extremity sensation normal and equal on both sides and Straight leg raise negative bilaterally                    Patient answers are not available for this visit.  "

## 2022-11-20 ENCOUNTER — HEALTH MAINTENANCE LETTER (OUTPATIENT)
Age: 62
End: 2022-11-20

## 2022-11-21 ENCOUNTER — APPOINTMENT (OUTPATIENT)
Dept: GENERAL RADIOLOGY | Facility: CLINIC | Age: 62
End: 2022-11-21
Attending: EMERGENCY MEDICINE
Payer: COMMERCIAL

## 2022-11-21 ENCOUNTER — HOSPITAL ENCOUNTER (EMERGENCY)
Facility: CLINIC | Age: 62
Discharge: HOME | End: 2022-11-21
Attending: EMERGENCY MEDICINE | Admitting: EMERGENCY MEDICINE
Payer: COMMERCIAL

## 2022-11-21 VITALS
RESPIRATION RATE: 22 BRPM | HEIGHT: 63 IN | OXYGEN SATURATION: 94 % | TEMPERATURE: 98.3 F | SYSTOLIC BLOOD PRESSURE: 126 MMHG | BODY MASS INDEX: 25.71 KG/M2 | WEIGHT: 145.1 LBS | HEART RATE: 88 BPM | DIASTOLIC BLOOD PRESSURE: 88 MMHG

## 2022-11-21 DIAGNOSIS — J10.1 INFLUENZA A: ICD-10-CM

## 2022-11-21 DIAGNOSIS — J45.901 EXACERBATION OF ASTHMA, UNSPECIFIED ASTHMA SEVERITY, UNSPECIFIED WHETHER PERSISTENT: ICD-10-CM

## 2022-11-21 LAB
ALBUMIN SERPL-MCNC: 3.6 G/DL (ref 3.4–5)
ALP SERPL-CCNC: 74 U/L (ref 40–150)
ALT SERPL W P-5'-P-CCNC: 27 U/L (ref 0–50)
ANION GAP SERPL CALCULATED.3IONS-SCNC: 8 MMOL/L (ref 3–14)
AST SERPL W P-5'-P-CCNC: 25 U/L (ref 0–45)
BASE EXCESS BLDV CALC-SCNC: 1.8 MMOL/L (ref -7.7–1.9)
BASOPHILS # BLD AUTO: 0 10E3/UL (ref 0–0.2)
BASOPHILS NFR BLD AUTO: 0 %
BILIRUB SERPL-MCNC: 0.2 MG/DL (ref 0.2–1.3)
BUN SERPL-MCNC: 14 MG/DL (ref 7–30)
CALCIUM SERPL-MCNC: 8.3 MG/DL (ref 8.5–10.1)
CHLORIDE BLD-SCNC: 102 MMOL/L (ref 94–109)
CO2 SERPL-SCNC: 23 MMOL/L (ref 20–32)
CREAT SERPL-MCNC: 0.47 MG/DL (ref 0.52–1.04)
EOSINOPHIL # BLD AUTO: 0 10E3/UL (ref 0–0.7)
EOSINOPHIL NFR BLD AUTO: 1 %
ERYTHROCYTE [DISTWIDTH] IN BLOOD BY AUTOMATED COUNT: 14 % (ref 10–15)
FLUAV RNA SPEC QL NAA+PROBE: POSITIVE
FLUBV RNA RESP QL NAA+PROBE: NEGATIVE
GFR SERPL CREATININE-BSD FRML MDRD: >90 ML/MIN/1.73M2
GLUCOSE BLD-MCNC: 129 MG/DL (ref 70–99)
HCO3 BLDV-SCNC: 26 MMOL/L (ref 21–28)
HCT VFR BLD AUTO: 38.5 % (ref 35–47)
HGB BLD-MCNC: 13.2 G/DL (ref 11.7–15.7)
IMM GRANULOCYTES # BLD: 0 10E3/UL
IMM GRANULOCYTES NFR BLD: 0 %
LACTATE SERPL-SCNC: 1.1 MMOL/L (ref 0.7–2)
LYMPHOCYTES # BLD AUTO: 1.4 10E3/UL (ref 0.8–5.3)
LYMPHOCYTES NFR BLD AUTO: 25 %
MCH RBC QN AUTO: 31.1 PG (ref 26.5–33)
MCHC RBC AUTO-ENTMCNC: 34.3 G/DL (ref 31.5–36.5)
MCV RBC AUTO: 91 FL (ref 78–100)
MONOCYTES # BLD AUTO: 1 10E3/UL (ref 0–1.3)
MONOCYTES NFR BLD AUTO: 18 %
NEUTROPHILS # BLD AUTO: 3.1 10E3/UL (ref 1.6–8.3)
NEUTROPHILS NFR BLD AUTO: 56 %
NRBC # BLD AUTO: 0 10E3/UL
NRBC BLD AUTO-RTO: 0 /100
O2/TOTAL GAS SETTING VFR VENT: 21 %
PCO2 BLDV: 37 MM HG (ref 40–50)
PH BLDV: 7.45 [PH] (ref 7.32–7.43)
PLATELET # BLD AUTO: 194 10E3/UL (ref 150–450)
PO2 BLDV: 50 MM HG (ref 25–47)
POTASSIUM BLD-SCNC: 3.5 MMOL/L (ref 3.4–5.3)
PROT SERPL-MCNC: 6.7 G/DL (ref 6.8–8.8)
RBC # BLD AUTO: 4.24 10E6/UL (ref 3.8–5.2)
RSV RNA SPEC NAA+PROBE: NEGATIVE
SARS-COV-2 RNA RESP QL NAA+PROBE: NEGATIVE
SODIUM SERPL-SCNC: 133 MMOL/L (ref 133–144)
WBC # BLD AUTO: 5.5 10E3/UL (ref 4–11)

## 2022-11-21 PROCEDURE — 250N000013 HC RX MED GY IP 250 OP 250 PS 637: Performed by: EMERGENCY MEDICINE

## 2022-11-21 PROCEDURE — 36415 COLL VENOUS BLD VENIPUNCTURE: CPT | Performed by: EMERGENCY MEDICINE

## 2022-11-21 PROCEDURE — 83605 ASSAY OF LACTIC ACID: CPT | Performed by: EMERGENCY MEDICINE

## 2022-11-21 PROCEDURE — 99284 EMERGENCY DEPT VISIT MOD MDM: CPT | Mod: CS | Performed by: EMERGENCY MEDICINE

## 2022-11-21 PROCEDURE — 250N000009 HC RX 250: Performed by: EMERGENCY MEDICINE

## 2022-11-21 PROCEDURE — 99284 EMERGENCY DEPT VISIT MOD MDM: CPT | Mod: CS,25 | Performed by: EMERGENCY MEDICINE

## 2022-11-21 PROCEDURE — 71045 X-RAY EXAM CHEST 1 VIEW: CPT

## 2022-11-21 PROCEDURE — 250N000011 HC RX IP 250 OP 636: Performed by: EMERGENCY MEDICINE

## 2022-11-21 PROCEDURE — 82803 BLOOD GASES ANY COMBINATION: CPT | Performed by: EMERGENCY MEDICINE

## 2022-11-21 PROCEDURE — 96361 HYDRATE IV INFUSION ADD-ON: CPT | Performed by: EMERGENCY MEDICINE

## 2022-11-21 PROCEDURE — C9803 HOPD COVID-19 SPEC COLLECT: HCPCS | Performed by: EMERGENCY MEDICINE

## 2022-11-21 PROCEDURE — 80053 COMPREHEN METABOLIC PANEL: CPT | Performed by: EMERGENCY MEDICINE

## 2022-11-21 PROCEDURE — 85025 COMPLETE CBC W/AUTO DIFF WBC: CPT | Performed by: EMERGENCY MEDICINE

## 2022-11-21 PROCEDURE — 96374 THER/PROPH/DIAG INJ IV PUSH: CPT | Performed by: EMERGENCY MEDICINE

## 2022-11-21 PROCEDURE — 94640 AIRWAY INHALATION TREATMENT: CPT | Performed by: EMERGENCY MEDICINE

## 2022-11-21 PROCEDURE — 87637 SARSCOV2&INF A&B&RSV AMP PRB: CPT | Performed by: EMERGENCY MEDICINE

## 2022-11-21 PROCEDURE — 258N000003 HC RX IP 258 OP 636: Performed by: EMERGENCY MEDICINE

## 2022-11-21 RX ORDER — IPRATROPIUM BROMIDE AND ALBUTEROL SULFATE 2.5; .5 MG/3ML; MG/3ML
1 SOLUTION RESPIRATORY (INHALATION) EVERY 6 HOURS PRN
Qty: 90 ML | Refills: 0 | Status: SHIPPED | OUTPATIENT
Start: 2022-11-21 | End: 2024-10-04

## 2022-11-21 RX ORDER — SODIUM CHLORIDE 9 MG/ML
INJECTION, SOLUTION INTRAVENOUS CONTINUOUS
Status: DISCONTINUED | OUTPATIENT
Start: 2022-11-21 | End: 2022-11-22 | Stop reason: HOSPADM

## 2022-11-21 RX ORDER — IPRATROPIUM BROMIDE AND ALBUTEROL SULFATE 2.5; .5 MG/3ML; MG/3ML
3 SOLUTION RESPIRATORY (INHALATION)
Status: COMPLETED | OUTPATIENT
Start: 2022-11-21 | End: 2022-11-21

## 2022-11-21 RX ORDER — IBUPROFEN 600 MG/1
600 TABLET, FILM COATED ORAL ONCE
Status: COMPLETED | OUTPATIENT
Start: 2022-11-21 | End: 2022-11-21

## 2022-11-21 RX ORDER — METHYLPREDNISOLONE SODIUM SUCCINATE 125 MG/2ML
125 INJECTION, POWDER, LYOPHILIZED, FOR SOLUTION INTRAMUSCULAR; INTRAVENOUS ONCE
Status: COMPLETED | OUTPATIENT
Start: 2022-11-21 | End: 2022-11-21

## 2022-11-21 RX ORDER — PREDNISONE 20 MG/1
TABLET ORAL
Qty: 10 TABLET | Refills: 0 | Status: SHIPPED | OUTPATIENT
Start: 2022-11-21 | End: 2024-05-09 | Stop reason: DRUGHIGH

## 2022-11-21 RX ORDER — BENZONATATE 200 MG/1
200 CAPSULE ORAL 3 TIMES DAILY PRN
Qty: 20 CAPSULE | Refills: 0 | Status: SHIPPED | OUTPATIENT
Start: 2022-11-21 | End: 2024-10-04

## 2022-11-21 RX ORDER — BUDESONIDE AND FORMOTEROL FUMARATE DIHYDRATE 160; 4.5 UG/1; UG/1
2 AEROSOL RESPIRATORY (INHALATION) 2 TIMES DAILY
Qty: 10.2 G | Refills: 0 | Status: SHIPPED | OUTPATIENT
Start: 2022-11-21

## 2022-11-21 RX ADMIN — SODIUM CHLORIDE 1000 ML: 9 INJECTION, SOLUTION INTRAVENOUS at 22:01

## 2022-11-21 RX ADMIN — IPRATROPIUM BROMIDE AND ALBUTEROL SULFATE 3 ML: 2.5; .5 SOLUTION RESPIRATORY (INHALATION) at 20:58

## 2022-11-21 RX ADMIN — IBUPROFEN 600 MG: 600 TABLET ORAL at 21:07

## 2022-11-21 RX ADMIN — METHYLPREDNISOLONE SODIUM SUCCINATE 125 MG: 125 INJECTION, POWDER, FOR SOLUTION INTRAMUSCULAR; INTRAVENOUS at 21:58

## 2022-11-21 ASSESSMENT — ACTIVITIES OF DAILY LIVING (ADL)
ADLS_ACUITY_SCORE: 35
ADLS_ACUITY_SCORE: 35

## 2022-11-21 NOTE — Clinical Note
Fern Gonzalez was seen and treated in our emergency department on 11/21/2022.  She may return to work on 11/28/2022.  May return to work if no longer running a fever for 24 hours, symptoms have improved, may return sooner if better     If you have any questions or concerns, please don't hesitate to call.      Megha Sargent, DO

## 2022-11-22 NOTE — ED PROVIDER NOTES
"  History     Chief Complaint   Patient presents with     Cough     Shortness of Breath     HPI  Fern Gonzalez is a 62 year old female who presents to the emergency room for concern of cough and shortness of breath.  Symptoms started on Saturday and have gotten worse throughout the weekend.  She does have underlying asthma, and uses inhalers as well as a nebulizer, but states that this did not help today.  Last nebulizer was about 2 hours ago with no improvement.  She felt like she could not breathe so she came to the emergency room for evaluation.  Cough is nonproductive.  Thinks that she has been running a fever since she wakes up in the middle of the night drenched in sweat, but has not taken her temperature at home.  Has not been vomiting.  Did not receive influenza or COVID vaccinations.    Allergies:  Allergies   Allergen Reactions     Cephalexin Hcl Hives     Codeine Nausea and Vomiting     significant     Gabapentin Nausea and Vomiting     Morphine Hcl Nausea and Vomiting     Percocet [Oxycodone-Acetaminophen] Nausea and Vomiting     Significant; pt reports that plain Oxycodone she did fine with     Valium [Diazepam] Other (See Comments)     \"terrible nightmare\"     Vicodin [Hydrocodone-Acetaminophen] Nausea and Vomiting     significant     Cefazolin Rash       Problem List:    Patient Active Problem List    Diagnosis Date Noted     Severe persistent asthma with status asthmaticus 05/20/2022     Priority: Medium     Acute respiratory failure with hypoxia (H) 05/20/2022     Priority: Medium     SIRS (systemic inflammatory response syndrome) (H)-tachycardia, tachypnea, leukocytosis 05/20/2022     Priority: Medium     Perennial allergic rhinitis with seasonal variation 05/20/2022     Priority: Medium     Steroid-induced hyperglycemia 05/20/2022     Priority: Medium     Amnesia 04/01/2019     Priority: Medium     Major depressive disorder, recurrent episode, moderate (H) 05/14/2015     Priority: Medium     " Tobacco use disorder 2015     Priority: Medium     Anxiety 08/15/2014     Priority: Medium     CARDIOVASCULAR SCREENING; LDL GOAL LESS THAN 160 10/07/2011     Priority: Medium        Past Medical History:    Past Medical History:   Diagnosis Date     Diagnostic skin and sensitization tests (aka ALLERGENS) 10/29/15 skin tests pos. for: T >> dog/DM only.      Esophageal reflux      Headache(784.0) 10/11/2011     Migraine, unspecified, without mention of intractable migraine without mention of status migrainosus      Recurrent sinusitis      Uncomplicated asthma      Varicella meningitis        Past Surgical History:    Past Surgical History:   Procedure Laterality Date     CHOLECYSTECTOMY, LAPOROSCOPIC  2007    Lysis of intestinal adhesions.     CL AFF SURGICAL PATHOLOGY       COLONOSCOPY N/A 10/31/2014    Procedure: COMBINED COLONOSCOPY, SINGLE OR MULTIPLE BIOPSY/POLYPECTOMY BY BIOPSY;  Surgeon: Leonard Dumont MD;  Location: [facility]     HC LAP,FULGURATE/EXCISE LESIONS  10/04    mesh present as had abdominal wall surgery     HYSTERECTOMY TOTAL ABDOMINAL       INJECT EPIDURAL CERVICAL Bilateral 2019    Procedure: INJECTION, SPINE, CERVICAL 6-7, EPIDURAL;  Surgeon: Des Rider MD;  Location: [facility] OR     RELEASE CARPAL TUNNEL Right 2016    Procedure: RELEASE CARPAL TUNNEL;  Surgeon: Leif Fine MD;  Location: [facility] OR     RELEASE CARPAL TUNNEL Left 10/5/2016    Procedure: RELEASE CARPAL TUNNEL;  Surgeon: Leif Fine MD;  Location: [facility] OR     RELEASE CARPAL TUNNEL Right 2016    Procedure: RELEASE CARPAL TUNNEL;  Surgeon: Leif Fine MD;  Location: [facility] OR     ZZC  DELIVERY ONLY      3 times     ZZHC UGI ENDOSCOPY, SIMPLE EXAM  10/26/2006       Family History:    Family History   Problem Relation Age of Onset     Thyroid Disease Maternal Grandmother      Thyroid Disease Sister      Coronary Artery Disease No family hx of      Hypertension No family hx of   "    Hyperlipidemia No family hx of      Cancer - colorectal No family hx of      Ovarian Cancer No family hx of      Other Cancer No family hx of      Mental Illness No family hx of      Cerebrovascular Disease No family hx of      Asthma No family hx of      Known Genetic Syndrome No family hx of        Social History:  Marital Status:  Single [1]  Social History     Tobacco Use     Smoking status: Every Day     Years: 0.50     Types: Cigarettes     Smokeless tobacco: Never     Tobacco comments:     Smoking 3 cigarettes a day   Vaping Use     Vaping Use: Former   Substance Use Topics     Alcohol use: Yes     Alcohol/week: 0.0 standard drinks     Comment: \"very rarely\"     Drug use: No        Medications:    albuterol (PROAIR HFA/PROVENTIL HFA/VENTOLIN HFA) 108 (90 Base) MCG/ACT inhaler  benzonatate (TESSALON) 200 MG capsule  budesonide (PULMICORT) 0.5 MG/2ML neb solution  budesonide-formoterol (SYMBICORT) 160-4.5 MCG/ACT Inhaler  budesonide-formoterol (SYMBICORT) 160-4.5 MCG/ACT Inhaler  ipratropium - albuterol 0.5 mg/2.5 mg/3 mL (DUONEB) 0.5-2.5 (3) MG/3ML neb solution  ipratropium - albuterol 0.5 mg/2.5 mg/3 mL (DUONEB) 0.5-2.5 (3) MG/3ML neb solution  montelukast (SINGULAIR) 10 MG tablet  Multiple Vitamins-Minerals (CENTRUM SILVER 50+WOMEN) TABS  predniSONE (DELTASONE) 20 MG tablet  rizatriptan (MAXALT) 5 MG tablet          Review of Systems   All other systems reviewed and are negative.      Physical Exam   BP: (!) 125/91  Pulse: 110  Temp: 98.3  F (36.8  C)  Resp: 24  Height: 160 cm (5' 3\")  Weight: 65.8 kg (145 lb 1.6 oz)  SpO2: 95 %      Physical Exam  Vitals and nursing note reviewed.   Constitutional:       General: She is not in acute distress.     Appearance: She is ill-appearing. She is not diaphoretic.   HENT:      Head: Atraumatic.      Mouth/Throat:      Pharynx: No oropharyngeal exudate.   Eyes:      General: No scleral icterus.     Pupils: Pupils are equal, round, and reactive to light. "   Cardiovascular:      Rate and Rhythm: Tachycardia present.      Heart sounds: Normal heart sounds.   Pulmonary:      Effort: No respiratory distress.      Breath sounds: Examination of the right-lower field reveals wheezing. Examination of the left-lower field reveals wheezing. Decreased breath sounds and wheezing present.   Abdominal:      General: Bowel sounds are normal.      Palpations: Abdomen is soft.      Tenderness: There is no abdominal tenderness.   Musculoskeletal:         General: No tenderness.   Skin:     General: Skin is warm.      Findings: No rash.   Neurological:      Mental Status: She is alert.         ED Course                 Procedures              Critical Care time:  none               Results for orders placed or performed during the hospital encounter of 11/21/22 (from the past 24 hour(s))   Symptomatic; Yes; 11/19/2022 Influenza A/B & SARS-CoV2 (COVID-19) Virus PCR Multiplex Nose    Specimen: Nose; Swab   Result Value Ref Range    Influenza A PCR Positive (A) Negative    Influenza B PCR Negative Negative    RSV PCR Negative Negative    SARS CoV2 PCR Negative Negative    Narrative    Testing was performed using the Xpert Xpress CoV2/Flu/RSV Assay on the [employer] GeneXpert Instrument. This test should be ordered for the detection of SARS-CoV-2 and influenza viruses in individuals who meet clinical and/or epidemiological criteria. Test performance is unknown in asymptomatic patients. This test is for in vitro diagnostic use under the FDA EUA for laboratories certified under CLIA to perform high or moderate complexity testing. This test has not been FDA cleared or approved. A negative result does not rule out the presence of PCR inhibitors in the specimen or target RNA in concentration below the limit of detection for the assay. If only one viral target is positive but coinfection with multiple targets is suspected, the sample should be re-tested with another FDA cleared, approved, or  authorized test, if coinfection would change clinical management. This test was validated by the [facility]. These laboratories are certified under the Clinical Laboratory Improvement Amendments of 1988 (CLIA-88) as qualified to perform high complexity laboratory testing.   CBC with platelets differential    Narrative    The following orders were created for panel order CBC with platelets differential.  Procedure                               Abnormality         Status                     ---------                               -----------         ------                     CBC with platelets and d...[[id number]]                      Final result                 Please view results for these tests on the individual orders.   Comprehensive metabolic panel   Result Value Ref Range    Sodium 133 133 - 144 mmol/L    Potassium 3.5 3.4 - 5.3 mmol/L    Chloride 102 94 - 109 mmol/L    Carbon Dioxide (CO2) 23 20 - 32 mmol/L    Anion Gap 8 3 - 14 mmol/L    Urea Nitrogen 14 7 - 30 mg/dL    Creatinine 0.47 (L) 0.52 - 1.04 mg/dL    Calcium 8.3 (L) 8.5 - 10.1 mg/dL    Glucose 129 (H) 70 - 99 mg/dL    Alkaline Phosphatase 74 40 - 150 U/L    AST 25 0 - 45 U/L    ALT 27 0 - 50 U/L    Protein Total 6.7 (L) 6.8 - 8.8 g/dL    Albumin 3.6 3.4 - 5.0 g/dL    Bilirubin Total 0.2 0.2 - 1.3 mg/dL    GFR Estimate >90 >60 mL/min/1.73m2   Lactic acid whole blood   Result Value Ref Range    Lactic Acid 1.1 0.7 - 2.0 mmol/L   Blood gas venous   Result Value Ref Range    pH Venous 7.45 (H) 7.32 - 7.43    pCO2 Venous 37 (L) 40 - 50 mm Hg    pO2 Venous 50 (H) 25 - 47 mm Hg    Bicarbonate Venous 26 21 - 28 mmol/L    Base Excess/Deficit (+/-) 1.8 -7.7 - 1.9 mmol/L    FIO2 21    CBC with platelets and differential   Result Value Ref Range    WBC Count 5.5 4.0 - 11.0 10e3/uL    RBC Count 4.24 3.80 - 5.20 10e6/uL    Hemoglobin 13.2 11.7 - 15.7 g/dL    Hematocrit 38.5 35.0 - 47.0 %    MCV 91 78 - 100 fL    MCH 31.1 26.5 - 33.0 pg     MCHC 34.3 31.5 - 36.5 g/dL    RDW 14.0 10.0 - 15.0 %    Platelet Count 194 150 - 450 10e3/uL    % Neutrophils 56 %    % Lymphocytes 25 %    % Monocytes 18 %    % Eosinophils 1 %    % Basophils 0 %    % Immature Granulocytes 0 %    NRBCs per 100 WBC 0 <1 /100    Absolute Neutrophils 3.1 1.6 - 8.3 10e3/uL    Absolute Lymphocytes 1.4 0.8 - 5.3 10e3/uL    Absolute Monocytes 1.0 0.0 - 1.3 10e3/uL    Absolute Eosinophils 0.0 0.0 - 0.7 10e3/uL    Absolute Basophils 0.0 0.0 - 0.2 10e3/uL    Absolute Immature Granulocytes 0.0 <=0.4 10e3/uL    Absolute NRBCs 0.0 10e3/uL   XR Chest Port 1 View    Narrative    EXAM: XR CHEST PORT 1 VIEW  LOCATION: [facility]  DATE/TIME: 11/21/2022 9:52 PM    INDICATION: Cough and dyspnea.  COMPARISON: 05/20/2022.      Impression    IMPRESSION: Focal airspace consolidation. No pleural effusion or pneumothorax.    Cardiomediastinal silhouette is normal. Atherosclerotic calcifications of the thoracic aorta.    Cholecystectomy.    Small, benign chondroid lesion versus infarct of the proximal left humerus, similar to prior.       Medications   0.9% sodium chloride BOLUS (1,000 mLs Intravenous New Bag 11/21/22 2201)     Followed by   sodium chloride 0.9% infusion (has no administration in time range)   ipratropium - albuterol 0.5 mg/2.5 mg/3 mL (DUONEB) neb solution 3 mL (3 mLs Nebulization Given 11/21/22 2058)   ibuprofen (ADVIL/MOTRIN) tablet 600 mg (600 mg Oral Given 11/21/22 2107)   methylPREDNISolone sodium succinate (solu-MEDROL) injection 125 mg (125 mg Intravenous Given 11/21/22 2158)       Assessments & Plan (with Medical Decision Making)  Anand is a 62-year-old female presenting to the emergency room with dyspnea.  See history and focused physical exam as above  62-year-old female in moderate distress, frequent coughing and feeling dyspneic, mildly tachycardic, may be related to recurrent albuterol nebulizer treatments at home and here in the department.  She  "is currently receiving a DuoNeb treatment, will insert peripheral IV to give IV fluids, steroid, and she is given ibuprofen for her headache.  Labs and imaging as above.  Positive for influenza A.  She is maintained oxygen saturations in the low to mid 90s throughout her ED stay.  She is feeling slightly improved but still has headache and says \"this probably will go away for a few days\".  Updated on findings and recommendations for treatment.  Did offer Tamiflu but ultimately she did not care for the side effects and so would like to forego this.  Prescriptions for Tessalon, DuoNeb's, and prednisone were sent to the pharmacy.  She also says she needs a refill of her Symbicort, which was given as well.  Provided with a work note.  Advised to return to the emergency room any new or worsening symptoms develop.  Discharged in no distress     I have reviewed the nursing notes.    I have reviewed the findings, diagnosis, plan and need for follow up with the patient.       New Prescriptions    BENZONATATE (TESSALON) 200 MG CAPSULE    Take 1 capsule (200 mg) by mouth 3 times daily as needed for cough    BUDESONIDE-FORMOTEROL (SYMBICORT) 160-4.5 MCG/ACT INHALER    Inhale 2 puffs into the lungs 2 times daily    IPRATROPIUM - ALBUTEROL 0.5 MG/2.5 MG/3 ML (DUONEB) 0.5-2.5 (3) MG/3ML NEB SOLUTION    Take 1 vial (3 mLs) by nebulization every 6 hours as needed for shortness of breath / dyspnea or wheezing    PREDNISONE (DELTASONE) 20 MG TABLET    Take two tablets (= 40mg) each day for 5 (five) days       Final diagnoses:   Influenza A   Exacerbation of asthma, unspecified asthma severity, unspecified whether persistent       11/21/2022   [facility] EMERGENCY DEPT     Arie Megha Isis,   11/21/22 2929    "

## 2022-11-22 NOTE — DISCHARGE INSTRUCTIONS
Your testing was positive for influenza.  Did not see any sign of pneumonia    Your other lab work is consistent with an asthma exacerbation, likely due to influenza    You are given a dose of steroid here in the emergency room today.  A prescription for ongoing steroid was sent to your pharmacy, you may start this tomorrow.  You should take this as early in the morning as possible so it does not interfere with sleep.  You may take the steroid for a total of 5 additional days\    The nebulizer solution was refilled as was your Symbicort inhaler    A prescription for cough medicine, Tessalon, was sent to the pharmacy as well.  You may take 1 tablet up to 3 times daily as needed for cough    If you develop any new or worsening symptoms do not hesitate to return to the emergency room for evaluation.  Otherwise you may follow-up with your primary provider in clinic

## 2022-11-22 NOTE — ED TRIAGE NOTES
Patient with cough/shortness of breath since Saturday. Neb at home last 2 hours ago.      Triage Assessment     Row Name 11/21/22 2033       Triage Assessment (Adult)    Airway WDL WDL       Respiratory WDL    Respiratory WDL cough;X       Skin Circulation/Temperature WDL    Skin Circulation/Temperature WDL WDL       Cardiac WDL    Cardiac WDL WDL

## 2022-12-07 ENCOUNTER — HOSPITAL ENCOUNTER (EMERGENCY)
Facility: CLINIC | Age: 62
Discharge: HOME | End: 2022-12-07
Attending: EMERGENCY MEDICINE | Admitting: EMERGENCY MEDICINE
Payer: COMMERCIAL

## 2022-12-07 VITALS
TEMPERATURE: 98 F | RESPIRATION RATE: 18 BRPM | OXYGEN SATURATION: 99 % | SYSTOLIC BLOOD PRESSURE: 102 MMHG | HEART RATE: 109 BPM | DIASTOLIC BLOOD PRESSURE: 76 MMHG

## 2022-12-07 DIAGNOSIS — U07.1 COVID: ICD-10-CM

## 2022-12-07 LAB
FLUAV RNA SPEC QL NAA+PROBE: NEGATIVE
FLUBV RNA RESP QL NAA+PROBE: NEGATIVE
RSV RNA SPEC NAA+PROBE: NEGATIVE
SARS-COV-2 RNA RESP QL NAA+PROBE: POSITIVE

## 2022-12-07 PROCEDURE — 99284 EMERGENCY DEPT VISIT MOD MDM: CPT | Mod: CS | Performed by: EMERGENCY MEDICINE

## 2022-12-07 PROCEDURE — C9803 HOPD COVID-19 SPEC COLLECT: HCPCS | Performed by: EMERGENCY MEDICINE

## 2022-12-07 PROCEDURE — 87637 SARSCOV2&INF A&B&RSV AMP PRB: CPT | Performed by: EMERGENCY MEDICINE

## 2022-12-07 PROCEDURE — 250N000011 HC RX IP 250 OP 636: Performed by: EMERGENCY MEDICINE

## 2022-12-07 RX ORDER — DEXAMETHASONE 6 MG/1
6 TABLET ORAL DAILY
Qty: 7 TABLET | Refills: 0 | Status: SHIPPED | OUTPATIENT
Start: 2022-12-07 | End: 2024-10-04

## 2022-12-07 RX ADMIN — DEXAMETHASONE 10 MG: 4 TABLET ORAL at 16:56

## 2022-12-07 NOTE — ED PROVIDER NOTES
"  History     Chief Complaint   Patient presents with     Headache     Cough     HPI  Fern Gonzalez is a 62 year old female who presents to the ER secondary to headache, cough, fatigue.  Daughter has similar illness. She had influenza A two weeks ago. Symptoms started 3 days ago.  No vomiting or abdominal pain.  No significant shortness of breath.  She has had a headache.  She is drinking plenty of water and has had normal urine output.  She does not believe she is dehydrated.  No significant new body aches.    Allergies:  Allergies   Allergen Reactions     Cephalexin Hcl Hives     Codeine Nausea and Vomiting     significant     Gabapentin Nausea and Vomiting     Morphine Hcl Nausea and Vomiting     Percocet [Oxycodone-Acetaminophen] Nausea and Vomiting     Significant; pt reports that plain Oxycodone she did fine with     Valium [Diazepam] Other (See Comments)     \"terrible nightmare\"     Vicodin [Hydrocodone-Acetaminophen] Nausea and Vomiting     significant     Cefazolin Rash       Problem List:    Patient Active Problem List    Diagnosis Date Noted     Severe persistent asthma with status asthmaticus 05/20/2022     Priority: Medium     Acute respiratory failure with hypoxia (H) 05/20/2022     Priority: Medium     SIRS (systemic inflammatory response syndrome) (H)-tachycardia, tachypnea, leukocytosis 05/20/2022     Priority: Medium     Perennial allergic rhinitis with seasonal variation 05/20/2022     Priority: Medium     Steroid-induced hyperglycemia 05/20/2022     Priority: Medium     Amnesia 04/01/2019     Priority: Medium     Major depressive disorder, recurrent episode, moderate (H) 05/14/2015     Priority: Medium     Tobacco use disorder 04/08/2015     Priority: Medium     Anxiety 08/15/2014     Priority: Medium     CARDIOVASCULAR SCREENING; LDL GOAL LESS THAN 160 10/07/2011     Priority: Medium        Past Medical History:    Past Medical History:   Diagnosis Date     Diagnostic skin and sensitization " tests (aka ALLERGENS) 10/29/15 skin tests pos. for: T >> dog/DM only.      Esophageal reflux      Headache(784.0) 10/11/2011     Migraine, unspecified, without mention of intractable migraine without mention of status migrainosus      Recurrent sinusitis      Uncomplicated asthma      Varicella meningitis        Past Surgical History:    Past Surgical History:   Procedure Laterality Date     CHOLECYSTECTOMY, LAPOROSCOPIC  2007    Lysis of intestinal adhesions.     [facility] SURGICAL PATHOLOGY       COLONOSCOPY N/A 10/31/2014    Procedure: COMBINED COLONOSCOPY, SINGLE OR MULTIPLE BIOPSY/POLYPECTOMY BY BIOPSY;  Surgeon: Leonard Dumont MD;  Location: [facility]     HC LAP,FULGURATE/EXCISE LESIONS  10/04    mesh present as had abdominal wall surgery     HYSTERECTOMY TOTAL ABDOMINAL       INJECT EPIDURAL CERVICAL Bilateral 2019    Procedure: INJECTION, SPINE, CERVICAL 6-7, EPIDURAL;  Surgeon: Des Rider MD;  Location: [facility]     RELEASE CARPAL TUNNEL Right 2016    Procedure: RELEASE CARPAL TUNNEL;  Surgeon: Leif Fine MD;  Location: [facility]     RELEASE CARPAL TUNNEL Left 10/5/2016    Procedure: RELEASE CARPAL TUNNEL;  Surgeon: Leif iFne MD;  Location: [facility]     RELEASE CARPAL TUNNEL Right 2016    Procedure: RELEASE CARPAL TUNNEL;  Surgeon: Leif Fine MD;  Location: [facility]     ZZC  DELIVERY ONLY      3 times     ZZHC UGI ENDOSCOPY, SIMPLE EXAM  10/26/2006       Family History:    Family History   Problem Relation Age of Onset     Thyroid Disease Maternal Grandmother      Thyroid Disease Sister      Coronary Artery Disease No family hx of      Hypertension No family hx of      Hyperlipidemia No family hx of      Cancer - colorectal No family hx of      Ovarian Cancer No family hx of      Other Cancer No family hx of      Mental Illness No family hx of      Cerebrovascular Disease No family hx of      Asthma No family hx of      Known Genetic Syndrome No family  "hx of        Social History:  Marital Status:  Single [1]  Social History     Tobacco Use     Smoking status: Every Day     Years: 0.50     Types: Cigarettes     Smokeless tobacco: Never     Tobacco comments:     Smoking 3 cigarettes a day   Vaping Use     Vaping Use: Former   Substance Use Topics     Alcohol use: Yes     Alcohol/week: 0.0 standard drinks     Comment: \"very rarely\"     Drug use: No        Medications:    dexamethasone (DECADRON) 6 MG tablet  albuterol (PROAIR HFA/PROVENTIL HFA/VENTOLIN HFA) 108 (90 Base) MCG/ACT inhaler  benzonatate (TESSALON) 200 MG capsule  budesonide (PULMICORT) 0.5 MG/2ML neb solution  budesonide-formoterol (SYMBICORT) 160-4.5 MCG/ACT Inhaler  budesonide-formoterol (SYMBICORT) 160-4.5 MCG/ACT Inhaler  ipratropium - albuterol 0.5 mg/2.5 mg/3 mL (DUONEB) 0.5-2.5 (3) MG/3ML neb solution  ipratropium - albuterol 0.5 mg/2.5 mg/3 mL (DUONEB) 0.5-2.5 (3) MG/3ML neb solution  montelukast (SINGULAIR) 10 MG tablet  Multiple Vitamins-Minerals (CENTRUM SILVER 50+WOMEN) TABS  predniSONE (DELTASONE) 20 MG tablet  rizatriptan (MAXALT) 5 MG tablet          Review of Systems   All other systems reviewed and are negative.      Physical Exam   BP: 102/76  Pulse: 109  Temp: 98  F (36.7  C)  Resp: 18  SpO2: 99 %      Physical Exam  Vitals and nursing note reviewed.   Constitutional:       General: She is not in acute distress.     Appearance: She is well-developed and well-nourished. She is not diaphoretic.   HENT:      Head: Normocephalic and atraumatic.      Right Ear: External ear normal.      Left Ear: External ear normal.      Nose: Nose normal.      Mouth/Throat:      Mouth: Mucous membranes are moist.   Eyes:      General: No scleral icterus.     Pupils: Pupils are equal, round, and reactive to light.   Cardiovascular:      Rate and Rhythm: Tachycardia present.   Pulmonary:      Effort: Pulmonary effort is normal.      Breath sounds: Wheezing present.      Comments: Light scattered " wheezes  Abdominal:      Tenderness: There is no abdominal tenderness. There is no guarding or rebound.   Musculoskeletal:         General: Normal range of motion.      Cervical back: Normal range of motion and neck supple.      Right lower leg: No edema.      Left lower leg: No edema.   Skin:     General: Skin is warm and dry.      Coloration: Skin is not pale.      Findings: No erythema or rash.   Neurological:      General: No focal deficit present.      Mental Status: She is alert and oriented to person, place, and time.   Psychiatric:         Mood and Affect: Mood normal.         ED Course                 Procedures           Results for orders placed or performed during the hospital encounter of 12/07/22 (from the past 24 hour(s))   Symptomatic Influenza A/B & SARS-CoV2 (COVID-19) Virus PCR Multiplex Nasopharyngeal    Specimen: Nasopharyngeal; Swab   Result Value Ref Range    Influenza A PCR Negative Negative    Influenza B PCR Negative Negative    RSV PCR Negative Negative    SARS CoV2 PCR Positive (A) Negative    Narrative    Testing was performed using the Xpert Xpress CoV2/Flu/RSV Assay on the [employer] GeneXpert Instrument. This test should be ordered for the detection of SARS-CoV-2 and influenza viruses in individuals who meet clinical and/or epidemiological criteria. Test performance is unknown in asymptomatic patients. This test is for in vitro diagnostic use under the FDA EUA for laboratories certified under CLIA to perform high or moderate complexity testing. This test has not been FDA cleared or approved. A negative result does not rule out the presence of PCR inhibitors in the specimen or target RNA in concentration below the limit of detection for the assay. If only one viral target is positive but coinfection with multiple targets is suspected, the sample should be re-tested with another FDA cleared, approved, or authorized test, if coinfection would change clinical management. This test was  validated by the [facility]. These laboratories are certified under the Clinical Laboratory Improvement Amendments of 1988 (CLIA-88) as qualified to perform high complexity laboratory testing.       Medications   dexamethasone (DECADRON) tablet 10 mg (10 mg Oral Given 12/7/22 1656)       Assessments & Plan (with Medical Decision Making)  62-year-old female with COVID.  No findings consistent with pneumonia.  Chest x-ray not performed.  I discussed options with the patient including treatment with Toradol for her headache and she would rather go home and take ibuprofen.  She feels that she is well-hydrated and therefore IV fluids are not necessary.  She does not qualify for antivirals.  Return to ER precautions and fall precautions discussed.  She does have underlying asthma and so was given Decadron here in the emergency department and will be sent home with a week of Decadron.  She has a nebulizer at home which she will be using.  She had some faint wheezing on exam.  The patient was discharged home in stable condition.  Return to ER precautions discussed.     I have reviewed the nursing notes.    I have reviewed the findings, diagnosis, plan and need for follow up with the patient.      Discharge Medication List as of 12/7/2022  4:47 PM      START taking these medications    Details   dexamethasone (DECADRON) 6 MG tablet Take 1 tablet (6 mg) by mouth daily for 7 days, Disp-7 tablet, R-0, E-Prescribe             Final diagnoses:   COVID       12/7/2022   [facility] EMERGENCY DEPT     Enrico Manuel MD  12/07/22 1158

## 2022-12-07 NOTE — LETTER
December 7, 2022      To Whom It May Concern:      Fern Gonzalez was seen in our Emergency Department today, 12/07/22.  I expect her condition to improve over the next 7 days.  She may return to work when improved and it has been at least 7 days since onset of symptoms.    Sincerely,        Enrico Manuel MD

## 2022-12-07 NOTE — ED TRIAGE NOTES
Influenza A 2 weeks ago - was better for about 5 days. Now since Monday severe headache, coughing, fatigue.      Triage Assessment     Row Name 12/07/22 1500       Triage Assessment (Adult)    Airway WDL WDL       Respiratory WDL    Respiratory WDL WDL       Cardiac WDL    Cardiac WDL WDL

## 2022-12-07 NOTE — DISCHARGE INSTRUCTIONS
Your COVID test was positive today.  Please take the Decadron as this may help with the wheezing.  Continue use your DuoNeb's.  I hope you get better quickly.  There is no antivirals that you would qualify for at this point.  It was a pleasure to meet you.  Avoid others.

## 2023-02-14 ENCOUNTER — TELEPHONE (OUTPATIENT)
Dept: FAMILY MEDICINE | Facility: CLINIC | Age: 63
End: 2023-02-14
Payer: COMMERCIAL

## 2023-02-14 NOTE — TELEPHONE ENCOUNTER
Reason for Call:  Appointment Request    Patient requesting this type of appt: Chronic Diease Management/Medication/Follow-Up    Requested provider: Mirta Fan    Reason patient unable to be scheduled: Not within requested timeframe    When does patient want to be seen/preferred time: 3-7 days    Comments: Patient requesting follow up appointment with primary care provider for left wrist fracture and work restriction paper work. Unable to find available appointment within next two weeks.    Could we send this information to you in [employer] or would you prefer to receive a phone call?:   Patient would prefer a phone call   Okay to leave a detailed message?: Yes at Home number on file [phone] (home)    Call taken on 2/14/2023 at 3:30 PM by Milena Arizmendi

## 2023-02-21 ENCOUNTER — OFFICE VISIT (OUTPATIENT)
Dept: FAMILY MEDICINE | Facility: OTHER | Age: 63
End: 2023-02-21
Payer: COMMERCIAL

## 2023-02-21 VITALS
HEART RATE: 88 BPM | HEIGHT: 57 IN | BODY MASS INDEX: 30.85 KG/M2 | DIASTOLIC BLOOD PRESSURE: 70 MMHG | SYSTOLIC BLOOD PRESSURE: 102 MMHG | WEIGHT: 143 LBS | TEMPERATURE: 98 F | OXYGEN SATURATION: 97 %

## 2023-02-21 DIAGNOSIS — G56.03 BILATERAL CARPAL TUNNEL SYNDROME: Primary | ICD-10-CM

## 2023-02-21 DIAGNOSIS — J45.52 SEVERE PERSISTENT ASTHMA WITH STATUS ASTHMATICUS (H): ICD-10-CM

## 2023-02-21 PROCEDURE — 99213 OFFICE O/P EST LOW 20 MIN: CPT | Performed by: PHYSICIAN ASSISTANT

## 2023-02-21 RX ORDER — MONTELUKAST SODIUM 10 MG/1
10 TABLET ORAL AT BEDTIME
Qty: 90 TABLET | Refills: 1 | Status: SHIPPED | OUTPATIENT
Start: 2023-02-21 | End: 2024-10-04

## 2023-02-21 RX ORDER — IBUPROFEN 200 MG
400 TABLET ORAL EVERY 4 HOURS PRN
COMMUNITY
End: 2024-10-04

## 2023-02-21 RX ORDER — MONTELUKAST SODIUM 10 MG/1
10 TABLET ORAL AT BEDTIME
Qty: 90 TABLET | Refills: 0 | Status: CANCELLED | OUTPATIENT
Start: 2023-02-21

## 2023-02-21 ASSESSMENT — PAIN SCALES - GENERAL: PAINLEVEL: NO PAIN (0)

## 2023-02-21 NOTE — LETTER
February 21, 2023      [address] [address] [facility] [address]        To Whom It May Concern,     Fern has been under the care of family medicine and specialty orthopedic providers for management of the below conditions for the past several years.     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

## 2023-02-21 NOTE — PROGRESS NOTES
Assessment & Plan     Bilateral carpal tunnel syndrome  Patient has history of bilateral carpal tunnel which was last addressed by orthopedics in 01/2018. Review of this note shows that, at this time, the patient was not interested in injection or surgical correction. She was placed on restrictions relevant to the duties she had been performing at work. She has since changed employers, now working for [employer]. 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 She has been doing well with majority of these new duties, but finds that fine pinching motions such as those required to hand screw nuts/bolts, etc. trigger her symptoms. I reviewed previous note from the aforementioned visit and updated it to reflect new limitations. Can consider OT evaluation if employer needs more specific restrictions.      Severe persistent asthma with status asthmaticus  Reviewed possible adverse effects and refill sent to the pharmacy. Patient will reach out if not tolerating.   - montelukast (SINGULAIR) 10 MG tablet; Take 1 tablet (10 mg) by mouth At Bedtime    Reviewed care gaps. Declined covid/flu vaccine. Patient advised to schedule annual checkup with PCP.      Return in about 6 months (around 8/21/2023) for Return for scheduled annual checkup with PCP.    Parviz Fairbanks PA-C  [facility] SHERRI Michel is a 62 year old, presenting for the following health issues:  Follow Up (Carpal tunnel surgery )      History of Present Illness       Reason for visit:  I need my restriction reworded    She eats 2-3 servings of fruits and vegetables daily.She consumes 3 sweetened beverage(s) daily.She exercises with enough effort to increase her heart rate 60 or more minutes per day.  She exercises with enough effort to increase her heart rate 6 days per week.   She is taking medications regularly.       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      Objective    /70   Pulse 88   Temp 98  F (36.7  C) (Temporal)   Ht 4' 9.32\" (1.456 m)   Wt 143 lb (64.9 kg)   LMP  (LMP Unknown)   SpO2 97%   BMI 30.60 kg/m    Body mass index is 30.6 kg/m .  Physical Exam   GENERAL: healthy, alert and no distress  RESP: lungs clear to auscultation - no rales, rhonchi or wheezes  CV: regular rate and rhythm, normal S1 S2, no S3 or S4, no murmur, click or rub, no peripheral edema and peripheral pulses strong  MS: Normal AROM of wrists, reduced  strength bilaterally, tenderness along the lateral right hand to palpation  PSYCH: mentation appears normal, affect normal/bright                "

## 2023-06-13 NOTE — NURSING NOTE
"Chief Complaint   Patient presents with     Surgical Followup     Release of Guyon's canal right wrist. DOS: 11/23/16 (11 months postop)      Surgical Followup     Left carpal tunnel release and release of Guyon's canal.  DOS: 10/5/16 (12 months postop)     Surgical Followup     Right carpal tunnel release.  DOS: 9/7/16 (13 months postop)     Work Comp       Initial Temp 97.9  F (36.6  C) (Temporal)  Ht 1.448 m (4' 9\")  Wt 69.9 kg (154 lb)  BMI 33.33 kg/m2 Estimated body mass index is 33.33 kg/(m^2) as calculated from the following:    Height as of this encounter: 1.448 m (4' 9\").    Weight as of this encounter: 69.9 kg (154 lb).  Medication Reconciliation: complete   KASI Maldonado  "
none

## 2023-07-08 ENCOUNTER — HEALTH MAINTENANCE LETTER (OUTPATIENT)
Age: 63
End: 2023-07-08

## 2023-12-18 ENCOUNTER — APPOINTMENT (OUTPATIENT)
Dept: GENERAL RADIOLOGY | Facility: CLINIC | Age: 63
End: 2023-12-18
Attending: STUDENT IN AN ORGANIZED HEALTH CARE EDUCATION/TRAINING PROGRAM
Payer: COMMERCIAL

## 2023-12-18 ENCOUNTER — HOSPITAL ENCOUNTER (EMERGENCY)
Facility: CLINIC | Age: 63
Discharge: HOME | End: 2023-12-18
Attending: STUDENT IN AN ORGANIZED HEALTH CARE EDUCATION/TRAINING PROGRAM | Admitting: STUDENT IN AN ORGANIZED HEALTH CARE EDUCATION/TRAINING PROGRAM
Payer: COMMERCIAL

## 2023-12-18 VITALS
TEMPERATURE: 100 F | HEART RATE: 107 BPM | RESPIRATION RATE: 20 BRPM | DIASTOLIC BLOOD PRESSURE: 61 MMHG | SYSTOLIC BLOOD PRESSURE: 94 MMHG | OXYGEN SATURATION: 98 %

## 2023-12-18 DIAGNOSIS — U07.1 COVID-19 VIRUS INFECTION: Primary | ICD-10-CM

## 2023-12-18 DIAGNOSIS — H92.01 RIGHT EAR PAIN: ICD-10-CM

## 2023-12-18 DIAGNOSIS — R51.9 SINUS HEADACHE: ICD-10-CM

## 2023-12-18 LAB
DEPRECATED S PYO AG THROAT QL EIA: NEGATIVE
FLUAV RNA SPEC QL NAA+PROBE: NEGATIVE
FLUBV RNA RESP QL NAA+PROBE: NEGATIVE
GROUP A STREP BY PCR: NOT DETECTED
RSV RNA SPEC NAA+PROBE: NEGATIVE
SARS-COV-2 RNA RESP QL NAA+PROBE: POSITIVE

## 2023-12-18 PROCEDURE — 71045 X-RAY EXAM CHEST 1 VIEW: CPT

## 2023-12-18 PROCEDURE — 96374 THER/PROPH/DIAG INJ IV PUSH: CPT | Performed by: STUDENT IN AN ORGANIZED HEALTH CARE EDUCATION/TRAINING PROGRAM

## 2023-12-18 PROCEDURE — 258N000003 HC RX IP 258 OP 636: Performed by: STUDENT IN AN ORGANIZED HEALTH CARE EDUCATION/TRAINING PROGRAM

## 2023-12-18 PROCEDURE — 99284 EMERGENCY DEPT VISIT MOD MDM: CPT | Performed by: STUDENT IN AN ORGANIZED HEALTH CARE EDUCATION/TRAINING PROGRAM

## 2023-12-18 PROCEDURE — 250N000011 HC RX IP 250 OP 636: Performed by: STUDENT IN AN ORGANIZED HEALTH CARE EDUCATION/TRAINING PROGRAM

## 2023-12-18 PROCEDURE — 99284 EMERGENCY DEPT VISIT MOD MDM: CPT | Mod: 25 | Performed by: STUDENT IN AN ORGANIZED HEALTH CARE EDUCATION/TRAINING PROGRAM

## 2023-12-18 PROCEDURE — 96375 TX/PRO/DX INJ NEW DRUG ADDON: CPT | Performed by: STUDENT IN AN ORGANIZED HEALTH CARE EDUCATION/TRAINING PROGRAM

## 2023-12-18 PROCEDURE — 250N000013 HC RX MED GY IP 250 OP 250 PS 637: Performed by: STUDENT IN AN ORGANIZED HEALTH CARE EDUCATION/TRAINING PROGRAM

## 2023-12-18 PROCEDURE — 87637 SARSCOV2&INF A&B&RSV AMP PRB: CPT | Performed by: STUDENT IN AN ORGANIZED HEALTH CARE EDUCATION/TRAINING PROGRAM

## 2023-12-18 PROCEDURE — 87651 STREP A DNA AMP PROBE: CPT | Performed by: STUDENT IN AN ORGANIZED HEALTH CARE EDUCATION/TRAINING PROGRAM

## 2023-12-18 PROCEDURE — 96361 HYDRATE IV INFUSION ADD-ON: CPT | Performed by: STUDENT IN AN ORGANIZED HEALTH CARE EDUCATION/TRAINING PROGRAM

## 2023-12-18 RX ORDER — ONDANSETRON 2 MG/ML
4 INJECTION INTRAMUSCULAR; INTRAVENOUS ONCE
Status: COMPLETED | OUTPATIENT
Start: 2023-12-18 | End: 2023-12-18

## 2023-12-18 RX ORDER — ONDANSETRON 4 MG/1
4 TABLET, ORALLY DISINTEGRATING ORAL EVERY 8 HOURS PRN
Qty: 10 TABLET | Refills: 0 | Status: SHIPPED | OUTPATIENT
Start: 2023-12-18 | End: 2023-12-21

## 2023-12-18 RX ORDER — ACETAMINOPHEN 500 MG
1000 TABLET ORAL ONCE
Status: COMPLETED | OUTPATIENT
Start: 2023-12-18 | End: 2023-12-18

## 2023-12-18 RX ORDER — KETOROLAC TROMETHAMINE 30 MG/ML
30 INJECTION, SOLUTION INTRAMUSCULAR; INTRAVENOUS ONCE
Status: COMPLETED | OUTPATIENT
Start: 2023-12-18 | End: 2023-12-18

## 2023-12-18 RX ADMIN — ACETAMINOPHEN 1000 MG: 500 TABLET ORAL at 02:01

## 2023-12-18 RX ADMIN — KETOROLAC TROMETHAMINE 30 MG: 30 INJECTION, SOLUTION INTRAMUSCULAR; INTRAVENOUS at 02:01

## 2023-12-18 RX ADMIN — ONDANSETRON 4 MG: 2 INJECTION INTRAMUSCULAR; INTRAVENOUS at 02:00

## 2023-12-18 RX ADMIN — SODIUM CHLORIDE 1000 ML: 9 INJECTION, SOLUTION INTRAVENOUS at 02:02

## 2023-12-18 ASSESSMENT — ENCOUNTER SYMPTOMS
DIARRHEA: 0
NAUSEA: 0
VOMITING: 0
DYSURIA: 0
FREQUENCY: 0
FATIGUE: 1
ABDOMINAL PAIN: 0
APPETITE CHANGE: 1
HEADACHES: 1
SORE THROAT: 1
WOUND: 0
FEVER: 1
DIZZINESS: 0
RHINORRHEA: 1
CHILLS: 1

## 2023-12-18 ASSESSMENT — ACTIVITIES OF DAILY LIVING (ADL): ADLS_ACUITY_SCORE: 35

## 2023-12-18 NOTE — DISCHARGE INSTRUCTIONS
Please see the instruction sheet for caring for yourself during COVID-19 infection.  Please take 600 to 800 mg ibuprofen and or 1000 mg Tylenol every 6-8 hours to help with headaches and bodyaches.  You are provided with Zofran to help with appetite.  Goal is to maintain hydration.  She started having shortness of breath significant chest pains inability to tolerate anything orally with recurrent vomiting please return for reevaluation.  Otherwise please address your work occupational health for duration of absence secondary to COVID-19 infections.

## 2023-12-18 NOTE — Clinical Note
Fern Gonzalez was seen and treated in our emergency department on 12/18/2023.  She may return to work on 12/20/2023.       If you have any questions or concerns, please don't hesitate to call.      Marian Plata MD

## 2023-12-18 NOTE — ED PROVIDER NOTES
"  History     Chief Complaint   Patient presents with    Headache     HPI  Fern Gonzalez is a 63 year old female presents with a 2-week history of nasal congestion and right ear pain frontal headaches with intermittent coughing.  She was seen in [place] approximately 2 weeks ago notes that she was diagnosed with streptococcal pharyngitis and started on penicillin antibiotics however she notes her symptoms are continued without significant improvement.  She has not had any significant chest pains abdominal pains diarrhea nausea or vomiting but just feeling generally unwell.  On arrival patient is a temperature of 100.0 with a heart rate of 107 and is mildly upset with intermittent episodes of tearing/crying secondary to the general unwell feeling that she is having.  She is also having noted body aches and chills and has not taken any medications today    Allergies:  Allergies   Allergen Reactions    Cephalexin Hcl Hives    Codeine Nausea and Vomiting     significant    Gabapentin Nausea and Vomiting    Morphine Hcl Nausea and Vomiting    Percocet [Oxycodone-Acetaminophen] Nausea and Vomiting     Significant; pt reports that plain Oxycodone she did fine with    Valium [Diazepam] Other (See Comments)     \"terrible nightmare\"    Vicodin [Hydrocodone-Acetaminophen] Nausea and Vomiting     significant    Cefazolin Rash       Problem List:    Patient Active Problem List    Diagnosis Date Noted    Severe persistent asthma with status asthmaticus (H28) 05/20/2022     Priority: Medium    Acute respiratory failure with hypoxia (H) 05/20/2022     Priority: Medium    SIRS (systemic inflammatory response syndrome) (H)-tachycardia, tachypnea, leukocytosis 05/20/2022     Priority: Medium    Perennial allergic rhinitis with seasonal variation 05/20/2022     Priority: Medium    Steroid-induced hyperglycemia 05/20/2022     Priority: Medium    Amnesia 04/01/2019     Priority: Medium    Major depressive disorder, recurrent episode, " moderate (H) 2015     Priority: Medium    Tobacco use disorder 2015     Priority: Medium    Anxiety 08/15/2014     Priority: Medium    CARDIOVASCULAR SCREENING; LDL GOAL LESS THAN 160 10/07/2011     Priority: Medium        Past Medical History:    Past Medical History:   Diagnosis Date    Diagnostic skin and sensitization tests (aka ALLERGENS) 10/29/15 skin tests pos. for: T >> dog/DM only.     Esophageal reflux     Headache(784.0) 10/11/2011    Migraine, unspecified, without mention of intractable migraine without mention of status migrainosus     Recurrent sinusitis     Uncomplicated asthma     Varicella meningitis        Past Surgical History:    Past Surgical History:   Procedure Laterality Date    CHOLECYSTECTOMY, LAPOROSCOPIC  2007    Lysis of intestinal adhesions.    [facility]      COLONOSCOPY N/A 10/31/2014    Procedure: COMBINED COLONOSCOPY, SINGLE OR MULTIPLE BIOPSY/POLYPECTOMY BY BIOPSY;  Surgeon: Leonard Dumont MD;  Location: [facility]    HC LAP,FULGURATE/EXCISE LESIONS  10/04    mesh present as had abdominal wall surgery    HYSTERECTOMY TOTAL ABDOMINAL      INJECT EPIDURAL CERVICAL Bilateral 2019    Procedure: INJECTION, SPINE, CERVICAL 6-7, EPIDURAL;  Surgeon: Des Rider MD;  Location: [facility]    RELEASE CARPAL TUNNEL Right 2016    Procedure: RELEASE CARPAL TUNNEL;  Surgeon: Leif Fine MD;  Location: [facility]    RELEASE CARPAL TUNNEL Left 10/5/2016    Procedure: RELEASE CARPAL TUNNEL;  Surgeon: Leif Fine MD;  Location: [facility]    RELEASE CARPAL TUNNEL Right 2016    Procedure: RELEASE CARPAL TUNNEL;  Surgeon: Leif Fine MD;  Location:  OR    Z  DELIVERY ONLY      3 times    [facility] UGI ENDOSCOPY, SIMPLE EXAM  10/26/2006       Family History:    Family History   Problem Relation Age of Onset    Thyroid Disease Maternal Grandmother     Thyroid Disease Sister     Coronary Artery Disease No family hx of      "Hypertension No family hx of     Hyperlipidemia No family hx of     Cancer - colorectal No family hx of     Ovarian Cancer No family hx of     Other Cancer No family hx of     Mental Illness No family hx of     Cerebrovascular Disease No family hx of     Asthma No family hx of     Known Genetic Syndrome No family hx of        Social History:  Marital Status:  Single [1]  Social History     Tobacco Use    Smoking status: Every Day     Years: .5     Types: Cigarettes    Smokeless tobacco: Never    Tobacco comments:     Smoking 3 cigarettes a day   Vaping Use    Vaping Use: Former   Substance Use Topics    Alcohol use: Yes     Alcohol/week: 0.0 standard drinks of alcohol     Comment: \"very rarely\"    Drug use: No        Medications:    amoxicillin-clavulanate (AUGMENTIN) 875-125 MG tablet  ondansetron (ZOFRAN ODT) 4 MG ODT tab  albuterol (PROAIR HFA/PROVENTIL HFA/VENTOLIN HFA) 108 (90 Base) MCG/ACT inhaler  benzonatate (TESSALON) 200 MG capsule  budesonide (PULMICORT) 0.5 MG/2ML neb solution  budesonide-formoterol (SYMBICORT) 160-4.5 MCG/ACT Inhaler  budesonide-formoterol (SYMBICORT) 160-4.5 MCG/ACT Inhaler  dexamethasone (DECADRON) 6 MG tablet  ibuprofen (ADVIL/MOTRIN) 200 MG tablet  ipratropium - albuterol 0.5 mg/2.5 mg/3 mL (DUONEB) 0.5-2.5 (3) MG/3ML neb solution  ipratropium - albuterol 0.5 mg/2.5 mg/3 mL (DUONEB) 0.5-2.5 (3) MG/3ML neb solution  montelukast (SINGULAIR) 10 MG tablet  Multiple Vitamins-Minerals (CENTRUM SILVER 50+WOMEN) TABS  predniSONE (DELTASONE) 20 MG tablet  rizatriptan (MAXALT) 5 MG tablet          Review of Systems   Constitutional:  Positive for appetite change, chills, fatigue and fever.   HENT:  Positive for congestion, ear pain, rhinorrhea, sneezing and sore throat.    Gastrointestinal:  Negative for abdominal pain, diarrhea, nausea and vomiting.   Genitourinary:  Negative for dysuria and frequency.   Skin:  Negative for rash and wound.   Neurological:  Positive for headaches. Negative " for dizziness.   All other systems reviewed and are negative.      Physical Exam   BP: 138/75  Pulse: 107  Temp: 100  F (37.8  C)  Resp: 20  SpO2: 98 %      Physical Exam  Vitals and nursing note reviewed.   Constitutional:       Appearance: Normal appearance. She is not ill-appearing, toxic-appearing or diaphoretic.   HENT:      Head: Normocephalic and atraumatic.      Right Ear: Hearing normal. Tympanic membrane is bulging.      Left Ear: Hearing normal.      Nose: Congestion and rhinorrhea present.      Mouth/Throat:      Mouth: Mucous membranes are moist.      Pharynx: No oropharyngeal exudate or posterior oropharyngeal erythema.   Eyes:      Extraocular Movements: Extraocular movements intact.      Pupils: Pupils are equal, round, and reactive to light.   Cardiovascular:      Rate and Rhythm: Tachycardia present.      Pulses: Normal pulses.      Heart sounds: Murmur heard.   Pulmonary:      Effort: Pulmonary effort is normal.      Breath sounds: Normal breath sounds. No wheezing or rales.   Chest:      Chest wall: No tenderness.   Abdominal:      General: Abdomen is flat. Bowel sounds are normal.      Palpations: Abdomen is soft.      Tenderness: There is no abdominal tenderness.   Neurological:      General: No focal deficit present.      Mental Status: She is alert and oriented to person, place, and time.   Psychiatric:      Comments: Intermittently tearful         ED Course                 Procedures                Results for orders placed or performed during the hospital encounter of 12/18/23 (from the past 24 hour(s))   Symptomatic Influenza A/B, RSV, & SARS-CoV2 PCR (COVID-19) Nose    Specimen: Nose; Swab   Result Value Ref Range    Influenza A PCR Negative Negative    Influenza B PCR Negative Negative    RSV PCR Negative Negative    SARS CoV2 PCR Positive (A) Negative    Narrative    Testing was performed using the Xpert Xpress CoV2/Flu/RSV Assay on the Cepheid GeneXpert Instrument. This test should be  ordered for the detection of SARS-CoV-2, influenza, and RSV viruses in individuals who meet clinical and/or epidemiological criteria. Test performance is unknown in asymptomatic patients. This test is for in vitro diagnostic use under the FDA EUA for laboratories certified under CLIA to perform high or moderate complexity testing. This test has not been FDA cleared or approved. A negative result does not rule out the presence of PCR inhibitors in the specimen or target RNA in concentration below the limit of detection for the assay. If only one viral target is positive but coinfection with multiple targets is suspected, the sample should be re-tested with another FDA cleared, approved, or authorized test, if coinfection would change clinical management. This test was validated by the [facility]. These laboratories are certified under the Clinical Laboratory Improvement Amendments of 1988 (CLIA-88) as qualified to perform high complexity laboratory testing.   Streptococcus A Rapid Screen w/Reflex to PCR    Specimen: Throat; Swab   Result Value Ref Range    Group A Strep antigen Negative Negative   XR Chest Port 1 View    Narrative    EXAM: XR CHEST PORT 1 VIEW  LOCATION: [facility]  DATE: 12/18/2023    INDICATION: cough  COMPARISON: 11/21/2022      Impression    IMPRESSION: Subsegmental atelectasis is seen in the right infrahilar lung zone, the lungs are otherwise unchanged in appearance.       Medications   sodium chloride 0.9% BOLUS 1,000 mL (1,000 mLs Intravenous $New Bag 12/18/23 0202)   ketorolac (TORADOL) injection 30 mg (30 mg Intravenous $Given 12/18/23 0201)   acetaminophen (TYLENOL) tablet 1,000 mg (1,000 mg Oral $Given 12/18/23 0201)   ondansetron (ZOFRAN) injection 4 mg (4 mg Intravenous $Given 12/18/23 0200)       Assessments & Plan (with Medical Decision Making)     I have reviewed the nursing notes.    I have reviewed the findings, diagnosis, plan and  need for follow up with the patient.          Medical Decision Making  63-year-old female presenting with nasal congestion frontal headaches right ear pain body aches chills and decreased appetite.  Recent diagnosis of streptococcal pharyngitis to weeks ago and provided with antibiotics through urgent care.  Patient on arrival is mildly tachycardic and intermittently tearful with a temperature of 100.0.  Patient provided with 1 L normal saline fluids Toradol and Tylenol.  Chest x-ray ordered.  Right tympanic bulging present concerning for possible otitis media on right aspect of ears patient also noting right ear pain.  At this time believe patient may be sent from acute otitis media with associated upper tract infection.  Viral panel ordered.    Patient positive for COVID-19.  Chest x-ray without acute signs of pneumonia.  Discussed supportive care measures and return precautions and outpatient follow-up instructions.  Work note provided.  Patient outside window for COVID Paxlovid medication as patient symptoms been going on for 7 to 10 days.  Patient discharged home with supportive care measures.    Due to patient's right eardrum pain and erythema/bulging will provide Augmentin prescription as well.    New Prescriptions    AMOXICILLIN-CLAVULANATE (AUGMENTIN) 875-125 MG TABLET    Take 1 tablet by mouth 2 times daily    ONDANSETRON (ZOFRAN ODT) 4 MG ODT TAB    Take 1 tablet (4 mg) by mouth every 8 hours as needed for nausea       Final diagnoses:   COVID-19 virus infection   Sinus headache   Right ear pain       12/18/2023   [facility] EMERGENCY DEPT       Marian Plata MD  12/18/23 0216

## 2023-12-18 NOTE — ED TRIAGE NOTES
Pt had bacterial pharyngitis 2 weeks ago. Returned to work last week and was exposed to Covid. Pt reports ongoing, headache, cough, fever, body aches, ear pain and sinus pressure.      Triage Assessment (Adult)       Row Name 12/18/23 0121          Triage Assessment    Airway WDL WDL        Respiratory WDL    Respiratory WDL WDL;cough        Skin Circulation/Temperature WDL    Skin Circulation/Temperature WDL WDL        Cardiac WDL    Cardiac WDL WDL        Peripheral/Neurovascular WDL    Peripheral Neurovascular WDL WDL        Cognitive/Neuro/Behavioral WDL    Cognitive/Neuro/Behavioral WDL WDL

## 2024-05-09 ENCOUNTER — APPOINTMENT (OUTPATIENT)
Dept: GENERAL RADIOLOGY | Facility: CLINIC | Age: 64
End: 2024-05-09
Attending: EMERGENCY MEDICINE
Payer: COMMERCIAL

## 2024-05-09 ENCOUNTER — HOSPITAL ENCOUNTER (EMERGENCY)
Facility: CLINIC | Age: 64
Discharge: HOME | End: 2024-05-09
Attending: EMERGENCY MEDICINE | Admitting: EMERGENCY MEDICINE
Payer: COMMERCIAL

## 2024-05-09 VITALS
HEIGHT: 57 IN | HEART RATE: 96 BPM | OXYGEN SATURATION: 97 % | RESPIRATION RATE: 20 BRPM | BODY MASS INDEX: 30.94 KG/M2 | TEMPERATURE: 97.8 F | SYSTOLIC BLOOD PRESSURE: 100 MMHG | DIASTOLIC BLOOD PRESSURE: 76 MMHG

## 2024-05-09 DIAGNOSIS — J45.41 MODERATE PERSISTENT ASTHMA WITH ACUTE EXACERBATION: ICD-10-CM

## 2024-05-09 DIAGNOSIS — J30.89 PERENNIAL ALLERGIC RHINITIS WITH SEASONAL VARIATION: ICD-10-CM

## 2024-05-09 DIAGNOSIS — J30.2 PERENNIAL ALLERGIC RHINITIS WITH SEASONAL VARIATION: ICD-10-CM

## 2024-05-09 DIAGNOSIS — F17.200 TOBACCO USE DISORDER: ICD-10-CM

## 2024-05-09 LAB
ALBUMIN SERPL BCG-MCNC: 4.1 G/DL (ref 3.5–5.2)
ALP SERPL-CCNC: 79 U/L (ref 40–150)
ALT SERPL W P-5'-P-CCNC: 18 U/L (ref 0–50)
ANION GAP SERPL CALCULATED.3IONS-SCNC: 9 MMOL/L (ref 7–15)
AST SERPL W P-5'-P-CCNC: 20 U/L (ref 0–45)
BASOPHILS # BLD AUTO: 0.1 10E3/UL (ref 0–0.2)
BASOPHILS NFR BLD AUTO: 2 %
BILIRUB SERPL-MCNC: 0.2 MG/DL
BUN SERPL-MCNC: 18.4 MG/DL (ref 8–23)
CALCIUM SERPL-MCNC: 9.2 MG/DL (ref 8.8–10.2)
CHLORIDE SERPL-SCNC: 110 MMOL/L (ref 98–107)
CREAT SERPL-MCNC: 0.55 MG/DL (ref 0.51–0.95)
DEPRECATED HCO3 PLAS-SCNC: 20 MMOL/L (ref 22–29)
EGFRCR SERPLBLD CKD-EPI 2021: >90 ML/MIN/1.73M2
EOSINOPHIL # BLD AUTO: 0.7 10E3/UL (ref 0–0.7)
EOSINOPHIL NFR BLD AUTO: 9 %
ERYTHROCYTE [DISTWIDTH] IN BLOOD BY AUTOMATED COUNT: 13.7 % (ref 10–15)
GLUCOSE SERPL-MCNC: 103 MG/DL (ref 70–99)
HCT VFR BLD AUTO: 36 % (ref 35–47)
HGB BLD-MCNC: 12.2 G/DL (ref 11.7–15.7)
IMM GRANULOCYTES # BLD: 0 10E3/UL
IMM GRANULOCYTES NFR BLD: 1 %
LYMPHOCYTES # BLD AUTO: 2.2 10E3/UL (ref 0.8–5.3)
LYMPHOCYTES NFR BLD AUTO: 29 %
MCH RBC QN AUTO: 31 PG (ref 26.5–33)
MCHC RBC AUTO-ENTMCNC: 33.9 G/DL (ref 31.5–36.5)
MCV RBC AUTO: 92 FL (ref 78–100)
MONOCYTES # BLD AUTO: 0.7 10E3/UL (ref 0–1.3)
MONOCYTES NFR BLD AUTO: 9 %
NEUTROPHILS # BLD AUTO: 3.9 10E3/UL (ref 1.6–8.3)
NEUTROPHILS NFR BLD AUTO: 51 %
NRBC # BLD AUTO: 0 10E3/UL
NRBC BLD AUTO-RTO: 0 /100
PLATELET # BLD AUTO: 214 10E3/UL (ref 150–450)
POTASSIUM SERPL-SCNC: 4.2 MMOL/L (ref 3.4–5.3)
PROT SERPL-MCNC: 6.7 G/DL (ref 6.4–8.3)
RBC # BLD AUTO: 3.93 10E6/UL (ref 3.8–5.2)
SODIUM SERPL-SCNC: 139 MMOL/L (ref 135–145)
WBC # BLD AUTO: 7.7 10E3/UL (ref 4–11)

## 2024-05-09 PROCEDURE — 94640 AIRWAY INHALATION TREATMENT: CPT

## 2024-05-09 PROCEDURE — 71045 X-RAY EXAM CHEST 1 VIEW: CPT

## 2024-05-09 PROCEDURE — 96374 THER/PROPH/DIAG INJ IV PUSH: CPT

## 2024-05-09 PROCEDURE — 99284 EMERGENCY DEPT VISIT MOD MDM: CPT | Performed by: EMERGENCY MEDICINE

## 2024-05-09 PROCEDURE — 99284 EMERGENCY DEPT VISIT MOD MDM: CPT | Mod: 25

## 2024-05-09 PROCEDURE — 85041 AUTOMATED RBC COUNT: CPT | Performed by: EMERGENCY MEDICINE

## 2024-05-09 PROCEDURE — 36415 COLL VENOUS BLD VENIPUNCTURE: CPT | Performed by: EMERGENCY MEDICINE

## 2024-05-09 PROCEDURE — 80053 COMPREHEN METABOLIC PANEL: CPT | Performed by: EMERGENCY MEDICINE

## 2024-05-09 PROCEDURE — 250N000009 HC RX 250: Performed by: EMERGENCY MEDICINE

## 2024-05-09 PROCEDURE — 250N000011 HC RX IP 250 OP 636: Performed by: EMERGENCY MEDICINE

## 2024-05-09 RX ORDER — MONTELUKAST SODIUM 10 MG/1
10 TABLET ORAL AT BEDTIME
Qty: 30 TABLET | Refills: 0 | Status: SHIPPED | OUTPATIENT
Start: 2024-05-09

## 2024-05-09 RX ORDER — METHYLPREDNISOLONE SODIUM SUCCINATE 125 MG/2ML
125 INJECTION, POWDER, LYOPHILIZED, FOR SOLUTION INTRAMUSCULAR; INTRAVENOUS ONCE
Status: COMPLETED | OUTPATIENT
Start: 2024-05-09 | End: 2024-05-09

## 2024-05-09 RX ORDER — AZITHROMYCIN 250 MG/1
TABLET, FILM COATED ORAL
Qty: 6 TABLET | Refills: 0 | Status: SHIPPED | OUTPATIENT
Start: 2024-05-09 | End: 2024-05-14

## 2024-05-09 RX ORDER — IPRATROPIUM BROMIDE AND ALBUTEROL SULFATE 2.5; .5 MG/3ML; MG/3ML
1 SOLUTION RESPIRATORY (INHALATION) EVERY 6 HOURS PRN
Qty: 90 ML | Refills: 0 | Status: SHIPPED | OUTPATIENT
Start: 2024-05-09

## 2024-05-09 RX ORDER — IPRATROPIUM BROMIDE AND ALBUTEROL SULFATE 2.5; .5 MG/3ML; MG/3ML
3 SOLUTION RESPIRATORY (INHALATION) ONCE
Status: COMPLETED | OUTPATIENT
Start: 2024-05-09 | End: 2024-05-09

## 2024-05-09 RX ORDER — PREDNISONE 10 MG/1
TABLET ORAL
Qty: 32 TABLET | Refills: 0 | Status: SHIPPED | OUTPATIENT
Start: 2024-05-09 | End: 2024-05-19

## 2024-05-09 RX ADMIN — METHYLPREDNISOLONE SODIUM SUCCINATE 125 MG: 125 INJECTION, POWDER, FOR SOLUTION INTRAMUSCULAR; INTRAVENOUS at 13:26

## 2024-05-09 RX ADMIN — IPRATROPIUM BROMIDE AND ALBUTEROL SULFATE 3 ML: .5; 3 SOLUTION RESPIRATORY (INHALATION) at 13:38

## 2024-05-09 ASSESSMENT — ACTIVITIES OF DAILY LIVING (ADL)
ADLS_ACUITY_SCORE: 33
ADLS_ACUITY_SCORE: 35

## 2024-05-09 ASSESSMENT — COLUMBIA-SUICIDE SEVERITY RATING SCALE - C-SSRS
2. HAVE YOU ACTUALLY HAD ANY THOUGHTS OF KILLING YOURSELF IN THE PAST MONTH?: NO
6. HAVE YOU EVER DONE ANYTHING, STARTED TO DO ANYTHING, OR PREPARED TO DO ANYTHING TO END YOUR LIFE?: NO
1. IN THE PAST MONTH, HAVE YOU WISHED YOU WERE DEAD OR WISHED YOU COULD GO TO SLEEP AND NOT WAKE UP?: NO

## 2024-05-09 NOTE — ED PROVIDER NOTES
"  History     Chief Complaint   Patient presents with    Cough    Shortness of Breath     HPI  Fern Gonzalez is a 63 year old female who presents with cough and worsening shortness of breath.  Has history of asthma and allergies, is supposed to take Symbicort and Singulair.  She states that the Symbicort was too expensive, so she has not been able to get this filled.  Has also run out of her Singulair so has been taking over-the-counter Allegra at the start of this allergy season, but this does not seem to be helping.  Has been more short of breath for the last few weeks, and has also been coughing up clear and green-colored sputum.  She feels that she has not been well since December, when she was sick with COVID.  Has had a cough since that time, but things have been getting progressively worse over the last few weeks.  Has not been running a fever.  No known sick contacts.  She does smoke, says she smokes half a pack every 2 days.  Has not followed up with her primary doctor regarding an alternative for her expensive asthma medications.  She has previously been hospitalized for asthma exacerbation, has not been intubated but did need to be admitted with BiPAP.    Allergies:  Allergies   Allergen Reactions    Cephalexin Hcl Hives    Codeine Nausea and Vomiting     significant    Gabapentin Nausea and Vomiting    Morphine Hcl Nausea and Vomiting    Percocet [Oxycodone-Acetaminophen] Nausea and Vomiting     Significant; pt reports that plain Oxycodone she did fine with    Valium [Diazepam] Other (See Comments)     \"terrible nightmare\"    Vicodin [Hydrocodone-Acetaminophen] Nausea and Vomiting     significant    Cefazolin Rash       Problem List:    Patient Active Problem List    Diagnosis Date Noted    Severe persistent asthma with status asthmaticus (H28) 05/20/2022     Priority: Medium    Acute respiratory failure with hypoxia (H) 05/20/2022     Priority: Medium    SIRS (systemic inflammatory response syndrome) " (H)-tachycardia, tachypnea, leukocytosis 05/20/2022     Priority: Medium    Perennial allergic rhinitis with seasonal variation 05/20/2022     Priority: Medium    Steroid-induced hyperglycemia 05/20/2022     Priority: Medium    Amnesia 04/01/2019     Priority: Medium    Major depressive disorder, recurrent episode, moderate (H) 05/14/2015     Priority: Medium    Tobacco use disorder 04/08/2015     Priority: Medium    Anxiety 08/15/2014     Priority: Medium    CARDIOVASCULAR SCREENING; LDL GOAL LESS THAN 160 10/07/2011     Priority: Medium        Past Medical History:    Past Medical History:   Diagnosis Date    Diagnostic skin and sensitization tests (aka ALLERGENS) 10/29/15 skin tests pos. for: T >> dog/DM only.     Esophageal reflux     Headache(784.0) 10/11/2011    Migraine, unspecified, without mention of intractable migraine without mention of status migrainosus     Recurrent sinusitis     Uncomplicated asthma     Varicella meningitis        Past Surgical History:    Past Surgical History:   Procedure Laterality Date    CHOLECYSTECTOMY, LAPOROSCOPIC  01/29/2007    Lysis of intestinal adhesions.    [facility] SURGICAL PATHOLOGY      COLONOSCOPY N/A 10/31/2014    Procedure: COMBINED COLONOSCOPY, SINGLE OR MULTIPLE BIOPSY/POLYPECTOMY BY BIOPSY;  Surgeon: Leonard Dumont MD;  Location: [facility]    HC LAP,FULGURATE/EXCISE LESIONS  10/04    mesh present as had abdominal wall surgery    HYSTERECTOMY TOTAL ABDOMINAL  2007    INJECT EPIDURAL CERVICAL Bilateral 7/11/2019    Procedure: INJECTION, SPINE, CERVICAL 6-7, EPIDURAL;  Surgeon: Des Rider MD;  Location: [facility]    RELEASE CARPAL TUNNEL Right 9/7/2016    Procedure: RELEASE CARPAL TUNNEL;  Surgeon: Leif Fine MD;  Location: [facility]    RELEASE CARPAL TUNNEL Left 10/5/2016    Procedure: RELEASE CARPAL TUNNEL;  Surgeon: Leif Fine MD;  Location: [facility]    RELEASE CARPAL TUNNEL Right 11/23/2016    Procedure: RELEASE CARPAL TUNNEL;  Surgeon: Babatunde  "Leif Palafox MD;  Location:  OR    [facility]  DELIVERY ONLY      3 times    [facility]GI ENDOSCOPY, SIMPLE EXAM  10/26/2006       Family History:    Family History   Problem Relation Age of Onset    Thyroid Disease Maternal Grandmother     Thyroid Disease Sister     Coronary Artery Disease No family hx of     Hypertension No family hx of     Hyperlipidemia No family hx of     Cancer - colorectal No family hx of     Ovarian Cancer No family hx of     Other Cancer No family hx of     Mental Illness No family hx of     Cerebrovascular Disease No family hx of     Asthma No family hx of     Known Genetic Syndrome No family hx of        Social History:  Marital Status:  Single [1]  Social History     Tobacco Use    Smoking status: Every Day     Types: Cigarettes    Smokeless tobacco: Never    Tobacco comments:     Smoking 3 cigarettes a day   Vaping Use    Vaping status: Former   Substance Use Topics    Alcohol use: Yes     Alcohol/week: 0.0 standard drinks of alcohol     Comment: \"very rarely\"    Drug use: No        Medications:    azithromycin (ZITHROMAX) 250 MG tablet  ipratropium - albuterol 0.5 mg/2.5 mg/3 mL (DUONEB) 0.5-2.5 (3) MG/3ML neb solution  montelukast (SINGULAIR) 10 MG tablet  predniSONE (DELTASONE) 10 MG tablet  albuterol (PROAIR HFA/PROVENTIL HFA/VENTOLIN HFA) 108 (90 Base) MCG/ACT inhaler  amoxicillin-clavulanate (AUGMENTIN) 875-125 MG tablet  benzonatate (TESSALON) 200 MG capsule  budesonide (PULMICORT) 0.5 MG/2ML neb solution  budesonide-formoterol (SYMBICORT) 160-4.5 MCG/ACT Inhaler  budesonide-formoterol (SYMBICORT) 160-4.5 MCG/ACT Inhaler  dexamethasone (DECADRON) 6 MG tablet  ibuprofen (ADVIL/MOTRIN) 200 MG tablet  ipratropium - albuterol 0.5 mg/2.5 mg/3 mL (DUONEB) 0.5-2.5 (3) MG/3ML neb solution  ipratropium - albuterol 0.5 mg/2.5 mg/3 mL (DUONEB) 0.5-2.5 (3) MG/3ML neb solution  montelukast (SINGULAIR) 10 MG tablet  Multiple Vitamins-Minerals (CENTRUM SILVER 50+WOMEN) TABS  rizatriptan " "(MAXALT) 5 MG tablet          Review of Systems   All other systems reviewed and are negative.      Physical Exam   BP: 100/76  Pulse: 96  Temp: 97.8  F (36.6  C)  Resp: 20  Height: 144.8 cm (4' 9\")  SpO2: 97 %      Physical Exam  Vitals and nursing note reviewed.   Constitutional:       Appearance: She is not diaphoretic.   Cardiovascular:      Rate and Rhythm: Normal rate and regular rhythm.   Pulmonary:      Effort: Pulmonary effort is normal.      Breath sounds: Wheezing present.   Musculoskeletal:      Right lower leg: No edema.      Left lower leg: No edema.   Skin:     General: Skin is warm and dry.   Neurological:      General: No focal deficit present.      Mental Status: She is alert.         ED Course        Procedures              Critical Care time:  none               Results for orders placed or performed during the hospital encounter of 05/09/24 (from the past 24 hour(s))   CBC with platelets differential    Narrative    The following orders were created for panel order CBC with platelets differential.  Procedure                               Abnormality         Status                     ---------                               -----------         ------                     CBC with platelets and d...[[id number]]                      Final result                 Please view results for these tests on the individual orders.   Comprehensive metabolic panel   Result Value Ref Range    Sodium 139 135 - 145 mmol/L    Potassium 4.2 3.4 - 5.3 mmol/L    Carbon Dioxide (CO2) 20 (L) 22 - 29 mmol/L    Anion Gap 9 7 - 15 mmol/L    Urea Nitrogen 18.4 8.0 - 23.0 mg/dL    Creatinine 0.55 0.51 - 0.95 mg/dL    GFR Estimate >90 >60 mL/min/1.73m2    Calcium 9.2 8.8 - 10.2 mg/dL    Chloride 110 (H) 98 - 107 mmol/L    Glucose 103 (H) 70 - 99 mg/dL    Alkaline Phosphatase 79 40 - 150 U/L    AST 20 0 - 45 U/L    ALT 18 0 - 50 U/L    Protein Total 6.7 6.4 - 8.3 g/dL    Albumin 4.1 3.5 - 5.2 g/dL    Bilirubin Total 0.2 <=1.2 " mg/dL   CBC with platelets and differential   Result Value Ref Range    WBC Count 7.7 4.0 - 11.0 10e3/uL    RBC Count 3.93 3.80 - 5.20 10e6/uL    Hemoglobin 12.2 11.7 - 15.7 g/dL    Hematocrit 36.0 35.0 - 47.0 %    MCV 92 78 - 100 fL    MCH 31.0 26.5 - 33.0 pg    MCHC 33.9 31.5 - 36.5 g/dL    RDW 13.7 10.0 - 15.0 %    Platelet Count 214 150 - 450 10e3/uL    % Neutrophils 51 %    % Lymphocytes 29 %    % Monocytes 9 %    % Eosinophils 9 %    % Basophils 2 %    % Immature Granulocytes 1 %    NRBCs per 100 WBC 0 <1 /100    Absolute Neutrophils 3.9 1.6 - 8.3 10e3/uL    Absolute Lymphocytes 2.2 0.8 - 5.3 10e3/uL    Absolute Monocytes 0.7 0.0 - 1.3 10e3/uL    Absolute Eosinophils 0.7 0.0 - 0.7 10e3/uL    Absolute Basophils 0.1 0.0 - 0.2 10e3/uL    Absolute Immature Granulocytes 0.0 <=0.4 10e3/uL    Absolute NRBCs 0.0 10e3/uL   XR Chest Port 1 View    Narrative    CHEST ONE VIEW  5/9/2024 1:35 PM     HISTORY: Dyspnea, asthma exacerbation.    COMPARISON: December 18, 2023      Impression    IMPRESSION: No acute disease.       Medications   ipratropium - albuterol 0.5 mg/2.5 mg/3 mL (DUONEB) neb solution 3 mL (3 mLs Nebulization $Given 5/9/24 5787)   methylPREDNISolone sodium succinate (solu-MEDROL) injection 125 mg (125 mg Intravenous $Given 5/9/24 1326)       Assessments & Plan (with Medical Decision Making)  Anand is a 63-year-old female presenting with concern of asthma exacerbation due to allergies and not being able to afford her medication.  See history and physical exam as above  63-year-old female in no acute respiratory distress, does have wheezing noted on exam throughout all lung fields.  Is able to speak in full sentences without dyspnea.  Denies chest pain, fever, or other signs or symptoms that would indicate significant illness.  Suspect patient has acute asthma exacerbation, and this may be related to underlying seasonal allergic rhinitis.  She was previously taking montelukast but has not been able to take  this recently and has been on over-the-counter antihistamine.  Has also not been taking Symbicort as prescribed for her asthma since she is unable to afford it.  This may be also contributing to her worsening symptoms.  Finally, since patient is still smoking, this is likely not helping her respiratory symptoms.  Oxygen saturations are 99% and above on room air.  Will plan to get a chest x-ray, will check blood work, give a neb treatment and IV steroids.  She is agreeable to this plan  Labs and imaging as above.  CBC and CMP are within acceptable limits of normal.  Chest x-ray does not reveal acute consolidation, pleural effusion, pneumothorax.  She remains 95 to 100% on room air and says she feels slightly better after the neb and Solu-Medrol.  Since she has had ongoing and worsening symptoms, including worsening sputum production for the last 2 weeks, will place on azithromycin to cover for any atypical infection.  Also, with its anti-inflammatory properties may also help with acute asthma exacerbation.  Will start on a steroid taper, refill her nebulizer solution, and give a 1 month refill of montelukast.  She was encouraged to follow-up with her primary provider for any ongoing refills.  Also encouraged return to the emergency room if any new or worsening symptoms develop.  All questions answered discharged in stable condition     I have reviewed the nursing notes.    I have reviewed the findings, diagnosis, plan and need for follow up with the patient.           Medical Decision Making  The patient's presentation was of moderate complexity (a chronic illness mild to moderate exacerbation, progression, or side effect of treatment).    The patient's evaluation involved:  ordering and/or review of 3+ test(s) in this encounter (see separate area of note for details)    The patient's management necessitated moderate risk (prescription drug management including medications given in the ED).        New Prescriptions     AZITHROMYCIN (ZITHROMAX) 250 MG TABLET    Take 2 tablets (500 mg) by mouth daily for 1 day, THEN 1 tablet (250 mg) daily for 4 days.    IPRATROPIUM - ALBUTEROL 0.5 MG/2.5 MG/3 ML (DUONEB) 0.5-2.5 (3) MG/3ML NEB SOLUTION    Take 1 vial (3 mLs) by nebulization every 6 hours as needed for shortness of breath, wheezing or cough    MONTELUKAST (SINGULAIR) 10 MG TABLET    Take 1 tablet (10 mg) by mouth at bedtime    PREDNISONE (DELTASONE) 10 MG TABLET    Take 4 tablets daily for 5 days,  take 2 tablets daily for 3 days, take 1 tablet daily for 3 days, take half a tablet for 3 days.       Final diagnoses:   Moderate persistent asthma with acute exacerbation   Tobacco use disorder   Perennial allergic rhinitis with seasonal variation       5/9/2024   [facility] EMERGENCY DEPT       Megha Sargent,   05/09/24 [id number]

## 2024-05-09 NOTE — DISCHARGE INSTRUCTIONS
Your chest x-ray did not show any sign of pneumonia or fluid overload.  However, since you are having more sputum production and worsening symptoms over the last few weeks, we will start you on an antibiotic to treat for any atypical bacterial infection.  It also has anti-inflammatory properties that may help with your breathing.  Take 2 tablets today, then starting tomorrow take 1 tablet daily for 4 days    You are given a dose of steroids in the emergency room today.  Starting tomorrow, take the steroids as prescribed to slowly taper down over 2 weeks.  Hopefully this will reduce the inflammation and help with asthma exacerbation    You may use the DuoNeb nebulizer treatment every 4-6 hours as needed for shortness of breath, cough, or wheezing.  You should talk with your primary doctor about changing you from Symbicort to a different maintenance inhaler if it is too expensive    Reordered your Singulair, you may start taking this again once daily to help with your allergies, as allergic rhinitis may be causing worsening of your asthma symptoms.  Talk to your primary doctor for any ongoing refills    You develop any significant new or worsening symptoms do not hesitate to return to the emergency room for evaluation

## 2024-05-09 NOTE — ED TRIAGE NOTES
Pt reports cough and SOB that has been getting worse. States she has a history of asthma and is using her nebs and inhalers.      Triage Assessment (Adult)       Row Name 05/09/24 1225          Triage Assessment    Airway WDL WDL        Respiratory WDL    Respiratory WDL cough     Cough Frequency frequent        Skin Circulation/Temperature WDL    Skin Circulation/Temperature WDL WDL        Peripheral/Neurovascular WDL    Peripheral Neurovascular WDL WDL

## 2024-08-25 ENCOUNTER — HEALTH MAINTENANCE LETTER (OUTPATIENT)
Age: 64
End: 2024-08-25

## 2024-10-04 ENCOUNTER — APPOINTMENT (OUTPATIENT)
Dept: CT IMAGING | Facility: CLINIC | Age: 64
End: 2024-10-04
Attending: PHYSICIAN ASSISTANT
Payer: COMMERCIAL

## 2024-10-04 ENCOUNTER — HOSPITAL ENCOUNTER (EMERGENCY)
Facility: CLINIC | Age: 64
Discharge: HOME | End: 2024-10-04
Attending: PHYSICIAN ASSISTANT | Admitting: PHYSICIAN ASSISTANT
Payer: COMMERCIAL

## 2024-10-04 VITALS
DIASTOLIC BLOOD PRESSURE: 68 MMHG | HEIGHT: 57 IN | SYSTOLIC BLOOD PRESSURE: 111 MMHG | TEMPERATURE: 98.5 F | WEIGHT: 144 LBS | BODY MASS INDEX: 31.07 KG/M2 | OXYGEN SATURATION: 99 % | RESPIRATION RATE: 22 BRPM | HEART RATE: 74 BPM

## 2024-10-04 DIAGNOSIS — G43.909 MIGRAINE: ICD-10-CM

## 2024-10-04 DIAGNOSIS — M62.838 NECK MUSCLE SPASM: ICD-10-CM

## 2024-10-04 PROCEDURE — 96375 TX/PRO/DX INJ NEW DRUG ADDON: CPT | Performed by: PHYSICIAN ASSISTANT

## 2024-10-04 PROCEDURE — 250N000011 HC RX IP 250 OP 636: Performed by: PHYSICIAN ASSISTANT

## 2024-10-04 PROCEDURE — 99285 EMERGENCY DEPT VISIT HI MDM: CPT | Mod: 25 | Performed by: PHYSICIAN ASSISTANT

## 2024-10-04 PROCEDURE — 72125 CT NECK SPINE W/O DYE: CPT

## 2024-10-04 PROCEDURE — 99284 EMERGENCY DEPT VISIT MOD MDM: CPT | Performed by: PHYSICIAN ASSISTANT

## 2024-10-04 PROCEDURE — 96374 THER/PROPH/DIAG INJ IV PUSH: CPT | Mod: 59 | Performed by: PHYSICIAN ASSISTANT

## 2024-10-04 PROCEDURE — 70450 CT HEAD/BRAIN W/O DYE: CPT

## 2024-10-04 RX ORDER — ORPHENADRINE CITRATE 30 MG/ML
60 INJECTION INTRAMUSCULAR; INTRAVENOUS ONCE
Status: COMPLETED | OUTPATIENT
Start: 2024-10-04 | End: 2024-10-04

## 2024-10-04 RX ORDER — ACETAMINOPHEN 500 MG
1000 TABLET ORAL 2 TIMES DAILY PRN
COMMUNITY

## 2024-10-04 RX ORDER — KETOROLAC TROMETHAMINE 15 MG/ML
15 INJECTION, SOLUTION INTRAMUSCULAR; INTRAVENOUS ONCE
Status: COMPLETED | OUTPATIENT
Start: 2024-10-04 | End: 2024-10-04

## 2024-10-04 RX ORDER — METHOCARBAMOL 500 MG/1
500 TABLET, FILM COATED ORAL 4 TIMES DAILY PRN
Qty: 20 TABLET | Refills: 0 | Status: SHIPPED | OUTPATIENT
Start: 2024-10-04 | End: 2024-10-10

## 2024-10-04 RX ADMIN — KETOROLAC TROMETHAMINE 15 MG: 15 INJECTION, SOLUTION INTRAMUSCULAR; INTRAVENOUS at 13:02

## 2024-10-04 RX ADMIN — ORPHENADRINE CITRATE 60 MG: 60 INJECTION INTRAMUSCULAR; INTRAVENOUS at 13:05

## 2024-10-04 ASSESSMENT — COLUMBIA-SUICIDE SEVERITY RATING SCALE - C-SSRS
1. IN THE PAST MONTH, HAVE YOU WISHED YOU WERE DEAD OR WISHED YOU COULD GO TO SLEEP AND NOT WAKE UP?: NO
2. HAVE YOU ACTUALLY HAD ANY THOUGHTS OF KILLING YOURSELF IN THE PAST MONTH?: NO
6. HAVE YOU EVER DONE ANYTHING, STARTED TO DO ANYTHING, OR PREPARED TO DO ANYTHING TO END YOUR LIFE?: NO

## 2024-10-04 ASSESSMENT — ACTIVITIES OF DAILY LIVING (ADL)
ADLS_ACUITY_SCORE: 35

## 2024-10-04 NOTE — MEDICATION SCRIBE - ADMISSION MEDICATION HISTORY
Medication Scribe Admission Medication History    Admission medication history is complete. The information provided in this note is only as accurate as the sources available at the time of the update.    Information Source(s): Patient and CareEverywhere/SureScripts via in-person    Pertinent Information: n/a    Changes made to PTA medication list:  Added: tylenol  Deleted: augmentin, tessalon perles, decadron, advil  Changed: pulmicort nebs to prn    Allergies reviewed with patient and updates made in EHR: yes    Medication History Completed By: BEULAH PETERSEN 10/4/2024 2:43 PM    PTA Med List   Medication Sig Last Dose    acetaminophen (TYLENOL) 500 MG tablet Take 1,000 mg by mouth 2 times daily as needed for fever or pain. 10/3/2024 at 1600    albuterol (PROAIR HFA/PROVENTIL HFA/VENTOLIN HFA) 108 (90 Base) MCG/ACT inhaler Inhale 1-2 puffs into the lungs every 4 hours as needed for shortness of breath / dyspnea or wheezing 10/3/2024 at 1100    budesonide (PULMICORT) 0.5 MG/2ML neb solution Inhale 0.5 mg into the lungs 2 times daily as needed (breathing issues). Past Month at unkn    budesonide-formoterol (SYMBICORT) 160-4.5 MCG/ACT Inhaler Inhale 2 puffs into the lungs 2 times daily More than a month at $$$    ipratropium - albuterol 0.5 mg/2.5 mg/3 mL (DUONEB) 0.5-2.5 (3) MG/3ML neb solution Take 1 vial (3 mLs) by nebulization every 6 hours as needed for shortness of breath, wheezing or cough Past Month at unkn    montelukast (SINGULAIR) 10 MG tablet Take 1 tablet (10 mg) by mouth at bedtime 10/2/2024 at hs    Multiple Vitamins-Minerals (CENTRUM SILVER 50+WOMEN) TABS Take 1 tablet by mouth daily 10/4/2024 at am    rizatriptan (MAXALT) 5 MG tablet Take 1 tablet (5 mg) by mouth at onset of headache for migraine May repeat in 2 hours. Max 6 tablets/24 hours. More than a month at unkn

## 2024-10-04 NOTE — DISCHARGE INSTRUCTIONS
Your scans looked overall reassuring.  I think a lot of the neck pain is muscular and could be causing your migraine.  Please start putting heat on the neck and practice gentle stretching.  Use the Robaxin, a muscle relaxer, as needed for spasm relief.  Try to take ibuprofen 600 mg 3-4 times a day for pain and anti-inflammatory relief.  Follow-up next week with your clinic provider for reevaluation.  If you have any new or worsening symptoms please return to the emergency department.    Thank you for choosing [facility]'s Emergency Department. It was a pleasure taking care of you today. If you have any questions, please call [phone].    Cyn Velasquez, DINO

## 2024-10-04 NOTE — ED TRIAGE NOTES
PT presents with c/o neck pain that radiates towards the left shoulder rates it as 4/10 pain score, that started Wednesday when she woke up. Feels dizzy and nauseated.

## 2024-10-04 NOTE — ED PROVIDER NOTES
"  History     Chief Complaint   Patient presents with    Neck Pain    Dizziness    Nausea       HPI  Fern Gonzalez is a 64 year old female who presents to the emergency department complaining of neck pain. The patient reports on Wednesday, 2 days ago, she woke up with left-sided neck pain.  She has had episodes like this in the past about 3 other times for which she has been seen in the emergency department and even admitted at [facility] in 2019.  After the neck pain started she was driving to work on Wednesday and she developed a sudden onset severe migraine, vertigo, and nausea.  She states she had amnesia for about 5 hours with the headache then it slowly improved.  She does have a continued headache in the frontal region.  The neck pain has gotten worse and it is very sore to move her neck.  No reported radiation down the arms or numbness.  She took Tylenol for the headache and neck pain but has not tried anything else and has not taken anything today.  She denies any fevers, URI symptoms, visual changes, vomiting.  Denies any chest pain or shortness of breath.        Allergies:  Allergies   Allergen Reactions    Cephalexin Hcl Hives    Codeine Nausea and Vomiting     significant    Gabapentin Nausea and Vomiting    Morphine Hcl Nausea and Vomiting    Percocet [Oxycodone-Acetaminophen] Nausea and Vomiting     Significant; pt reports that plain Oxycodone she did fine with    Valium [Diazepam] Other (See Comments)     \"terrible nightmare\"    Vicodin [Hydrocodone-Acetaminophen] Nausea and Vomiting     significant    Cefazolin Rash       Problem List:    Patient Active Problem List    Diagnosis Date Noted    Severe persistent asthma with status asthmaticus (H) 05/20/2022     Priority: Medium    Acute respiratory failure with hypoxia (H) 05/20/2022     Priority: Medium    SIRS (systemic inflammatory response syndrome) (H)-tachycardia, tachypnea, leukocytosis 05/20/2022     Priority: Medium    Perennial " allergic rhinitis with seasonal variation 2022     Priority: Medium    Steroid-induced hyperglycemia 2022     Priority: Medium    Amnesia 2019     Priority: Medium    Major depressive disorder, recurrent episode, moderate (H) 2015     Priority: Medium    Tobacco use disorder 2015     Priority: Medium    Anxiety 08/15/2014     Priority: Medium    CARDIOVASCULAR SCREENING; LDL GOAL LESS THAN 160 10/07/2011     Priority: Medium        Past Medical History:    Past Medical History:   Diagnosis Date    Diagnostic skin and sensitization tests (aka ALLERGENS) 10/29/15 skin tests pos. for: T >> dog/DM only.     Esophageal reflux     Headache(784.0) 10/11/2011    Migraine, unspecified, without mention of intractable migraine without mention of status migrainosus     Recurrent sinusitis     Uncomplicated asthma     Varicella meningitis        Past Surgical History:    Past Surgical History:   Procedure Laterality Date    CHOLECYSTECTOMY, LAPOROSCOPIC  2007    Lysis of intestinal adhesions.    [facility] AFF SURGICAL PATHOLOGY      COLONOSCOPY N/A 10/31/2014    Procedure: COMBINED COLONOSCOPY, SINGLE OR MULTIPLE BIOPSY/POLYPECTOMY BY BIOPSY;  Surgeon: Leonard Dumont MD;  Location: [facility]    HC LAP,FULGURATE/EXCISE LESIONS  10/04    mesh present as had abdominal wall surgery    HYSTERECTOMY TOTAL ABDOMINAL      INJECT EPIDURAL CERVICAL Bilateral 2019    Procedure: INJECTION, SPINE, CERVICAL 6-7, EPIDURAL;  Surgeon: Des Rider MD;  Location: [facility]    RELEASE CARPAL TUNNEL Right 2016    Procedure: RELEASE CARPAL TUNNEL;  Surgeon: Leif Fine MD;  Location: [facility]    RELEASE CARPAL TUNNEL Left 10/5/2016    Procedure: RELEASE CARPAL TUNNEL;  Surgeon: Leif Fine MD;  Location: [facility]    RELEASE CARPAL TUNNEL Right 2016    Procedure: RELEASE CARPAL TUNNEL;  Surgeon: Leif Fine MD;  Location: [facility]    ZZC  DELIVERY ONLY      3 times    [facility]GI  "ENDOSCOPY, SIMPLE EXAM  10/26/2006       Family History:    Family History   Problem Relation Age of Onset    Thyroid Disease Maternal Grandmother     Thyroid Disease Sister     Coronary Artery Disease No family hx of     Hypertension No family hx of     Hyperlipidemia No family hx of     Cancer - colorectal No family hx of     Ovarian Cancer No family hx of     Other Cancer No family hx of     Mental Illness No family hx of     Cerebrovascular Disease No family hx of     Asthma No family hx of     Known Genetic Syndrome No family hx of        Social History:  Marital Status:  Single [1]  Social History     Tobacco Use    Smoking status: Every Day     Types: Cigarettes    Smokeless tobacco: Never    Tobacco comments:     Smoking 3 cigarettes a day   Vaping Use    Vaping status: Former   Substance Use Topics    Alcohol use: Yes     Alcohol/week: 0.0 standard drinks of alcohol     Comment: \"very rarely\"    Drug use: No        Medications:    acetaminophen (TYLENOL) 500 MG tablet  albuterol (PROAIR HFA/PROVENTIL HFA/VENTOLIN HFA) 108 (90 Base) MCG/ACT inhaler  budesonide (PULMICORT) 0.5 MG/2ML neb solution  budesonide-formoterol (SYMBICORT) 160-4.5 MCG/ACT Inhaler  ipratropium - albuterol 0.5 mg/2.5 mg/3 mL (DUONEB) 0.5-2.5 (3) MG/3ML neb solution  methocarbamol (ROBAXIN) 500 MG tablet  montelukast (SINGULAIR) 10 MG tablet  Multiple Vitamins-Minerals (CENTRUM SILVER 50+WOMEN) TABS  rizatriptan (MAXALT) 5 MG tablet          Review of Systems   All other systems reviewed and are negative.        Physical Exam   BP: 115/65  Pulse: 95  Temp: 98.5  F (36.9  C)  Resp: 22  Height: 144.8 cm (4' 9\")  Weight: 65.3 kg (144 lb)  SpO2: 99 %      Physical Exam  Vitals and nursing note reviewed.   Constitutional:       General: She is not in acute distress.     Appearance: Normal appearance. She is not ill-appearing, toxic-appearing or diaphoretic.   HENT:      Head: Normocephalic and atraumatic.      Nose: Nose normal.      " Mouth/Throat:      Mouth: Mucous membranes are moist.      Pharynx: Oropharynx is clear.   Eyes:      Extraocular Movements: Extraocular movements intact.      Conjunctiva/sclera: Conjunctivae normal.      Pupils: Pupils are equal, round, and reactive to light.   Neck:      Comments: Tenderness with spasming throughout the left paraspinous musculature into the left trapezius with tenderness throughout.  No nuchal rigidity noted.  Pain with looking rightward but good range of motion overall.  Cardiovascular:      Rate and Rhythm: Normal rate and regular rhythm.      Heart sounds: Normal heart sounds.   Pulmonary:      Effort: Pulmonary effort is normal. No respiratory distress.      Breath sounds: Normal breath sounds.   Abdominal:      General: Abdomen is flat. There is no distension.      Tenderness: There is no abdominal tenderness.   Musculoskeletal:      Cervical back: Neck supple.   Skin:     General: Skin is warm and dry.   Neurological:      General: No focal deficit present.      Mental Status: She is alert and oriented to person, place, and time. Mental status is at baseline.      Cranial Nerves: No cranial nerve deficit.      Motor: No weakness.   Psychiatric:         Mood and Affect: Mood normal.         Behavior: Behavior normal.           ED Course        Procedures      Results for orders placed or performed during the hospital encounter of 10/04/24 (from the past 24 hour(s))   CT Head w/o Contrast    Narrative    CT SCAN OF THE HEAD WITHOUT CONTRAST October 4, 2024 2:04 PM     HISTORY: Headache, dizziness, amnesia.    TECHNIQUE: Axial images of the head and coronal reformations without  IV contrast material. Radiation dose for this scan was reduced using  automated exposure control, adjustment of the mA and/or kV according  to patient size, or iterative reconstruction technique.    COMPARISON: Head CT 3/28/2022.    FINDINGS: No evidence of hemorrhage, mass, or hydrocephalus. Mild  generalized volume  loss with background of nonspecific white matter  hypoattenuation presumably representing chronic small vessel ischemic  change. No acute osseous abnormality.      Impression    IMPRESSION: No acute intracranial abnormality.    SHAY FONSECA MD         SYSTEM ID:  [id number]   CT Cervical Spine w/o Contrast    Narrative    CT CERVICAL SPINE WITHOUT CONTRAST   10/4/2024 2:06 PM     HISTORY: Left-sided neck pain.     TECHNIQUE: Axial images of the cervical spine were obtained without  intravenous contrast. Multiplanar reformations were performed.   Radiation dose for this scan was reduced using automated exposure  control, adjustment of the mA and/or kV according to patient size, or  iterative reconstruction technique.    COMPARISON: None.    FINDINGS: No evidence of fracture or posttraumatic subluxation. The  superior C1 ring and craniocervical junction are partially excluded at  the superior field of view. Multilevel mild to moderate degenerative  disc disease. Multilevel facet arthropathy which is severe on the left  at C3-C4 and on the right at C4-C5. Multilevel grade 1  spondylolisthesis. No high-grade spinal canal stenosis. Moderate left  foraminal stenosis at C2-C3 and C3-C4. Moderate right foraminal  stenosis at C4-C5. Apical pulmonary emphysema with presumed  atelectasis.      Impression    IMPRESSION:   1. No acute osseous abnormality.  2. Multilevel degenerative change.    SHAY FONSECA MD         SYSTEM ID:  [id number]       Medications   orphenadrine (NORFLEX) injection 60 mg (60 mg Intravenous $Given 10/4/24 1305)   ketorolac (TORADOL) injection 15 mg (15 mg Intravenous $Given 10/4/24 1302)         Assessments & Plan (with Medical Decision Making)  Fern Gonzalez is a 64 year old female who presents to the ED complaining of left-sided neck pain with a headache.  Patient reports she has had several episodes of these atypical migraines where she ends up with vertigo, nausea, and an amnestic event.  She  has been worked up with neurology through [facility] during a hospitalization several years ago with no acute findings.  Per chart review it looks like she was diagnosed with transient global amnesia.  Initial headache And 2 days ago.  Comes in for persistent left neck pain.  On arrival she had normal vital signs.  Exam today demonstrated tenderness with spasming throughout the left paraspinous musculature into the trapezius.  No nuchal rigidity, normal neurological exam.  Primary complaint today was the neck pain.  I do think this is muscular in nature rather than radicular since she has no radicular complaints and she has this tenderness and spasming noted on exam.  She requested imaging of the neck to ensure no discogenic issues as she has had epidural injections in the past.  Because of her amnestic event I will also get a CT of the head in addition to CT of the neck.    Patient was given Toradol and Norflex for acute symptom relief.  Imaging reviewed as above and did not show any acute abnormalities. Chronic degenerative changes noted on CT of the spine.  I do not think MRI imaging of the spine or head warranted given her reassuring neurological exam and symptomology.  I went over these results with the patient.  Discussed that neck pain is most likely muscular in nature.  Did note Toradol and Norflex took some of the edge off.  I think she would benefit from continued muscle relaxer therapy, NSAIDs, heat and gentle stretching.  She was comfortable with this plan.  Robaxin will be prescribed.  I advised that she follow-up with her clinic provider next week for reevaluation regarding this recurrent atypical migraine presentation.  She was advised if she develops any new or worsening symptoms to return to the emergency department.  All questions answered and patient discharged home in stable condition.     I have reviewed the nursing notes.    I have reviewed the findings, diagnosis, plan and need for follow up  with the patient.      Discharge Medication List as of 10/4/2024  3:16 PM        START taking these medications    Details   methocarbamol (ROBAXIN) 500 MG tablet Take 1 tablet (500 mg) by mouth 4 times daily as needed for muscle spasms., Disp-20 tablet, R-0, E-Prescribe             Final diagnoses:   Neck muscle spasm   Migraine     Note: Chart documentation done in part with Dragon Voice Recognition software. Although reviewed after completion, some word and grammatical errors may remain.     10/4/2024   [facility] EMERGENCY DEPT       Kareen Ni PA-C  10/04/24 1530

## 2024-10-10 ENCOUNTER — OFFICE VISIT (OUTPATIENT)
Dept: FAMILY MEDICINE | Facility: OTHER | Age: 64
End: 2024-10-10
Payer: COMMERCIAL

## 2024-10-10 VITALS
OXYGEN SATURATION: 97 % | BODY MASS INDEX: 31.38 KG/M2 | WEIGHT: 145 LBS | SYSTOLIC BLOOD PRESSURE: 110 MMHG | RESPIRATION RATE: 20 BRPM | TEMPERATURE: 97.9 F | DIASTOLIC BLOOD PRESSURE: 70 MMHG | HEART RATE: 88 BPM

## 2024-10-10 DIAGNOSIS — M50.30 DDD (DEGENERATIVE DISC DISEASE), CERVICAL: ICD-10-CM

## 2024-10-10 DIAGNOSIS — G45.4 TRANSIENT GLOBAL AMNESIA: ICD-10-CM

## 2024-10-10 DIAGNOSIS — M54.12 LEFT CERVICAL RADICULOPATHY: ICD-10-CM

## 2024-10-10 DIAGNOSIS — J45.50 SEVERE PERSISTENT ASTHMA WITHOUT COMPLICATION (H): ICD-10-CM

## 2024-10-10 DIAGNOSIS — G43.901 MIGRAINE WITH STATUS MIGRAINOSUS, NOT INTRACTABLE, UNSPECIFIED MIGRAINE TYPE: ICD-10-CM

## 2024-10-10 DIAGNOSIS — M54.2 CHRONIC NECK PAIN: Primary | ICD-10-CM

## 2024-10-10 DIAGNOSIS — G89.29 CHRONIC NECK PAIN: Primary | ICD-10-CM

## 2024-10-10 PROCEDURE — 99214 OFFICE O/P EST MOD 30 MIN: CPT | Performed by: PHYSICIAN ASSISTANT

## 2024-10-10 RX ORDER — METHYLPREDNISOLONE 4 MG/1
TABLET ORAL
Qty: 21 TABLET | Refills: 0 | Status: SHIPPED | OUTPATIENT
Start: 2024-10-10

## 2024-10-10 RX ORDER — MONTELUKAST SODIUM 10 MG/1
10 TABLET ORAL AT BEDTIME
Qty: 90 TABLET | Refills: 3 | Status: SHIPPED | OUTPATIENT
Start: 2024-10-10

## 2024-10-10 RX ORDER — RIZATRIPTAN BENZOATE 5 MG/1
5 TABLET ORAL
Qty: 12 TABLET | Refills: 2 | Status: SHIPPED | OUTPATIENT
Start: 2024-10-10

## 2024-10-10 RX ORDER — METHOCARBAMOL 500 MG/1
500 TABLET, FILM COATED ORAL 4 TIMES DAILY PRN
Qty: 30 TABLET | Refills: 1 | Status: SHIPPED | OUTPATIENT
Start: 2024-10-10

## 2024-10-10 ASSESSMENT — ASTHMA QUESTIONNAIRES
QUESTION_1 LAST FOUR WEEKS HOW MUCH OF THE TIME DID YOUR ASTHMA KEEP YOU FROM GETTING AS MUCH DONE AT WORK, SCHOOL OR AT HOME: SOME OF THE TIME
ACT_TOTALSCORE: 14
QUESTION_5 LAST FOUR WEEKS HOW WOULD YOU RATE YOUR ASTHMA CONTROL: SOMEWHAT CONTROLLED
EMERGENCY_ROOM_LAST_YEAR_TOTAL: TWO
ACT_TOTALSCORE: 14
QUESTION_3 LAST FOUR WEEKS HOW OFTEN DID YOUR ASTHMA SYMPTOMS (WHEEZING, COUGHING, SHORTNESS OF BREATH, CHEST TIGHTNESS OR PAIN) WAKE YOU UP AT NIGHT OR EARLIER THAN USUAL IN THE MORNING: TWO OR THREE NIGHTS A WEEK
QUESTION_4 LAST FOUR WEEKS HOW OFTEN HAVE YOU USED YOUR RESCUE INHALER OR NEBULIZER MEDICATION (SUCH AS ALBUTEROL): TWO OR THREE TIMES PER WEEK
QUESTION_2 LAST FOUR WEEKS HOW OFTEN HAVE YOU HAD SHORTNESS OF BREATH: THREE TO SIX TIMES A WEEK

## 2024-10-10 ASSESSMENT — PATIENT HEALTH QUESTIONNAIRE - PHQ9
10. IF YOU CHECKED OFF ANY PROBLEMS, HOW DIFFICULT HAVE THESE PROBLEMS MADE IT FOR YOU TO DO YOUR WORK, TAKE CARE OF THINGS AT HOME, OR GET ALONG WITH OTHER PEOPLE: NOT DIFFICULT AT ALL
SUM OF ALL RESPONSES TO PHQ QUESTIONS 1-9: 2
SUM OF ALL RESPONSES TO PHQ QUESTIONS 1-9: 2

## 2024-10-10 ASSESSMENT — PAIN SCALES - GENERAL: PAINLEVEL: MODERATE PAIN (5)

## 2024-10-10 NOTE — PROGRESS NOTES
Assessment & Plan       ICD-10-CM    1. Chronic neck pain  M54.2 methocarbamol (ROBAXIN) 500 MG tablet    G89.29 methylPREDNISolone (MEDROL DOSEPAK) 4 MG tablet therapy pack     MR Cervical Spine w/o Contrast      2. DDD (degenerative disc disease), cervical  M50.30 methocarbamol (ROBAXIN) 500 MG tablet     methylPREDNISolone (MEDROL DOSEPAK) 4 MG tablet therapy pack     MR Cervical Spine w/o Contrast      3. Left cervical radiculopathy  M54.12 MR Cervical Spine w/o Contrast      4. Migraine with status migrainosus, not intractable, unspecified migraine type  G43.901 rizatriptan (MAXALT) 5 MG tablet     Adult Neurology  Referral      5. Transient global amnesia  G45.4 Adult Neurology  Referral      6. Severe persistent asthma without complication (H)  J45.50 montelukast (SINGULAIR) 10 MG tablet            1-5. Recent ED visit for atypical migraine with transient amnesia which has occurred a handful of times since 2019. Will place new referral to neurology and refill Maxalt to use as needed. For the neck pain, will order a Medrol Dosepak to help with pain and inflammation and she will continue with Robaxin and Tylenol as needed. She declines physical therapy. She did have a neck CT last week but has not had an MRI since 2019 so I recommend a cervical MRI to better visualize the discs and nerves given her worsening pain. Will consider another cervical DIGNA and/or referral to neurosurgery if pain is not improving. She is in agreement with this plan.    6. Continue nebulizers and Symbicort. Managed by PCP.     MED REC REQUIRED  Post Medication Reconciliation Status: discharge medications reconciled and changed, per note/orders  Nicotine/Tobacco Cessation  She reports that she has been smoking cigarettes. She has never used smokeless tobacco.  Nicotine/Tobacco Cessation Plan  Information offered: Patient not interested at this time      Sona Michel is a 64 year old, presenting for the following  health issues:  ER follow up      10/10/2024    10:38 AM   Additional Questions   Roomed by Jerrica FUENTES     ED/UC Followup:    Facility:  [facility] ED   Date of visit: 10/4/2024  Reason for visit: Neck Muscle spasm, Migraine  Current Status: stable but still a lot of neck pain      She was seen in the ED on 10/4 for a severe headache, left sided neck pain and transient amnesia. These symptoms have occurred in the past and she has undergone inpatient neurologic work up without abnormal findings. She never saw outpatient neurology after previous referrals as they were booked out too far. Her headache is gone but she still has significant left sided neck pain. She had epidural steroid injections in her neck a few years ago and would like to pursue these again as her neck pain has been worsening this past year. She has radiation of her pain into the left shoulder and back of the left arm to the elbow. No numbness, tingling or weakness. She is taking 3000 mg of Tylenol total per day with some benefit. She also had Maxalt on hand for her migraines which only work if she catches them early.       Assessments & Plan (with Medical Decision Making)  Fern Gonzalez is a 64 year old female who presents to the ED complaining of left-sided neck pain with a headache.  Patient reports she has had several episodes of these atypical migraines where she ends up with vertigo, nausea, and an amnestic event.  She has been worked up with neurology through [facility] during a hospitalization several years ago with no acute findings.  Per chart review it looks like she was diagnosed with transient global amnesia.  Initial headache And 2 days ago.  Comes in for persistent left neck pain.  On arrival she had normal vital signs.  Exam today demonstrated tenderness with spasming throughout the left paraspinous musculature into the trapezius.  No nuchal rigidity, normal neurological exam.  Primary complaint today was the neck pain.  I  do think this is muscular in nature rather than radicular since she has no radicular complaints and she has this tenderness and spasming noted on exam.  She requested imaging of the neck to ensure no discogenic issues as she has had epidural injections in the past.  Because of her amnestic event I will also get a CT of the head in addition to CT of the neck.    Patient was given Toradol and Norflex for acute symptom relief.  Imaging reviewed as above and did not show any acute abnormalities. Chronic degenerative changes noted on CT of the spine.  I do not think MRI imaging of the spine or head warranted given her reassuring neurological exam and symptomology.  I went over these results with the patient.  Discussed that neck pain is most likely muscular in nature.  Did note Toradol and Norflex took some of the edge off.  I think she would benefit from continued muscle relaxer therapy, NSAIDs, heat and gentle stretching.  She was comfortable with this plan.  Robaxin will be prescribed.  I advised that she follow-up with her clinic provider next week for reevaluation regarding this recurrent atypical migraine presentation.  She was advised if she develops any new or worsening symptoms to return to the emergency department.  All questions answered and patient discharged home in stable condition.         Review of Systems  Constitutional, HEENT, cardiovascular, pulmonary, musculoskeletal, neuro, gi and gu systems are negative, except as otherwise noted.      Objective    /70   Pulse 88   Temp 97.9  F (36.6  C) (Temporal)   Resp 20   Wt 65.8 kg (145 lb)   LMP  (LMP Unknown)   SpO2 97%   BMI 31.38 kg/m    Body mass index is 31.38 kg/m .  Physical Exam   GENERAL: alert and no distress  EYES: Eyes grossly normal to inspection, PERRL and conjunctivae and sclerae normal  RESP: lungs clear to auscultation - no rales, rhonchi or wheezes  CV: regular rate and rhythm, normal S1 S2, no S3 or S4, no murmur, click or  rub, no peripheral edema  MS: Mild tenderness over the cervical spinous processes and moderate tenderness and muscle tightness over the left cervical paraspinals and left trapezius. Cervical bilateral rotation is limited. Full flexion and extension.   NEURO: Normal strength and tone, mentation intact and speech normal. Cranial nerves II-XII are grossly intact. DTRs are 2+/4 throughout and symmetric. Gait is stable.   PSYCH: mentation appears normal, affect normal/bright          Signed Electronically by: Dimas Dahl PA-C    Answers submitted by the patient for this visit:  Patient Health Questionnaire (Submitted on 10/10/2024)  If you checked off any problems, how difficult have these problems made it for you to do your work, take care of things at home, or get along with other people?: Not difficult at all  PHQ9 TOTAL SCORE: 2

## 2024-10-10 NOTE — PATIENT INSTRUCTIONS
Will order an updated MRI of your neck and prescribe a steroid pack to help with pain and swelling.  Continue Tylenonl and muscle relaxer.    Will consider an injection if not improving.    Continue home stretching.    Will refer to neurology for the headaches/amnesia.

## 2024-10-24 ENCOUNTER — HOSPITAL ENCOUNTER (OUTPATIENT)
Dept: MRI IMAGING | Facility: CLINIC | Age: 64
Discharge: HOME | End: 2024-10-24
Attending: PHYSICIAN ASSISTANT | Admitting: PHYSICIAN ASSISTANT
Payer: COMMERCIAL

## 2024-10-24 ENCOUNTER — MYC MEDICAL ADVICE (OUTPATIENT)
Dept: FAMILY MEDICINE | Facility: OTHER | Age: 64
End: 2024-10-24
Payer: COMMERCIAL

## 2024-10-24 ENCOUNTER — TELEPHONE (OUTPATIENT)
Dept: FAMILY MEDICINE | Facility: CLINIC | Age: 64
End: 2024-10-24

## 2024-10-24 DIAGNOSIS — M50.30 DDD (DEGENERATIVE DISC DISEASE), CERVICAL: ICD-10-CM

## 2024-10-24 DIAGNOSIS — M54.2 CHRONIC NECK PAIN: ICD-10-CM

## 2024-10-24 DIAGNOSIS — G89.29 CHRONIC NECK PAIN: ICD-10-CM

## 2024-10-24 DIAGNOSIS — M54.12 LEFT CERVICAL RADICULOPATHY: ICD-10-CM

## 2024-10-24 PROCEDURE — 72141 MRI NECK SPINE W/O DYE: CPT

## 2024-10-24 NOTE — LETTER
December 11, 2024      Fern Gonzalez  [address] [facility] [address]        Dear Fern,   December 11, 2024    To  Fern Gonzalez  [address] [facility] [address]    Your team at [facility] cares about your health. We have reviewed your chart and based on our findings; we are making the following recommendations to better manage your health.     You are in particular need of attention regarding the following:     Pediatric Asthma Control Test    We care about your child's health and are committed to providing quality patient care.     This screening tool helps us to assess how well your child's asthma is controlled.Good asthma control leads to fewer asthma symptoms and greater health. If your child's asthma is not in good control (score is 19 or less) or has been to the ER or urgent care for their asthma, it is recommended they be seen by their provider for medication and lifestyle adjustments.     Please complete the attached questionnaire and respond below with your answers for each question: Pediatric ACT    This is valuable information that is requested by your child's Care Team.    Call or MyChart message your clinic to schedule a colonoscopy, schedule/ a FIT Test, or order a Cologuard test. If you are unsure what type of test you need, please call your clinic and speak to clinic staff.   Colon cancer is now the second leading cause of cancer-related deaths in the [place] for both men and women and there are over 130,000 new cases and 50,000 deaths per year from colon cancer. Colonoscopies can prevent 90-95% of these deaths. Problem lesions can be removed before they ever become cancer. This test is not only looking for cancer, but also getting rid of precancerous lesions.   If you are under/uninsured, we recommend you contact the [employer] Program.[employer] is a free colorectal cancer screening program that provides colonoscopies for eligible under/uninsured  [place] men and women. If you are interested in receiving a free colonoscopy, please call [employer] at t [phone] (mention code [employer]) to see if you're eligible. Please have them send us the results.   Schedule Annual MAM[facility]GRAP[facility] scheduling number is [phone] or schedule in [employer]hart (self referral).  PREVENTATIVE VISIT: Physical    If you have already completed these items, please contact the clinic via phone or   [employer]hart so your care team can review and update your records. Thank you for   choosing [facility] for your healthcare needs. For any questions,   concerns, or to schedule an appointment please contact our clinic.    Healthy Regards,      Your [facility] Team            Sincerely,        Mirta Fan NP

## 2024-10-24 NOTE — TELEPHONE ENCOUNTER
Patient Quality Outreach    Patient is due for the following:   Breast Cancer Screening - Mammogram  Physical Preventive Adult Physical      Topic Date Due    Zoster (Shingles) Vaccine (2 of 3) 11/20/2013    Pneumococcal Vaccine (2 of 2 - PCV) 09/17/2016       Next Steps:   Schedule a nurse only visit for immunizations     Type of outreach:    Sent [employer] message.      Questions for provider review:    None           Gail Wallace CNA

## 2024-10-25 ENCOUNTER — TELEPHONE (OUTPATIENT)
Dept: FAMILY MEDICINE | Facility: CLINIC | Age: 64
End: 2024-10-25
Payer: COMMERCIAL

## 2024-10-25 ENCOUNTER — MEDICAL CORRESPONDENCE (OUTPATIENT)
Dept: HEALTH INFORMATION MANAGEMENT | Facility: CLINIC | Age: 64
End: 2024-10-25
Payer: COMMERCIAL

## 2024-10-25 NOTE — TELEPHONE ENCOUNTER
Order/Referral Request    Who is requesting: Patient     Orders being requested: steroid injections     Reason service is needed/diagnosis: Neck pain     When are orders needed by: n/a    Has this been discussed with Provider: Yes    Does patient have a preference on a Group/Provider/Facility? [facility]    Does patient have an appointment scheduled?: No    Where to send orders: Place orders within Epic    Could we send this information to you in [facility] or would you prefer to receive a phone call?:   Patient would prefer a phone call   Okay to leave a detailed message?: Yes at Cell number on file:    Telephone Information:   Mobile [phone]

## 2024-10-25 NOTE — TELEPHONE ENCOUNTER
I have placed the written order for cervical epidural steroid injection with Dr. Rider in my bin as this is the new way he requests ordered. Please add patient label and [email or web address].    Oscar Dahl PA-C

## 2024-10-29 ENCOUNTER — TELEPHONE (OUTPATIENT)
Dept: FAMILY MEDICINE | Facility: CLINIC | Age: 64
End: 2024-10-29
Payer: COMMERCIAL

## 2024-10-29 NOTE — TELEPHONE ENCOUNTER
REASON FOR VISIT: Migraine with status migrainosus, not intractable, unspecified migraine type [G4...,    DATE OF APPT: 1/24/2025   NOTES (FOR ALL VISITS) STATUS DETAILS   OFFICE NOTE from referring provider Internal [facility] Dimas James PA-C 10/10/2024   MEDICATION LIST Internal    IMAGING  (FOR ALL VISITS)     XR N/A    MRI (HEAD, NECK, SPINE) N/A    CT (HEAD, NECK, SPINE) Internal [facility]  CT Head 10/04/2024

## 2024-10-29 NOTE — TELEPHONE ENCOUNTER
This has been completed and emailed to dept for scheduling per myc encounter 10/24/24. Sent myc. Closing this encounter.

## 2024-10-29 NOTE — TELEPHONE ENCOUNTER
Order/Referral Request    Who is requesting: the patient    Orders being requested: injections     Reason service is needed/diagnosis: preventative    When are orders needed by: asap    Has this been discussed with Provider: Yes    Does patient have a preference on a Group/Provider/Facility? [facility]    Where to send orders: Place orders within Epic    Could we send this information to you in [facility] or would you prefer to receive a phone call?:   Patient would prefer a phone call   Okay to leave a detailed message?: Yes at Home number on file [phone] ([place])

## 2024-11-01 ENCOUNTER — TELEPHONE (OUTPATIENT)
Dept: PALLIATIVE MEDICINE | Facility: CLINIC | Age: 64
End: 2024-11-01
Payer: COMMERCIAL

## 2024-11-27 ENCOUNTER — HOSPITAL ENCOUNTER (OUTPATIENT)
Dept: GENERAL RADIOLOGY | Facility: CLINIC | Age: 64
Discharge: HOME | End: 2024-11-27
Attending: ANESTHESIOLOGY | Admitting: ANESTHESIOLOGY
Payer: COMMERCIAL

## 2024-11-27 ENCOUNTER — HOSPITAL ENCOUNTER (OUTPATIENT)
Facility: CLINIC | Age: 64
Discharge: HOME | End: 2024-11-27
Attending: ANESTHESIOLOGY | Admitting: ANESTHESIOLOGY
Payer: COMMERCIAL

## 2024-11-27 VITALS
DIASTOLIC BLOOD PRESSURE: 114 MMHG | SYSTOLIC BLOOD PRESSURE: 137 MMHG | OXYGEN SATURATION: 98 % | HEART RATE: 72 BPM | RESPIRATION RATE: 18 BRPM | TEMPERATURE: 97.5 F

## 2024-11-27 DIAGNOSIS — M54.12 CERVICAL RADICULOPATHY: ICD-10-CM

## 2024-11-27 PROCEDURE — 250N000011 HC RX IP 250 OP 636: Performed by: ANESTHESIOLOGY

## 2024-11-27 PROCEDURE — 62321 NJX INTERLAMINAR CRV/THRC: CPT | Performed by: ANESTHESIOLOGY

## 2024-11-27 PROCEDURE — 999N000179 XR SURGERY CARM FLUORO LESS THAN 5 MIN W STILLS: Mod: TC

## 2024-11-27 PROCEDURE — 250N000009 HC RX 250: Performed by: ANESTHESIOLOGY

## 2024-11-27 RX ORDER — TRIAMCINOLONE ACETONIDE 40 MG/ML
INJECTION, SUSPENSION INTRA-ARTICULAR; INTRAMUSCULAR PRN
Status: DISCONTINUED | OUTPATIENT
Start: 2024-11-27 | End: 2024-11-27 | Stop reason: HOSPADM

## 2024-11-27 RX ORDER — IOPAMIDOL 612 MG/ML
INJECTION, SOLUTION INTRATHECAL PRN
Status: DISCONTINUED | OUTPATIENT
Start: 2024-11-27 | End: 2024-11-27 | Stop reason: HOSPADM

## 2024-11-27 ASSESSMENT — ACTIVITIES OF DAILY LIVING (ADL)
ADLS_ACUITY_SCORE: 46
ADLS_ACUITY_SCORE: 46

## 2024-11-27 NOTE — DISCHARGE INSTRUCTIONS

## 2024-11-27 NOTE — OP NOTE
CHIEF COMPLAINT: Neck pain with radicular arm pains    INDICATIONS FOR PROCEDURE:   1.This patient suffers from moderate to severe neck pain and upper extremity radicular pains.    2.This pain has persisted for more than 4 weeks and is causing significant functional disability when they are trying to perform ADL's.   3. They failed conservative care which consisted of giving this pain time to pancho, PT, meds.  4. Preoperative NRS pain score was verbally reported to me today as a 7 /10.   5. The patients radicular pains correlates to their MRI which shows spondylolisthesis at C4-5, C5-6, C6-7.  6.  An order was sent to me to perform the technical component of a cervical epidural steroid injection.    PROCEDURE: C6-7 Interlaminar epidural steroid injection using fluoroscopic guidance with contrast dye.     PROCEDURE DETAILS: After written informed consent was obtained from the patient, the patient was escorted to the procedure room.  The patient was placed in the prone position.  A  time out  was conducted to verify patient identity, procedure to be performed, side, site, allergies and any special requirements.  The skin over the neck and upper back region were prepped and draped in normal sterile fashion utilizing ChloraPrep.  Initially the intended target was C5-6 however after looking at the fluoroscopic images the C6-7 segment looked much safer and easier to approach.  Fluoroscopy was used to identify the C6-7 interspace in an AP view and the skin was anesthetized with 2 mL of 1% lidocaine with bicarbonate buffer.  A 20-gauge 3-1/2 inch Tuohy needle was advanced using the loss of resistance technique with preservative free normal saline with fluoroscopic guidance. After negative aspiration for CSF and blood, 1.5 cc of Isovue contrast dye was injected revealing the appropriate cervical epidurogram without evidence of intrathecal or intravascular spread. Following this, a 3-mL solution of 40 mg of triamcinolone with  2 cc preservative-free normal saline was slowly injected.  After injection of the medication, as the needle tip was withdrawn, it was flushed with local anesthetic.  The patient was monitored with blood pressure and pulse oximetry machines with the assistance of an RN throughout the procedure.  The patient was alert and responsive to questions throughout the procedure.   The patient tolerated the procedure well and was observed in the post-procedural area.  The patient was dismissed without apparent complications.     BLOOD LOSS: less than 5 cc    DIAGNOSIS:  1.  3 levels of spondylolisthesis from C4 down to C7 likely causing instability resulting in neck pain and left arm pain.    PLAN:    1. Performed a C6-7 interlaminar epidural steroid injection without complications.   2. The patient was instructed follow-up with the referring provider and to call the [place] [facility] if today's procedure is not helpful.  Given her arm pain she is not a good candidate for medial branch blocks.  If the epidural injection is not helpful then I think the only other options are physical therapy or possibly surgical..    Des Rider MD  Diplomate of the American Board of Anesthesiology, Pain Medicine

## 2024-12-11 NOTE — TELEPHONE ENCOUNTER
Patient Quality Outreach    Patient is due for the following:   Asthma  -  AAP  Colon Cancer Screening  Breast Cancer Screening - Mammogram  Physical Preventive Adult Physical      Topic Date Due    Zoster (Shingles) Vaccine (2 of 3) 11/20/2013    Pneumococcal Vaccine (2 of 2 - PCV) 09/17/2016       Action(s) Taken:   Schedule a nurse only visit for immunizations Adult Preventative      Type of outreach:    Sent [employer] message. and Sent letter.    Questions for provider review:    None           Gail Wallace CNA

## 2025-01-24 ENCOUNTER — PRE VISIT (OUTPATIENT)
Dept: NEUROLOGY | Facility: CLINIC | Age: 65
End: 2025-01-24

## 2025-05-22 ENCOUNTER — HOSPITAL ENCOUNTER (EMERGENCY)
Facility: CLINIC | Age: 65
Discharge: HOME | End: 2025-05-22
Attending: PHYSICIAN ASSISTANT
Payer: COMMERCIAL

## 2025-05-22 ENCOUNTER — APPOINTMENT (OUTPATIENT)
Dept: GENERAL RADIOLOGY | Facility: CLINIC | Age: 65
End: 2025-05-22
Attending: PHYSICIAN ASSISTANT
Payer: COMMERCIAL

## 2025-05-22 VITALS
TEMPERATURE: 97.8 F | SYSTOLIC BLOOD PRESSURE: 109 MMHG | DIASTOLIC BLOOD PRESSURE: 64 MMHG | WEIGHT: 138.4 LBS | OXYGEN SATURATION: 98 % | RESPIRATION RATE: 20 BRPM | HEART RATE: 77 BPM | BODY MASS INDEX: 29.95 KG/M2

## 2025-05-22 DIAGNOSIS — K21.9 GASTROESOPHAGEAL REFLUX DISEASE, UNSPECIFIED WHETHER ESOPHAGITIS PRESENT: ICD-10-CM

## 2025-05-22 LAB
ALBUMIN SERPL BCG-MCNC: 4.1 G/DL (ref 3.5–5.2)
ALP SERPL-CCNC: 85 U/L (ref 40–150)
ALT SERPL W P-5'-P-CCNC: 18 U/L (ref 0–50)
ANION GAP SERPL CALCULATED.3IONS-SCNC: 12 MMOL/L (ref 7–15)
AST SERPL W P-5'-P-CCNC: 20 U/L (ref 0–45)
ATRIAL RATE - MUSE: 88 BPM
BASOPHILS # BLD AUTO: 0.1 10E3/UL (ref 0–0.2)
BASOPHILS NFR BLD AUTO: 1 %
BILIRUB SERPL-MCNC: 0.3 MG/DL
BUN SERPL-MCNC: 16.8 MG/DL (ref 8–23)
CALCIUM SERPL-MCNC: 9.6 MG/DL (ref 8.8–10.4)
CHLORIDE SERPL-SCNC: 107 MMOL/L (ref 98–107)
CREAT SERPL-MCNC: 0.8 MG/DL (ref 0.51–0.95)
DIASTOLIC BLOOD PRESSURE - MUSE: NORMAL MMHG
EGFRCR SERPLBLD CKD-EPI 2021: 83 ML/MIN/1.73M2
EOSINOPHIL # BLD AUTO: 0.1 10E3/UL (ref 0–0.7)
EOSINOPHIL NFR BLD AUTO: 1 %
ERYTHROCYTE [DISTWIDTH] IN BLOOD BY AUTOMATED COUNT: 13.3 % (ref 10–15)
GLUCOSE SERPL-MCNC: 106 MG/DL (ref 70–99)
HCO3 SERPL-SCNC: 20 MMOL/L (ref 22–29)
HCT VFR BLD AUTO: 37.7 % (ref 35–47)
HGB BLD-MCNC: 13 G/DL (ref 11.7–15.7)
IMM GRANULOCYTES # BLD: 0 10E3/UL
IMM GRANULOCYTES NFR BLD: 0 %
INTERPRETATION ECG - MUSE: NORMAL
LIPASE SERPL-CCNC: 38 U/L (ref 13–60)
LYMPHOCYTES # BLD AUTO: 1.7 10E3/UL (ref 0.8–5.3)
LYMPHOCYTES NFR BLD AUTO: 25 %
MCH RBC QN AUTO: 31.3 PG (ref 26.5–33)
MCHC RBC AUTO-ENTMCNC: 34.5 G/DL (ref 31.5–36.5)
MCV RBC AUTO: 91 FL (ref 78–100)
MONOCYTES # BLD AUTO: 0.6 10E3/UL (ref 0–1.3)
MONOCYTES NFR BLD AUTO: 8 %
NEUTROPHILS # BLD AUTO: 4.5 10E3/UL (ref 1.6–8.3)
NEUTROPHILS NFR BLD AUTO: 65 %
NRBC # BLD AUTO: 0 10E3/UL
NRBC BLD AUTO-RTO: 0 /100
P AXIS - MUSE: 34 DEGREES
PLATELET # BLD AUTO: 219 10E3/UL (ref 150–450)
POTASSIUM SERPL-SCNC: 3.8 MMOL/L (ref 3.4–5.3)
PR INTERVAL - MUSE: 148 MS
PROT SERPL-MCNC: 6.8 G/DL (ref 6.4–8.3)
QRS DURATION - MUSE: 68 MS
QT - MUSE: 382 MS
QTC - MUSE: 462 MS
R AXIS - MUSE: 73 DEGREES
RBC # BLD AUTO: 4.16 10E6/UL (ref 3.8–5.2)
SODIUM SERPL-SCNC: 139 MMOL/L (ref 135–145)
SYSTOLIC BLOOD PRESSURE - MUSE: NORMAL MMHG
T AXIS - MUSE: 33 DEGREES
TROPONIN T SERPL HS-MCNC: <6 NG/L
VENTRICULAR RATE- MUSE: 88 BPM
WBC # BLD AUTO: 6.9 10E3/UL (ref 4–11)

## 2025-05-22 PROCEDURE — 82374 ASSAY BLOOD CARBON DIOXIDE: CPT | Performed by: PHYSICIAN ASSISTANT

## 2025-05-22 PROCEDURE — 85041 AUTOMATED RBC COUNT: CPT | Performed by: PHYSICIAN ASSISTANT

## 2025-05-22 PROCEDURE — 71046 X-RAY EXAM CHEST 2 VIEWS: CPT

## 2025-05-22 PROCEDURE — 83690 ASSAY OF LIPASE: CPT | Performed by: PHYSICIAN ASSISTANT

## 2025-05-22 PROCEDURE — 93005 ELECTROCARDIOGRAM TRACING: CPT | Performed by: PHYSICIAN ASSISTANT

## 2025-05-22 PROCEDURE — 250N000009 HC RX 250: Performed by: PHYSICIAN ASSISTANT

## 2025-05-22 PROCEDURE — 84484 ASSAY OF TROPONIN QUANT: CPT | Performed by: PHYSICIAN ASSISTANT

## 2025-05-22 PROCEDURE — 250N000011 HC RX IP 250 OP 636: Performed by: PHYSICIAN ASSISTANT

## 2025-05-22 PROCEDURE — 36415 COLL VENOUS BLD VENIPUNCTURE: CPT | Performed by: PHYSICIAN ASSISTANT

## 2025-05-22 PROCEDURE — 93010 ELECTROCARDIOGRAM REPORT: CPT | Performed by: PHYSICIAN ASSISTANT

## 2025-05-22 PROCEDURE — 99284 EMERGENCY DEPT VISIT MOD MDM: CPT | Performed by: PHYSICIAN ASSISTANT

## 2025-05-22 PROCEDURE — 99285 EMERGENCY DEPT VISIT HI MDM: CPT | Mod: 25 | Performed by: PHYSICIAN ASSISTANT

## 2025-05-22 PROCEDURE — 96374 THER/PROPH/DIAG INJ IV PUSH: CPT | Performed by: PHYSICIAN ASSISTANT

## 2025-05-22 PROCEDURE — 250N000013 HC RX MED GY IP 250 OP 250 PS 637: Performed by: PHYSICIAN ASSISTANT

## 2025-05-22 RX ORDER — LIDOCAINE HYDROCHLORIDE 20 MG/ML
15 SOLUTION OROPHARYNGEAL ONCE
Status: COMPLETED | OUTPATIENT
Start: 2025-05-22 | End: 2025-05-22

## 2025-05-22 RX ORDER — NITROGLYCERIN 0.4 MG/1
0.4 TABLET SUBLINGUAL EVERY 5 MIN PRN
Status: DISCONTINUED | OUTPATIENT
Start: 2025-05-22 | End: 2025-05-22 | Stop reason: HOSPADM

## 2025-05-22 RX ORDER — ASPIRIN 81 MG/1
324 TABLET, CHEWABLE ORAL ONCE
Status: COMPLETED | OUTPATIENT
Start: 2025-05-22 | End: 2025-05-22

## 2025-05-22 RX ORDER — PANTOPRAZOLE SODIUM 40 MG/1
40 TABLET, DELAYED RELEASE ORAL DAILY
Qty: 30 TABLET | Refills: 0 | Status: SHIPPED | OUTPATIENT
Start: 2025-05-22 | End: 2025-06-21

## 2025-05-22 RX ORDER — MAGNESIUM HYDROXIDE/ALUMINUM HYDROXICE/SIMETHICONE 120; 1200; 1200 MG/30ML; MG/30ML; MG/30ML
15 SUSPENSION ORAL ONCE
Status: COMPLETED | OUTPATIENT
Start: 2025-05-22 | End: 2025-05-22

## 2025-05-22 RX ADMIN — PANTOPRAZOLE SODIUM 80 MG: 40 INJECTION, POWDER, FOR SOLUTION INTRAVENOUS at 14:39

## 2025-05-22 RX ADMIN — LIDOCAINE HYDROCHLORIDE 15 ML: 20 SOLUTION ORAL at 13:40

## 2025-05-22 RX ADMIN — ASPIRIN 324 MG: 81 TABLET, CHEWABLE ORAL at 12:59

## 2025-05-22 RX ADMIN — ALUMINUM HYDROXIDE, MAGNESIUM HYDROXIDE, AND SIMETHICONE 15 ML: 200; 200; 20 SUSPENSION ORAL at 13:40

## 2025-05-22 ASSESSMENT — ACTIVITIES OF DAILY LIVING (ADL)
ADLS_ACUITY_SCORE: 52

## 2025-05-22 ASSESSMENT — COLUMBIA-SUICIDE SEVERITY RATING SCALE - C-SSRS
6. HAVE YOU EVER DONE ANYTHING, STARTED TO DO ANYTHING, OR PREPARED TO DO ANYTHING TO END YOUR LIFE?: NO
1. IN THE PAST MONTH, HAVE YOU WISHED YOU WERE DEAD OR WISHED YOU COULD GO TO SLEEP AND NOT WAKE UP?: NO
2. HAVE YOU ACTUALLY HAD ANY THOUGHTS OF KILLING YOURSELF IN THE PAST MONTH?: NO

## 2025-05-22 NOTE — ED PROVIDER NOTES
"  History     Chief Complaint   Patient presents with    Chest Pain       HPI  Fern Gonzalez is a 64 year old female with a history of asthma, GERD, tobacco abuser, who presents to the emergency department complaining of chest pain.  The patient reports for the last 2 days she has had persistent chest pain described as a tightness with associated lightheadedness, nausea, and a discomfort in the back of her throat radiating down her central chest.  She thought it could be indigestion but just feels like something is in right.  Yesterday was scary when she was at work and took 2 aspirin because of her symptoms.  She does work a pretty labor-intensive job.  Today she tried a nebulizer thinking that would help with the chest tightness and now she is feeling shaky but the chest tightness has not improved.  She has maybe a little bit of shortness of breath and has a chronic cough but no new cough or fevers.  Denies any leg swelling.  Denies any abdominal pain.  She has not taken any aspirin today.        Allergies:  Allergies   Allergen Reactions    Cephalexin Hcl Hives    Codeine Nausea and Vomiting     significant    Gabapentin Nausea and Vomiting    Morphine Hcl Nausea and Vomiting    Percocet [Oxycodone-Acetaminophen] Nausea and Vomiting     Significant; pt reports that plain Oxycodone she did fine with    Valium [Diazepam] Other (See Comments)     \"terrible nightmare\"    Vicodin [Hydrocodone-Acetaminophen] Nausea and Vomiting     significant    Cefazolin Rash       Problem List:    Patient Active Problem List    Diagnosis Date Noted    Severe persistent asthma with status asthmaticus (H) 05/20/2022     Priority: Medium    Acute respiratory failure with hypoxia (H) 05/20/2022     Priority: Medium    SIRS (systemic inflammatory response syndrome) (H)-tachycardia, tachypnea, leukocytosis 05/20/2022     Priority: Medium    Perennial allergic rhinitis with seasonal variation 05/20/2022     Priority: Medium    " Steroid-induced hyperglycemia 05/20/2022     Priority: Medium    Amnesia 04/01/2019     Priority: Medium    Major depressive disorder, recurrent episode, moderate (H) 05/14/2015     Priority: Medium    Tobacco use disorder 04/08/2015     Priority: Medium    Anxiety 08/15/2014     Priority: Medium    CARDIOVASCULAR SCREENING; LDL GOAL LESS THAN 160 10/07/2011     Priority: Medium        Past Medical History:    Past Medical History:   Diagnosis Date    Diagnostic skin and sensitization tests (aka ALLERGENS) 10/29/15 skin tests pos. for: T >> dog/DM only.     Esophageal reflux     Headache(784.0) 10/11/2011    Migraine, unspecified, without mention of intractable migraine without mention of status migrainosus     Recurrent sinusitis     Uncomplicated asthma     Varicella meningitis        Past Surgical History:    Past Surgical History:   Procedure Laterality Date    CHOLECYSTECTOMY, LAPOROSCOPIC  01/29/2007    Lysis of intestinal adhesions.    [facility] SURGICAL PATHOLOGY      COLONOSCOPY N/A 10/31/2014    Procedure: COMBINED COLONOSCOPY, SINGLE OR MULTIPLE BIOPSY/POLYPECTOMY BY BIOPSY;  Surgeon: Leonard Dumont MD;  Location: [facility]    HC LAP,FULGURATE/EXCISE LESIONS  10/04    mesh present as had abdominal wall surgery    HYSTERECTOMY TOTAL ABDOMINAL  2007    INJECT EPIDURAL CERVICAL Bilateral 7/11/2019    Procedure: INJECTION, SPINE, CERVICAL 6-7, EPIDURAL;  Surgeon: Des Rider MD;  Location: [facility]    INJECT EPIDURAL CERVICAL Left 11/27/2024    Procedure: left Cervical 6-7 Interlaminar epidural steroid injection using fluoroscopic guidance with contrast dye;  Surgeon: Des Rider MD;  Location: [facility]    RELEASE CARPAL TUNNEL Right 9/7/2016    Procedure: RELEASE CARPAL TUNNEL;  Surgeon: Leif Fine MD;  Location: [facility]    RELEASE CARPAL TUNNEL Left 10/5/2016    Procedure: RELEASE CARPAL TUNNEL;  Surgeon: Leif Fine MD;  Location: [facility]    RELEASE CARPAL TUNNEL Right 11/23/2016     "Procedure: RELEASE CARPAL TUNNEL;  Surgeon: Leif Fine MD;  Location:  OR    [facility]  DELIVERY ONLY      3 times    [facility] UGI ENDOSCOPY, SIMPLE EXAM  10/26/2006       Family History:    Family History   Problem Relation Age of Onset    Thyroid Disease Maternal Grandmother     Thyroid Disease Sister     Coronary Artery Disease No family hx of     Hypertension No family hx of     Hyperlipidemia No family hx of     Cancer - colorectal No family hx of     Ovarian Cancer No family hx of     Other Cancer No family hx of     Mental Illness No family hx of     Cerebrovascular Disease No family hx of     Asthma No family hx of     Known Genetic Syndrome No family hx of        Social History:  Marital Status:  Single [1]  Social History     Tobacco Use    Smoking status: Every Day     Types: Cigarettes    Smokeless tobacco: Never    Tobacco comments:     Smoking 3 cigarettes a day   Vaping Use    Vaping status: Former   Substance Use Topics    Alcohol use: Yes     Alcohol/week: 0.0 standard drinks of alcohol     Comment: \"very rarely\"    Drug use: No        Medications:    acetaminophen (TYLENOL) 500 MG tablet  albuterol (PROAIR HFA/PROVENTIL HFA/VENTOLIN HFA) 108 (90 Base) MCG/ACT inhaler  Fexofenadine HCl (ALLEGRA PO)  ipratropium - albuterol 0.5 mg/2.5 mg/3 mL (DUONEB) 0.5-2.5 (3) MG/3ML neb solution  montelukast (SINGULAIR) 10 MG tablet  Multiple Vitamins-Minerals (CENTRUM SILVER 50+WOMEN) TABS  pantoprazole (PROTONIX) 40 MG EC tablet  rizatriptan (MAXALT) 5 MG tablet          Review of Systems   All other systems reviewed and are negative.          Physical Exam   BP: 128/84  Pulse: 96  Temp: 98.4  F (36.9  C)  Resp: 22  Weight: 62.8 kg (138 lb 6.4 oz)  SpO2: 100 %      Physical Exam  Vitals and nursing note reviewed.   Constitutional:       General: She is not in acute distress.     Appearance: She is well-developed. She is not toxic-appearing or diaphoretic.   HENT:      Head: Normocephalic and " atraumatic.      Nose: Nose normal.      Mouth/Throat:      Mouth: Mucous membranes are moist.   Eyes:      Conjunctiva/sclera: Conjunctivae normal.      Pupils: Pupils are equal, round, and reactive to light.   Cardiovascular:      Rate and Rhythm: Normal rate and regular rhythm.      Heart sounds: Normal heart sounds.   Pulmonary:      Effort: Pulmonary effort is normal. No respiratory distress.      Breath sounds: Normal breath sounds. No wheezing.   Abdominal:      General: Bowel sounds are normal. There is no distension.      Palpations: Abdomen is soft.      Tenderness: There is no abdominal tenderness.   Musculoskeletal:         General: No deformity.      Cervical back: Neck supple.      Right lower leg: No edema.      Left lower leg: No edema.   Skin:     General: Skin is warm and dry.   Neurological:      General: No focal deficit present.      Mental Status: She is alert and oriented to person, place, and time.      Coordination: Coordination normal.   Psychiatric:         Mood and Affect: Mood is anxious.             ED Course        Procedures              EKG Interpretation:      Interpreted by Kareen Ni PA-C  Time reviewed: 1245  Symptoms at time of EKG: chest tightness   Rhythm: normal sinus   Rate: normal  Axis: normal  Ectopy: none  Conduction: normal  ST Segments/ T Waves: No ST-T wave changes  Q Waves: none  Comparison to prior: Unchanged    Clinical Impression: normal EKG        Results for orders placed or performed during the hospital encounter of 05/22/25 (from the past 24 hours)   EKG 12-lead, tracing only   Result Value Ref Range    Systolic Blood Pressure  mmHg    Diastolic Blood Pressure  mmHg    Ventricular Rate 88 BPM    Atrial Rate 88 BPM    OH Interval 148 ms    QRS Duration 68 ms     ms    QTc 462 ms    P Axis 34 degrees    R AXIS 73 degrees    T Axis 33 degrees    Interpretation ECG       Sinus rhythm  Normal ECG  No previous ECGs available  Confirmed by SEE ED  PROVIDER NOTE FOR, ECG INTERPRETATION (4000),  Fina Huff ([id number]) on 5/22/2025 2:48:59 PM     CBC with platelets differential    Narrative    The following orders were created for panel order CBC with platelets differential.  Procedure                               Abnormality         Status                     ---------                               -----------         ------                     CBC with platelets and ...[[id number]]                      Final result                 Please view results for these tests on the individual orders.   Comprehensive metabolic panel   Result Value Ref Range    Sodium 139 135 - 145 mmol/L    Potassium 3.8 3.4 - 5.3 mmol/L    Carbon Dioxide (CO2) 20 (L) 22 - 29 mmol/L    Anion Gap 12 7 - 15 mmol/L    Urea Nitrogen 16.8 8.0 - 23.0 mg/dL    Creatinine 0.80 0.51 - 0.95 mg/dL    GFR Estimate 83 >60 mL/min/1.73m2    Calcium 9.6 8.8 - 10.4 mg/dL    Chloride 107 98 - 107 mmol/L    Glucose 106 (H) 70 - 99 mg/dL    Alkaline Phosphatase 85 40 - 150 U/L    AST 20 0 - 45 U/L    ALT 18 0 - 50 U/L    Protein Total 6.8 6.4 - 8.3 g/dL    Albumin 4.1 3.5 - 5.2 g/dL    Bilirubin Total 0.3 <=1.2 mg/dL   Troponin T, High Sensitivity   Result Value Ref Range    Troponin T, High Sensitivity <6 <=14 ng/L   CBC with platelets and differential   Result Value Ref Range    WBC Count 6.9 4.0 - 11.0 10e3/uL    RBC Count 4.16 3.80 - 5.20 10e6/uL    Hemoglobin 13.0 11.7 - 15.7 g/dL    Hematocrit 37.7 35.0 - 47.0 %    MCV 91 78 - 100 fL    MCH 31.3 26.5 - 33.0 pg    MCHC 34.5 31.5 - 36.5 g/dL    RDW 13.3 10.0 - 15.0 %    Platelet Count 219 150 - 450 10e3/uL    % Neutrophils 65 %    % Lymphocytes 25 %    % Monocytes 8 %    % Eosinophils 1 %    % Basophils 1 %    % Immature Granulocytes 0 %    NRBCs per 100 WBC 0 <1 /100    Absolute Neutrophils 4.5 1.6 - 8.3 10e3/uL    Absolute Lymphocytes 1.7 0.8 - 5.3 10e3/uL    Absolute Monocytes 0.6 0.0 - 1.3 10e3/uL    Absolute Eosinophils 0.1 0.0 - 0.7  10e3/uL    Absolute Basophils 0.1 0.0 - 0.2 10e3/uL    Absolute Immature Granulocytes 0.0 <=0.4 10e3/uL    Absolute NRBCs 0.0 10e3/uL   Lipase   Result Value Ref Range    Lipase 38 13 - 60 U/L   XR Chest 2 Views    Narrative    EXAM: XR CHEST 2 VIEWS  LOCATION: [facility]  DATE: 5/22/2025    INDICATION: chest tightness  COMPARISON: Chest radiograph 5/9/2024.      Impression    IMPRESSION: Normal size of cardiomediastinal silhouette. There are some new opacities, most pronounced in the right apex, could represent infectious/inflammatory process, consider radiographic follow-up to resolution. No lobar consolidation, pleural   effusion or pneumothorax. No acute bony abnormality. Prior cholecystectomy.       Medications   nitroGLYcerin (NITROSTAT) sublingual tablet 0.4 mg (0.4 mg Sublingual Not Given 5/22/25 1339)   aspirin (ASA) chewable tablet 324 mg (324 mg Oral $Given 5/22/25 1259)   alum & mag hydroxide-simethicone (MAALOX) suspension 15 mL (15 mLs Oral $Given 5/22/25 1340)   lidocaine (viscous) (XYLOCAINE) 2 % solution 15 mL (15 mLs Mouth/Throat $Given 5/22/25 1340)   pantoprazole (PROTONIX) IV push injection 80 mg (80 mg Intravenous $Given 5/22/25 1439)         Assessments & Plan (with Medical Decision Making)  Fern Gonzalez is a 64 year old female who presents with a 2-day history of chest tightness discomfort in her throat, nausea, and occasional lightheadedness.  She has not had any significant shortness of breath or cough and no fevers.  Describes pain as starting in her upper abdomen and radiating to her throat and is a burning tight feeling.  Pushing on her abdomen actually helps the abdominal discomfort.  On arrival to the ED, her vital signs were within normal limits.  She did not appear acutely ill or toxic.  Mildly anxious.  Her abdominal exam was benign today.  Cardiopulmonary exam also reassuring, she had no wheezes or decreased air movement to suggest asthma as  "component to her symptoms.  There is concern for cardiac etiology, aspirin given.  Consider nitroglycerin but blood pressure was mildly low at 109/64 so this was held initially.  Her EKG showed no signs of ischemia or dysrhythmias.  Her troponin after 2 days of constant symptoms was also undetectable ruling ACS unlikely.  Her white blood cell count was normal.  CMP unremarkable, lipase within normal limits.  Chest x-ray today showed some new opacities suggestive of infectious versus inflammatory process primarily in the right apex.  Without associated fever, cough, normal oxygenation here, I do not think she has an acute pneumonia.  Discussed this with patient and she agreed unlikely.  She received a GI cocktail here along with Protonix and actually noted some improvement of symptoms afterwards.  Clinically I suspect her symptomology is more consistent with GERD/gastritis.  She notes that she has been eating the \"carnivore diet\" for the last month or so, primarily eating beef sticks and cheese.  Suspect this may have contributed to some GERD.  She also smokes regularly which can also contribute.  Patient was reassured by workup here today and comfortable with plan to discharge home and follow-up in outpatient setting for further evaluation and management.  Patient will be prescribed pantoprazole to take daily going forward I will also place a referral for an endoscopy to be completed hopefully in the next couple weeks for further evaluation.  She can use over-the-counter antacids for symptom relief as well.  I advised that she follow-up closely with her primary care provider.  She was given instructions on when to return to the ED.  All questions answered and patient discharged home in stable condition.     I have reviewed the nursing notes.    I have reviewed the findings, diagnosis, plan and need for follow up with the patient.        Discharge Medication List as of 5/22/2025  3:18 PM        START taking these " medications    Details   pantoprazole (PROTONIX) 40 MG EC tablet Take 1 tablet (40 mg) by mouth daily., Disp-30 tablet, R-0, E-Prescribe             Final diagnoses:   Gastroesophageal reflux disease, unspecified whether esophagitis present     Note: Chart documentation done in part with Dragon Voice Recognition software. Although reviewed after completion, some word and grammatical errors may remain.     5/22/2025   [facility] EMERGENCY DEPT       Kareen Ni PA-C  05/22/25 7625

## 2025-05-22 NOTE — DISCHARGE INSTRUCTIONS
Your workup today was overall reassuring that pain is not likely from your heart.  I think you are probably having some acid reflux and inflammation in your stomach.  It could be related to the dietary changes you made.  Start taking the pantoprazole, an antacid medication, daily starting tomorrow.  You are he received a dose here in the ER.  You can also use over-the-counter antacids as needed.  I placed a referral for you to have an endoscopy done for further evaluation as well.  Follow-up with your clinic provider regarding the results.  If you do have any new or worsening symptoms please return to the emergency department.    Thank you for choosing [facility]'s Emergency Department. It was a pleasure taking care of you today. If you have any questions, please call [phone].    Cyn Velasquez, DINO

## 2025-05-22 NOTE — ED TRIAGE NOTES
"Patient presents with chest tightness, lump in back of throat, and indigestion since Tuesday. Took a neb before arrival and states she is now shaky. Patient states \"something is just not right\".         "

## 2025-05-22 NOTE — MEDICATION SCRIBE - ADMISSION MEDICATION HISTORY
Medication Scribe Admission Medication History    Admission medication history is complete. The information provided in this note is only as accurate as the sources available at the time of the update.    Information Source(s): Patient and CareEverywhere/SureScripts via in-person    Pertinent Information: Verified no use of pain pump or prescription eye drops currently.      Changes made to PTA medication list:  Added: allegra  Deleted: robaxin, medrol dosepak  Changed: None    Allergies reviewed with patient and updates made in EHR: yes    Medication History Completed By: BEULAH PETERSEN 5/22/2025 1:11 PM    PTA Med List   Medication Sig Last Dose/Taking    acetaminophen (TYLENOL) 500 MG tablet Take 1,000 mg by mouth 2 times daily as needed for fever or pain. 5/22/2025 at 10:30 AM    albuterol (PROAIR HFA/PROVENTIL HFA/VENTOLIN HFA) 108 (90 Base) MCG/ACT inhaler Inhale 1-2 puffs into the lungs every 4 hours as needed for shortness of breath / dyspnea or wheezing 5/22/2025 at 12:00 PM    Fexofenadine HCl (ALLEGRA PO) Take 1 tablet by mouth daily. Unsure of strength 5/22/2025 at 10:30 AM    ipratropium - albuterol 0.5 mg/2.5 mg/3 mL (DUONEB) 0.5-2.5 (3) MG/3ML neb solution Take 1 vial (3 mLs) by nebulization every 6 hours as needed for shortness of breath, wheezing or cough 5/22/2025 at 12:00 PM    montelukast (SINGULAIR) 10 MG tablet Take 1 tablet (10 mg) by mouth at bedtime. 5/20/2025 Bedtime    Multiple Vitamins-Minerals (CENTRUM SILVER 50+WOMEN) TABS Take 1 tablet by mouth daily 5/22/2025 at 10:30 AM    rizatriptan (MAXALT) 5 MG tablet Take 1 tablet (5 mg) by mouth at onset of headache for migraine. May repeat in 2 hours. Max 6 tablets/24 hours. 5/19/2025

## 2025-06-02 ENCOUNTER — TELEPHONE (OUTPATIENT)
Dept: GASTROENTEROLOGY | Facility: CLINIC | Age: 65
End: 2025-06-02
Payer: COMMERCIAL

## 2025-06-02 ENCOUNTER — HOSPITAL ENCOUNTER (OUTPATIENT)
Facility: CLINIC | Age: 65
End: 2025-06-02
Attending: SPECIALIST | Admitting: SPECIALIST
Payer: COMMERCIAL

## 2025-06-02 NOTE — TELEPHONE ENCOUNTER
"Endoscopy Scheduling Screen    Caller: patient    Have you had any respiratory illness or flu-like symptoms in the last 10 days?  No    What is your communication preference for Instructions and/or Bowel Prep?   MyChart    What insurance is in the chart?  Other:  [employer]    Ordering/Referring Provider: HIMA KELLEY   (If ordering provider performs procedure, schedule with ordering provider unless otherwise instructed. )    BMI: Estimated body mass index is 29.95 kg/m  as calculated from the following:    Height as of 10/4/24: 1.448 m (4' 9\").    Weight as of 5/22/25: 62.8 kg (138 lb 6.4 oz).     Sedation Ordered  moderate sedation.   If patient BMI > 50 do not schedule in [facility].    If patient BMI > 45 do not schedule at [facility].    Are you taking methadone or Suboxone?  NO, No RN review required.    Have you been diagnosed and are being treated for severe PTSD or severe anxiety?  NO, No RN review required.    Are you taking any prescription medications for pain 3 or more times per week?   NO, No RN review required.    Do you have a history of malignant hyperthermia?  No    (Females) Are you currently pregnant?   No     Have you been diagnosed or told you have pulmonary hypertension?   No    Do you have an LVAD?  No    Have you been told you have moderate to severe sleep apnea?  No.    Have you been told you have COPD, asthma, or any other lung disease?  Yes     What breathing problems do you have?  Asthma     Do you use home oxygen?  No    Have your breathing problems required an ED visit or hospitalization in the last year?  No.    Has your doctor ordered any cardiac tests like echo, angiogram, stress test, ablation, or EKG, that you have not completed yet?  No    Do you  have a history of any heart conditions?  No     Have you ever had or are you waiting for an organ transplant?  No. Continue scheduling, no site restrictions.    Have you had a stroke or transient ischemic attack (TIA aka \"mini stroke\") in the last " "2 years?   No.    Have you been diagnosed with or been told you have cirrhosis of the liver?   No.    Are you currently on dialysis?   No    Do you need assistance transferring?   No    BMI: Estimated body mass index is 29.95 kg/m  as calculated from the following:    Height as of 10/4/24: 1.448 m (4' 9\").    Weight as of 5/22/25: 62.8 kg (138 lb 6.4 oz).     Is patients BMI > 40 and scheduling location UP?  No    Do you take an injectable or oral medication for weight loss or diabetes (excluding insulin)?  No    Do you take the medication Naltrexone?  No    Do you take blood thinners?  No       Prep   Are you currently on dialysis or do you have chronic kidney disease?  No    Do you have a diagnosis of diabetes?  No    Do you have a diagnosis of cystic fibrosis (CF)?  No    On a regular basis do you go 3 -5 days between bowel movements?  No    BMI > 40?  No    Preferred Pharmacy:    [facility] [address] [place] [address] - [address]  [address]  [facility] [address]  Phone: [phone] Fax: [phone]    [facility] [place], [place] - [address]   [address]   [facility] [address]  Phone: [phone] Fax: [phone]    Final Scheduling Details     Procedure scheduled  Upper endoscopy (EGD)    Surgeon:  AURELIANO     Date of procedure:  6/20/25     Pre-OP / PAC:   No - Not required for this site.    Location  [facility] - Per order.    Sedation   MAC/Deep Sedation Per location.      Patient Reminders:   You will receive a call from a Nurse to review instructions and health history.  This assessment must be completed prior to your procedure.  Failure to complete the Nurse assessment may result in the procedure being cancelled.      On the day of your procedure, please designate an adult(s) who can drive you home stay with you for the next 24 hours. The medicines used in the exam will make you sleepy. You will not be able to drive.      You cannot take public transportation, ride share " services, or non-medical taxi service without a responsible caregiver.  Medical transport services are allowed with the requirement that a responsible caregiver will receive you at your destination.  We require that drivers and caregivers are confirmed prior to your procedure.

## 2025-06-05 ENCOUNTER — TELEPHONE (OUTPATIENT)
Dept: GASTROENTEROLOGY | Facility: CLINIC | Age: 65
End: 2025-06-05
Payer: COMMERCIAL

## 2025-06-05 NOTE — TELEPHONE ENCOUNTER
Attempted to contact patient in order to complete pre assessment questions.     Patient needs to cancel procedure due to schedule conflict and will call back to reschedule.  Staff message sent to endoscopy schedulers to cancel.    Fina Nelson LPN

## 2025-06-05 NOTE — TELEPHONE ENCOUNTER
----- Message from Fina LINARES sent at 6/5/2025 11:09 AM CDT -----  Regarding: cancellation  Patient needs to cancel procedure on 6/20 at [facility] due to schedule conflict and will call back to reschedule.      Thank you    Fina Nelson LPN

## 2025-06-05 NOTE — TELEPHONE ENCOUNTER
Caller: RN spoke with patient    Reason for Reschedule/Cancellation (please be detailed, any staff messages or encounters to note?):   Schedule conflict    Did you cancel or rescheduled an EUS procedure? No.    Is screening questionnaire older than 3 months from the reschedule date.   If Yes, please complete screening questionnaire. No    Prior to reschedule please review:  Ordering Provider: Kareen Ni PA-C   Sedation Determined: CS  Does patient have any ASC Exclusions, please identify?: persistent asthma    Notes on Cancelled Procedure:  Procedure: Upper Endoscopy [EGD]   Date: 6/20/2025  Location: [facility]; [address] [place] [address] , [place] [address]  Surgeon: Sreekanth    Rescheduled: No, patient will reschedule at a later time. CTL.

## 2025-06-05 NOTE — TELEPHONE ENCOUNTER
Pre visit planning completed.      Procedure details:    Patient scheduled for Upper endoscopy (EGD) on 6/20/25.     Arrival time: 1130. Procedure time 1230    Facility location: [facility]; [address] [place] [address] , [place] [id number]. Check in location: Main entrance at Surgery registation desk.    Sedation type: MAC    Pre op exam needed? No.    Indication for procedure: Gastroesophageal reflux disease, unspecified whether esophagitis present       Chart review:     Electronic implanted devices? No    Recent diagnosis of diverticulitis within the last 6 weeks? No      Medication review:    Diabetic? No    Anticoagulants? No    Weight loss medication/injectable? No GLP-1 medication per patient's medication list. Nursing to verify with pre-assessment call.    Other medication HOLDING recommendations:  N/A      Prep for procedure:       Procedure information and instructions sent via [facility]         Meredith Moran RN  Endoscopy Procedure Pre Assessment   [phone] option 3

## (undated) DEVICE — SYR 10ML LL W/O NDL

## (undated) DEVICE — TRAY EPDRL 3.5IN 17GA 19GA PRFX BPA DEHP 332079

## (undated) DEVICE — PREP CHLORAPREP 26ML TINTED ORANGE  260815

## (undated) DEVICE — TRAY SINGLE DOSE EPIDURAL ANESTHESIA

## (undated) DEVICE — SYR 10ML PERFIX LL 332152

## (undated) DEVICE — NDL COUNTER 20CT 31142493

## (undated) DEVICE — GLOVE PROTEXIS W/NEU-THERA 7.5  2D73TE75

## (undated) DEVICE — SYR 05ML LL W/O NDL

## (undated) DEVICE — NDL EPIDURAL TUOHY 20GA 3.5" LF DISP 183A12

## (undated) DEVICE — TUBING EXTENSION SET MICROBORE 21CM LL 6N8374

## (undated) DEVICE — GLOVE BIOGEL PI ULTRATOUCH G SZ 7.5 42175